# Patient Record
Sex: FEMALE | Race: WHITE | Employment: OTHER | URBAN - METROPOLITAN AREA
[De-identification: names, ages, dates, MRNs, and addresses within clinical notes are randomized per-mention and may not be internally consistent; named-entity substitution may affect disease eponyms.]

---

## 2017-02-06 ENCOUNTER — TRANSFERRED RECORDS (OUTPATIENT)
Dept: FAMILY MEDICINE | Facility: CLINIC | Age: 82
End: 2017-02-06

## 2017-02-23 ENCOUNTER — TRANSFERRED RECORDS (OUTPATIENT)
Dept: FAMILY MEDICINE | Facility: CLINIC | Age: 82
End: 2017-02-23

## 2017-03-07 ENCOUNTER — OFFICE VISIT (OUTPATIENT)
Dept: FAMILY MEDICINE | Facility: CLINIC | Age: 82
End: 2017-03-07

## 2017-03-07 VITALS
BODY MASS INDEX: 28.07 KG/M2 | WEIGHT: 139 LBS | OXYGEN SATURATION: 98 % | SYSTOLIC BLOOD PRESSURE: 104 MMHG | DIASTOLIC BLOOD PRESSURE: 56 MMHG | HEART RATE: 66 BPM | RESPIRATION RATE: 16 BRPM

## 2017-03-07 DIAGNOSIS — E11.51 DIABETES MELLITUS TYPE 2 WITH PERIPHERAL ARTERY DISEASE (H): Primary | ICD-10-CM

## 2017-03-07 DIAGNOSIS — K21.9 GASTROESOPHAGEAL REFLUX DISEASE, ESOPHAGITIS PRESENCE NOT SPECIFIED: ICD-10-CM

## 2017-03-07 DIAGNOSIS — E03.8 OTHER SPECIFIED HYPOTHYROIDISM: ICD-10-CM

## 2017-03-07 DIAGNOSIS — M48.061 LUMBAR SPINAL STENOSIS: ICD-10-CM

## 2017-03-07 DIAGNOSIS — M54.2 CERVICALGIA: ICD-10-CM

## 2017-03-07 DIAGNOSIS — I25.10 CORONARY ARTERY DISEASE INVOLVING NATIVE CORONARY ARTERY OF NATIVE HEART WITHOUT ANGINA PECTORIS: ICD-10-CM

## 2017-03-07 PROCEDURE — 99214 OFFICE O/P EST MOD 30 MIN: CPT | Performed by: FAMILY MEDICINE

## 2017-03-07 PROCEDURE — 36415 COLL VENOUS BLD VENIPUNCTURE: CPT | Performed by: FAMILY MEDICINE

## 2017-03-07 NOTE — MR AVS SNAPSHOT
After Visit Summary   3/7/2017    Serena Marie    MRN: [id number]           Patient Information     Date Of Birth          8/21/1924        Visit Information        Provider Department      3/7/2017 12:30 PM Victorino Schaefer MD [facility]        Today's Diagnoses     Diabetes mellitus type 2 with peripheral artery disease (H)    -  1    Gastroesophageal reflux disease, esophagitis presence not specified        Cervicalgia        Lumbar spinal stenosis        PAD (peripheral artery disease) (H)        Other specified hypothyroidism           Follow-ups after your visit        Your next 10 appointments already scheduled     Mar 28, 2017  3:00 PM CDT   Pacemaker Check with MITCHELL DCR2   [facility] [place] ([facility])    [address]  [facility] [id number] [phone]            Mar 28, 2017  4:15 PM CDT   Return Visit with Sis Auguste DO   [facility] ([facility])    [address]  [facility] [id number] [phone]              Who to contact     If you have questions or need follow up information about today's clinic visit or your schedule please contact [facility] directly at [phone].  Normal or non-critical lab and imaging results will be communicated to you by [employer], letter or phone within 4 business days after the clinic has received the results. If you do not hear from us within 7 days, please contact the clinic through [employer] or phone. If you have a critical or abnormal lab result, we will notify you by phone as soon as possible.  Submit refill requests through [employer] or call your pharmacy and they will forward the refill request to us. Please allow 3 business days for your refill to be completed.          Additional Information About Your Visit        [employer] Information     The [employer] lets you send messages to your  "doctor, view your test results, renew your prescriptions, schedule appointments and more. To sign up, go to [email or web address] . Click on \"Log in\" on the left side of the screen, which will take you to the Welcome page. Then click on \"Sign up Now\" on the right side of the page.     You will be asked to enter the access code listed below, as well as some personal information. Please follow the directions to create your username and password.     Your access code is: [email or web address]  Expires: 3/13/2017  1:09 PM     Your access code will  in 90 days. If you need help or a new code, please call your [place] clinic or [phone].        Care EveryWhere ID     This is your Care EveryWhere ID. This could be used by other organizations to access your [place] medical records  [id number]        Your Vitals Were     Pulse Respirations Pulse Oximetry BMI (Body Mass Index)          66 16 98% 28.07 kg/m2         Blood Pressure from Last 3 Encounters:   17 104/56   16 138/68   16 124/54    Weight from Last 3 Encounters:   17 63 kg (139 lb)   16 63.7 kg (140 lb 6.4 oz)   16 63.6 kg (140 lb 3.2 oz)              We Performed the Following     Comp. Metabolic Panel (14) ([facility])     Hemoglobin A1C ([facility])     Lipid Panel ([facility])     TSH ([facility])          Today's Medication Changes          These changes are accurate as of: 3/7/17 12:51 PM.  If you have any questions, ask your nurse or doctor.               These medicines have changed or have updated prescriptions.        Dose/Directions    fluocinolone acetonide 0.01 % Oil   This may have changed:    - how to take this  - when to take this  - reasons to take this  - additional instructions   Used for:  DM type 2, goal A1c below 7        Apply to scalp when wet massage well cover with cap leave on over night Wash in am.   Quantity:  118 mL   Refills:  12       furosemide 20 MG tablet   Commonly known as:  LASIX   This may have " changed:  See the new instructions.   Used for:  Encounter for medication refill        TAKE ONE TABLET BY MOUTH ON MONDAY WEDNESDAY AND FRIDAY   Quantity:  38 tablet   Refills:  3       lidocaine 5 % Patch   Commonly known as:  LIDODERM   This may have changed:  See the new instructions.   Used for:  Encounter for medication refill        APPLY UP TO 3 PATCHES TO PAINFUL AREA AT ONCE FOR UP TO 12 H WITHIN A 24 H PERIOD.  REMOVE AFTER 12 HOURS.   Quantity:  60 patch   Refills:  12                Primary Care Provider Office Phone # Fax #    Victorino Schaefer [phone]       [facility] [address]  [facility] [id number]        Goals        General    I want to have good control of Diabetes (pt-stated)     Notes - Note edited  1/28/2016  1:17 PM by Leatha Campos RN    I will check my blood sugars 4 x a day.  I will take Novolog with my meals.  I will take my Levemir at bedtime.              Thank you!     Thank you for choosing [facility]  for your care. Our goal is always to provide you with excellent care. Hearing back from our patients is one way we can continue to improve our services. Please take a few minutes to complete the written survey that you may receive in the mail after your visit with us. Thank you!             Your Updated Medication List - Protect others around you: Learn how to safely use, store and throw away your medicines at www.disposemymeds.org.          This list is accurate as of: 3/7/17 12:51 PM.  Always use your most recent med list.                   Brand Name Dispense Instructions for use    ACE/ARB NOT PRESCRIBED (INTENTIONAL)      1 each continuous prn ACE & ARB not prescribed due to Risk for drug interaction and Worsening renal function on ACE/ARB therapy       aspirin 81 MG tablet      Take 1 tablet by mouth every evening       atorvastatin 80 MG tablet    LIPITOR    90 tablet    Take 1 tablet (80 mg) by mouth daily       blood  glucose monitoring lancets     300 Box    Use to test blood sugar 3-4 times daily or as directed.       cholecalciferol 1000 UNIT tablet    vitamin D    100 tablet    Take 2,000 Units by mouth daily       DOCQLACE 100 MG capsule   Generic drug:  docusate sodium      Take 100 mg by mouth At Bedtime       fish oil-omega-3 fatty acids 1000 MG capsule      Take 1,200 mg by mouth daily       fluocinolone acetonide 0.01 % Oil     118 mL    Apply to scalp when wet massage well cover with cap leave on over night Wash in am.       furosemide 20 MG tablet    LASIX    38 tablet    TAKE ONE TABLET BY MOUTH ON MONDAY WEDNESDAY AND FRIDAY       GENTAK 0.3 % ophthalmic ointment   Generic drug:  gentamicin      Place into the right eye 2 times daily Right socket       GLUCERNA 1.5 JEN PO      Take 1 Can by mouth daily       LAMISIL EX      Externally apply topically daily For under breasts, in between toes       LEVEMIR FLEXTOUCH 100 UNIT/ML injection   Generic drug:  insulin detemir     15 mL    INJECT 15 UNITS UNDER THE SKIN AT BEDTIME       levothyroxine 75 MCG tablet    SYNTHROID/LEVOTHROID    90 tablet    TAKE ONE TABLET BY MOUTH EVERY DAY       lidocaine 5 % Patch    LIDODERM    60 patch    APPLY UP TO 3 PATCHES TO PAINFUL AREA AT ONCE FOR UP TO 12 H WITHIN A 24 H PERIOD.  REMOVE AFTER 12 HOURS.       TITO 128 5 % ophthalmic solution   Generic drug:  sodium chloride      Place 1 drop Into the left eye daily as needed for dry eyes       NovoLOG FLEXPEN 100 UNIT/ML injection   Generic drug:  insulin aspart     15 mL    INJECT 8-10 UNITS SUBCUTANEOUSLY UP TO 4 TIMES DAILY BEFORE MEALS AS DIRECTED       omeprazole 20 MG CR capsule    priLOSEC    30 capsule    Take 1 capsule (20 mg) by mouth At Bedtime       ONE TOUCH ULTRA test strip   Generic drug:  blood glucose monitoring     300 each    TEST BLOOD GLUCOSE FOUR TIMES A DAY       PREPARATION H 0.25-14-74.9 % rectal ointment   Generic drug:  phenylephrine-shark liver oil-mineral  oil-petrolatum      Place rectally daily       PRESERVISION AREDS 2 PO      Take 1 tablet by mouth every morning       ranitidine 150 MG tablet    ZANTAC     Take 150 mg by mouth daily (with lunch)       SYSTANE 0.4-0.3 % Soln ophthalmic solution   Generic drug:  polyethylene glycol 0.4%- propylene glycol 0.3%      Place 1 drop into both eyes as needed for dry eyes       timolol 0.5 % ophthalmic solution    TIMOPTIC    1 Bottle    Place 1 drop Into the left eye 2 times daily       TYLENOL PO      Take 1,000 mg by mouth daily       UNABLE TO FIND      Take 1 Bar by mouth daily MEDICATION NAME: FiberONe Bar       UNIFINE PENTIPS 31G X 6 MM   Generic drug:  insulin pen needle     100 each    USE ONCE DAILY       VAGISIL ANTI-ITCH MEDICATED EX      Externally apply topically every morning

## 2017-03-07 NOTE — LETTER
[facility]  [address] [place]  [address]  Phone: [phone]    March 9, 2017      Serena Marie  [address]  [facility] [address]              Dear Serena,    The results from your recent visit showed that  you are correct and  you did need to increase the insulin.   Do what we discussed and see me as planned.         Sincerely,     Victorino Rader M.D.    Results for orders placed or performed in visit on 03/07/17   Hemoglobin A1C ([facility])   Result Value Ref Range    Hemoglobin A1C 8.0 (H) 4.8 - 5.6 %    Narrative    Performed at:  01 - [facility] [address], [place], [place]  [id number]  : Kurtis Felix MD, Phone:  [phone]   Lipid Panel ([facility])   Result Value Ref Range    Cholesterol 123 100 - 199 mg/dL    Triglycerides 169 (H) 0 - 149 mg/dL    HDL Cholesterol 49 >39 mg/dL    VLDL Cholesterol Lito 34 5 - 40 mg/dL    LDL Cholesterol Calculated 40 0 - 99 mg/dL    LDL/HDL Ratio 0.8 0.0 - 3.2 ratio units    Narrative    Performed at:  01 - [facility] [address], [place], [place]  [id number]  : Kurtis Felix MD, Phone:  [phone]   Comp. Metabolic Panel (14) ([facility])   Result Value Ref Range    Glucose 280 (H) 65 - 99 mg/dL    Urea Nitrogen 16 10 - 36 mg/dL    Creatinine 0.60 0.57 - 1.00 mg/dL    eGFR If NonAfricn Am 79 >59 mL/min/1.73    eGFR If Africn Am 92 >59 mL/min/1.73    BUN/Creatinine Ratio 27 (H) 11 - 26    Sodium 136 134 - 144 mmol/L    Potassium 4.8 3.5 - 5.2 mmol/L    Chloride 96 96 - 106 mmol/L    Total CO2 20 18 - 28 mmol/L    Calcium 9.4 8.7 - 10.3 mg/dL    Protein Total 6.4 6.0 - 8.5 g/dL    Albumin 4.1 3.2 - 4.6 g/dL    Globulin, Total 2.3 1.5 - 4.5 g/dL    A/G Ratio 1.8 1.1 - 2.5    Bilirubin Total 0.3 0.0 - 1.2 mg/dL    Alkaline Phosphatase 79 39 - 117 IU/L    AST 22 0 - 40 IU/L    ALT 14 0 - 32 IU/L    Narrative    Performed at:  01 - [facility] [address], [place], [place]  [id number]  Lab  Director: Kurtis Felix MD, Phone:  [phone]   TSH ([facility])   Result Value Ref Range    TSH 0.756 0.450 - 4.500 uIU/mL    Narrative    Performed at:  01 - [facility] [address], [place], [place]  [id number]  : Kurtis Felix MD, Phone:  [phone]

## 2017-03-07 NOTE — PROGRESS NOTES
Multiple medical issues    Diabetes mellitus  Levemir 15 units daily, NovoLog with meals   at her last visit I suggested she cut back on her NovoLog insulin.  She was using 8-10 units with her meals four times a day.  Her A1c had gotten pretty low and it seemed reasonable to back off.  She tried using six units with her meals and has her readings with her today and is running most sugars during the day pre-meal in the 200s occasionally above 300.  Morning sugars are in the 140-180 range.  She is adamant that she go back to using her higher dose of NovoLog .    GERD previously associated with chest discomfort.  Currently she is on omeprazole 20 g daily no symptoms to suggest reflux.    Lumbar and cervical spine pain, she uses Lidoderm patches that she cots and finds these to be extremely helpful for her pain.    History of coronary artery disease.  She denies any chest discomfort current medications .  Aspirin 81 mg furosemide one tablet Monday and Thursday 20 mg.  Lipitor 80 mg.    Hypothyroidism on levothyroxine.      Past Medical History   Diagnosis Date     CAD (coronary artery disease) 8/2012 8/2012 Cath - 75% RCA with BRIGITTE placed, 40% ostial and distal left main stenosis     Diabetes mellitus type 2 with peripheral artery disease (H)      DM type 2, goal A1c below 7      GERD (gastroesophageal reflux disease)      Gluten intolerance      HTN (hypertension)      been off meds since 2012     Hyperlipidemia LDL goal < 100      Pacemaker 8/2009 8/2009 Near syncopal episode with HR 40s and BP 60/sys - PPM implanted     PAD (peripheral artery disease) (H) 2008     70% proximal right SFA stenosis with successful angioplasy     S/P coronary angiogram 8/2012 8/2012 Cath - 75% RCA with BRIGITTE placed, 40% ostial and distal left main stenosis     Syncope 8/2009 8/2009 Near syncopal episode with HR 40s and BP 60/sys - PPM implanted     Unspecified hypothyroidism      Past Surgical History   Procedure  Laterality Date     Colectomy       Tonsillectomy & adenoidectomy       Appendectomy       Hysterectomy, [place]       BSO     Back surgery       Cholecystectomy, laporoscopic       Cholecystectomy, Laparoscopic     Stent, coronary, [place]  8-12     8/2012 Cath - 75% RCA with BRIGITTE placed, 40% ostial and distal left main stenosis     Implant pacemaker  8/2009     Eye surgery       corneal transplant      C removal of eye       right      Angioplasty  2008     Angioplasty of proximal right superficial femoral artery     Current Outpatient Prescriptions   Medication     Multiple Vitamins-Minerals (PRESERVISION AREDS 2 PO)     omeprazole (PRILOSEC) 20 MG CR capsule     levothyroxine (SYNTHROID, LEVOTHROID) 75 MCG tablet     docusate sodium (DOCQLACE) 100 MG capsule     Terbinafine (LAMISIL EX)     Pramoxine HCl (VAGISIL ANTI-ITCH MEDICATED EX)     phenylephrine-shark liver oil-mineral oil-petrolatum (PREPARATION H) 0.25-14-74.9 % rectal ointment     polyethylene glycol 0.4%- propylene glycol 0.3% (SYSTANE) 0.4-0.3 % SOLN ophthalmic solution     NOVOLOG FLEXPEN 100 UNIT/ML soln     ranitidine (ZANTAC) 150 MG tablet     blood glucose monitoring (ONE TOUCH ULTRASOFT) lancets     lidocaine (LIDODERM) 5 % patch     ONE TOUCH ULTRA test strip     Nutritional Supplements (GLUCERNA 1.5 JEN PO)     UNABLE TO FIND     atorvastatin (LIPITOR) 80 MG tablet     furosemide (LASIX) 20 MG tablet     GENTAK 0.3 % ophthalmic ointment     fish oil-omega-3 fatty acids 1000 MG capsule     LEVEMIR FLEXTOUCH 100 UNIT/ML soln     UNIFINE PENTIPS 31G X 6 MM     cholecalciferol (VITAMIN D) 1000 UNIT tablet     timolol (TIMOPTIC) 0.5 % ophthalmic solution     Acetaminophen (TYLENOL PO)     sodium chloride (TITO 128) 5 % ophthalmic solution     fluocinolone acetonide 0.01 % OIL     aspirin 81 MG tablet     ACE/ARB NOT PRESCRIBED, INTENTIONAL,     No current facility-administered medications for this visit.      Ros  Tired in general  No CP  No edema      She continues to be very social.  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 Metabolic Panel       She will return to using the 8-10 units of NovoLog with her meals.  She is somewhat compulsive about her sugars and would prefer to be tightly controlled.    (K21.9) Gastroesophageal reflux disease, esophagitis presence not specified  Doing well currentlyOn her omeprazole    (M54.2) Cervicalgia  Significant problem that currently is managed well heat and lidocaine patches have helped her quite a bit if she gets a flareup she probably would benefit from brief course of steroids  (M48.06) Lumbar spinal stenosis   as above    (I25.10) Coronary artery disease involving native coronary artery of native heart without angina pectoris  She has no symptoms to suggest ischemia at this time certainly has significant coronary artery disease  Plan: Lipid Panel ([facility])  She will be following up with Dr. Molina shaikh         (E03.8) Other specified hypothyroidism  Plan: TSH ([facility])  Okay    rtc 2 months      Yanick Rader MD

## 2017-03-08 LAB
ALBUMIN SERPL-MCNC: 4.1 G/DL (ref 3.2–4.6)
ALBUMIN/GLOB SERPL: 1.8 {RATIO} (ref 1.1–2.5)
ALP SERPL-CCNC: 79 IU/L (ref 39–117)
ALT SERPL-CCNC: 14 IU/L (ref 0–32)
AST SERPL-CCNC: 22 IU/L (ref 0–40)
BILIRUB SERPL-MCNC: 0.3 MG/DL (ref 0–1.2)
BUN SERPL-MCNC: 16 MG/DL (ref 10–36)
BUN/CREATININE RATIO: 27 (ref 11–26)
CALCIUM SERPL-MCNC: 9.4 MG/DL (ref 8.7–10.3)
CHLORIDE SERPLBLD-SCNC: 96 MMOL/L (ref 96–106)
CHOLEST SERPL-MCNC: 123 MG/DL (ref 100–199)
CREAT SERPL-MCNC: 0.6 MG/DL (ref 0.57–1)
EGFR IF AFRICN AM: 92 ML/MIN/1.73
EGFR IF NONAFRICN AM: 79 ML/MIN/1.73
GLOBULIN, TOTAL: 2.3 G/DL (ref 1.5–4.5)
GLUCOSE SERPL-MCNC: 280 MG/DL (ref 65–99)
HBA1C MFR BLD: 8 % (ref 4.8–5.6)
HDLC SERPL-MCNC: 49 MG/DL
LDL/HDL RATIO: 0.8 RATIO UNITS (ref 0–3.2)
LDLC SERPL CALC-MCNC: 40 MG/DL (ref 0–99)
POTASSIUM SERPL-SCNC: 4.8 MMOL/L (ref 3.5–5.2)
PROT SERPL-MCNC: 6.4 G/DL (ref 6–8.5)
SODIUM SERPL-SCNC: 136 MMOL/L (ref 134–144)
TOTAL CO2: 20 MMOL/L (ref 18–28)
TRIGL SERPL-MCNC: 169 MG/DL (ref 0–149)
TSH BLD-ACNC: 0.76 UIU/ML (ref 0.45–4.5)
VLDLC SERPL CALC-MCNC: 34 MG/DL (ref 5–40)

## 2017-03-28 ENCOUNTER — ALLIED HEALTH/NURSE VISIT (OUTPATIENT)
Dept: CARDIOLOGY | Facility: CLINIC | Age: 82
End: 2017-03-28
Payer: MEDICARE

## 2017-03-28 ENCOUNTER — OFFICE VISIT (OUTPATIENT)
Dept: CARDIOLOGY | Facility: CLINIC | Age: 82
End: 2017-03-28
Payer: MEDICARE

## 2017-03-28 VITALS
HEART RATE: 72 BPM | BODY MASS INDEX: 28.43 KG/M2 | HEIGHT: 59 IN | WEIGHT: 141 LBS | SYSTOLIC BLOOD PRESSURE: 124 MMHG | DIASTOLIC BLOOD PRESSURE: 70 MMHG

## 2017-03-28 DIAGNOSIS — I73.9 PAD (PERIPHERAL ARTERY DISEASE) (H): ICD-10-CM

## 2017-03-28 DIAGNOSIS — Z95.0 CARDIAC PACEMAKER IN SITU: Primary | ICD-10-CM

## 2017-03-28 PROCEDURE — 93280 PM DEVICE PROGR EVAL DUAL: CPT | Performed by: INTERNAL MEDICINE

## 2017-03-28 PROCEDURE — 99213 OFFICE O/P EST LOW 20 MIN: CPT | Performed by: INTERNAL MEDICINE

## 2017-03-28 RX ORDER — SENNOSIDES 8.6 MG
1300 CAPSULE ORAL DAILY
COMMUNITY
End: 2020-08-28

## 2017-03-28 RX ORDER — LIDOCAINE 50 MG/G
1 PATCH TOPICAL EVERY 24 HOURS
COMMUNITY
End: 2017-08-31

## 2017-03-28 RX ORDER — ATORVASTATIN CALCIUM 80 MG/1
80 TABLET, FILM COATED ORAL DAILY
Qty: 90 TABLET | Refills: 3 | Status: SHIPPED | OUTPATIENT
Start: 2017-03-28

## 2017-03-28 RX ORDER — FUROSEMIDE 20 MG
20 TABLET ORAL WEEKLY
COMMUNITY
End: 2018-02-17

## 2017-03-28 NOTE — LETTER
3/28/2017    Victorino Schaefer MD  [facility]  [address] [place] [address]    RE: Serena SETH Marie       Dear Colleague,    I had the pleasure of seeing Serena Woodshelder in the [facility].    REFERRING PHYSICIAN:  Dr. Victorino Schaefer.      Ms. 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  She is here for a followup visit today.  She was hospitalized in September of last year with pneumonia.  During that time, she did undergo an echocardiogram which I have available for review.  Her heart function looks really good for 89.  Her EF is normal at 60%-65%.  She has mild mitral insufficiency, but no other significant valvular heart disease.  RV function looks normal.  Pacemaker is noted in the RV.  Review of her labs from the beginning of this month show optimal control of her cholesterol numbers with the exception of mild elevation in the triglycerides at 169.  Her LDL is 40, HDL 49, total is 123.  She does tolerate high-intensity statin therapy.  She also had a basic metabolic panel showing normal kidney function and normal electrolytes.  Liver function tests appeared normal that day.  Her hemoglobin A1c was elevated at 8.  She does have measurement of her blood sugar numbers and they range in the 200s-300s.      PHYSICAL EXAMINATION:   VITAL SIGNS:  On exam today, her blood pressure is 124/70, pulse is 72, weight 141.  This has been fairly stable, fluctuating only a couple of pounds over the last year.   CARDIOVASCULAR:  Tones are regular.  I do not appreciate a murmur today on exam.   LUNGS:  Clear posteriorly.   EXTREMITIES:  She has no peripheral edema.     Outpatient Encounter Prescriptions as of 3/28/2017   Medication Sig Dispense Refill     acetaminophen (TYLENOL ARTHRITIS  PAIN) 650 MG CR tablet Take 1,300 mg by mouth daily States 2 tablets qam & usually 2 tablets qpm       RaNITidine HCl (ZANTAC 150 MAXIMUM STRENGTH PO) Take 150 mg by mouth daily as needed       cholecalciferol (VITAMIN D) 1000 UNIT tablet Take 2,000 Units by mouth daily       gentamicin (GENTAK) 0.3 % ophthalmic ointment Place 1 Application into the right eye 3 times daily       lidocaine (LIDODERM) 5 % Patch Place 1 patch onto the skin every 24 hours 1/2 a patch on her R side of her neck & other 1/2 on her lower back for 12 hours a day       furosemide (LASIX) 20 MG tablet Take 20 mg by mouth once a week 1 tablet Mon & Thur       atorvastatin (LIPITOR) 80 MG tablet Take 1 tablet (80 mg) by mouth daily 90 tablet 3     Multiple Vitamins-Minerals (PRESERVISION AREDS 2 PO) Take 1 tablet by mouth every morning       omeprazole (PRILOSEC) 20 MG CR capsule Take 1 capsule (20 mg) by mouth At Bedtime 30 capsule 11     levothyroxine (SYNTHROID, LEVOTHROID) 75 MCG tablet TAKE ONE TABLET BY MOUTH EVERY DAY 90 tablet 2     docusate sodium (DOCQLACE) 100 MG capsule Take 100 mg by mouth At Bedtime       Terbinafine (LAMISIL EX) Externally apply topically daily For under breasts, in between toes       Pramoxine HCl (VAGISIL ANTI-ITCH MEDICATED EX) Externally apply topically every morning       phenylephrine-shark liver oil-mineral oil-petrolatum (PREPARATION H) 0.25-14-74.9 % rectal ointment Place rectally daily       [DISCONTINUED] NOVOLOG FLEXPEN 100 UNIT/ML soln INJECT 8-10 UNITS SUBCUTANEOUSLY UP TO 4 TIMES DAILY BEFORE MEALS AS DIRECTED 15 mL 4     blood glucose monitoring (ONE TOUCH ULTRASOFT) lancets Use to test blood sugar 3-4 times daily or as directed. 300 Box 11     ONE TOUCH ULTRA test strip TEST BLOOD GLUCOSE FOUR TIMES A  each 11     Nutritional Supplements (GLUCERNA 1.5 JEN PO) Take 1 Can by mouth daily        UNABLE TO FIND Take 1 Bar by mouth 2 times daily MEDICATION NAME: FiberONe Bar        fish  oil-omega-3 fatty acids 1000 MG capsule Take 1,200 mg by mouth daily        [DISCONTINUED] LEVEMIR FLEXTOUCH 100 UNIT/ML soln INJECT 15 UNITS UNDER THE SKIN AT BEDTIME 15 mL 9     UNIFINE PENTIPS 31G X 6 MM USE ONCE DAILY 100 each 12     timolol (TIMOPTIC) 0.5 % ophthalmic solution Place 1 drop Into the left eye 2 times daily  1 Bottle      sodium chloride (TITO 128) 5 % ophthalmic solution Place 1 drop Into the left eye daily as needed for dry eyes        ACE/ARB NOT PRESCRIBED, INTENTIONAL, 1 each continuous prn ACE & ARB not prescribed due to Risk for drug interaction and Worsening renal function on ACE/ARB therapy (Patient not taking: Reported on 4/10/2017)       fluocinolone acetonide 0.01 % OIL Apply to scalp when wet massage well cover with cap leave on over night  Wash in am. (Patient not taking: Reported on 5/9/2017) 118 mL 12     aspirin 81 MG tablet Take 1 tablet by mouth every evening        [DISCONTINUED] polyethylene glycol 0.4%- propylene glycol 0.3% (SYSTANE) 0.4-0.3 % SOLN ophthalmic solution Place 1 drop into both eyes as needed for dry eyes       [DISCONTINUED] ranitidine (ZANTAC) 150 MG tablet Take 150 mg by mouth daily (with lunch)        [DISCONTINUED] lidocaine (LIDODERM) 5 % patch APPLY UP TO 3 PATCHES TO PAINFUL AREA AT ONCE FOR UP TO 12 H WITHIN A 24 H PERIOD.  REMOVE AFTER 12 HOURS. (Patient taking differently: APPLY 1 PATCH (1/2 patch to right side of neck and 1/2 patch to mid-low back) TO PAINFUL AREA  FOR UP TO 12 H WITHIN A 24 H PERIOD.  REMOVE AFTER 12 HOURS.) 60 patch 12     [DISCONTINUED] atorvastatin (LIPITOR) 80 MG tablet Take 1 tablet (80 mg) by mouth daily 90 tablet 3     [DISCONTINUED] furosemide (LASIX) 20 MG tablet TAKE ONE TABLET BY MOUTH ON MONDAY WEDNESDAY AND FRIDAY (Patient taking differently: TAKE ONE TABLET BY MOUTH ON MONDAY AND THURSDAY) 38 tablet 3     [DISCONTINUED] GENTAK 0.3 % ophthalmic ointment Place into the right eye 2 times daily Right socket  1      [DISCONTINUED] cholecalciferol (VITAMIN D) 1000 UNIT tablet Take 2,000 Units by mouth daily  100 tablet 3     [DISCONTINUED] Acetaminophen (TYLENOL PO) Take 1,000 mg by mouth daily        No facility-administered encounter medications on file as of 3/28/2017.       SUMMARY:  Ms. 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  She is stable from a cardiac perspective and asymptomatic with normal LV function and no significant valvular disease noted on echocardiogram last fall.  Her blood tests show optimal control of her cholesterol with the exception of mild elevated triglycerides, likely in correlation with her mildly elevated hemoglobin A1c, her blood sugars as well.  Kidney function and electrolytes looked normal.  Her last interrogation of her pacemaker is from November of this past year showing appropriate pacing thresholds and adequate battery life.  She is continuing to have interrogations every 3 months.  I do not have any recommendations for changes to her medications at this time.  She seems to be doing well from a cardiac perspective.  I am happy to see her back as needed.  Please feel free to contact me with any questions you have in regards to her care.      Again, thank you for allowing me to participate in the care of your patient.      Sincerely,    Sis Auguste, DO     [facility]

## 2017-03-28 NOTE — MR AVS SNAPSHOT
After Visit Summary   3/28/2017    Serena Marie    MRN: [id number]           Patient Information     Date Of Birth          8/21/1924        Visit Information        Provider Department      3/28/2017 3:15 PM MITCHELL TAMMIEN [facility]        Today's Diagnoses     Cardiac pacemaker in situ    -  1       Follow-ups after your visit        Your next 10 appointments already scheduled     Mar 28, 2017  4:15 PM CDT   Return Visit with Sis Auguste DO   [facility] ([facility])    [address] [place] [address] [phone]            May 09, 2017 12:30 PM CDT   SHORT with Victorino Schaefer MD   [facility]    [address] [place] [address] [phone]            Jun 29, 2017 11:15 AM CDT   Phone Device Check with MITCHELL TECH2   [facility] ([facility])    [address] [place], [place] [id number]  [facility] [id number] [phone]              Who to contact     If you have questions or need follow up information about today's clinic visit or your schedule please contact [facility] directly at [phone].  Normal or non-critical lab and imaging results will be communicated to you by [email or web address], letter or phone within 4 business days after the clinic has received the results. If you do not hear from us within 7 days, please contact the clinic through MyChart or phone. If you have a critical or abnormal lab result, we will notify you by phone as soon as possible.  Submit refill requests through Gam[employer] or call your pharmacy and they will forward the refill request to us. Please allow 3 business days for your refill to be completed.          Additional Information About Your Visit        Gamemaster Information     Gam[employer]aster lets you send messages to your doctor,  "view your test results, renew your prescriptions, schedule appointments and more. To sign up, go to [email or web address] . Click on \"Log in\" on the left side of the screen, which will take you to the Welcome page. Then click on \"Sign up Now\" on the right side of the page.     You will be asked to enter the access code listed below, as well as some personal information. Please follow the directions to create your username and password.     Your access code is: [email or web address]  Expires: 2017  3:42 PM     Your access code will  in 90 days. If you need help or a new code, please call your [place] clinic or [phone].        Care EveryWhere ID     This is your Care EveryWhere ID. This could be used by other organizations to access your [place] medical records  [id number]         Blood Pressure from Last 3 Encounters:   17 104/56   16 138/68   16 124/54    Weight from Last 3 Encounters:   17 63 kg (139 lb)   16 63.7 kg (140 lb 6.4 oz)   16 63.6 kg (140 lb 3.2 oz)              We Performed the Following     PM DEVICE PROGRAMMING EVAL, DUAL LEAD PACER (53952)          Today's Medication Changes          These changes are accurate as of: 3/28/17  3:42 PM.  If you have any questions, ask your nurse or doctor.               These medicines have changed or have updated prescriptions.        Dose/Directions    fluocinolone acetonide 0.01 % Oil   This may have changed:    - how to take this  - when to take this  - reasons to take this  - additional instructions   Used for:  DM type 2, goal A1c below 7        Apply to scalp when wet massage well cover with cap leave on over night Wash in am.   Quantity:  118 mL   Refills:  12       furosemide 20 MG tablet   Commonly known as:  LASIX   This may have changed:  See the new instructions.   Used for:  Encounter for medication refill        TAKE ONE TABLET BY MOUTH ON  AND FRIDAY   Quantity:  38 tablet   Refills:  3 "       lidocaine 5 % Patch   Commonly known as:  LIDODERM   This may have changed:  See the new instructions.   Used for:  Encounter for medication refill        APPLY UP TO 3 PATCHES TO PAINFUL AREA AT ONCE FOR UP TO 12 H WITHIN A 24 H PERIOD.  REMOVE AFTER 12 HOURS.   Quantity:  60 patch   Refills:  12                Primary Care Provider Office Phone # Fax #    Victorino Schaefer [phone]       [facility] [address]  [facility] [address]        Goals        General    I want to have good control of Diabetes (pt-stated)     Notes - Note edited  1/28/2016  1:17 PM by Leatha Campos, RN    I will check my blood sugars 4 x a day.  I will take Novolog with my meals.  I will take my Levemir at bedtime.              Thank you!     Thank you for choosing [facility] [place]  for your care. Our goal is always to provide you with excellent care. Hearing back from our patients is one way we can continue to improve our services. Please take a few minutes to complete the written survey that you may receive in the mail after your visit with us. Thank you!             Your Updated Medication List - Protect others around you: Learn how to safely use, store and throw away your medicines at www.disposemymeds.org.          This list is accurate as of: 3/28/17  3:42 PM.  Always use your most recent med list.                   Brand Name Dispense Instructions for use    ACE/ARB NOT PRESCRIBED (INTENTIONAL)      1 each continuous prn ACE & ARB not prescribed due to Risk for drug interaction and Worsening renal function on ACE/ARB therapy       aspirin 81 MG tablet      Take 1 tablet by mouth every evening       atorvastatin 80 MG tablet    LIPITOR    90 tablet    Take 1 tablet (80 mg) by mouth daily       blood glucose monitoring lancets     300 Box    Use to test blood sugar 3-4 times daily or as directed.       cholecalciferol 1000 UNIT tablet    vitamin D     100 tablet    Take 2,000 Units by mouth daily       DOCQLACE 100 MG capsule   Generic drug:  docusate sodium      Take 100 mg by mouth At Bedtime       fish oil-omega-3 fatty acids 1000 MG capsule      Take 1,200 mg by mouth daily       fluocinolone acetonide 0.01 % Oil     118 mL    Apply to scalp when wet massage well cover with cap leave on over night Wash in am.       furosemide 20 MG tablet    LASIX    38 tablet    TAKE ONE TABLET BY MOUTH ON MONDAY WEDNESDAY AND FRIDAY       GENTAK 0.3 % ophthalmic ointment   Generic drug:  gentamicin      Place into the right eye 2 times daily Right socket       GLUCERNA 1.5 JEN PO      Take 1 Can by mouth daily       LAMISIL EX      Externally apply topically daily For under breasts, in between toes       LEVEMIR FLEXTOUCH 100 UNIT/ML injection   Generic drug:  insulin detemir     15 mL    INJECT 15 UNITS UNDER THE SKIN AT BEDTIME       levothyroxine 75 MCG tablet    SYNTHROID/LEVOTHROID    90 tablet    TAKE ONE TABLET BY MOUTH EVERY DAY       lidocaine 5 % Patch    LIDODERM    60 patch    APPLY UP TO 3 PATCHES TO PAINFUL AREA AT ONCE FOR UP TO 12 H WITHIN A 24 H PERIOD.  REMOVE AFTER 12 HOURS.       TITO 128 5 % ophthalmic solution   Generic drug:  sodium chloride      Place 1 drop Into the left eye daily as needed for dry eyes       NovoLOG FLEXPEN 100 UNIT/ML injection   Generic drug:  insulin aspart     15 mL    INJECT 8-10 UNITS SUBCUTANEOUSLY UP TO 4 TIMES DAILY BEFORE MEALS AS DIRECTED       omeprazole 20 MG CR capsule    priLOSEC    30 capsule    Take 1 capsule (20 mg) by mouth At Bedtime       ONE TOUCH ULTRA test strip   Generic drug:  blood glucose monitoring     300 each    TEST BLOOD GLUCOSE FOUR TIMES A DAY       PREPARATION H 0.25-14-74.9 % rectal ointment   Generic drug:  phenylephrine-shark liver oil-mineral oil-petrolatum      Place rectally daily       PRESERVISION AREDS 2 PO      Take 1 tablet by mouth every morning       ranitidine 150 MG tablet     ZANTAC     Take 150 mg by mouth daily (with lunch)       SYSTANE 0.4-0.3 % Soln ophthalmic solution   Generic drug:  polyethylene glycol 0.4%- propylene glycol 0.3%      Place 1 drop into both eyes as needed for dry eyes       timolol 0.5 % ophthalmic solution    TIMOPTIC    1 Bottle    Place 1 drop Into the left eye 2 times daily       TYLENOL PO      Take 1,000 mg by mouth daily       UNABLE TO FIND      Take 1 Bar by mouth daily MEDICATION NAME: FiberONe Bar       UNIFINE PENTIPS 31G X 6 MM   Generic drug:  insulin pen needle     100 each    USE ONCE DAILY       VAGISIL ANTI-ITCH MEDICATED EX      Externally apply topically every morning

## 2017-03-28 NOTE — PROGRESS NOTES
Don Majano DR Pacemaker Device Check/ Dr Auguste  AP: 25 % : 75 %  Mode: DDD        Underlying Rhythm: SR with CHB, rate 42 bpm  Heart Rate: Histogram is stable with adequate HR distribution  Sensing: Stable    Pacing Threshold: Stable   Impedance: WNL  Battery Status: 5 years estimated longevity  Atrial Arrhythmia: NONE  Ventricular Arrhythmia: NONE  Setting Change: NONE    Care Plan: Home teletrace in 3 months.

## 2017-03-28 NOTE — MR AVS SNAPSHOT
"              After Visit Summary   3/28/2017    Serena Marie    MRN: [id number]           Patient Information     Date Of Birth          8/21/1924        Visit Information        Provider Department      3/28/2017 4:15 PM Sis Auguste DO [facility] [place]        Today's Diagnoses     PAD (peripheral artery disease)           Follow-ups after your visit        Your next 10 appointments already scheduled     May 09, 2017 12:30 PM CDT   SHORT with Victorino Schaefer MD   [facility]    [address] [place] [address] [phone]            Jun 29, 2017 11:15 AM CDT   Phone Device Check with MITCHELL TECH2   [facility] ([facility])    [address] [place] [address] [phone]              Who to contact     If you have questions or need follow up information about today's clinic visit or your schedule please contact [facility] directly at [phone].  Normal or non-critical lab and imaging results will be communicated to you by be2hart, letter or phone within 4 business days after the clinic has received the results. If you do not hear from us within 7 days, please contact the clinic through be2hart or phone. If you have a critical or abnormal lab result, we will notify you by phone as soon as possible.  Submit refill requests through [employer] or call your pharmacy and they will forward the refill request to us. Please allow 3 business days for your refill to be completed.          Additional Information About Your Visit        MyChart Information     [employer] lets you send messages to your doctor, view your test results, renew your prescriptions, schedule appointments and more. To sign up, go to [email or web address] . Click on \"Log in\" on the left side of the screen, which will take you to the " "Welcome page. Then click on \"Sign up Now\" on the right side of the page.     You will be asked to enter the access code listed below, as well as some personal information. Please follow the directions to create your username and password.     Your access code is: [email or web address]  Expires: 2017  3:42 PM     Your access code will  in 90 days. If you need help or a new code, please call your [place] clinic or [phone].        Care EveryWhere ID     This is your Care EveryWhere ID. This could be used by other organizations to access your [place] medical records  [id number]        Your Vitals Were     Pulse Height BMI (Body Mass Index)             72 1.499 m (4' 11\") 28.48 kg/m2          Blood Pressure from Last 3 Encounters:   17 124/70   17 104/56   16 138/68    Weight from Last 3 Encounters:   17 64 kg (141 lb)   17 63 kg (139 lb)   16 63.7 kg (140 lb 6.4 oz)              Today, you had the following     No orders found for display         Today's Medication Changes          These changes are accurate as of: 3/28/17  4:36 PM.  If you have any questions, ask your nurse or doctor.               These medicines have changed or have updated prescriptions.        Dose/Directions    fluocinolone acetonide 0.01 % Oil   This may have changed:    - how to take this  - when to take this  - reasons to take this  - additional instructions   Used for:  DM type 2, goal A1c below 7        Apply to scalp when wet massage well cover with cap leave on over night Wash in am.   Quantity:  118 mL   Refills:  12            Where to get your medicines      These medications were sent to [id number] - [place], [place] - [address]  [address] [facility] [address]     Phone:  [phone]     atorvastatin 80 MG tablet                Primary Care Provider Office Phone # Fax #    Victorino Schaefer [phone]       [place] [facility] [address]  [place] [address]        Goals        General    I want to have good control of Diabetes (pt-stated)     Notes - Note edited  1/28/2016  1:17 PM by Leatha Campos, RN    I will check my blood sugars 4 x a day.  I will take Novolog with my meals.  I will take my Levemir at bedtime.              Thank you!     Thank you for choosing [facility] [place]  for your care. Our goal is always to provide you with excellent care. Hearing back from our patients is one way we can continue to improve our services. Please take a few minutes to complete the written survey that you may receive in the mail after your visit with us. Thank you!             Your Updated Medication List - Protect others around you: Learn how to safely use, store and throw away your medicines at www.disposemymeds.org.          This list is accurate as of: 3/28/17  4:36 PM.  Always use your most recent med list.                   Brand Name Dispense Instructions for use    ACE/ARB NOT PRESCRIBED (INTENTIONAL)      1 each continuous prn ACE & ARB not prescribed due to Risk for drug interaction and Worsening renal function on ACE/ARB therapy       aspirin 81 MG tablet      Take 1 tablet by mouth every evening       atorvastatin 80 MG tablet    LIPITOR    90 tablet    Take 1 tablet (80 mg) by mouth daily       blood glucose monitoring lancets     300 Box    Use to test blood sugar 3-4 times daily or as directed.       cholecalciferol 1000 UNIT tablet    vitamin D     Take 2,000 Units by mouth daily       DOCQLACE 100 MG capsule   Generic drug:  docusate sodium      Take 100 mg by mouth At Bedtime       fish oil-omega-3 fatty acids 1000 MG capsule      Take 1,200 mg by mouth daily       fluocinolone acetonide 0.01 % Oil     118 mL    Apply to scalp when wet massage well cover with cap leave on over night Wash in am.       GENTAK 0.3 % ophthalmic ointment   Generic drug:  gentamicin      Place 1 Application into the right eye 3  times daily       GLUCERNA 1.5 JEN PO      Take 1 Can by mouth daily       LAMISIL EX      Externally apply topically daily For under breasts, in between toes       LASIX 20 MG tablet   Generic drug:  furosemide      Take 20 mg by mouth once a week 1 tablet Mon & Thur       LEVEMIR FLEXTOUCH 100 UNIT/ML injection   Generic drug:  insulin detemir     15 mL    INJECT 15 UNITS UNDER THE SKIN AT BEDTIME       levothyroxine 75 MCG tablet    SYNTHROID/LEVOTHROID    90 tablet    TAKE ONE TABLET BY MOUTH EVERY DAY       lidocaine 5 % Patch    LIDODERM     Place 1 patch onto the skin every 24 hours 1/2 a patch on her R side of her neck & other 1/2 on her lower back for 12 hours a day       TITO 128 5 % ophthalmic solution   Generic drug:  sodium chloride      Place 1 drop Into the left eye daily as needed for dry eyes       NovoLOG FLEXPEN 100 UNIT/ML injection   Generic drug:  insulin aspart     15 mL    INJECT 8-10 UNITS SUBCUTANEOUSLY UP TO 4 TIMES DAILY BEFORE MEALS AS DIRECTED       omeprazole 20 MG CR capsule    priLOSEC    30 capsule    Take 1 capsule (20 mg) by mouth At Bedtime       ONE TOUCH ULTRA test strip   Generic drug:  blood glucose monitoring     300 each    TEST BLOOD GLUCOSE FOUR TIMES A DAY       PREPARATION H 0.25-14-74.9 % rectal ointment   Generic drug:  phenylephrine-shark liver oil-mineral oil-petrolatum      Place rectally daily       PRESERVISION AREDS 2 PO      Take 1 tablet by mouth every morning       timolol 0.5 % ophthalmic solution    TIMOPTIC    1 Bottle    Place 1 drop Into the left eye 2 times daily       TYLENOL ARTHRITIS PAIN 650 MG CR tablet   Generic drug:  acetaminophen      Take 1,300 mg by mouth daily States 2 tablets qam & usually 2 tablets qpm       UNABLE TO FIND      Take 1 Bar by mouth daily MEDICATION NAME: FiberONe Bar       UNIFINE PENTIPS 31G X 6 MM   Generic drug:  insulin pen needle     100 each    USE ONCE DAILY       VAGISIL ANTI-ITCH MEDICATED EX      Externally apply  topically every morning       ZANTAC 150 MAXIMUM STRENGTH PO      Take 150 mg by mouth daily as needed

## 2017-03-28 NOTE — PROGRESS NOTES
HPI and Plan:   See dictation    No orders of the defined types were placed in this encounter.      Orders Placed This Encounter   Medications     acetaminophen (TYLENOL ARTHRITIS PAIN) 650 MG CR tablet     Sig: Take 1,300 mg by mouth daily States 2 tablets qam & usually 2 tablets qpm     RaNITidine HCl (ZANTAC 150 MAXIMUM STRENGTH PO)     Sig: Take 150 mg by mouth daily as needed     cholecalciferol (VITAMIN D) 1000 UNIT tablet     Sig: Take 2,000 Units by mouth daily     gentamicin (GENTAK) 0.3 % ophthalmic ointment     Sig: Place 1 Application into the right eye 3 times daily     lidocaine (LIDODERM) 5 % Patch     Sig: Place 1 patch onto the skin every 24 hours 1/2 a patch on her R side of her neck & other 1/2 on her lower back for 12 hours a day     furosemide (LASIX) 20 MG tablet     Sig: Take 20 mg by mouth once a week 1 tablet Mon & Thur     atorvastatin (LIPITOR) 80 MG tablet     Sig: Take 1 tablet (80 mg) by mouth daily     Dispense:  90 tablet     Refill:  3       Medications Discontinued During This Encounter   Medication Reason     Acetaminophen (TYLENOL PO) Stopped by Patient     ranitidine (ZANTAC) 150 MG tablet Stopped by Patient     cholecalciferol (VITAMIN D) 1000 UNIT tablet Stopped by Patient     GENTAK 0.3 % ophthalmic ointment Stopped by Patient     polyethylene glycol 0.4%- propylene glycol 0.3% (SYSTANE) 0.4-0.3 % SOLN ophthalmic solution Stopped by Patient     lidocaine (LIDODERM) 5 % patch Stopped by Patient     furosemide (LASIX) 20 MG tablet Stopped by Patient     atorvastatin (LIPITOR) 80 MG tablet Reorder         Encounter Diagnosis   Name Primary?     PAD (peripheral artery disease)        CURRENT MEDICATIONS:  Current Outpatient Prescriptions   Medication Sig Dispense Refill     acetaminophen (TYLENOL ARTHRITIS PAIN) 650 MG CR tablet Take 1,300 mg by mouth daily States 2 tablets qam & usually 2 tablets qpm       RaNITidine HCl (ZANTAC 150 MAXIMUM STRENGTH PO) Take 150 mg by mouth daily  as needed       cholecalciferol (VITAMIN D) 1000 UNIT tablet Take 2,000 Units by mouth daily       gentamicin (GENTAK) 0.3 % ophthalmic ointment Place 1 Application into the right eye 3 times daily       lidocaine (LIDODERM) 5 % Patch Place 1 patch onto the skin every 24 hours 1/2 a patch on her R side of her neck & other 1/2 on her lower back for 12 hours a day       furosemide (LASIX) 20 MG tablet Take 20 mg by mouth once a week 1 tablet Mon & Thur       atorvastatin (LIPITOR) 80 MG tablet Take 1 tablet (80 mg) by mouth daily 90 tablet 3     Multiple Vitamins-Minerals (PRESERVISION AREDS 2 PO) Take 1 tablet by mouth every morning       omeprazole (PRILOSEC) 20 MG CR capsule Take 1 capsule (20 mg) by mouth At Bedtime 30 capsule 11     levothyroxine (SYNTHROID, LEVOTHROID) 75 MCG tablet TAKE ONE TABLET BY MOUTH EVERY DAY 90 tablet 2     docusate sodium (DOCQLACE) 100 MG capsule Take 100 mg by mouth At Bedtime       Terbinafine (LAMISIL EX) Externally apply topically daily For under breasts, in between toes       Pramoxine HCl (VAGISIL ANTI-ITCH MEDICATED EX) Externally apply topically every morning       phenylephrine-shark liver oil-mineral oil-petrolatum (PREPARATION H) 0.25-14-74.9 % rectal ointment Place rectally daily       NOVOLOG FLEXPEN 100 UNIT/ML soln INJECT 8-10 UNITS SUBCUTANEOUSLY UP TO 4 TIMES DAILY BEFORE MEALS AS DIRECTED 15 mL 4     blood glucose monitoring (ONE TOUCH ULTRASOFT) lancets Use to test blood sugar 3-4 times daily or as directed. [address]     ONE TOUCH ULTRA test strip TEST BLOOD GLUCOSE FOUR TIMES A  each 11     Nutritional Supplements (GLUCERNA 1.5 JEN PO) Take 1 Can by mouth daily        UNABLE TO FIND Take 1 Bar by mouth daily MEDICATION NAME: FiberONe Bar       fish oil-omega-3 fatty acids 1000 MG capsule Take 1,200 mg by mouth daily        LEVEMIR FLEXTOUCH 100 UNIT/ML soln INJECT 15 UNITS UNDER THE SKIN AT BEDTIME 15 mL 9     UNIFINE PENTIPS 31G X 6 MM USE ONCE DAILY 100  each 12     timolol (TIMOPTIC) 0.5 % ophthalmic solution Place 1 drop Into the left eye 2 times daily  1 Bottle      sodium chloride (TITO 128) 5 % ophthalmic solution Place 1 drop Into the left eye daily as needed for dry eyes        ACE/ARB NOT PRESCRIBED, INTENTIONAL, 1 each continuous prn ACE & ARB not prescribed due to Risk for drug interaction and Worsening renal function on ACE/ARB therapy       fluocinolone acetonide 0.01 % OIL Apply to scalp when wet massage well cover with cap leave on over night  Wash in am. (Patient taking differently: Apply topically daily as needed Apply to scalp when wet massage well cover with cap leave on over night  Wash in am.) 118 mL 12     aspirin 81 MG tablet Take 1 tablet by mouth every evening        [DISCONTINUED] atorvastatin (LIPITOR) 80 MG tablet Take 1 tablet (80 mg) by mouth daily 90 tablet 3       ALLERGIES     Allergies   Allergen Reactions     Contrast Dye Hives and Swelling     Pt. States no history of allergy to topical betadine. Tolerated surgical betadine prep 2-6-2012.     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 (H)      DM type 2, goal A1c below 7      GERD (gastroesophageal reflux disease)      Gluten intolerance      HTN (hypertension)     been off meds since 2012     Hyperlipidemia LDL goal < 100      Pacemaker 8/2009 8/2009 Near syncopal episode with HR 40s and BP 60/sys - PPM implanted     PAD (peripheral artery disease) (H) 2008    70% proximal right SFA stenosis with successful angioplasy     S/P coronary angiogram 8/2012 8/2012 Cath - 75% RCA with BRIGITTE placed, 40% ostial and distal left main stenosis     Syncope 8/2009 8/2009 Near syncopal episode with HR 40s and BP 60/sys - PPM  "implanted     Unspecified hypothyroidism        PAST SURGICAL HISTORY:  Past Surgical History:   Procedure Laterality Date     ANGIOPLASTY  2008    Angioplasty of proximal right superficial femoral artery     APPENDECTOMY       BACK SURGERY       C REMOVAL OF EYE      right      CHOLECYSTECTOMY, LAPOROSCOPIC      Cholecystectomy, Laparoscopic     COLECTOMY       EYE SURGERY      corneal transplant      HYSTERECTOMY, EMANI      BSO     IMPLANT PACEMAKER  8/2009     STENT, CORONARY, FATOU  8-12 8/2012 Cath - 75% RCA with BRIGITTE placed, 40% ostial and distal left main stenosis     TONSILLECTOMY & ADENOIDECTOMY         FAMILY HISTORY:  Family History   Problem Relation Age of Onset     CANCER Mother      CANCER Father      Unknown/Adopted Maternal Grandmother      Unknown/Adopted Maternal Grandfather      Unknown/Adopted Paternal Grandmother      Unknown/Adopted Paternal Grandfather        SOCIAL HISTORY:  Social History     Social History     Marital status:      Spouse name: N/A     Number of children: N/A     Years of education: N/A     Social History Main Topics     Smoking status: Former Smoker     Packs/day: 1.00     Years: 3.00     Types: Cigarettes     Quit date: 8/17/1947     Smokeless tobacco: Never Used     Alcohol use No     Drug use: No     Sexual activity: Not Asked     Other Topics Concern     Caffeine Concern Yes     5 cups coffee in morning     Sleep Concern No     Stress Concern No     Special Diet No     Exercise Yes     walks hallways     Seat Belt Yes     Social History Narrative       Review of Systems:  Skin:  Negative       Eyes:  Positive for glasses    ENT:  Negative      Respiratory:  Positive for cough;mucoid expectorant clear   Cardiovascular:    Positive for;chest pain \"chest pressure\" that comes and goes  Gastroenterology: Positive for excessive gas or bloating;reflux    Genitourinary:  Negative      Musculoskeletal:  Positive for neck pain;back pain    Neurologic:  Positive for " "headaches;numbness or tingling of hands Neuropathy in her hamds  Psychiatric:  Negative      Heme/Lymph/Imm:  Positive for allergies    Endocrine:  Positive for thyroid disorder;diabetes      Physical Exam:  Vitals: /70  Pulse 72  Ht 1.499 m (4' 11\")  Wt 64 kg (141 lb)  BMI 28.48 kg/m2    Constitutional:           Skin:           Head:           Eyes:           ENT:           Neck:           Chest:             Cardiac:                    Abdomen:           Vascular:                                          Extremities and Back:                 Neurological:                 CC  Victorino Schaefer MD  [facility]  [address] [facility] [place] [address]                  "

## 2017-03-29 NOTE — PROGRESS NOTES
REFERRING PHYSICIAN:  Dr. Victorino Rader.      HISTORY OF PRESENT ILLNESS:  Ms. Marei is a very pleasant 89-year-old female with a history of coronary disease, remote history of right coronary artery PCI, hypertension, hyperlipidemia and permanent pacemaker placement due to symptomatic bradycardia and syncope.  She is here for a followup visit today.  She was hospitalized in September of last year with pneumonia.  During that time, she did undergo an echocardiogram which I have available for review.  Her heart function looks really good for 89.  Her EF is normal at 60%-65%.  She has mild mitral insufficiency, but no other significant valvular heart disease.  RV function looks normal.  Pacemaker is noted in the RV.  Review of her labs from the beginning of this month show optimal control of her cholesterol numbers with the exception of mild elevation in the triglycerides at 169.  Her LDL is 40, HDL 49, total is 123.  She does tolerate high-intensity statin therapy.  She also had a basic metabolic panel showing normal kidney function and normal electrolytes.  Liver function tests appeared normal that day.  Her hemoglobin A1c was elevated at 8.  She does have measurement of her blood sugar numbers and they range in the 200s-300s.      PHYSICAL EXAMINATION:   VITAL SIGNS:  On exam today, her blood pressure is 124/70, pulse is 72, weight 141.  This has been fairly stable, fluctuating only a couple of pounds over the last year.   CARDIOVASCULAR:  Tones are regular.  I do not appreciate a murmur today on exam.   LUNGS:  Clear posteriorly.   EXTREMITIES:  She has no peripheral edema.      SUMMARY:  Ms. 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  She is stable from a cardiac perspective and asymptomatic with normal LV function and no significant  valvular disease noted on echocardiogram last .  Her blood tests show optimal control of her cholesterol with the exception of mild elevated triglycerides, likely in correlation with her mildly elevated hemoglobin A1c, her blood sugars as well.  Kidney function and electrolytes looked normal.  Her last interrogation of her pacemaker is from November of this past year showing appropriate pacing thresholds and adequate battery life.  She is continuing to have interrogations every 3 months.  I do not have any recommendations for changes to her medications at this time.  She seems to be doing well from a cardiac perspective.  I am happy to see her back as needed.  Please feel free to contact me with any questions you have in regards to her care.      cc:   Victorino Rader MD   [facility] [address] [place] [address]         JIMENEZ TOBAR DO             D: 2017 16:35   T: 2017 11:03   MT: LARS      Name:     CHRISTINA GANDHI   MRN:      [id number]        Account:      [id number]   :      1924           Service Date: 2017      Document: [id number]

## 2017-04-10 ENCOUNTER — OFFICE VISIT (OUTPATIENT)
Dept: FAMILY MEDICINE | Facility: CLINIC | Age: 82
End: 2017-04-10

## 2017-04-10 VITALS
RESPIRATION RATE: 16 BRPM | SYSTOLIC BLOOD PRESSURE: 124 MMHG | WEIGHT: 137.8 LBS | TEMPERATURE: 97.9 F | DIASTOLIC BLOOD PRESSURE: 72 MMHG | HEART RATE: 71 BPM | OXYGEN SATURATION: 93 % | BODY MASS INDEX: 27.83 KG/M2

## 2017-04-10 DIAGNOSIS — R07.0 THROAT PAIN: Primary | ICD-10-CM

## 2017-04-10 LAB — S PYO AG THROAT QL IA.RAPID: NEGATIVE

## 2017-04-10 PROCEDURE — 99213 OFFICE O/P EST LOW 20 MIN: CPT | Performed by: FAMILY MEDICINE

## 2017-04-10 PROCEDURE — 87430 STREP A AG IA: CPT | Performed by: FAMILY MEDICINE

## 2017-04-10 NOTE — PROGRESS NOTES
Sore throat, Mostly on the LEFT side  Pain in left ear   And into head  No exposure to strep,  Has history of acid reflux , But does not feel her reflux is worse, no change and using her ppi       Eye infection left  On abx drop      Past Medical History:   Diagnosis Date     CAD (coronary artery disease) 8/2012 8/2012 Cath - 75% RCA with BRIGITTE placed, 40% ostial and distal left main stenosis     Diabetes mellitus type 2 with peripheral artery disease (H)      DM type 2, goal A1c below 7      GERD (gastroesophageal reflux disease)      Gluten intolerance      HTN (hypertension)     been off meds since 2012     Hyperlipidemia LDL goal < 100      Pacemaker 8/2009 8/2009 Near syncopal episode with HR 40s and BP 60/sys - PPM implanted     PAD (peripheral artery disease) (H) 2008    70% proximal right SFA stenosis with successful angioplasy     S/P coronary angiogram 8/2012 8/2012 Cath - 75% RCA with BRIGITTE placed, 40% ostial and distal left main stenosis     Syncope 8/2009 8/2009 Near syncopal episode with HR 40s and BP 60/sys - PPM implanted     Unspecified hypothyroidism      Past Surgical History:   Procedure Laterality Date     ANGIOPLASTY  2008    Angioplasty of proximal right superficial femoral artery     APPENDECTOMY       BACK SURGERY       C REMOVAL OF EYE      right      CHOLECYSTECTOMY, LAPOROSCOPIC      Cholecystectomy, Laparoscopic     COLECTOMY       EYE SURGERY      corneal transplant      HYSTERECTOMY, EMANI      BSO     IMPLANT PACEMAKER  8/2009     STENT, CORONARY, FATOU  8-12 8/2012 Cath - 75% RCA with BRIGITTE placed, 40% ostial and distal left main stenosis     TONSILLECTOMY & ADENOIDECTOMY       Current Outpatient Prescriptions   Medication     LEVEMIR FLEXTOUCH 100 UNIT/ML injection     acetaminophen (TYLENOL ARTHRITIS PAIN) 650 MG CR tablet     RaNITidine HCl (ZANTAC 150 MAXIMUM STRENGTH PO)     cholecalciferol (VITAMIN D) 1000 UNIT tablet     gentamicin (GENTAK) 0.3 % ophthalmic ointment      lidocaine (LIDODERM) 5 % Patch     furosemide (LASIX) 20 MG tablet     atorvastatin (LIPITOR) 80 MG tablet     Multiple Vitamins-Minerals (PRESERVISION AREDS 2 PO)     omeprazole (PRILOSEC) 20 MG CR capsule     levothyroxine (SYNTHROID, LEVOTHROID) 75 MCG tablet     docusate sodium (DOCQLACE) 100 MG capsule     Terbinafine (LAMISIL EX)     Pramoxine HCl (VAGISIL ANTI-ITCH MEDICATED EX)     phenylephrine-shark liver oil-mineral oil-petrolatum (PREPARATION H) 0.25-14-74.9 % rectal ointment     NOVOLOG FLEXPEN 100 UNIT/ML soln     blood glucose monitoring (ONE TOUCH ULTRASOFT) lancets     ONE TOUCH ULTRA test strip     Nutritional Supplements (GLUCERNA 1.5 JEN PO)     UNABLE TO FIND     UNIFINE PENTIPS 31G X 6 MM     timolol (TIMOPTIC) 0.5 % ophthalmic solution     sodium chloride (TITO 128) 5 % ophthalmic solution     fluocinolone acetonide 0.01 % OIL     aspirin 81 MG tablet     fish oil-omega-3 fatty acids 1000 MG capsule     ACE/ARB NOT PRESCRIBED, INTENTIONAL,     No current facility-administered medications for this visit.        /72  Pulse 71  Temp 97.9  F (36.6  C) (Oral)  Resp 16  Wt 62.5 kg (137 lb 12.8 oz)  SpO2 93%  BMI 27.83 kg/m2  Looks herself  Right eye absent  Left eye appears nl  Ear exam nl  Mouth nl, oropharynx looks nl  Neck no masses   Mild tenderness at larynx  Lungs clear  Cor reg      ASSESSMENT / PLAN  (R07.0) Throat pain  (primary encounter diagnosis)  No clear cause ? Reflux? Observe for now if worse call    consider abx if not gone in 2-3 days?    Plan: Rapid Strep ([facility]), Beta Strep Grp A Cult         ([facility])       Yanick Rader MD

## 2017-04-10 NOTE — MR AVS SNAPSHOT
After Visit Summary   4/10/2017    Serena Marie    MRN: [id number]           Patient Information     Date Of Birth          8/21/1924        Visit Information        Provider Department      4/10/2017 4:30 PM Victorino Schaefer MD [facility]        Today's Diagnoses     Throat pain    -  1      Care Instructions    If throat pain not gone on Wednesday call Loly Schaefer's nurse  Use hot tea with honey or lemon to sooth the throat   Tylenol is okay for pain          Follow-ups after your visit        Your next 10 appointments already scheduled     May 09, 2017 12:30 PM CDT   SHORT with Victorino Schaefer MD   [facility]    [address] [place] [address] [phone]            Jun 29, 2017 11:15 AM CDT   Phone Device Check with MITCHELL TECH2   [facility] PHYSICIANS [facility] AT [place] ([facility])    [address] [place], [place] [address]  [facility] [address] [phone]              Who to contact     If you have questions or need follow up information about today's clinic visit or your schedule please contact [facility] directly at [phone].  Normal or non-critical lab and imaging results will be communicated to you by [employer], letter or phone within 4 business days after the clinic has received the results. If you do not hear from us within 7 days, please contact the clinic through [employer] or phone. If you have a critical or abnormal lab result, we will notify you by phone as soon as possible.  Submit refill requests through [employer] or call your pharmacy and they will forward the refill request to us. Please allow 3 business days for your refill to be completed.          Additional Information About Your Visit        [employer] Information     [employer] lets you send messages to your doctor, view your test results, renew your prescriptions, schedule appointments and more. To sign up, go to  [email or web address] . Click on \"Log in\" on the left side of the screen, which will take you to the Welcome page. Then click on \"Sign up Now\" on the right side of the page.     You will be asked to enter the access code listed below, as well as some personal information. Please follow the directions to create your username and password.     Your access code is: [email or web address]  Expires: 2017  3:42 PM     Your access code will  in 90 days. If you need help or a new code, please call your [place] clinic or [phone].        Care EveryWhere ID     This is your Care EveryWhere ID. This could be used by other organizations to access your [place] medical records  [id number]        Your Vitals Were     Pulse Temperature Respirations Pulse Oximetry BMI (Body Mass Index)       71 97.9  F (36.6  C) (Oral) 16 93% 27.83 kg/m2        Blood Pressure from Last 3 Encounters:   04/10/17 124/72   17 124/70   17 104/56    Weight from Last 3 Encounters:   04/10/17 62.5 kg (137 lb 12.8 oz)   17 64 kg (141 lb)   17 63 kg (139 lb)              We Performed the Following     Beta Strep Grp A Cult ([facility])     Rapid Strep ([facility])          Today's Medication Changes          These changes are accurate as of: 4/10/17  4:56 PM.  If you have any questions, ask your nurse or doctor.               These medicines have changed or have updated prescriptions.        Dose/Directions    fluocinolone acetonide 0.01 % Oil   This may have changed:    - how to take this  - when to take this  - reasons to take this  - additional instructions   Used for:  DM type 2, goal A1c below 7        Apply to scalp when wet massage well cover with cap leave on over night Wash in am.   Quantity:  118 mL   Refills:  12                Primary Care Provider Office Phone # Fax #    Victorino Schaefer [phone]       [facility] [address]  [facility] [address]        Goals        General    I " want to have good control of Diabetes (pt-stated)     Notes - Note edited  1/28/2016  1:17 PM by Leatha Campos, RN    I will check my blood sugars 4 x a day.  I will take Novolog with my meals.  I will take my Levemir at bedtime.              Thank you!     Thank you for choosing [facility]  for your care. Our goal is always to provide you with excellent care. Hearing back from our patients is one way we can continue to improve our services. Please take a few minutes to complete the written survey that you may receive in the mail after your visit with us. Thank you!             Your Updated Medication List - Protect others around you: Learn how to safely use, store and throw away your medicines at www.disposemymeds.org.          This list is accurate as of: 4/10/17  4:56 PM.  Always use your most recent med list.                   Brand Name Dispense Instructions for use    ACE/ARB NOT PRESCRIBED (INTENTIONAL)      1 each continuous prn ACE & ARB not prescribed due to Risk for drug interaction and Worsening renal function on ACE/ARB therapy       aspirin 81 MG tablet      Take 1 tablet by mouth every evening       atorvastatin 80 MG tablet    LIPITOR    90 tablet    Take 1 tablet (80 mg) by mouth daily       blood glucose monitoring lancets     300 Box    Use to test blood sugar 3-4 times daily or as directed.       cholecalciferol 1000 UNIT tablet    vitamin D     Take 2,000 Units by mouth daily       DOCQLACE 100 MG capsule   Generic drug:  docusate sodium      Take 100 mg by mouth At Bedtime       fish oil-omega-3 fatty acids 1000 MG capsule      Take 1,200 mg by mouth daily       fluocinolone acetonide 0.01 % Oil     118 mL    Apply to scalp when wet massage well cover with cap leave on over night Wash in am.       GENTAK 0.3 % ophthalmic ointment   Generic drug:  gentamicin      Place 1 Application into the right eye 3 times daily       GLUCERNA 1.5 JEN PO      Take 1 Can by mouth daily        LAMISIL EX      Externally apply topically daily For under breasts, in between toes       LASIX 20 MG tablet   Generic drug:  furosemide      Take 20 mg by mouth once a week 1 tablet Mon & Thur       LEVEMIR FLEXTOUCH 100 UNIT/ML injection   Generic drug:  insulin detemir     15 mL    INJECT 15 UNITS UNDER THE SKIN AT BEDTIME       levothyroxine 75 MCG tablet    SYNTHROID/LEVOTHROID    90 tablet    TAKE ONE TABLET BY MOUTH EVERY DAY       lidocaine 5 % Patch    LIDODERM     Place 1 patch onto the skin every 24 hours 1/2 a patch on her R side of her neck & other 1/2 on her lower back for 12 hours a day       TITO 128 5 % ophthalmic solution   Generic drug:  sodium chloride      Place 1 drop Into the left eye daily as needed for dry eyes       NovoLOG FLEXPEN 100 UNIT/ML injection   Generic drug:  insulin aspart     15 mL    INJECT 8-10 UNITS SUBCUTANEOUSLY UP TO 4 TIMES DAILY BEFORE MEALS AS DIRECTED       omeprazole 20 MG CR capsule    priLOSEC    30 capsule    Take 1 capsule (20 mg) by mouth At Bedtime       ONE TOUCH ULTRA test strip   Generic drug:  blood glucose monitoring     300 each    TEST BLOOD GLUCOSE FOUR TIMES A DAY       PREPARATION H 0.25-14-74.9 % rectal ointment   Generic drug:  phenylephrine-shark liver oil-mineral oil-petrolatum      Place rectally daily       PRESERVISION AREDS 2 PO      Take 1 tablet by mouth every morning       timolol 0.5 % ophthalmic solution    TIMOPTIC    1 Bottle    Place 1 drop Into the left eye 2 times daily       TYLENOL ARTHRITIS PAIN 650 MG CR tablet   Generic drug:  acetaminophen      Take 1,300 mg by mouth daily States 2 tablets qam & usually 2 tablets qpm       UNABLE TO FIND      Take 1 Bar by mouth 2 times daily MEDICATION NAME: FiberONe Bar       UNIFINE PENTIPS 31G X 6 MM   Generic drug:  insulin pen needle     100 each    USE ONCE DAILY       VAGISIL ANTI-ITCH MEDICATED EX      Externally apply topically every morning       ZANTAC 150 MAXIMUM STRENGTH PO       Take 150 mg by mouth daily as needed

## 2017-04-10 NOTE — PATIENT INSTRUCTIONS
If throat pain not gone on Wednesday call Loly Rader's nurse  Use hot tea with honey or lemon to sooth the throat   Tylenol is okay for pain

## 2017-04-12 LAB — BETA STREP GP A CULTURE: NEGATIVE

## 2017-04-18 ENCOUNTER — TELEPHONE (OUTPATIENT)
Dept: FAMILY MEDICINE | Facility: CLINIC | Age: 82
End: 2017-04-18

## 2017-04-18 NOTE — TELEPHONE ENCOUNTER
Fax from [facility] that patients rx for Lidocaine patch needs PA..  Submitted to [employer] on 04/17.  Received fax back stating patches were approved.  Approval dates 01/17/2017-04/17/2018.  Called [facility] pharmacy to inform them.  They will fill and call patient.

## 2017-05-09 ENCOUNTER — OFFICE VISIT (OUTPATIENT)
Dept: FAMILY MEDICINE | Facility: CLINIC | Age: 82
End: 2017-05-09

## 2017-05-09 ENCOUNTER — CARE COORDINATION (OUTPATIENT)
Dept: CASE MANAGEMENT | Facility: CLINIC | Age: 82
End: 2017-05-09

## 2017-05-09 VITALS
OXYGEN SATURATION: 98 % | SYSTOLIC BLOOD PRESSURE: 104 MMHG | RESPIRATION RATE: 16 BRPM | WEIGHT: 139.4 LBS | BODY MASS INDEX: 28.16 KG/M2 | DIASTOLIC BLOOD PRESSURE: 46 MMHG | HEART RATE: 63 BPM

## 2017-05-09 DIAGNOSIS — Z76.0 ENCOUNTER FOR MEDICATION REFILL: ICD-10-CM

## 2017-05-09 DIAGNOSIS — H54.10 BLINDNESS AND LOW VISION: ICD-10-CM

## 2017-05-09 DIAGNOSIS — E11.51 DIABETES MELLITUS TYPE 2 WITH PERIPHERAL ARTERY DISEASE (H): Primary | ICD-10-CM

## 2017-05-09 DIAGNOSIS — Z90.01 H/O ENUCLEATION OF RIGHT EYEBALL: ICD-10-CM

## 2017-05-09 PROCEDURE — 99214 OFFICE O/P EST MOD 30 MIN: CPT | Performed by: FAMILY MEDICINE

## 2017-05-09 RX ORDER — SODIUM CHLORIDE 5 %
1 OINTMENT (GRAM) OPHTHALMIC (EYE) 2 TIMES DAILY
COMMUNITY
End: 2018-04-10

## 2017-05-09 NOTE — MR AVS SNAPSHOT
"              After Visit Summary   5/9/2017    Serena Marie    MRN: [id number]           Patient Information     Date Of Birth          8/21/1924        Visit Information        Provider Department      5/9/2017 12:30 PM Victorino Schaefer MD [facility]        Today's Diagnoses     Diabetes mellitus type 2 with peripheral artery disease (H)    -  1    Blindness and low vision        H/O enucleation of right eyeball        Encounter for medication refill        Diabetes mellitus type 2 with peripheral artery disease           Follow-ups after your visit        Your next 10 appointments already scheduled     Jun 29, 2017 11:15 AM CDT   Phone Device Check with MITCHELL TECH2   [facility] [place] ([facility])    [address] [place] [id number] [phone]              Who to contact     If you have questions or need follow up information about today's clinic visit or your schedule please contact [facility] directly at [phone].  Normal or non-critical lab and imaging results will be communicated to you by [employer], letter or phone within 4 business days after the clinic has received the results. If you do not hear from us within 7 days, please contact the clinic through [employer] or phone. If you have a critical or abnormal lab result, we will notify you by phone as soon as possible.  Submit refill requests through [employer] or call your pharmacy and they will forward the refill request to us. Please allow 3 business days for your refill to be completed.          Additional Information About Your Visit        [employer] Information     [employer] lets you send messages to your doctor, view your test results, renew your prescriptions, schedule appointments and more. To sign up, go to [email or web address] . Click on \"Log in\" on the left side of the screen, which will take you to the Welcome page. Then click on \"Sign up Now\" on the right " side of the page.     You will be asked to enter the access code listed below, as well as some personal information. Please follow the directions to create your username and password.     Your access code is: [email or web address]  Expires: 2017  3:42 PM     Your access code will  in 90 days. If you need help or a new code, please call your [place] clinic or [phone].        Care EveryWhere ID     This is your Care EveryWhere ID. This could be used by other organizations to access your [place] medical records  [id number]        Your Vitals Were     Pulse Respirations Pulse Oximetry BMI (Body Mass Index)          63 16 98% 28.16 kg/m2         Blood Pressure from Last 3 Encounters:   17 104/46   04/10/17 124/72   17 124/70    Weight from Last 3 Encounters:   17 63.2 kg (139 lb 6.4 oz)   04/10/17 62.5 kg (137 lb 12.8 oz)   17 64 kg (141 lb)              Today, you had the following     No orders found for display         Today's Medication Changes          These changes are accurate as of: 17  1:10 PM.  If you have any questions, ask your nurse or doctor.               These medicines have changed or have updated prescriptions.        Dose/Directions    insulin aspart 100 UNIT/ML injection   Commonly known as:  NovoLOG FLEXPEN   This may have changed:  See the new instructions.   Used for:  Encounter for medication refill        INJECT 10 UNITS SUBCUTANEOUSLY 4 TIMES DAILY BEFORE MEALS  AND AT BEDTIME IF SUGAR HIGHER THAN 200   Quantity:  15 mL   Refills:  4       insulin detemir 100 UNIT/ML injection   Commonly known as:  LEVEMIR FLEXTOUCH   This may have changed:  See the new instructions.   Used for:  Diabetes mellitus type 2 with peripheral artery disease (H)        INJECT 18 UNITS UNDER THE SKIN AT BEDTIME   Quantity:  15 mL   Refills:  8            Where to get your medicines      These medications were sent to [facility][id number] - [place] - [address], [facility] [address]     Phone:  [phone]     insulin aspart 100 UNIT/ML injection    insulin detemir 100 UNIT/ML injection                Primary Care Provider Office Phone # Fax #    Victorino Schaefer [phone]       [facility] [address]  [facility] [address]        Goals        General    I want to have good control of Diabetes (pt-stated)     Notes - Note edited  1/28/2016  1:17 PM by Leatha Campos, RN    I will check my blood sugars 4 x a day.  I will take Novolog with my meals.  I will take my Levemir at bedtime.              Thank you!     Thank you for choosing [facility]  for your care. Our goal is always to provide you with excellent care. Hearing back from our patients is one way we can continue to improve our services. Please take a few minutes to complete the written survey that you may receive in the mail after your visit with us. Thank you!             Your Updated Medication List - Protect others around you: Learn how to safely use, store and throw away your medicines at www.disposemymeds.org.          This list is accurate as of: 5/9/17  1:10 PM.  Always use your most recent med list.                   Brand Name Dispense Instructions for use    ACE/ARB NOT PRESCRIBED (INTENTIONAL)      1 each continuous prn ACE & ARB not prescribed due to Risk for drug interaction and Worsening renal function on ACE/ARB therapy       aspirin 81 MG tablet      Take 1 tablet by mouth every evening       atorvastatin 80 MG tablet    LIPITOR    90 tablet    Take 1 tablet (80 mg) by mouth daily       blood glucose monitoring lancets     300 Box    Use to test blood sugar 3-4 times daily or as directed.       cholecalciferol 1000 UNIT tablet    vitamin D     Take 2,000 Units by mouth daily       DOCQLACE 100 MG capsule   Generic drug:  docusate sodium      Take 100 mg by mouth At Bedtime       fish oil-omega-3 fatty acids 1000 MG capsule      Take 1,200 mg by  mouth daily       fluocinolone acetonide 0.01 % Oil     118 mL    Apply to scalp when wet massage well cover with cap leave on over night Wash in am.       GENTAK 0.3 % ophthalmic ointment   Generic drug:  gentamicin      Place 1 Application into the right eye 3 times daily       GLUCERNA 1.5 JEN PO      Take 1 Can by mouth daily       insulin aspart 100 UNIT/ML injection    NovoLOG FLEXPEN    15 mL    INJECT 10 UNITS SUBCUTANEOUSLY 4 TIMES DAILY BEFORE MEALS  AND AT BEDTIME IF SUGAR HIGHER THAN 200       insulin detemir 100 UNIT/ML injection    LEVEMIR FLEXTOUCH    15 mL    INJECT 18 UNITS UNDER THE SKIN AT BEDTIME       LAMISIL EX      Externally apply topically daily For under breasts, in between toes       LASIX 20 MG tablet   Generic drug:  furosemide      Take 20 mg by mouth once a week 1 tablet Mon & Thur       levothyroxine 75 MCG tablet    SYNTHROID/LEVOTHROID    90 tablet    TAKE ONE TABLET BY MOUTH EVERY DAY       lidocaine 5 % Patch    LIDODERM     Place 1 patch onto the skin every 24 hours 1/2 a patch on her R side of her neck & other 1/2 on her lower back for 12 hours a day       * TITO 128 5 % ophthalmic solution   Generic drug:  sodium chloride      Place 1 drop Into the left eye daily as needed for dry eyes       * TITO 128 5 % ophthalmic ointment   Generic drug:  sodium chloride      Place 1 Application into the right eye 2 times daily as needed for dry eyes       omeprazole 20 MG CR capsule    priLOSEC    30 capsule    Take 1 capsule (20 mg) by mouth At Bedtime       ONE TOUCH ULTRA test strip   Generic drug:  blood glucose monitoring     300 each    TEST BLOOD GLUCOSE FOUR TIMES A DAY       PREPARATION H 0.25-14-74.9 % rectal ointment   Generic drug:  phenylephrine-shark liver oil-mineral oil-petrolatum      Place rectally daily       PRESERVISION AREDS 2 PO      Take 1 tablet by mouth every morning       TH EYE DROP ADVANCED RELIEF 0.05-0.1-1-1 % Soln   Generic drug:   Tetrahydroz-Dextran-PEG-Povid      Apply to eye as needed For dry eye relief- 5 x per day       timolol 0.5 % ophthalmic solution    TIMOPTIC    1 Bottle    Place 1 drop Into the left eye 2 times daily       TYLENOL ARTHRITIS PAIN 650 MG CR tablet   Generic drug:  acetaminophen      Take 1,300 mg by mouth daily States 2 tablets qam & usually 2 tablets qpm       UNABLE TO FIND      Take 1 Bar by mouth 2 times daily MEDICATION NAME: FiberONe Bar       UNIFINE PENTIPS 31G X 6 MM   Generic drug:  insulin pen needle     100 each    USE ONCE DAILY       VAGISIL ANTI-ITCH MEDICATED EX      Externally apply topically every morning       ZANTAC 150 MAXIMUM STRENGTH PO      Take 150 mg by mouth daily as needed       * Notice:  This list has 2 medication(s) that are the same as other medications prescribed for you. Read the directions carefully, and ask your doctor or other care provider to review them with you.

## 2017-05-09 NOTE — PROGRESS NOTES
.      Subjective:  Here to discuss the status of the following problem(s):(Unless noted the problem(s) is/are stable and if applicable the medicine(s) being used is/are causing no side effects or problems.)    CC: Recheck Medication and Diabetes      1. Diabetes mellitus type 2 with peripheral artery disease (H)  Sugars check 4 times per day  ams 120-140 other 180-240  levemir and novolog  Denies low sugars    2. Blindness and low vision  Blindness is coming  She is anxious but understands  What is coming. Wants to find NH that would care for her.               ROS:  General and CV completed and negative except as noted above    Past Medical History:   Diagnosis Date     CAD (coronary artery disease) 8/2012 8/2012 Cath - 75% RCA with BRIGITTE placed, 40% ostial and distal left main stenosis     Diabetes mellitus type 2 with peripheral artery disease (H)      DM type 2, goal A1c below 7      GERD (gastroesophageal reflux disease)      Gluten intolerance      HTN (hypertension)     been off meds since 2012     Hyperlipidemia LDL goal < 100      Pacemaker 8/2009 8/2009 Near syncopal episode with HR 40s and BP 60/sys - PPM implanted     PAD (peripheral artery disease) (H) 2008    70% proximal right SFA stenosis with successful angioplasy     S/P coronary angiogram 8/2012 8/2012 Cath - 75% RCA with BRIGITTE placed, 40% ostial and distal left main stenosis     Syncope 8/2009 8/2009 Near syncopal episode with HR 40s and BP 60/sys - PPM implanted     Unspecified hypothyroidism      Current Outpatient Prescriptions   Medication     Tetrahydroz-Dextran-PEG-Povid (TH EYE DROP ADVANCED RELIEF) 0.05-0.1-1-1 % SOLN     sodium chloride (TITO 128) 5 % ophthalmic ointment     insulin aspart (NOVOLOG FLEXPEN) 100 UNIT/ML injection     insulin detemir (LEVEMIR FLEXTOUCH) 100 UNIT/ML injection     acetaminophen (TYLENOL ARTHRITIS PAIN) 650 MG CR tablet     RaNITidine HCl (ZANTAC 150 MAXIMUM STRENGTH PO)     cholecalciferol  (VITAMIN D) 1000 UNIT tablet     gentamicin (GENTAK) 0.3 % ophthalmic ointment     lidocaine (LIDODERM) 5 % Patch     furosemide (LASIX) 20 MG tablet     atorvastatin (LIPITOR) 80 MG tablet     Multiple Vitamins-Minerals (PRESERVISION AREDS 2 PO)     omeprazole (PRILOSEC) 20 MG CR capsule     levothyroxine (SYNTHROID, LEVOTHROID) 75 MCG tablet     docusate sodium (DOCQLACE) 100 MG capsule     Terbinafine (LAMISIL EX)     Pramoxine HCl (VAGISIL ANTI-ITCH MEDICATED EX)     phenylephrine-shark liver oil-mineral oil-petrolatum (PREPARATION H) 0.25-14-74.9 % rectal ointment     blood glucose monitoring (ONE TOUCH ULTRASOFT) lancets     ONE TOUCH ULTRA test strip     Nutritional Supplements (GLUCERNA 1.5 JEN PO)     UNABLE TO FIND     fish oil-omega-3 fatty acids 1000 MG capsule     UNIFINE PENTIPS 31G X 6 MM     timolol (TIMOPTIC) 0.5 % ophthalmic solution     sodium chloride (TITO 128) 5 % ophthalmic solution     aspirin 81 MG tablet     [DISCONTINUED] LEVEMIR FLEXTOUCH 100 UNIT/ML injection     [DISCONTINUED] NOVOLOG FLEXPEN 100 UNIT/ML soln     ACE/ARB NOT PRESCRIBED, INTENTIONAL,     fluocinolone acetonide 0.01 % OIL     No current facility-administered medications for this visit.       reports that she quit smoking about 69 years ago. Her smoking use included Cigarettes. She has a 3.00 pack-year smoking history. She has never used smokeless tobacco. She reports that she does not drink alcohol or use illicit drugs.      EXAM:  BP: 104/46   Pulse: 63      Wt Readings from Last 2 Encounters:   05/09/17 63.2 kg (139 lb 6.4 oz)   04/10/17 62.5 kg (137 lb 12.8 oz)       BMI= Body mass index is 28.16 kg/(m^2).    EXAM:   APPEARANCE: = Nrl  Conj/Eyelids = right eyeball gone, left with opor vision  Ears/Nose = Nrl  Resp effort = Nrl  Breath Sounds = Nrl  Heart Rate, Rythym, & sounds (no Murm)  = Nrl  SKIN absent significant rashes or lesions = Nrl  Ext (edema) = Nrl  Recent/Remote Memory = Nrl  Mood/Affect =  Nrl    Assessment/Plan:  Serena was seen today for recheck medication and diabetes.    Diagnoses and all orders for this visit:    Diabetes mellitus type 2 with peripheral artery disease (H)  -     insulin detemir (LEVEMIR FLEXTOUCH) 100 UNIT/ML injection; INJECT 18 UNITS UNDER THE SKIN AT BEDTIME  -     CARE COORDINATION REFERRAL  Will try to get control better follow up in 4 weeks  She is quite good with managing her sugars     Blindness and low vision  -     CARE COORDINATION REFERRAL  Help with plans for NH  H/O enucleation of right eyeball    Other orders  -     Cancel: Microalbumin (RMG)          Victorino Schaefer  [facility]

## 2017-05-09 NOTE — LETTER
[facility] - CARE MANAGEMENT DEPT  [address] [facility] [address] [facility] [address]  [place] [address]  Phone: [phone]      May 15, 2017      Serena Marie  [address]  [place] [address]    Dear Serena,  I am the Clinic Care Coordinator that works with your primary care provider's clinic. I wanted to thank you for spending the time to talk with me.      I have included listings of Skilled Nursing Facilities that can provide to all of the care needs that you may have when your vision fails. There are two maps of different regions of the [place] area and the names of the facilities below the map. I also picked out a few and printed out the specific details of the facility.     I also included information about Vision Loss resources a non-profit organization that helps individuals with vision impairments stay independent.     Please feel free to keep this letter and contact information to contact me at [phone] with any questions or if you want to discuss the resources I have mailed. I will plan to check in with you in a few weeks to see if this was helpful. I look forward to hearing from you.      Sincerely,     Alma Rivera

## 2017-05-10 NOTE — PROGRESS NOTES
Clinic Care Coordination Contact  OUTREACH    Referral Information:  Referral Source: PCP  Reason for Contact: Future planning/Resource Navigation  Care Conference: No     Universal Utilization:   ED Visits in last year: 0  Hospital visits in last year: 1  Last PCP appointment: 10/06/16  Missed Appointments: 4  Concerns: No concerns  Multiple Providers or Specialists: Yes    Clinical Concerns:  Current Medical Concerns:   Patient Active Problem List   Diagnosis     Hyperlipidemia with target LDL less than 100     Diabetes mellitus type 2 with peripheral artery disease (H)     PAD (peripheral artery disease) (H)     Hypothyroidism     Lumbar spinal stenosis     GERD (gastroesophageal reflux disease)     Post-op bleeding     H/O enucleation of right eyeball     Health Care Home     Pacemaker     Syncope     HCAP (healthcare-associated pneumonia)     Cephalalgia     Bilateral low back pain without sciatica     Cervicalgia     Community acquired pneumonia     ACP (advance care planning)     Coronary artery disease involving native coronary artery of native heart without angina pectoris     Blindness and low vision         Education Provided to patient:  LTC assessment process through the [facility], educated on MA/ EW/ ACW, educated on memory care units of [facility] and LTC placement- how they differ, Vision Loss Resources, State services for the Blind  Clinical Pathway: None    Medication Management:  Patient understands and is adherent     Functional Status:  Mobility Status: Independent w/Device  Equipment Currently Used at Home: grab bar, walker, rolling  Transportation: Family provides     Psychosocial:  Current living arrangement:: I live alone  Financial/Insurance: [employer] [place]/ Medicare     Resources and Interventions:  Current Resources:  No formal supports listed  Advanced Care Plans/Directives on file:: Yes  Referrals Placed: State services for the blind/Vision Loss Resources     Goals:   Goal 1 Statement: I would like  to find a nursing home that can care for me when I am completly visually impaired.  Goal 1 Progression Percent: 10%  Goal 1 Progression Date: 05/10/17    Patient/Caregiver understanding: Spoke with patient who reports that her vision has started to slowly decrease over the last month or so. Pt is interested in researching nursing homes that specialize in assisting individuals who are visually impaired. Discussed Vision Loss resources and will mail out several options for nursing homes in her area. Pt reports she is still doing well and is able to perform daily ADL's, etc, just wanting to plan for the future.   Frequency of Care Coordination: as needed  Upcoming appointment: 06/06/17     Plan: GREGORIO MORGAN to mail out resources and meet with patient in clinic at PCP appointment on 6/6/2017.    CANDIS Proctor  Clinic Care Coordinator  [facility]/ [facility]  [phone]

## 2017-06-06 ENCOUNTER — OFFICE VISIT (OUTPATIENT)
Dept: FAMILY MEDICINE | Facility: CLINIC | Age: 82
End: 2017-06-06

## 2017-06-06 VITALS
WEIGHT: 140 LBS | TEMPERATURE: 98.2 F | HEIGHT: 57 IN | SYSTOLIC BLOOD PRESSURE: 142 MMHG | BODY MASS INDEX: 30.2 KG/M2 | HEART RATE: 70 BPM | DIASTOLIC BLOOD PRESSURE: 64 MMHG | OXYGEN SATURATION: 98 %

## 2017-06-06 DIAGNOSIS — L82.0 INFLAMED SEBORRHEIC KERATOSIS: ICD-10-CM

## 2017-06-06 DIAGNOSIS — E11.51 DIABETES MELLITUS TYPE 2 WITH PERIPHERAL ARTERY DISEASE (H): Primary | ICD-10-CM

## 2017-06-06 DIAGNOSIS — I25.10 CORONARY ARTERY DISEASE INVOLVING NATIVE CORONARY ARTERY OF NATIVE HEART WITHOUT ANGINA PECTORIS: ICD-10-CM

## 2017-06-06 DIAGNOSIS — H54.10 BLINDNESS AND LOW VISION: ICD-10-CM

## 2017-06-06 DIAGNOSIS — M54.2 CERVICALGIA: ICD-10-CM

## 2017-06-06 PROCEDURE — 36415 COLL VENOUS BLD VENIPUNCTURE: CPT | Performed by: FAMILY MEDICINE

## 2017-06-06 PROCEDURE — 17110 DESTRUCTION B9 LES UP TO 14: CPT | Performed by: FAMILY MEDICINE

## 2017-06-06 PROCEDURE — 99214 OFFICE O/P EST MOD 30 MIN: CPT | Mod: 25 | Performed by: FAMILY MEDICINE

## 2017-06-06 NOTE — MR AVS SNAPSHOT
After Visit Summary   6/6/2017    Serena Marie    MRN: [id number]           Patient Information     Date Of Birth          8/21/1924        Visit Information        Provider Department      6/6/2017 12:45 PM Victorino Schaefer MD [facility]        Today's Diagnoses     Blindness and low vision    -  1    Coronary artery disease involving native coronary artery of native heart without angina pectoris        Diabetes mellitus type 2 with peripheral artery disease (H)        Inflamed seborrheic keratosis        Other specified hypothyroidism        Cervicalgia           Follow-ups after your visit        Your next 10 appointments already scheduled     Jun 06, 2017 12:45 PM CDT   SHORT with Victorino Schaefer MD   [facility]    [address] [place] [address] [phone]            Jun 29, 2017 11:15 AM CDT   Phone Device Check with MITCHELL TECH2   [facility]YS[facility] ([facility])    [address]  [facility] [address] [phone]              Who to contact     If you have questions or need follow up information about today's clinic visit or your schedule please contact [facility] directly at [phone].  Normal or non-critical lab and imaging results will be communicated to you by [employer], letter or phone within 4 business days after the clinic has received the results. If you do not hear from us within 7 days, please contact the clinic through [employer] or phone. If you have a critical or abnormal lab result, we will notify you by phone as soon as possible.  Submit refill requests through [employer] or call your pharmacy and they will forward the refill request to us. Please allow 3 business days for your refill to be completed.          Additional Information About Your Visit        [employer] Information     [employer] lets you send messages to your doctor, view your test  "results, renew your prescriptions, schedule appointments and more. To sign up, go to [email or web address] . Click on \"Log in\" on the left side of the screen, which will take you to the Welcome page. Then click on \"Sign up Now\" on the right side of the page.     You will be asked to enter the access code listed below, as well as some personal information. Please follow the directions to create your username and password.     Your access code is: [email or web address]  Expires: 2017  3:42 PM     Your access code will  in 90 days. If you need help or a new code, please call your [facility] clinic or [phone].        Care EveryWhere ID     This is your Care EveryWhere ID. This could be used by other organizations to access your [facility] medical records  [id number]        Your Vitals Were     Pulse Temperature Height Pulse Oximetry BMI (Body Mass Index)       70 98.2  F (36.8  C) (Oral) 1.448 m (4' 9\") 98% 30.3 kg/m2        Blood Pressure from Last 3 Encounters:   17 142/64   17 104/46   04/10/17 124/72    Weight from Last 3 Encounters:   17 63.5 kg (140 lb)   17 63.2 kg (139 lb 6.4 oz)   04/10/17 62.5 kg (137 lb 12.8 oz)              We Performed the Following     DESTRUCT BENIGN LESION, UP TO 14     Hemoglobin A1C ([facility])          Today's Medication Changes          These changes are accurate as of: 17 12:40 PM.  If you have any questions, ask your nurse or doctor.               These medicines have changed or have updated prescriptions.        Dose/Directions    insulin aspart 100 UNIT/ML injection   Commonly known as:  NovoLOG FLEXPEN   This may have changed:  additional instructions   Used for:  Diabetes mellitus type 2 with peripheral artery disease (H)   Changed by:  Victorino Schaefer MD        INJECT 12 UNITS SUBCUTANEOUSLY 4 TIMES DAILY BEFORE MEALS   Quantity:  15 mL   Refills:  4       insulin detemir 100 UNIT/ML injection   Commonly known as:  LEVEMIR FLEXTOUCH   This may " have changed:  additional instructions   Used for:  Diabetes mellitus type 2 with peripheral artery disease (H)   Changed by:  Victorino Schaefer MD        INJECT 20 UNITS UNDER THE SKIN AT BEDTIME   Quantity:  15 mL   Refills:  8            Where to get your medicines      These medications were sent to [facility][id number] - [place] - [address]  [address], [facility] [address]     Phone:  [phone]     insulin aspart 100 UNIT/ML injection    insulin detemir 100 UNIT/ML injection                Primary Care Provider Office Phone # Fax #    Victorino Schaefer [phone]       [facility] [address]  [facility] [address]        Goals        General    I want to have good control of Diabetes (pt-stated)     Notes - Note edited  1/28/2016  1:17 PM by Leatha Campos RN    I will check my blood sugars 4 x a day.  I will take Novolog with my meals.  I will take my Levemir at bedtime.              Thank you!     Thank you for choosing [facility]  for your care. Our goal is always to provide you with excellent care. Hearing back from our patients is one way we can continue to improve our services. Please take a few minutes to complete the written survey that you may receive in the mail after your visit with us. Thank you!             Your Updated Medication List - Protect others around you: Learn how to safely use, store and throw away your medicines at www.disposemymeds.org.          This list is accurate as of: 6/6/17 12:40 PM.  Always use your most recent med list.                   Brand Name Dispense Instructions for use    ACE/ARB NOT PRESCRIBED (INTENTIONAL)      1 each continuous prn ACE & ARB not prescribed due to Risk for drug interaction and Worsening renal function on ACE/ARB therapy       aspirin 81 MG tablet      Take 1 tablet by mouth every evening       atorvastatin 80 MG tablet    LIPITOR    90 tablet    Take 1 tablet (80 mg) by mouth daily        blood glucose monitoring lancets     300 Box    Use to test blood sugar 3-4 times daily or as directed.       cholecalciferol 1000 UNIT tablet    vitamin D     Take 2,000 Units by mouth daily       DOCQLACE 100 MG capsule   Generic drug:  docusate sodium      Take 100 mg by mouth At Bedtime       fish oil-omega-3 fatty acids 1000 MG capsule      Take 1,200 mg by mouth daily       fluocinolone acetonide 0.01 % Oil     118 mL    Apply to scalp when wet massage well cover with cap leave on over night Wash in am.       GENTAK 0.3 % ophthalmic ointment   Generic drug:  gentamicin      Place 1 Application into the right eye 3 times daily       GLUCERNA 1.5 JEN PO      Take 1 Can by mouth daily       insulin aspart 100 UNIT/ML injection    NovoLOG FLEXPEN    15 mL    INJECT 12 UNITS SUBCUTANEOUSLY 4 TIMES DAILY BEFORE MEALS       insulin detemir 100 UNIT/ML injection    LEVEMIR FLEXTOUCH    15 mL    INJECT 20 UNITS UNDER THE SKIN AT BEDTIME       LAMISIL EX      Externally apply topically daily For under breasts, in between toes       LASIX 20 MG tablet   Generic drug:  furosemide      Take 20 mg by mouth once a week 1 tablet Mon & Thur       levothyroxine 75 MCG tablet    SYNTHROID/LEVOTHROID    90 tablet    TAKE ONE TABLET BY MOUTH EVERY DAY       lidocaine 5 % Patch    LIDODERM     Place 1 patch onto the skin every 24 hours 1/2 a patch on her R side of her neck & other 1/2 on her lower back for 12 hours a day       * TITO 128 5 % ophthalmic solution   Generic drug:  sodium chloride      Place 1 drop Into the left eye daily as needed for dry eyes       * TITO 128 5 % ophthalmic ointment   Generic drug:  sodium chloride      Place 1 Application into the right eye 2 times daily as needed for dry eyes       omeprazole 20 MG CR capsule    priLOSEC    30 capsule    Take 1 capsule (20 mg) by mouth At Bedtime       ONE TOUCH ULTRA test strip   Generic drug:  blood glucose monitoring     300 each    TEST BLOOD GLUCOSE FOUR  TIMES A DAY       PREPARATION H 0.25-14-74.9 % rectal ointment   Generic drug:  phenylephrine-shark liver oil-mineral oil-petrolatum      Place rectally daily       PRESERVISION AREDS 2 PO      Take 1 tablet by mouth every morning       TH EYE DROP ADVANCED RELIEF 0.05-0.1-1-1 % Soln   Generic drug:  Tetrahydroz-Dextran-PEG-Povid      Apply to eye as needed For dry eye relief- 5 x per day       timolol 0.5 % ophthalmic solution    TIMOPTIC    1 Bottle    Place 1 drop Into the left eye 2 times daily       TYLENOL ARTHRITIS PAIN 650 MG CR tablet   Generic drug:  acetaminophen      Take 1,300 mg by mouth daily States 2 tablets qam & usually 2 tablets qpm       UNABLE TO FIND      Take 1 Bar by mouth 2 times daily MEDICATION NAME: FiberONe Bar       UNIFINE PENTIPS 31G X 6 MM   Generic drug:  insulin pen needle     100 each    USE ONCE DAILY       VAGISIL ANTI-ITCH MEDICATED EX      Externally apply topically every morning       ZANTAC 150 MAXIMUM STRENGTH PO      Take 150 mg by mouth daily as needed       * Notice:  This list has 2 medication(s) that are the same as other medications prescribed for you. Read the directions carefully, and ask your doctor or other care provider to review them with you.

## 2017-06-06 NOTE — PROGRESS NOTES
Going blind left eye  Has no         Subjective:  Here to discuss the status of the following problem(s):(Unless noted the problem(s) is/are stable and if applicable the medicine(s) being used is/are causing no side effects or problems.)    CC: Diabetes (insulin F/U )          2. Diabetes mellitus type 2 with peripheral artery disease (H)  Managing with insulin at home  18 levimer at bed   And    Novolog 10 units with meals   Sugars reviewed checks 4 times per day  [id number]  Noon 150-300   Pm  Same   Hs 130- 180    3. 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 Coronary artery disease involving native coronary artery of native heart without angina pectoris  No CP   Walks a lot daily with walker    5. Cervicalgia  Neck uses lidocaine and it helps       ROS:  General:  NEGATIVE for fever, chills, change in weight   Resp:  NEGATIVE for significant cough or SOB   Endocrine: DENIES LOW SUGAR EPISODES    Past Medical History:   Diagnosis Date     CAD (coronary artery disease) 8/2012 8/2012 Cath - 75% RCA with BRIGITTE placed, 40% ostial and distal left main stenosis     Diabetes mellitus type 2 with peripheral artery disease (H)      DM type 2, goal A1c below 7      GERD (gastroesophageal reflux disease)      Gluten intolerance      HTN (hypertension)     been off meds since 2012     Hyperlipidemia LDL goal < 100      Pacemaker 8/2009 8/2009 Near syncopal episode with HR 40s and BP 60/sys - PPM implanted     PAD (peripheral artery disease) (H) 2008    70% proximal right SFA stenosis with successful angioplasy     S/P coronary angiogram 8/2012 8/2012 Cath - 75% RCA with BRIGITTE placed, 40% ostial and distal left main stenosis     Syncope 8/2009 8/2009 Near syncopal episode with HR 40s and BP 60/sys - PPM implanted     Unspecified hypothyroidism      Current Outpatient Prescriptions   Medication     insulin detemir (LEVEMIR FLEXTOUCH)  100 UNIT/ML injection     insulin aspart (NOVOLOG FLEXPEN) 100 UNIT/ML injection     Tetrahydroz-Dextran-PEG-Povid (TH EYE DROP ADVANCED RELIEF) 0.05-0.1-1-1 % SOLN     sodium chloride (TITO 128) 5 % ophthalmic ointment     acetaminophen (TYLENOL ARTHRITIS PAIN) 650 MG CR tablet     RaNITidine HCl (ZANTAC 150 MAXIMUM STRENGTH PO)     cholecalciferol (VITAMIN D) 1000 UNIT tablet     gentamicin (GENTAK) 0.3 % ophthalmic ointment     lidocaine (LIDODERM) 5 % Patch     furosemide (LASIX) 20 MG tablet     atorvastatin (LIPITOR) 80 MG tablet     Multiple Vitamins-Minerals (PRESERVISION AREDS 2 PO)     omeprazole (PRILOSEC) 20 MG CR capsule     levothyroxine (SYNTHROID, LEVOTHROID) 75 MCG tablet     docusate sodium (DOCQLACE) 100 MG capsule     Terbinafine (LAMISIL EX)     Pramoxine HCl (VAGISIL ANTI-ITCH MEDICATED EX)     phenylephrine-shark liver oil-mineral oil-petrolatum (PREPARATION H) 0.25-14-74.9 % rectal ointment     blood glucose monitoring (ONE TOUCH ULTRASOFT) lancets     ONE TOUCH ULTRA test strip     Nutritional Supplements (GLUCERNA 1.5 JEN PO)     UNABLE TO FIND     fish oil-omega-3 fatty acids 1000 MG capsule     UNIFINE PENTIPS 31G X 6 MM     timolol (TIMOPTIC) 0.5 % ophthalmic solution     sodium chloride (TITO 128) 5 % ophthalmic solution     ACE/ARB NOT PRESCRIBED, INTENTIONAL,     fluocinolone acetonide 0.01 % OIL     aspirin 81 MG tablet     [DISCONTINUED] insulin aspart (NOVOLOG FLEXPEN) 100 UNIT/ML injection     [DISCONTINUED] insulin detemir (LEVEMIR FLEXTOUCH) 100 UNIT/ML injection     No current facility-administered medications for this visit.       reports that she quit smoking about 69 years ago. Her smoking use included Cigarettes. She has a 3.00 pack-year smoking history. She has never used smokeless tobacco. She reports that she does not drink alcohol or use illicit drugs.      EXAM:  BP: 142/64   Pulse: 70      Wt Readings from Last 2 Encounters:   06/06/17 63.5 kg (140 lb)   05/09/17 63.2  kg (139 lb 6.4 oz)       BMI= Body mass index is 30.3 kg/(m^2).    EXAM:  Serena appears her usual self she has no eyeball on the RIGHT her vision on the LEFT is diminished significantly.  She has some tearing from the left eye.  Skin LEFT eyebrow there is a firm brown raised papule that is rough on the surface light brown smooth borders.  Lungs are clear to auscultation.  Cardiac exam is regular soft murmur is heard.  Abdomen obese nontender extremities show no edema in her legs  She is alert and oriented and charming      Procedure  Cryo therapy with liquid nitrogen 30 sec x 3 to lesion above left eye brow  Assessment/Plan:  Serena was seen today for diabetes.    Diagnoses and all orders for this visit:    Diabetes mellitus type 2 with peripheral artery disease (H)  -     Hemoglobin A1C ([facility])  INCREASE INSULIN  -     insulin detemir (LEVEMIR FLEXTOUCH) 100 UNIT/ML injection; INJECT 20 UNITS UNDER THE SKIN AT BEDTIME  -     insulin aspart (NOVOLOG FLEXPEN) 100 UNIT/ML injection; INJECT 12 UNITS SUBCUTANEOUSLY 4 TIMES DAILY BEFORE MEALS  Call if low sugars  See freeman in 1 month to follow up sugars make sure not over treating    Encouraged to look at senior APT.    Inflamed seborrheic keratosis  -     DESTRUCT BENIGN LESION, UP TO 14    Blindness and low vision  She is still managing on her own- consider looking at   [facility] senior [facility]     Coronary artery disease involving native coronary artery of native heart without angina pectoris  No CP or anginal symptoms    Cervicalgia  Manages okay with patches lidocaine, heat and rom              Victorino Schaefer  [facility]

## 2017-06-06 NOTE — LETTER
[facility]  [address] [place]  [address]  Phone: [phone]    June 8, 2017      Serena Marie  [address]  [facility] [place] [facility] [address]              Dear Serena,      The sugars are up still. I am glad we increased the insulin dose a little. See me as planned.         Sincerely,     Victorino Rader M.D.    Results for orders placed or performed in visit on 06/06/17   Hemoglobin A1C ([facility])   Result Value Ref Range    Hemoglobin A1C 8.3 (H) 4.8 - 5.6 %    Narrative    Performed at:  01 - [facility] [address], [place], [place]  [id number]  : Kurtis Felix MD, Phone:  [phone]

## 2017-06-07 LAB — HBA1C MFR BLD: 8.3 % (ref 4.8–5.6)

## 2017-06-29 ENCOUNTER — ALLIED HEALTH/NURSE VISIT (OUTPATIENT)
Dept: CARDIOLOGY | Facility: CLINIC | Age: 82
End: 2017-06-29
Payer: MEDICARE

## 2017-06-29 DIAGNOSIS — Z95.0 CARDIAC PACEMAKER IN SITU: Primary | ICD-10-CM

## 2017-06-29 PROCEDURE — 93293 PM PHONE R-STRIP DEVICE EVAL: CPT | Performed by: INTERNAL MEDICINE

## 2017-06-29 NOTE — NURSING NOTE
Office teletrace  Intrinsic Rhythm at time of check. Magnet response WNL.  F/U scheduled q 3 months.

## 2017-06-29 NOTE — MR AVS SNAPSHOT
"              After Visit Summary   6/29/2017    Serena Marie    MRN: [id number]           Patient Information     Date Of Birth          8/21/1924        Visit Information        Provider Department      6/29/2017 11:15 AM MITCHELL TECH2 [facility]        Today's Diagnoses     Cardiac pacemaker in situ    -  1       Follow-ups after your visit        Your next 10 appointments already scheduled     Jul 05, 2017 12:45 PM CDT   [facility] with [facility]ino [facility]er [facility]   [facility]    [address] [place] [address] [phone]            Oct 05, 2017 11:00 AM CDT   Phone Device Check with MITCHELL TECH2   [facility] ([facility])    [address] [address] [phone]              Who to contact     If you have questions or need follow up information about today's clinic visit or your schedule please contact [facility] directly at [phone].  Normal or non-critical lab and imaging results will be communicated to you by MyChart, letter or phone within 4 business days after the clinic has received the results. If you do not hear from us within 7 days, please contact the clinic through Ctraxhart or phone. If you have a critical or abnormal lab result, we will notify you by phone as soon as possible.  Submit refill requests through Agavideo or call your pharmacy and they will forward the refill request to us. Please allow 3 business days for your refill to be completed.          Additional Information About Your Visit        Ctraxhart Information     Agavideo lets you send messages to your doctor, view your test results, renew your prescriptions, schedule appointments and more. To sign up, go to [email or web address] . Click on \"Log in\" on the left side of the screen, which will take you to the Welcome page. Then click on " "\"Sign up Now\" on the right side of the page.     You will be asked to enter the access code listed below, as well as some personal information. Please follow the directions to create your username and password.     Your access code is: [email or web address]  Expires: 2017 11:16 AM     Your access code will  in 90 days. If you need help or a new code, please call your [place] clinic or [phone].        Care EveryWhere ID     This is your Care EveryWhere ID. This could be used by other organizations to access your [place] medical records  [id number]         Blood Pressure from Last 3 Encounters:   17 142/64   17 104/46   04/10/17 124/72    Weight from Last 3 Encounters:   17 63.5 kg (140 lb)   17 63.2 kg (139 lb 6.4 oz)   04/10/17 62.5 kg (137 lb 12.8 oz)              We Performed the Following     PM PHONE R STRIP EVAL UP TO 90 DAYS ([id number])        Primary Care Provider Office Phone # Fax #    Victorino Schaefer [phone]       [facility] [address] [place] [address]        Goals        General    I want to have good control of Diabetes (pt-stated)     Notes - Note edited  2016  1:17 PM by Leatha Campos, RN    I will check my blood sugars 4 x a day.  I will take Novolog with my meals.  I will take my Levemir at bedtime.              Equal Access to Services     [facility] : Hadnajma marr Sopriyank, waaxda luqadaha, qaybta kaalmada jennifer, zoila church. So [facility] [phone].    ATENCIÓN: Si habla español, tiene a murcia disposición servicios gratuitos de asistencia lingüística. Llame al [phone].    We comply with applicable federal civil rights laws and [place] laws. We do not discriminate on the basis of race, color, national origin, age, disability sex, sexual orientation or gender identity.            Thank you!     Thank you for choosing [facility] [place]  " for your care. Our goal is always to provide you with excellent care. Hearing back from our patients is one way we can continue to improve our services. Please take a few minutes to complete the written survey that you may receive in the mail after your visit with us. Thank you!             Your Updated Medication List - Protect others around you: Learn how to safely use, store and throw away your medicines at www.disposemymeds.org.          This list is accurate as of: 6/29/17 11:16 AM.  Always use your most recent med list.                   Brand Name Dispense Instructions for use Diagnosis    ACE/ARB NOT PRESCRIBED (INTENTIONAL)      1 each continuous prn ACE & ARB not prescribed due to Risk for drug interaction and Worsening renal function on ACE/ARB therapy    CAD (coronary artery disease)       aspirin 81 MG tablet      Take 1 tablet by mouth every evening        atorvastatin 80 MG tablet    LIPITOR    90 tablet    Take 1 tablet (80 mg) by mouth daily    PAD (peripheral artery disease) (H)       blood glucose monitoring lancets     300 Box    Use to test blood sugar 3-4 times daily or as directed.        cholecalciferol 1000 UNIT tablet    vitamin D     Take 2,000 Units by mouth daily        DOCQLACE 100 MG capsule   Generic drug:  docusate sodium      Take 100 mg by mouth At Bedtime        fish oil-omega-3 fatty acids 1000 MG capsule      Take 1,200 mg by mouth daily    PAD (peripheral artery disease) (H)       fluocinolone acetonide 0.01 % Oil     118 mL    Apply to scalp when wet massage well cover with cap leave on over night Wash in am.    DM type 2, goal A1c below 7       GENTAK 0.3 % ophthalmic ointment   Generic drug:  gentamicin      Place 1 Application into the right eye 3 times daily        GLUCERNA 1.5 JEN PO      Take 1 Can by mouth daily        insulin aspart 100 UNIT/ML injection    NovoLOG FLEXPEN    15 mL    INJECT 12 UNITS SUBCUTANEOUSLY 4 TIMES DAILY BEFORE MEALS    Diabetes mellitus type 2  with peripheral artery disease (H)       insulin detemir 100 UNIT/ML injection    LEVEMIR FLEXTOUCH    15 mL    INJECT 20 UNITS UNDER THE SKIN AT BEDTIME    Diabetes mellitus type 2 with peripheral artery disease (H)       LAMISIL EX      Externally apply topically daily For under breasts, in between toes        LASIX 20 MG tablet   Generic drug:  furosemide      Take 20 mg by mouth once a week 1 tablet Mon & Thur        levothyroxine 75 MCG tablet    SYNTHROID/LEVOTHROID    90 tablet    TAKE ONE TABLET BY MOUTH EVERY DAY    Encounter for medication refill       lidocaine 5 % Patch    LIDODERM     Place 1 patch onto the skin every 24 hours 1/2 a patch on her R side of her neck & other 1/2 on her lower back for 12 hours a day        * TITO 128 5 % ophthalmic solution   Generic drug:  sodium chloride      Place 1 drop Into the left eye daily as needed for dry eyes        * TITO 128 5 % ophthalmic ointment   Generic drug:  sodium chloride      Place 1 Application into the right eye 2 times daily as needed for dry eyes    Blindness and low vision       omeprazole 20 MG CR capsule    priLOSEC    30 capsule    Take 1 capsule (20 mg) by mouth At Bedtime    Gastroesophageal reflux disease, esophagitis presence not specified       ONE TOUCH ULTRA test strip   Generic drug:  blood glucose monitoring     300 each    TEST BLOOD GLUCOSE FOUR TIMES A DAY    Encounter for medication refill       PREPARATION H 0.25-14-74.9 % rectal ointment   Generic drug:  phenylephrine-shark liver oil-mineral oil-petrolatum      Place rectally daily        PRESERVISION AREDS 2 PO      Take 1 tablet by mouth every morning        TH EYE DROP ADVANCED RELIEF 0.05-0.1-1-1 % Soln   Generic drug:  Tetrahydroz-Dextran-PEG-Povid      Apply to eye as needed For dry eye relief- 5 x per day    Blindness and low vision       timolol 0.5 % ophthalmic solution    TIMOPTIC    1 Bottle    Place 1 drop Into the left eye 2 times daily        TYLENOL ARTHRITIS PAIN  650 MG CR tablet   Generic drug:  acetaminophen      Take 1,300 mg by mouth daily States 2 tablets qam & usually 2 tablets qpm        UNABLE TO FIND      Take 1 Bar by mouth 2 times daily MEDICATION NAME: FiberONe Bar        UNIFINE PENTIPS 31G X 6 MM   Generic drug:  insulin pen needle     100 each    USE ONCE DAILY    Encounter for medication refill       VAGISIL ANTI-ITCH MEDICATED EX      Externally apply topically every morning        ZANTAC 150 MAXIMUM STRENGTH PO      Take 150 mg by mouth daily as needed        * Notice:  This list has 2 medication(s) that are the same as other medications prescribed for you. Read the directions carefully, and ask your doctor or other care provider to review them with you.

## 2017-07-05 ENCOUNTER — OFFICE VISIT (OUTPATIENT)
Dept: FAMILY MEDICINE | Facility: CLINIC | Age: 82
End: 2017-07-05

## 2017-07-05 VITALS
RESPIRATION RATE: 24 BRPM | OXYGEN SATURATION: 98 % | DIASTOLIC BLOOD PRESSURE: 64 MMHG | HEART RATE: 70 BPM | SYSTOLIC BLOOD PRESSURE: 138 MMHG | WEIGHT: 142.6 LBS | BODY MASS INDEX: 30.86 KG/M2

## 2017-07-05 DIAGNOSIS — E11.51 DIABETES MELLITUS TYPE 2 WITH PERIPHERAL ARTERY DISEASE (H): Primary | ICD-10-CM

## 2017-07-05 DIAGNOSIS — H54.10 BLINDNESS AND LOW VISION: ICD-10-CM

## 2017-07-05 DIAGNOSIS — Z90.01 H/O ENUCLEATION OF RIGHT EYEBALL: ICD-10-CM

## 2017-07-05 PROCEDURE — 99213 OFFICE O/P EST LOW 20 MIN: CPT | Performed by: FAMILY MEDICINE

## 2017-07-05 NOTE — MR AVS SNAPSHOT
"              After Visit Summary   7/5/2017    Serena Marie    MRN: [id number]           Patient Information     Date Of Birth          8/21/1924        Visit Information        Provider Department      7/5/2017 12:45 PM Victorino Schaefer MD [facility]         Follow-ups after your visit        Your next 10 appointments already scheduled     Oct 05, 2017 11:00 AM CDT   Phone Device Check with MITCHELL TECH2   [facility] [place] ([facility])    [address] [place] [address] [phone]              Who to contact     If you have questions or need follow up information about today's clinic visit or your schedule please contact [facility] directly at [phone].  Normal or non-critical lab and imaging results will be communicated to you by Medical Datasoft Internationalhart, letter or phone within 4 business days after the clinic has received the results. If you do not hear from us within 7 days, please contact the clinic through [employer] or phone. If you have a critical or abnormal lab result, we will notify you by phone as soon as possible.  Submit refill requests through [email or web address] or call your pharmacy and they will forward the refill request to us. Please allow 3 business days for your refill to be completed.          Additional Information About Your Visit        Medical Dat[employer] Internationalhart Information     [email or web address] lets you send messages to your doctor, view your test results, renew your prescriptions, schedule appointments and more. To sign up, go to [email or web address] . Click on \"Log in\" on the left side of the screen, which will take you to the Welcome page. Then click on \"Sign up Now\" on the right side of the page.     You will be asked to enter the access code listed below, as well as some personal information. Please follow the directions to create your username and password.     Your access code is: [email or web address]  Expires: 9/27/2017 11:16 AM     Your access " code will  in 90 days. If you need help or a new code, please call your [place] clinic or [phone].        Care EveryWhere ID     This is your Care EveryWhere ID. This could be used by other organizations to access your [place] medical records  [id number]        Your Vitals Were     Pulse Respirations Pulse Oximetry BMI (Body Mass Index)          70 24 98% 30.86 kg/m2         Blood Pressure from Last 3 Encounters:   17 138/64   17 142/64   17 104/46    Weight from Last 3 Encounters:   17 64.7 kg (142 lb 9.6 oz)   17 63.5 kg (140 lb)   17 63.2 kg (139 lb 6.4 oz)              Today, you had the following     No orders found for display       Primary Care Provider Office Phone # Fax #    Victorino Schaefer [phone]       [facility] [address] [place] [address]        Goals        General    I want to have good control of Diabetes (pt-stated)     Notes - Note edited  2016  1:17 PM by Letaha Campos, RN    I will check my blood sugars 4 x a day.  I will take Novolog with my meals.  I will take my Levemir at bedtime.              Equal Access to Services     CORRINE EUCEDA : Hadii aad ku hadasho Soelbaali, waaxda luqadaha, qaybta kaalmada adeegyada, zoila velasco haydavid kerns . So [facility] [phone].    ATENCIÓN: Si habla español, tiene a murcia disposición servicios gratuitos de asistencia lingüística. Llame al [phone].    We comply with applicable federal civil rights laws and [place] laws. We do not discriminate on the basis of race, color, national origin, age, disability sex, sexual orientation or gender identity.            Thank you!     Thank you for choosing [facility]  for your care. Our goal is always to provide you with excellent care. Hearing back from our patients is one way we can continue to improve our services. Please take a few minutes to complete the written survey that you may  receive in the mail after your visit with us. Thank you!             Your Updated Medication List - Protect others around you: Learn how to safely use, store and throw away your medicines at www.disposemymeds.org.          This list is accurate as of: 7/5/17 12:50 PM.  Always use your most recent med list.                   Brand Name Dispense Instructions for use Diagnosis    ACE/ARB NOT PRESCRIBED (INTENTIONAL)      1 each continuous prn ACE & ARB not prescribed due to Risk for drug interaction and Worsening renal function on ACE/ARB therapy    CAD (coronary artery disease)       aspirin 81 MG tablet      Take 1 tablet by mouth every evening        atorvastatin 80 MG tablet    LIPITOR    90 tablet    Take 1 tablet (80 mg) by mouth daily    PAD (peripheral artery disease) (H)       blood glucose monitoring lancets     300 Box    Use to test blood sugar 3-4 times daily or as directed.        cholecalciferol 1000 UNIT tablet    vitamin D     Take 2,000 Units by mouth daily        DOCQLACE 100 MG capsule   Generic drug:  docusate sodium      Take 100 mg by mouth At Bedtime        fish oil-omega-3 fatty acids 1000 MG capsule      Take 1,200 mg by mouth daily    PAD (peripheral artery disease) (H)       fluocinolone acetonide 0.01 % Oil     118 mL    Apply to scalp when wet massage well cover with cap leave on over night Wash in am.    DM type 2, goal A1c below 7       GENTAK 0.3 % ophthalmic ointment   Generic drug:  gentamicin      Place 1 Application into the right eye 3 times daily        GLUCERNA 1.5 JEN PO      Take 1 Can by mouth daily        insulin aspart 100 UNIT/ML injection    NovoLOG FLEXPEN    15 mL    INJECT 12 UNITS SUBCUTANEOUSLY 4 TIMES DAILY BEFORE MEALS    Diabetes mellitus type 2 with peripheral artery disease (H)       insulin detemir 100 UNIT/ML injection    LEVEMIR FLEXTOUCH    15 mL    INJECT 20 UNITS UNDER THE SKIN AT BEDTIME    Diabetes mellitus type 2 with peripheral artery disease (H)        LAMISIL EX      Externally apply topically daily For under breasts, in between toes        LASIX 20 MG tablet   Generic drug:  furosemide      Take 20 mg by mouth once a week 1 tablet Mon & Thur        levothyroxine 75 MCG tablet    SYNTHROID/LEVOTHROID    90 tablet    TAKE ONE TABLET BY MOUTH EVERY DAY    Encounter for medication refill       lidocaine 5 % Patch    LIDODERM     Place 1 patch onto the skin every 24 hours 1/2 a patch on her R side of her neck & other 1/2 on her lower back for 12 hours a day        * TITO 128 5 % ophthalmic solution   Generic drug:  sodium chloride      Place 1 drop Into the left eye daily as needed for dry eyes        * TITO 128 5 % ophthalmic ointment   Generic drug:  sodium chloride      Place 1 Application into the right eye 2 times daily as needed for dry eyes    Blindness and low vision       omeprazole 20 MG CR capsule    priLOSEC    30 capsule    Take 1 capsule (20 mg) by mouth At Bedtime    Gastroesophageal reflux disease, esophagitis presence not specified       ONE TOUCH ULTRA test strip   Generic drug:  blood glucose monitoring     300 each    TEST BLOOD GLUCOSE FOUR TIMES A DAY    Encounter for medication refill       PREPARATION H 0.25-14-74.9 % rectal ointment   Generic drug:  phenylephrine-shark liver oil-mineral oil-petrolatum      Place rectally daily        PRESERVISION AREDS 2 PO      Take 1 tablet by mouth every morning        TH EYE DROP ADVANCED RELIEF 0.05-0.1-1-1 % Soln   Generic drug:  Tetrahydroz-Dextran-PEG-Povid      Apply to eye as needed For dry eye relief- 5 x per day    Blindness and low vision       timolol 0.5 % ophthalmic solution    TIMOPTIC    1 Bottle    Place 1 drop Into the left eye 2 times daily        TYLENOL ARTHRITIS PAIN 650 MG CR tablet   Generic drug:  acetaminophen      Take 1,300 mg by mouth daily States 2 tablets qam & usually 2 tablets qpm        UNABLE TO FIND      Take 1 Bar by mouth 2 times daily MEDICATION NAME: Juan Francisco Du         UNIFINE PENTIPS 31G X 6 MM   Generic drug:  insulin pen needle     100 each    USE ONCE DAILY    Encounter for medication refill       VAGISIL ANTI-ITCH MEDICATED EX      Externally apply topically every morning        ZANTAC 150 MAXIMUM STRENGTH PO      Take 150 mg by mouth daily as needed        * Notice:  This list has 2 medication(s) that are the same as other medications prescribed for you. Read the directions carefully, and ask your doctor or other care provider to review them with you.

## 2017-07-05 NOTE — PROGRESS NOTES
.[facility]    She has been very social     Lab Results   Component Value Date    A1C 8.3 06/06/2017    A1C 8.0 03/07/2017    A1C 7.0 09/08/2016    A1C 6.8 03/10/2016    A1C 6.7 09/10/2015     /64  Pulse 70  Resp 24  Wt 64.7 kg (142 lb 9.6 oz)  SpO2 98%  BMI 30.86 kg/m2                              Subjective:  Here to discuss the status of the following problem(s):(Unless noted the problem(s) is/are stable and if applicable the medicine(s) being used is/are causing no side effects or problems.)    CC: Diabetes and Recheck Medication (recent med changes - insulin)     1. Diabetes mellitus type 2 with peripheral artery disease (H)  Managing with insulin at home  18 levimer at bed   Novolog 14 units with meals   This was increased at last visit  Sugars reviewed checks 4 times per day  [id number]  Noon 130-260  Pm  Same   Hs 130- 160     3. Blindness and low vision  Has no right eye and vision worsening in left  Has family involved   Still lives independantly   Very frightened about loss of independence             ROS:  General:  NEGATIVE for fever, chills, change in weight CV:  NEGATIVE for chest pain, palpitations or peripheral edema Resp:  NEGATIVE for significant cough or SOB    Past Medical History:   Diagnosis Date     CAD (coronary artery disease) 8/2012 8/2012 Cath - 75% RCA with BRIGITTE placed, 40% ostial and distal left main stenosis     Diabetes mellitus type 2 with peripheral artery disease (H)      DM type 2, goal A1c below 7      GERD (gastroesophageal reflux disease)      Gluten intolerance      HTN (hypertension)     been off meds since 2012     Hyperlipidemia LDL goal < 100      Pacemaker 8/2009 8/2009 Near syncopal episode with HR 40s and BP 60/sys - PPM implanted     PAD (peripheral artery disease) (H) 2008    70% proximal right SFA stenosis with successful angioplasy     S/P coronary angiogram 8/2012 8/2012 Cath - 75% RCA with BRIGITTE placed, 40% ostial and distal left main stenosis      Syncope 8/2009 8/2009 Near syncopal episode with HR 40s and BP 60/sys - PPM implanted     Unspecified hypothyroidism      Current Outpatient Prescriptions   Medication     insulin detemir (LEVEMIR FLEXTOUCH) 100 UNIT/ML injection     insulin aspart (NOVOLOG FLEXPEN) 100 UNIT/ML injection     Tetrahydroz-Dextran-PEG-Povid (TH EYE DROP ADVANCED RELIEF) 0.05-0.1-1-1 % SOLN     sodium chloride (TITO 128) 5 % ophthalmic ointment     acetaminophen (TYLENOL ARTHRITIS PAIN) 650 MG CR tablet     RaNITidine HCl (ZANTAC 150 MAXIMUM STRENGTH PO)     cholecalciferol (VITAMIN D) 1000 UNIT tablet     gentamicin (GENTAK) 0.3 % ophthalmic ointment     lidocaine (LIDODERM) 5 % Patch     furosemide (LASIX) 20 MG tablet     atorvastatin (LIPITOR) 80 MG tablet     Multiple Vitamins-Minerals (PRESERVISION AREDS 2 PO)     omeprazole (PRILOSEC) 20 MG CR capsule     levothyroxine (SYNTHROID, LEVOTHROID) 75 MCG tablet     docusate sodium (DOCQLACE) 100 MG capsule     Terbinafine (LAMISIL EX)     Pramoxine HCl (VAGISIL ANTI-ITCH MEDICATED EX)     phenylephrine-shark liver oil-mineral oil-petrolatum (PREPARATION H) 0.25-14-74.9 % rectal ointment     blood glucose monitoring (ONE TOUCH ULTRASOFT) lancets     ONE TOUCH ULTRA test strip     Nutritional Supplements (GLUCERNA 1.5 JEN PO)     UNABLE TO FIND     fish oil-omega-3 fatty acids 1000 MG capsule     UNIFINE PENTIPS 31G X 6 MM     timolol (TIMOPTIC) 0.5 % ophthalmic solution     sodium chloride (TITO 128) 5 % ophthalmic solution     ACE/ARB NOT PRESCRIBED, INTENTIONAL,     fluocinolone acetonide 0.01 % OIL     aspirin 81 MG tablet     No current facility-administered medications for this visit.       reports that she quit smoking about 69 years ago. Her smoking use included Cigarettes. She has a 3.00 pack-year smoking history. She has never used smokeless tobacco. She reports that she does not drink alcohol or use illicit drugs.      EXAM:  BP: 138/64   Pulse: 70      Wt Readings from  Last 2 Encounters:   07/05/17 64.7 kg (142 lb 9.6 oz)   06/06/17 63.5 kg (140 lb)       BMI= Body mass index is 30.86 kg/(m^2).    EXAM:  Feisty lady who is bright  Right eye removed  Poor vision on left   Lungs clear  Cor reg  Ext no edema  Feet no lesions  Gait with walker steady  Assessment/Plan:  Serena was seen today for diabetes and recheck medication.    Diagnoses and all orders for this visit:    Diabetes mellitus type 2 with peripheral artery disease (H)  Had been to high will await next A1c, in 2 months or so before more changes in insulin    H/O enucleation of right eyeball    Blindness and low vision  Discussed letting family help her find a new living place now so she has some ability to have input in the place while she can still at least a little.               Victorino Schaefer  [facility]

## 2017-07-27 DIAGNOSIS — Z76.0 ENCOUNTER FOR MEDICATION REFILL: ICD-10-CM

## 2017-07-27 NOTE — TELEPHONE ENCOUNTER
unifine pentip 31G last OV 7/5/17 last labs 6/6/17  Elodia Rivas MA July 27, 2017 1:35 PM      Lab Results   Component Value Date    A1C 8.3 06/06/2017    A1C 8.0 03/07/2017    A1C 7.0 09/08/2016    A1C 6.8 03/10/2016    A1C 6.7 09/10/2015

## 2017-07-28 RX ORDER — PEN NEEDLE, DIABETIC
NEEDLE, DISPOSABLE MISCELLANEOUS
Qty: 100 EACH | Refills: 11 | Status: SHIPPED | OUTPATIENT
Start: 2017-07-28 | End: 2018-03-16

## 2017-08-04 ENCOUNTER — CARE COORDINATION (OUTPATIENT)
Dept: CARE COORDINATION | Facility: CLINIC | Age: 82
End: 2017-08-04

## 2017-08-04 NOTE — PROGRESS NOTES
Clinic Care Coordination Contact  [facility]/Voicemail    Referral Source: PCP  Clinical Data: Care Coordinator Outreach  Outreach attempted x 2.  Left message on voicemail with call back information and requested return call.  Plan: Care Coordinator mailed out care coordination [facility] letter on August 8, 2017. Care Coordinator will try to reach patient again in 3-4 weeks.    CANDIS Proctor  Clinic Care Coordinator  [phone]  [email or web address]

## 2017-08-04 NOTE — LETTER
[facility]  [address] [place] [address]  [phone]      August 8, 2017      Serena Marie  [address]  [place] [address]    Dear Serena,  I am the Clinic Care Coordinator that works with your primary care provider's clinic. I have been trying to reach you recently to follow-up on the resources that I provided about Nursing Facility Care. I'm wondering if you'd like more information or assistance on this.    If you have a moment I would love to touch base and see if there is anything else you need. Please contact me at [phone] when you are able.      Sincerely,     Alma Rivera

## 2017-08-31 DIAGNOSIS — Z76.0 ENCOUNTER FOR MEDICATION REFILL: ICD-10-CM

## 2017-08-31 RX ORDER — LEVOTHYROXINE SODIUM 75 UG/1
TABLET ORAL
Qty: 90 TABLET | Refills: 1 | Status: SHIPPED | OUTPATIENT
Start: 2017-08-31

## 2017-08-31 RX ORDER — LIDOCAINE 50 MG/G
PATCH TOPICAL
Qty: 60 PATCH | Refills: 11 | Status: SHIPPED | OUTPATIENT
Start: 2017-08-31 | End: 2018-05-14

## 2017-09-05 ENCOUNTER — OFFICE VISIT (OUTPATIENT)
Dept: FAMILY MEDICINE | Facility: CLINIC | Age: 82
End: 2017-09-05

## 2017-09-05 VITALS
HEIGHT: 57 IN | WEIGHT: 142.6 LBS | HEART RATE: 64 BPM | OXYGEN SATURATION: 99 % | DIASTOLIC BLOOD PRESSURE: 70 MMHG | SYSTOLIC BLOOD PRESSURE: 162 MMHG | BODY MASS INDEX: 30.76 KG/M2

## 2017-09-05 DIAGNOSIS — H54.10 BLINDNESS AND LOW VISION: ICD-10-CM

## 2017-09-05 DIAGNOSIS — I25.10 CORONARY ARTERY DISEASE INVOLVING NATIVE CORONARY ARTERY OF NATIVE HEART WITHOUT ANGINA PECTORIS: ICD-10-CM

## 2017-09-05 DIAGNOSIS — Z95.0 PACEMAKER: ICD-10-CM

## 2017-09-05 DIAGNOSIS — K21.9 GASTROESOPHAGEAL REFLUX DISEASE, ESOPHAGITIS PRESENCE NOT SPECIFIED: ICD-10-CM

## 2017-09-05 DIAGNOSIS — E11.51 DIABETES MELLITUS TYPE 2 WITH PERIPHERAL ARTERY DISEASE (H): Primary | ICD-10-CM

## 2017-09-05 DIAGNOSIS — M54.2 CERVICALGIA: ICD-10-CM

## 2017-09-05 DIAGNOSIS — Z90.01 H/O ENUCLEATION OF RIGHT EYEBALL: ICD-10-CM

## 2017-09-05 PROCEDURE — 36415 COLL VENOUS BLD VENIPUNCTURE: CPT | Performed by: FAMILY MEDICINE

## 2017-09-05 PROCEDURE — 99214 OFFICE O/P EST MOD 30 MIN: CPT | Performed by: FAMILY MEDICINE

## 2017-09-05 RX ORDER — OXYCODONE HYDROCHLORIDE 5 MG/1
5 CAPSULE ORAL EVERY 4 HOURS PRN
Qty: 40 CAPSULE | Refills: 0 | Status: SHIPPED | OUTPATIENT
Start: 2017-09-05 | End: 2017-11-06

## 2017-09-05 NOTE — MR AVS SNAPSHOT
"              After Visit Summary   9/5/2017    Serena Marie    MRN: [id number]           Patient Information     Date Of Birth          8/21/1924        Visit Information        Provider Department      9/5/2017 12:00 PM Victorino Schaefer MD [facility]        Today's Diagnoses     Diabetes mellitus type 2 with peripheral artery disease (H)    -  1    Gastroesophageal reflux disease, esophagitis presence not specified        Blindness and low vision        H/O enucleation of right eyeball        Coronary artery disease involving native coronary artery of native heart without angina pectoris        Pacemaker        Cervicalgia           Follow-ups after your visit        Your next 10 appointments already scheduled     Oct 05, 2017 11:00 AM CDT   Phone Device Check with MITCHELL TECH2   [facility] PHYSICIANS [facility] AT [place] ([facility])    [address] [place] [id number] [phone]              Who to contact     If you have questions or need follow up information about today's clinic visit or your schedule please contact [facility] directly at [phone].  Normal or non-critical lab and imaging results will be communicated to you by [employer], letter or phone within 4 business days after the clinic has received the results. If you do not hear from us within 7 days, please contact the clinic through [employer]IP[employer] or phone. If you have a critical or abnormal lab result, we will notify you by phone as soon as possible.  Submit refill requests through [employer] or call your pharmacy and they will forward the refill request to us. Please allow 3 business days for your refill to be completed.          Additional Information About Your Visit        Immun[employer] Information     [employer] lets you send messages to your doctor, view your test results, renew your prescriptions, schedule appointments and more. To sign up, go to [email or web address] . Click on \"Log in\" " "on the left side of the screen, which will take you to the Welcome page. Then click on \"Sign up Now\" on the right side of the page.     You will be asked to enter the access code listed below, as well as some personal information. Please follow the directions to create your username and password.     Your access code is: [email or web address]  Expires: 2017 11:16 AM     Your access code will  in 90 days. If you need help or a new code, please call your [facility] or [phone].        Care EveryWhere ID     This is your Care EveryWhere ID. This could be used by other organizations to access your [place] medical records  [id number]        Your Vitals Were     Pulse Height Pulse Oximetry BMI (Body Mass Index)          64 1.448 m (4' 9\") 99% 30.86 kg/m2         Blood Pressure from Last 3 Encounters:   17 162/70   17 138/64   17 142/64    Weight from Last 3 Encounters:   17 64.7 kg (142 lb 9.6 oz)   17 64.7 kg (142 lb 9.6 oz)   17 63.5 kg (140 lb)              We Performed the Following     Basic Metabolic Panel (8) ([facility])     Hemoglobin A1C ([facility])          Today's Medication Changes          These changes are accurate as of: 17 12:29 PM.  If you have any questions, ask your nurse or doctor.               Start taking these medicines.        Dose/Directions    oxyCODONE 5 MG capsule   Commonly known as:  OXY-IR   Used for:  Cervicalgia        Dose:  5 mg   Take 1 capsule (5 mg) by mouth every 4 hours as needed for moderate to severe pain   Quantity:  40 capsule   Refills:  0            Where to get your medicines      Some of these will need a paper prescription and others can be bought over the counter.  Ask your nurse if you have questions.     Bring a paper prescription for each of these medications     oxyCODONE 5 MG capsule                Primary Care Provider Office Phone # Fax #    Victorino Schaefer [phone] [place]  [facility] [phone]        Goals        General    I want to have good control of Diabetes (pt-stated)     Notes - Note edited  1/28/2016  1:17 PM by Leatha Campos, RN    I will check my blood sugars 4 x a day.  I will take Novolog with my meals.  I will take my Levemir at bedtime.              Equal Access to Services     ERMA EUCEDA : Hadii aad ku hadasho Soomaali, waaxda luqadaha, qaybta kaalmada adeegyada, waxay naiin hayaan feliciano bazanemkhimaria esther kerns . So [facility] [phone].    ATENCIÓN: Si habla español, tiene a murcia disposición servicios gratuitos de asistencia lingüística. Pricilaame al [phone].    We comply with applicable federal civil rights laws and [place] laws. We do not discriminate on the basis of race, color, national origin, age, disability sex, sexual orientation or gender identity.            Thank you!     Thank you for choosing [facility]  for your care. Our goal is always to provide you with excellent care. Hearing back from our patients is one way we can continue to improve our services. Please take a few minutes to complete the written survey that you may receive in the mail after your visit with us. Thank you!             Your Updated Medication List - Protect others around you: Learn how to safely use, store and throw away your medicines at www.disposemymeds.org.          This list is accurate as of: 9/5/17 12:29 PM.  Always use your most recent med list.                   Brand Name Dispense Instructions for use Diagnosis    ACE/ARB NOT PRESCRIBED (INTENTIONAL)      1 each continuous prn ACE & ARB not prescribed due to Risk for drug interaction and Worsening renal function on ACE/ARB therapy    CAD (coronary artery disease)       aspirin 81 MG tablet      Take 1 tablet by mouth every evening        atorvastatin 80 MG tablet    LIPITOR    90 tablet    Take 1 tablet (80 mg) by mouth daily    PAD (peripheral artery disease) (H)       blood glucose monitoring lancets      300 Box    Use to test blood sugar 3-4 times daily or as directed.        cholecalciferol 1000 UNIT tablet    vitamin D     Take 2,000 Units by mouth daily        DOCQLACE 100 MG capsule   Generic drug:  docusate sodium      Take 100 mg by mouth At Bedtime        fish oil-omega-3 fatty acids 1000 MG capsule      Take 1,200 mg by mouth daily    PAD (peripheral artery disease) (H)       fluocinolone acetonide 0.01 % Oil     118 mL    Apply to scalp when wet massage well cover with cap leave on over night Wash in am.    DM type 2, goal A1c below 7       GENTAK 0.3 % ophthalmic ointment   Generic drug:  gentamicin      Place 1 Application into the right eye 3 times daily        GLUCERNA 1.5 JEN PO      Take 1 Can by mouth daily        insulin aspart 100 UNIT/ML injection    NovoLOG FLEXPEN    15 mL    INJECT 12 UNITS SUBCUTANEOUSLY 4 TIMES DAILY BEFORE MEALS    Diabetes mellitus type 2 with peripheral artery disease (H)       insulin detemir 100 UNIT/ML injection    LEVEMIR FLEXTOUCH    15 mL    INJECT 20 UNITS UNDER THE SKIN AT BEDTIME    Diabetes mellitus type 2 with peripheral artery disease (H)       LAMISIL EX      Externally apply topically daily For under breasts, in between toes        LASIX 20 MG tablet   Generic drug:  furosemide      Take 20 mg by mouth once a week 1 tablet Mon & Thur        levothyroxine 75 MCG tablet    SYNTHROID/LEVOTHROID    90 tablet    TAKE ONE TABLET BY MOUTH ONE TIME DAILY    Encounter for medication refill       lidocaine 5 % Patch    LIDODERM    60 patch    APPLY UP TO 3 PATCHES TO PAINFUL AREA AT ONCE FOR UP TO 12 H WITHIN A 24 H PERIOD.  REMOVE AFTER 12 HOURS.    Encounter for medication refill       * TITO 128 5 % ophthalmic solution   Generic drug:  sodium chloride      Place 1 drop Into the left eye daily as needed for dry eyes        * TITO 128 5 % ophthalmic ointment   Generic drug:  sodium chloride      Place 1 Application into the right eye 2 times daily as needed for dry  eyes    Blindness and low vision       omeprazole 20 MG CR capsule    priLOSEC    30 capsule    Take 1 capsule (20 mg) by mouth At Bedtime    Gastroesophageal reflux disease, esophagitis presence not specified       ONE TOUCH ULTRA test strip   Generic drug:  blood glucose monitoring     300 each    TEST BLOOD GLUCOSE FOUR TIMES A DAY    Encounter for medication refill       oxyCODONE 5 MG capsule    OXY-IR    40 capsule    Take 1 capsule (5 mg) by mouth every 4 hours as needed for moderate to severe pain    Cervicalgia       PREPARATION H 0.25-14-74.9 % rectal ointment   Generic drug:  phenylephrine-shark liver oil-mineral oil-petrolatum      Place rectally daily        PRESERVISION AREDS 2 PO      Take 1 tablet by mouth every morning        TH EYE DROP ADVANCED RELIEF 0.05-0.1-1-1 % Soln   Generic drug:  Tetrahydroz-Dextran-PEG-Povid      Apply to eye as needed For dry eye relief- 5 x per day    Blindness and low vision       timolol 0.5 % ophthalmic solution    TIMOPTIC    1 Bottle    Place 1 drop Into the left eye 2 times daily        TYLENOL ARTHRITIS PAIN 650 MG CR tablet   Generic drug:  acetaminophen      Take 1,300 mg by mouth daily States 2 tablets qam & usually 2 tablets qpm        UNABLE TO FIND      Take 1 Bar by mouth 2 times daily MEDICATION NAME: FiberONe Bar        UNIFINE PENTIPS 31G X 6 MM   Generic drug:  insulin pen needle     100 each    USE ONCE DAILY    Encounter for medication refill       VAGISIL ANTI-ITCH MEDICATED EX      Externally apply topically every morning        ZANTAC 150 MAXIMUM STRENGTH PO      Take 150 mg by mouth daily as needed        * Notice:  This list has 2 medication(s) that are the same as other medications prescribed for you. Read the directions carefully, and ask your doctor or other care provider to review them with you.

## 2017-09-05 NOTE — PROGRESS NOTES
Here to follow-up on multiple issues.    Arthralgia in her cervical spine and back.  She continues to use her lidocaine patches that she feels are helpful.  She uses Tylenol.  She'll use an occasional oxycodone for which she needs refills now.  She uses approximately one every day.  She also uses heat to her neck.    GERD she notices she's had a little more reflux and she notes incidentally she's been coughing more.  The cough is not productive of colored mucus is mostly whitish and clear.  She's been using omeprazole 20 mg at night she also uses Zantac on a when necessary basis and notes that she has not been using her Zantac very often.    Coronary artery disease with history of angina she has not had any angina and a number of years.    Pacemaker in place    Diabetes she continues to desire close control of her blood sugar.  And monitors her blood sugars four times a day.  She's brought her list with her again today most of her morning readings are in the 100-140 range her highest period of time is her prelunch readings where she at times will be up to 250.  Her bedtime readings are in the 110-180 range most of them are below 130.  She uses Levemir 20 units at bedtime and currently uses 12 units of NovoLog with each meal and at bedtime.  She denies low sugar readings.    ROS   eyes   her vision continues to worsen she is essentially legally blind.  COR she denies discomfort in her chest with exertion she's not doing a lot of physical activity though    Past Medical History:   Diagnosis Date     CAD (coronary artery disease) 8/2012 8/2012 Cath - 75% RCA with BRIGITTE placed, 40% ostial and distal left main stenosis     Diabetes mellitus type 2 with peripheral artery disease (H)      DM type 2, goal A1c below 7      GERD (gastroesophageal reflux disease)      Gluten intolerance      HTN (hypertension)     been off meds since 2012     Hyperlipidemia LDL goal < 100      Pacemaker 8/2009 8/2009 Near syncopal  episode with HR 40s and BP 60/sys - PPM implanted     PAD (peripheral artery disease) (H) 2008    70% proximal right SFA stenosis with successful angioplasy     S/P coronary angiogram 8/2012 8/2012 Cath - 75% RCA with BRIGITTE placed, 40% ostial and distal left main stenosis     Syncope 8/2009 8/2009 Near syncopal episode with HR 40s and BP 60/sys - PPM implanted     Unspecified hypothyroidism      Past Surgical History:   Procedure Laterality Date     ANGIOPLASTY  2008    Angioplasty of proximal right superficial femoral artery     APPENDECTOMY       BACK SURGERY       C REMOVAL OF EYE      right      CHOLECYSTECTOMY, LAPOROSCOPIC      Cholecystectomy, Laparoscopic     COLECTOMY       EYE SURGERY      corneal transplant      HYSTERECTOMY, EMANI      BSO     IMPLANT PACEMAKER  8/2009     STENT, CORONARY, FATOU  8-12 8/2012 Cath - 75% RCA with BRIGITTE placed, 40% ostial and distal left main stenosis     TONSILLECTOMY & ADENOIDECTOMY       Current Outpatient Prescriptions   Medication     oxyCODONE (OXY-IR) 5 MG capsule     levothyroxine (SYNTHROID/LEVOTHROID) 75 MCG tablet     lidocaine (LIDODERM) 5 % Patch     UNIFINE PENTIPS 31G X 6 MM     insulin detemir (LEVEMIR FLEXTOUCH) 100 UNIT/ML injection     insulin aspart (NOVOLOG FLEXPEN) 100 UNIT/ML injection     Tetrahydroz-Dextran-PEG-Povid (TH EYE DROP ADVANCED RELIEF) 0.05-0.1-1-1 % SOLN     sodium chloride (TITO 128) 5 % ophthalmic ointment     acetaminophen (TYLENOL ARTHRITIS PAIN) 650 MG CR tablet     RaNITidine HCl (ZANTAC 150 MAXIMUM STRENGTH PO)     cholecalciferol (VITAMIN D) 1000 UNIT tablet     gentamicin (GENTAK) 0.3 % ophthalmic ointment     furosemide (LASIX) 20 MG tablet     atorvastatin (LIPITOR) 80 MG tablet     Multiple Vitamins-Minerals (PRESERVISION AREDS 2 PO)     omeprazole (PRILOSEC) 20 MG CR capsule     docusate sodium (DOCQLACE) 100 MG capsule     Terbinafine (LAMISIL EX)     phenylephrine-shark liver oil-mineral oil-petrolatum (PREPARATION H)  "0.25-14-74.9 % rectal ointment     blood glucose monitoring (ONE TOUCH ULTRASOFT) lancets     ONE TOUCH ULTRA test strip     Nutritional Supplements (GLUCERNA 1.5 JEN PO)     UNABLE TO FIND     fish oil-omega-3 fatty acids 1000 MG capsule     timolol (TIMOPTIC) 0.5 % ophthalmic solution     sodium chloride (TITO 128) 5 % ophthalmic solution     ACE/ARB NOT PRESCRIBED, INTENTIONAL,     fluocinolone acetonide 0.01 % OIL     aspirin 81 MG tablet     Pramoxine HCl (VAGISIL ANTI-ITCH MEDICATED EX)     No current facility-administered medications for this visit.        VS noted   /70  Pulse 64  Ht 1.448 m (4' 9\")  Wt 64.7 kg (142 lb 9.6 oz)  SpO2 99%  BMI 30.86 kg/m2    Feisty lady  EYES =Abnormal  EARS= NL  MOUTH =NL  NECK = NL  LUNGS=NL  COR=NL  ABD=NL  EXT= no edema    MOOD AFFECT =NL        ASSESSMENT / PLAN  (E11.51) Diabetes mellitus type 2 with peripheral artery disease (H)  (primary encounter diagnosis)    Plan: Hemoglobin A1C ([facility]), Basic Metabolic Panel        (8) ([facility])   Plans pending results we may need to increase both her NovoLog as well as Levemir.  Follow-up in three months    (K21.9) Gastroesophageal reflux disease, esophagitis presence not specified  I suspect her cough is related to reflux not well controlled she will start using her Zantac on a daily basis.    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 Given #40     Yanick kwong      "

## 2017-09-06 LAB
BUN SERPL-MCNC: 14 MG/DL (ref 10–36)
BUN/CREATININE RATIO: 24 (ref 12–28)
CALCIUM SERPL-MCNC: 9.2 MG/DL (ref 8.7–10.3)
CHLORIDE SERPLBLD-SCNC: 98 MMOL/L (ref 96–106)
CREAT SERPL-MCNC: 0.59 MG/DL (ref 0.57–1)
EGFR IF AFRICN AM: 91 ML/MIN/1.73
EGFR IF NONAFRICN AM: 79 ML/MIN/1.73
GLUCOSE SERPL-MCNC: 247 MG/DL (ref 65–99)
HBA1C MFR BLD: 7 % (ref 4.8–5.6)
POTASSIUM SERPL-SCNC: 4.7 MMOL/L (ref 3.5–5.2)
SODIUM SERPL-SCNC: 136 MMOL/L (ref 134–144)
TOTAL CO2: 26 MMOL/L (ref 18–28)

## 2017-09-14 ENCOUNTER — CARE COORDINATION (OUTPATIENT)
Dept: CARE COORDINATION | Facility: CLINIC | Age: 82
End: 2017-09-14

## 2017-09-14 NOTE — PROGRESS NOTES
Clinic Care Coordination Contact  [facility]/Voicemail    Referral Source: PCP  Clinical Data: Care Coordinator Outreach  Outreach attempted x 1.  Unable to leave a voicemail with call back information.  Plan: Care Coordinator will try to reach patient again in 3-5 business days.    Alma Rivera RUTHY  Clinic Care Coordinator  [phone]  [email or web address]

## 2017-09-15 NOTE — PROGRESS NOTES
Clinic Care Coordination Contact  OUTREACH    Referral Information:  Referral Source: PCP  Reason for Contact: Follow-up on SNF information  Care Conference: No     Universal Utilization:   ED Visits in last year: 0  Hospital visits in last year: 1  Last PCP appointment: 09/05/17  Missed Appointments: 4  Concerns: No concerns  Multiple Providers or Specialists: Yes    Clinical Concerns:  Current Medical Concerns:   Patient Active Problem List   Diagnosis     Hyperlipidemia with target LDL less than 100     Diabetes mellitus type 2 with peripheral artery disease (H)     PAD (peripheral artery disease) (H)     Lumbar spinal stenosis     GERD (gastroesophageal reflux disease)     Post-op bleeding     H/O enucleation of right eyeball     Health Care Home     Pacemaker     Syncope     HCAP (healthcare-associated pneumonia)     Cephalalgia     Bilateral low back pain without sciatica     Cervicalgia     Community acquired pneumonia     ACP (advance care planning)     Coronary artery disease involving native coronary artery of native heart without angina pectoris     Blindness and low vision      Clinical Pathway: None    Medication Management:  Pt is adherent,  Reviewed 9/5/17    Functional Status:  Mobility Status: Independent w/Device- some visual impairments so needs assistance outside of apartment  Equipment Currently Used at Home: grab bar, walker, rolling  Transportation: Pt's family assists     Psychosocial:  Current living arrangement:: I live alone  Financial/Insurance: Medicare, [place]BS [place], No concerns     Resources and Interventions:  Current Resources: No formal supports  Advanced Care Plans/Directives on file:: Yes    Goals:   Goal 1 Statement: I would like to find a nursing home that can care for me when I am completly visually impired.  Goal 1 Progression Percent: 100%  Goal 1 Progression Date: 09/15/17  -Patient has the information of several options and is considering options    Patient/Caregiver  understanding: Pt reports that her vision has not gotten any worse over the last several weeks and is hopeful that it will not decline as quickly as was anticipated. Pt has all the resources and has looked at several places. Pt denies needing any additional information at this time.     Frequency of Care Coordination: No further outreaches will be made at this time unless a new referral is made or a change in the pt's status occurs. Patient was provided with this writer's contact information and encouraged to call with any questions or concerns.    Upcoming appointment: 10/05/17     Plan: GREGORIO MORGAN will remain available to patient in clinic.     Alma Rivera RUTHY  Clinic Care Coordinator  [phone]  [email or web address]

## 2017-10-05 ENCOUNTER — ALLIED HEALTH/NURSE VISIT (OUTPATIENT)
Dept: CARDIOLOGY | Facility: CLINIC | Age: 82
End: 2017-10-05
Payer: MEDICARE

## 2017-10-05 DIAGNOSIS — Z95.0 CARDIAC PACEMAKER IN SITU: Primary | ICD-10-CM

## 2017-10-05 PROCEDURE — 93293 PM PHONE R-STRIP DEVICE EVAL: CPT | Performed by: INTERNAL MEDICINE

## 2017-10-05 NOTE — NURSING NOTE
Office teletrace  Appropriate pacemaker function  at time of check. Magnet response WNL.  F/U scheduled q 3 months.

## 2017-10-05 NOTE — MR AVS SNAPSHOT
"              After Visit Summary   10/5/2017    Serena Marie    MRN: [id number]           Patient Information     Date Of Birth          8/21/1924        Visit Information        Provider Department      10/5/2017 11:00 AM MITCHELL TECH2 [facility]        Today's Diagnoses     Cardiac pacemaker in situ    -  1       Follow-ups after your visit        Your next 10 appointments already scheduled     Dec 05, 2017 12:00 PM CST   SHORT with Victorino Schaefer MD   [facility]    [address] [place] [address] [phone]            Jan 11, 2018 11:00 AM CST   Phone Device Check with MITCHELL TECH2   [facility] ([facility])    [id number]  [facility] [address] [phone]              Who to contact     If you have questions or need follow up information about today's clinic visit or your schedule please contact [facility] directly at [phone].  Normal or non-critical lab and imaging results will be communicated to you by FirstBesthart, letter or phone within 4 business days after the clinic has received the results. If you do not hear from us within 7 days, please contact the clinic through FirstBesthart or phone. If you have a critical or abnormal lab result, we will notify you by phone as soon as possible.  Submit refill requests through MindOps or call your pharmacy and they will forward the refill request to us. Please allow 3 business days for your refill to be completed.          Additional Information About Your Visit        FirstBesthart Information     MindOps lets you send messages to your doctor, view your test results, renew your prescriptions, schedule appointments and more. To sign up, go to [email or web address] . Click on \"Log in\" on the left side of the screen, which will take you to the Welcome page. Then click on " "\"Sign up Now\" on the right side of the page.     You will be asked to enter the access code listed below, as well as some personal information. Please follow the directions to create your username and password.     Your access code is: [email or web address]  Expires: 1/3/2018 11:05 AM     Your access code will  in 90 days. If you need help or a new code, please call your [place] [facility] clinic or [phone].        Care EveryWhere ID     This is your Care EveryWhere ID. This could be used by other organizations to access your [facility] medical records  [id number]         Blood Pressure from Last 3 Encounters:   17 162/70   17 138/64   17 142/64    Weight from Last 3 Encounters:   17 64.7 kg (142 lb 9.6 oz)   17 64.7 kg (142 lb 9.6 oz)   17 63.5 kg (140 lb)              We Performed the Following     PM PHONE R STRIP EVAL UP TO 90 DAYS ([phone])        Primary Care Provider Office Phone # Fax #    Victorino Schaefer [phone] [address]  [facility] [address]        Goals        General    I want to have good control of Diabetes (pt-stated)     Notes - Note edited  2016  1:17 PM by Leatha Campos, RN    I will check my blood sugars 4 x a day.  I will take Novolog with my meals.  I will take my Levemir at bedtime.              Equal Access to Services     [facility]: Hadii osiris guyo Sopriyank, waaxda luqadaha, qaybta kaalmada adejennifer, zoila kerns . So [facility] [phone].    ATENCIÓN: Si habla español, tiene a murcia disposición servicios gratuitos de asistencia lingüística. Llame al [phone].    We comply with applicable federal civil rights laws and [place] laws. We do not discriminate on the basis of race, color, national origin, age, disability, sex, sexual orientation, or gender identity.            Thank you!     Thank you for choosing [facility]  for your care. Our goal " is always to provide you with excellent care. Hearing back from our patients is one way we can continue to improve our services. Please take a few minutes to complete the written survey that you may receive in the mail after your visit with us. Thank you!             Your Updated Medication List - Protect others around you: Learn how to safely use, store and throw away your medicines at www.disposemymeds.org.          This list is accurate as of: 10/5/17 11:05 AM.  Always use your most recent med list.                   Brand Name Dispense Instructions for use Diagnosis    ACE/ARB NOT PRESCRIBED (INTENTIONAL)      1 each continuous prn ACE & ARB not prescribed due to Risk for drug interaction and Worsening renal function on ACE/ARB therapy    CAD (coronary artery disease)       aspirin 81 MG tablet      Take 1 tablet by mouth every evening        atorvastatin 80 MG tablet    LIPITOR    90 tablet    Take 1 tablet (80 mg) by mouth daily    PAD (peripheral artery disease) (H)       blood glucose monitoring lancets     300 Box    Use to test blood sugar 3-4 times daily or as directed.        cholecalciferol 1000 UNIT tablet    vitamin D     Take 2,000 Units by mouth daily        DOCQLACE 100 MG capsule   Generic drug:  docusate sodium      Take 100 mg by mouth At Bedtime        fish oil-omega-3 fatty acids 1000 MG capsule      Take 1,200 mg by mouth daily    PAD (peripheral artery disease) (H)       fluocinolone acetonide 0.01 % Oil     118 mL    Apply to scalp when wet massage well cover with cap leave on over night Wash in am.    DM type 2, goal A1c below 7       GENTAK 0.3 % ophthalmic ointment   Generic drug:  gentamicin      Place 1 Application into the right eye 3 times daily        GLUCERNA 1.5 JEN PO      Take 1 Can by mouth daily        insulin aspart 100 UNIT/ML injection    NovoLOG FLEXPEN    15 mL    INJECT 12 UNITS SUBCUTANEOUSLY 4 TIMES DAILY BEFORE MEALS    Diabetes mellitus type 2 with peripheral artery  disease (H)       insulin detemir 100 UNIT/ML injection    LEVEMIR FLEXTOUCH    15 mL    INJECT 20 UNITS UNDER THE SKIN AT BEDTIME    Diabetes mellitus type 2 with peripheral artery disease (H)       LAMISIL EX      Externally apply topically daily For under breasts, in between toes        LASIX 20 MG tablet   Generic drug:  furosemide      Take 20 mg by mouth once a week 1 tablet Mon & Thur        levothyroxine 75 MCG tablet    SYNTHROID/LEVOTHROID    90 tablet    TAKE ONE TABLET BY MOUTH ONE TIME DAILY    Encounter for medication refill       lidocaine 5 % Patch    LIDODERM    60 patch    APPLY UP TO 3 PATCHES TO PAINFUL AREA AT ONCE FOR UP TO 12 H WITHIN A 24 H PERIOD.  REMOVE AFTER 12 HOURS.    Encounter for medication refill       * TITO 128 5 % ophthalmic solution   Generic drug:  sodium chloride      Place 1 drop Into the left eye daily as needed for dry eyes        * TITO 128 5 % ophthalmic ointment   Generic drug:  sodium chloride      Place 1 Application into the right eye 2 times daily as needed for dry eyes    Blindness and low vision       omeprazole 20 MG CR capsule    priLOSEC    30 capsule    Take 1 capsule (20 mg) by mouth At Bedtime    Gastroesophageal reflux disease, esophagitis presence not specified       ONE TOUCH ULTRA test strip   Generic drug:  blood glucose monitoring     300 each    TEST BLOOD GLUCOSE FOUR TIMES A DAY    Encounter for medication refill       oxyCODONE 5 MG capsule    OXY-IR    40 capsule    Take 1 capsule (5 mg) by mouth every 4 hours as needed for moderate to severe pain    Cervicalgia       PREPARATION H 0.25-14-74.9 % rectal ointment   Generic drug:  phenylephrine-shark liver oil-mineral oil-petrolatum      Place rectally daily        PRESERVISION AREDS 2 PO      Take 1 tablet by mouth every morning        TH EYE DROP ADVANCED RELIEF 0.05-0.1-1-1 % Soln   Generic drug:  Tetrahydroz-Dextran-PEG-Povid      Apply to eye as needed For dry eye relief- 5 x per day     Blindness and low vision       timolol 0.5 % ophthalmic solution    TIMOPTIC    1 Bottle    Place 1 drop Into the left eye 2 times daily        TYLENOL ARTHRITIS PAIN 650 MG CR tablet   Generic drug:  acetaminophen      Take 1,300 mg by mouth daily States 2 tablets qam & usually 2 tablets qpm        UNABLE TO FIND      Take 1 Bar by mouth 2 times daily MEDICATION NAME: FiberONe Bar        UNIFINE PENTIPS 31G X 6 MM   Generic drug:  insulin pen needle     100 each    USE ONCE DAILY    Encounter for medication refill       VAGISIL ANTI-ITCH MEDICATED EX      Externally apply topically every morning        ZANTAC 150 MAXIMUM STRENGTH PO      Take 150 mg by mouth daily as needed        * Notice:  This list has 2 medication(s) that are the same as other medications prescribed for you. Read the directions carefully, and ask your doctor or other care provider to review them with you.

## 2017-11-06 DIAGNOSIS — M54.2 CERVICALGIA: ICD-10-CM

## 2017-11-06 RX ORDER — OXYCODONE HYDROCHLORIDE 5 MG/1
5 CAPSULE ORAL EVERY 4 HOURS PRN
Qty: 40 CAPSULE | Refills: 0 | Status: ON HOLD | OUTPATIENT
Start: 2017-11-06 | End: 2018-02-17

## 2017-12-05 ENCOUNTER — OFFICE VISIT (OUTPATIENT)
Dept: FAMILY MEDICINE | Facility: CLINIC | Age: 82
End: 2017-12-05

## 2017-12-05 VITALS
OXYGEN SATURATION: 99 % | BODY MASS INDEX: 30.94 KG/M2 | HEART RATE: 66 BPM | WEIGHT: 143 LBS | SYSTOLIC BLOOD PRESSURE: 136 MMHG | DIASTOLIC BLOOD PRESSURE: 64 MMHG

## 2017-12-05 DIAGNOSIS — Z90.01 H/O ENUCLEATION OF RIGHT EYEBALL: ICD-10-CM

## 2017-12-05 DIAGNOSIS — M54.2 CERVICALGIA: ICD-10-CM

## 2017-12-05 DIAGNOSIS — K21.9 GASTROESOPHAGEAL REFLUX DISEASE, ESOPHAGITIS PRESENCE NOT SPECIFIED: ICD-10-CM

## 2017-12-05 DIAGNOSIS — I25.10 CORONARY ARTERY DISEASE INVOLVING NATIVE CORONARY ARTERY OF NATIVE HEART WITHOUT ANGINA PECTORIS: ICD-10-CM

## 2017-12-05 DIAGNOSIS — E11.51 DIABETES MELLITUS TYPE 2 WITH PERIPHERAL ARTERY DISEASE (H): Primary | ICD-10-CM

## 2017-12-05 LAB
A/C RATIO MG/G: >300 MG/G
ALBUMIN (URINE) MG/L: 80 MG/L
INTERPRETATION: ABNORMAL
URINE CREATININE MG/DL - QUEST: 50 MG/DL

## 2017-12-05 PROCEDURE — 82570 ASSAY OF URINE CREATININE: CPT | Performed by: FAMILY MEDICINE

## 2017-12-05 PROCEDURE — 82044 UR ALBUMIN SEMIQUANTITATIVE: CPT | Performed by: FAMILY MEDICINE

## 2017-12-05 PROCEDURE — 99214 OFFICE O/P EST MOD 30 MIN: CPT | Performed by: FAMILY MEDICINE

## 2017-12-05 NOTE — LETTER
[facility]  [address] [place]  [address]  Phone: [phone]    December 6, 2017      Serena Marie  [address]  [facility] [place] [address]              Dear Serena,    The results from your recent visit showed that your  A1c is good at 7.3.   Keep doing what you are, and return in 3 months.     Best wishes         Sincerely,     Victorino Rader M.D.    Results for orders placed or performed in visit on 12/05/17   Hemoglobin A1C ([facility])   Result Value Ref Range    Hemoglobin A1C 7.3 (H) 4.8 - 5.6 %    Narrative    Performed at:  01 - [facility] [address], [place], [place]  [id number]  : Kurtis Felix MD, Phone:  [phone]   Microalbumin ([facility])   Result Value Ref Range    Albumin mg/L 80 (A) mg/L    Urine Creatinine Mg/Dl 50 mg/dL    A/C Ratio mg/g >300 (A) mg/g    Interpretation High Abnormal (A)

## 2017-12-06 LAB — HBA1C MFR BLD: 7.3 % (ref 4.8–5.6)

## 2017-12-06 NOTE — PROGRESS NOTES
"  Subjective:  Here to discuss the status of the following problem(s):(Unless noted the problem(s) is/are stable and if applicable the medicine(s) being used is/are causing no side effects or problems.)    CC: RECHECK (f/u 2mth.)      1. Diabetes mellitus type 2 with peripheral artery disease (H)  Is using \"her own\" regimen levimir 20 at hs, novolog 12 units four times daily  Checks 4 /day   Has readings with her  ams 120-150 others look high 120-220 for the most part  Lab Results   Component Value Date    A1C 7.0 09/05/2017    A1C 8.3 06/06/2017    A1C 8.0 03/07/2017    A1C 7.0 09/08/2016    A1C 6.8 03/10/2016           2. Gastroesophageal reflux disease, esophagitis presence not specified  No reflux now   Should always be on PPI  Prior CP was difficult to distinguish from   Angina    3. Coronary artery disease involving native coronary artery of native heart without angina pectoris  ALSO HAS PAD.  NO CP NO CLAUDICATION, BUT SHE IS NOT VERY ACTIVE    4. Cervicalgia  NECK AND HEAD PAIN , USING LIDOCAINE PATCHES AND FEELS THESE HELP  ALSO USE OXYCODONE ONE A DAY ROUGHLY      5. H/O enucleation of right eyeball  SHE HAS VERY POOR VISION IN OTHER EYE AND IS LEGALLY BLIND         ROS:  General and CV completed and negative except as noted above    Past Medical History:   Diagnosis Date     CAD (coronary artery disease) 8/2012 8/2012 Cath - 75% RCA with BRIGITTE placed, 40% ostial and distal left main stenosis     Diabetes mellitus type 2 with peripheral artery disease (H)      DM type 2, goal A1c below 7      GERD (gastroesophageal reflux disease)      Gluten intolerance      HTN (hypertension)     been off meds since 2012     Hyperlipidemia LDL goal < 100      Pacemaker 8/2009 8/2009 Near syncopal episode with HR 40s and BP 60/sys - PPM implanted     PAD (peripheral artery disease) (H) 2008    70% proximal right SFA stenosis with successful angioplasy     S/P coronary angiogram 8/2012 8/2012 Cath - 75% RCA with BRIGITTE " placed, 40% ostial and distal left main stenosis     Syncope 08/2009 8/2009 Near syncopal episode with HR 40s and BP 60/sys - PPM implanted     Unspecified hypothyroidism      Current Outpatient Prescriptions   Medication     oxyCODONE (OXY-IR) 5 MG capsule     levothyroxine (SYNTHROID/LEVOTHROID) 75 MCG tablet     lidocaine (LIDODERM) 5 % Patch     UNIFINE PENTIPS 31G X 6 MM     insulin detemir (LEVEMIR FLEXTOUCH) 100 UNIT/ML injection     insulin aspart (NOVOLOG FLEXPEN) 100 UNIT/ML injection     Tetrahydroz-Dextran-PEG-Povid (TH EYE DROP ADVANCED RELIEF) 0.05-0.1-1-1 % SOLN     sodium chloride (TITO 128) 5 % ophthalmic ointment     acetaminophen (TYLENOL ARTHRITIS PAIN) 650 MG CR tablet     RaNITidine HCl (ZANTAC 150 MAXIMUM STRENGTH PO)     cholecalciferol (VITAMIN D) 1000 UNIT tablet     gentamicin (GENTAK) 0.3 % ophthalmic ointment     furosemide (LASIX) 20 MG tablet     atorvastatin (LIPITOR) 80 MG tablet     Multiple Vitamins-Minerals (PRESERVISION AREDS 2 PO)     omeprazole (PRILOSEC) 20 MG CR capsule     docusate sodium (DOCQLACE) 100 MG capsule     Terbinafine (LAMISIL EX)     Pramoxine HCl (VAGISIL ANTI-ITCH MEDICATED EX)     phenylephrine-shark liver oil-mineral oil-petrolatum (PREPARATION H) 0.25-14-74.9 % rectal ointment     blood glucose monitoring (ONE TOUCH ULTRASOFT) lancets     ONE TOUCH ULTRA test strip     Nutritional Supplements (GLUCERNA 1.5 JEN PO)     UNABLE TO FIND     fish oil-omega-3 fatty acids 1000 MG capsule     timolol (TIMOPTIC) 0.5 % ophthalmic solution     sodium chloride (TITO 128) 5 % ophthalmic solution     ACE/ARB NOT PRESCRIBED, INTENTIONAL,     fluocinolone acetonide 0.01 % OIL     aspirin 81 MG tablet     No current facility-administered medications for this visit.       reports that she quit smoking about 70 years ago. Her smoking use included Cigarettes. She has a 3.00 pack-year smoking history. She has never used smokeless tobacco. She reports that she does not drink  alcohol or use illicit drugs.      EXAM:  BP: 136/64   Pulse: 66      Wt Readings from Last 2 Encounters:   12/05/17 64.9 kg (143 lb)   09/05/17 64.7 kg (142 lb 9.6 oz)       BMI= Body mass index is 30.94 kg/(m^2).    EXAM:   APPEARANCE: = alert, no distress, smiling and feisty   Conj/Eyelids = absent right eyeball   Ears/Nose = Nrl  Oropharynx = Nrl  Neck = Nrl  Resp effort = Nrl  Breath Sounds = Nrl  Heart Rate, Rythym, & sounds (no Murm)  = Nrl  Digits and Nails =Nrl  SKIN absent significant rashes or lesions = Nrl  Ext (edema) = Nrl  Recent/Remote Memory = Nrl  Mood/Affect = Nrl    Assessment/Plan:  Serena was seen today for recheck.    Diagnoses and all orders for this visit:    Diabetes mellitus type 2 with peripheral artery disease (H)  -     Hemoglobin A1C ([facility])now  rec check often q3-4 months  -     Microalbumin ([facility])  Has her own way of doing sugars and no hypoglycemia and good a1c's cpm      Gastroesophageal reflux disease, esophagitis presence not specified  Stay on PPI     Coronary artery disease involving native coronary artery of native heart without angina pectoris  No angina    Cervicalgia  Okay now has been very problematic in past  Lidoderm  Helps    H/O enucleation of right eyeball  Legally blind    rtc 3 months            Victorino Schaefer  [facility]

## 2017-12-22 ENCOUNTER — HOSPITAL ENCOUNTER (EMERGENCY)
Facility: CLINIC | Age: 82
Discharge: HOME | End: 2017-12-22
Attending: EMERGENCY MEDICINE | Admitting: EMERGENCY MEDICINE
Payer: MEDICARE

## 2017-12-22 ENCOUNTER — NURSE TRIAGE (OUTPATIENT)
Dept: NURSING | Facility: CLINIC | Age: 82
End: 2017-12-22

## 2017-12-22 VITALS
DIASTOLIC BLOOD PRESSURE: 105 MMHG | RESPIRATION RATE: 16 BRPM | OXYGEN SATURATION: 99 % | HEART RATE: 79 BPM | TEMPERATURE: 98.3 F | SYSTOLIC BLOOD PRESSURE: 189 MMHG

## 2017-12-22 DIAGNOSIS — K08.89 PAIN, DENTAL: ICD-10-CM

## 2017-12-22 PROCEDURE — A9270 NON-COVERED ITEM OR SERVICE: HCPCS | Mod: GY

## 2017-12-22 PROCEDURE — 25000132 ZZH RX MED GY IP 250 OP 250 PS 637: Mod: GY

## 2017-12-22 PROCEDURE — 99283 EMERGENCY DEPT VISIT LOW MDM: CPT

## 2017-12-22 RX ORDER — AMOXICILLIN 500 MG/1
500 CAPSULE ORAL 3 TIMES DAILY
Qty: 30 CAPSULE | Refills: 0 | Status: SHIPPED | OUTPATIENT
Start: 2017-12-22 | End: 2018-01-01

## 2017-12-22 RX ORDER — ACETAMINOPHEN 325 MG/1
TABLET ORAL
Status: COMPLETED
Start: 2017-12-22 | End: 2017-12-22

## 2017-12-22 RX ORDER — ACETAMINOPHEN 325 MG/1
650 TABLET ORAL ONCE
Status: COMPLETED | OUTPATIENT
Start: 2017-12-22 | End: 2017-12-22

## 2017-12-22 RX ORDER — TRAMADOL HYDROCHLORIDE 50 MG/1
50 TABLET ORAL EVERY 6 HOURS PRN
Qty: 20 TABLET | Refills: 0 | Status: ON HOLD | OUTPATIENT
Start: 2017-12-22 | End: 2018-02-22

## 2017-12-22 RX ADMIN — ACETAMINOPHEN 650 MG: 325 TABLET ORAL at 17:23

## 2017-12-22 RX ADMIN — ACETAMINOPHEN 650 MG: 325 TABLET, FILM COATED ORAL at 17:23

## 2017-12-22 NOTE — ED AVS SNAPSHOT
Emergency Department    [address] [place] [address]    Phone:  [phone]    Fax:  [phone]                                       Serena Marie   MRN: [id number]    Department:   Emergency Department   Date of Visit:  12/22/2017           After Visit Summary Signature Page     I have received my discharge instructions, and my questions have been answered. I have discussed any challenges I see with this plan with the nurse or doctor.    ..........................................................................................................................................  Patient/Patient Representative Signature      ..........................................................................................................................................  Patient Representative Print Name and Relationship to Patient    ..................................................               ................................................  Date                                            Time    ..........................................................................................................................................  Reviewed by Signature/Title    ...................................................              ..............................................  Date                                                            Time

## 2017-12-22 NOTE — ED PROVIDER NOTES
History     Chief Complaint:  Dental problem    HPI   Serena Marie is a 89 year old female who presents to the emergency department today for evaluation of a dental problem and is accompanied by her neighbor. The patient states that her right lower rearmost cap came off of her tooth this afternoon. She called the nurse's line who instructed her to present here. Here, the patient reports very minor pain that began after the cap fell off and attributes the pain to the cap falling off. She denies any other infectious symptoms or swelling.    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)   DM type 2, goal A1c below 7   GERD (gastroesophageal reflux disease)   Gluten intolerance   HTN (hypertension)   Hyperlipidemia LDL goal < 100   Pacemaker   PAD (peripheral artery disease) (H)   S/P coronary angiogram   Syncope   Unspecified hypothyroidism    Past Surgical History:    Angioplasty  Appendectomy  Back surgery  Right eye removal  Laparoscopic cholecystectomy  Colectomy  Corneal transplant  EMANI BSO, combined  Implant pacemaker  Coronary stent  Tonsillectomy and adenoidectomy    Family History:    Cancer - mother, father    Social History:  The patient was accompanied to the ED by her neighbor.  Smoking Status: Former smoker  Smokeless Tobacco: Never used  Alcohol Use: No  Marital Status:   [5]    Review of Systems   HENT:  Positive for dental problem.    All other systems reviewed and are negative.    Physical Exam     Patient Vitals for the past 24 hrs:   BP Temp Temp src Pulse Resp SpO2   12/22/17 1640 (!) 189/105 98.3  F (36.8  C) Oral 79 16 99 %     Physical Exam  Nursing note and vitals reviewed.  Constitutional:  Appears well-developed and well-nourished.   HENT:   Head:      Mouth/Throat:   Oropharynx moist. No oropharyngeal exudate. Tooth #31 is missing the cap. The stump of the tooth is dark in color and has mild localized tenderness, but no surrounding gingival swelling, discoloration, or drainage. Posterior oropharynx is normal .  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 Left pupil is round and reactive to light.   Neck:    Normal range of motion. Neck supple. No facial or neck swelling or discoloration     No tracheal deviation present. No thyromegaly present.   Cardiovascular:  Normal rate, regular rhythm, no murmur   Pulmonary/Chest: Breath sounds are clear and equal without wheezes or crackles.  Abdominal:   Soft. Bowel sounds are normal. Exhibits no distension and      no mass. There is no tenderness.      There is no rebound and no guarding.   Musculoskeletal:  Exhibits no edema.   Lymphadenopathy:  No cervical adenopathy.   Neurological:   Alert and oriented to person, place, and time.   Skin:    Skin is warm and dry. No rash noted. No pallor.     Emergency Department Course     Interventions:  1723 Tylenol 650 mg PO    Emergency Department Course:  Nursing notes and vitals reviewed.  1642: I performed an exam of the patient as documented above.   1644: I spoke with Dr. Singh of the oral surgery service from [facility] regarding patient's presentation, findings, and plan of care.  1700: I rechecked the patient and applied Dycal calcium hydroxide to the tooth. I also updated her on the plan of care. She expressed understanding of this plan and consented to discharge. She will be discharged  home with instructions for care and follow up. In addition, the patient will return to the emergency department if their symptoms worsen, if new symptoms arise or if there is any concern.  All questions were answered prior to discharge.    Impression & Plan      Medical Decision Making:  Patient arrives with dental pain due to the dental cap falling off today.  I consulted with the oral surgeon Dr. Singh.  The patient arrived here because her dentist office is closed to see if we could put the cap back on but spoke with the oral surgeon and he does not put caps back on either.  This was all discussed with the patient and her neighbor who were very upset because they had called the triage line discussed her problem and the pain and was told to come to the emergency department.  We discussed with them that we can definitely treat the pain and after discussion with Dr. Singh, I applied Dycal calcium hydroxide base of the tooth to help treat the pain and she was prescribed Tramadol and I discussed the risks and benefits with her.  He was told to use Tylenol for baseline for pain. Dr. Singh also recommended amoxicillin 500 mg 3 times daily for 1 week which was prescribed to her.  She was told to call her dentist and get in as soon as possible to have the cap replaced.  She was given signs and symptoms to return for, including any facial swelling or fever.  She does not appear to have any sign of abscess and I felt she would be safely discharged.    Diagnosis:    ICD-10-CM    1. Pain, dental K08.89      Disposition:   Home    Discharge Medications:  New Prescriptions    AMOXICILLIN (AMOXIL) 500 MG CAPSULE    Take 1 capsule (500 mg) by mouth 3 times daily for 10 days    TRAMADOL (ULTRAM) 50 MG TABLET    Take 1 tablet (50 mg) by mouth every 6 hours as needed for pain No driving a car or drinking alcohol for at least 8 hours after taking this medication.     Scribe Disclosure:  [place], am serving as a  scribe at 4:42 PM on 12/22/2017 to document services personally performed by Jeri Reed MD, based on my observations and the provider's statements to me.    12/22/2017    EMERGENCY DEPARTMENT       Jeri Reed MD  12/22/17 [id number]

## 2017-12-22 NOTE — TELEPHONE ENCOUNTER
"  Reason for Disposition    [1] SEVERE pain (e.g., excruciating, unable to do any normal activities) AND [2] not improved 2 hours after pain medicine     Neighbor Angely calling\" Serena lost the cap on her tooth and now she's in a lot of pain. Where can we go?\" Advised ER.    Additional Information    Negative: Shock suspected (e.g., cold/pale/clammy skin, too weak to stand, low BP, rapid pulse)    Negative: [1] Similar pain previously AND [2] it was from \"heart attack\"    Negative: [1] Similar pain previously AND [2] it was from \"angina\" AND [3] not relieved by nitroglycerin    Negative: Sounds like a life-threatening emergency to the triager    Negative: Chest pain    Negative: Toothache followed tooth injury    Negative: Toothache or mouth pain after tooth extraction    Negative: Canker sore (i.e., aphthous ulcer)    Negative: Tongue is very swollen and tender    Negative: [1] Face is swollen AND [2] fever    Negative: Patient sounds very sick or weak to the triager    Protocols used: TOOTHACHE-ADULT-    "

## 2017-12-22 NOTE — ED AVS SNAPSHOT
Emergency Department    [address] [facility] [address]    Phone:  [phone]    Fax:  [phone]                                       Serena Marie   MRN: [id number]    Department:   Emergency Department   Date of Visit:  12/22/2017           Patient Information     Date Of Birth          8/21/1924        Your diagnoses for this visit were:     Pain, dental        You were seen by Jeri Reed MD.      Follow-up Information     Follow up with  Emergency Department.    Specialty:  EMERGENCY MEDICINE    Why:  As needed for facial swelling or fever    Contact information:    [address] [facility] [address] [phone]        Please follow up.    Why:  Follow-up with your Dentist as soon as possible to replace your cap      Discharge References/Attachments     DENTAL PAIN (ENGLISH)      Future Appointments        Provider Department Dept Phone Center    1/11/2018 11:00 AM [facility] [phone] [facility]    2/6/2018 11:30 AM Lin Cruz MD [facility] [phone] [facility]      24 Hour Appointment Hotline       To make an appointment at any [facility], call [phone] ([phone]). If you don't have a family doctor or clinic, we will help you find one. [facility] are conveniently located to serve the needs of you and your family.             Review of your medicines      START taking        Dose / Directions Last dose taken    amoxicillin 500 MG capsule   Commonly known as:  AMOXIL   Dose:  500 mg   Quantity:  30 capsule        Take 1 capsule (500 mg) by mouth 3 times daily for 10 days   Refills:  0        traMADol 50 MG tablet   Commonly known as:  ULTRAM   Dose:  50 mg   Quantity:  20 tablet        Take 1 tablet (50 mg) by mouth every 6 hours as needed for pain No driving a car or drinking alcohol for at least 8 hours after taking this medication.    Refills:  0          Our records show that you are taking the medicines listed below. If these are incorrect, please call your family doctor or clinic.        Dose / Directions Last dose taken    ACE/ARB/ARNI NOT PRESCRIBED (INTENTIONAL)   Dose:  1 each        1 each continuous prn ACE & ARB not prescribed due to Risk for drug interaction and Worsening renal function on ACE/ARB therapy   Refills:  0        aspirin 81 MG tablet   Dose:  1 tablet        Take 1 tablet by mouth every evening   Refills:  0        atorvastatin 80 MG tablet   Commonly known as:  LIPITOR   Dose:  80 mg   Quantity:  90 tablet        Take 1 tablet (80 mg) by mouth daily   Refills:  3        blood glucose monitoring lancets   Quantity:  300 Box        Use to test blood sugar 3-4 times daily or as directed.   Refills:  11        cholecalciferol 1000 UNIT tablet   Commonly known as:  vitamin D3   Dose:  2000 Units        Take 2,000 Units by mouth daily   Refills:  0        DOCQLACE 100 MG capsule   Dose:  100 mg   Generic drug:  docusate sodium        Take 100 mg by mouth At Bedtime   Refills:  0        fish oil-omega-3 fatty acids 1000 MG capsule   Dose:  1200 mg        Take 1,200 mg by mouth daily   Refills:  0        fluocinolone acetonide 0.01 % Oil   Quantity:  118 mL        Apply to scalp when wet massage well cover with cap leave on over night Wash in am.   Refills:  12        GENTAK 0.3 % ophthalmic ointment   Dose:  1 Application   Generic drug:  gentamicin        Place 1 Application into the right eye 3 times daily   Refills:  0        GLUCERNA 1.5 JEN PO   Dose:  1 Can        Take 1 Can by mouth daily   Refills:  0        insulin aspart 100 UNIT/ML injection   Commonly known as:  NovoLOG FLEXPEN   Quantity:  15 mL        INJECT 12 UNITS SUBCUTANEOUSLY 4 TIMES DAILY BEFORE MEALS   Refills:  4        insulin detemir 100 UNIT/ML injection   Commonly known as:  LEVEMIR FLEXTOUCH   Quantity:  15 mL        INJECT 20 UNITS UNDER THE SKIN  AT BEDTIME   Refills:  8        LAMISIL EX        Externally apply topically daily For under breasts, in between toes   Refills:  0        LASIX 20 MG tablet   Dose:  20 mg   Generic drug:  furosemide        Take 20 mg by mouth once a week 1 tablet Mon & Thur   Refills:  0        levothyroxine 75 MCG tablet   Commonly known as:  SYNTHROID/LEVOTHROID   Quantity:  90 tablet        TAKE ONE TABLET BY MOUTH ONE TIME DAILY   Refills:  1        lidocaine 5 % Patch   Commonly known as:  LIDODERM   Quantity:  60 patch        APPLY UP TO 3 PATCHES TO PAINFUL AREA AT ONCE FOR UP TO 12 H WITHIN A 24 H PERIOD.  REMOVE AFTER 12 HOURS.   Refills:  11        * TITO 128 5 % ophthalmic solution   Dose:  1 drop   Generic drug:  sodium chloride        Place 1 drop Into the left eye daily as needed for dry eyes   Refills:  0        * TITO 128 5 % ophthalmic ointment   Dose:  1 Application   Generic drug:  sodium chloride        Place 1 Application into the right eye 2 times daily as needed for dry eyes   Refills:  0        omeprazole 20 MG CR capsule   Commonly known as:  priLOSEC   Dose:  20 mg   Quantity:  30 capsule        Take 1 capsule (20 mg) by mouth At Bedtime   Refills:  11        ONETOUCH ULTRA test strip   Quantity:  300 each   Generic drug:  blood glucose monitoring        TEST BLOOD GLUCOSE FOUR TIMES A DAY   Refills:  11        oxyCODONE 5 MG capsule   Commonly known as:  OXY-IR   Dose:  5 mg   Quantity:  40 capsule        Take 1 capsule (5 mg) by mouth every 4 hours as needed for moderate to severe pain   Refills:  0        PREPARATION H 0.25-14-74.9 % rectal ointment   Generic drug:  phenylephrine-shark liver oil-mineral oil-petrolatum        Place rectally daily   Refills:  0        PRESERVISION AREDS 2 PO   Dose:  1 tablet        Take 1 tablet by mouth every morning   Refills:  0        TH EYE DROP ADVANCED RELIEF 0.05-0.1-1-1 % Soln   Generic drug:  Tetrahydroz-Dextran-PEG-Povid        Apply to eye as needed For dry  eye relief- 5 x per day   Refills:  0        timolol 0.5 % ophthalmic solution   Commonly known as:  TIMOPTIC   Dose:  1 drop   Quantity:  1 Bottle        Place 1 drop Into the left eye 2 times daily   Refills:  0        TYLENOL ARTHRITIS PAIN 650 MG CR tablet   Dose:  1300 mg   Generic drug:  acetaminophen        Take 1,300 mg by mouth daily States 2 tablets qam & usually 2 tablets qpm   Refills:  0        UNABLE TO FIND   Dose:  1 Bar        Take 1 Bar by mouth 2 times daily MEDICATION NAME: FiberONe Bar   Refills:  0        UNIFINE PENTIPS 31G X 6 MM   Quantity:  100 each   Generic drug:  insulin pen needle        USE ONCE DAILY   Refills:  11        VAGISIL ANTI-ITCH MEDICATED EX        Externally apply topically every morning   Refills:  0        ZANTAC 150 MAXIMUM STRENGTH PO   Dose:  150 mg        Take 150 mg by mouth daily as needed   Refills:  0        * Notice:  This list has 2 medication(s) that are the same as other medications prescribed for you. Read the directions carefully, and ask your doctor or other care provider to review them with you.            Prescriptions were sent or printed at these locations (2 Prescriptions)                   Other Prescriptions                Printed at Department/Unit printer (2 of 2)         amoxicillin (AMOXIL) 500 MG capsule               traMADol (ULTRAM) 50 MG tablet                Orders Needing Specimen Collection     None      Pending Results     No orders found from 12/20/2017 to 12/23/2017.            Pending Culture Results     No orders found from 12/20/2017 to 12/23/2017.            Pending Results Instructions     If you had any lab results that were not finalized at the time of your Discharge, you can call the ED Lab Result RN at [phone]. You will be contacted by this team for any positive Lab results or changes in treatment. The nurses are available 7 days a week from 10A to 6:30P.  You can leave a message 24 hours per day and they will return  your call.        Test Results From Your Hospital Stay               Clinical Quality Measure: Blood Pressure Screening     Your blood pressure was checked while you were in the emergency department today. The last reading we obtained was  BP: (!) 189/105 . Please read the guidelines below about what these numbers mean and what you should do about them.  If your systolic blood pressure (the top number) is less than 120 and your diastolic blood pressure (the bottom number) is less than 80, then your blood pressure is normal. There is nothing more that you need to do about it.  If your systolic blood pressure (the top number) is 120-139 or your diastolic blood pressure (the bottom number) is 80-89, your blood pressure may be higher than it should be. You should have your blood pressure rechecked within a year by a primary care provider.  If your systolic blood pressure (the top number) is 140 or greater or your diastolic blood pressure (the bottom number) is 90 or greater, you may have high blood pressure. High blood pressure is treatable, but if left untreated over time it can put you at risk for heart attack, stroke, or kidney failure. You should have your blood pressure rechecked by a primary care provider within the next 4 weeks.  If your provider in the emergency department today gave you specific instructions to follow-up with your doctor or provider even sooner than that, you should follow that instruction and not wait for up to 4 weeks for your follow-up visit.        Thank you for choosing [facility]       Thank you for choosing [facility] for your care. Our goal is always to provide you with excellent care. Hearing back from our patients is one way we can continue to improve our services. Please take a few minutes to complete the written survey that you may receive in the mail after you visit with us. Thank you!        iKaaz[employer] Information     [employer] lets you send messages to your doctor, view your test results,  "renew your prescriptions, schedule appointments and more. To sign up, go to [email or web address] . Click on \"Log in\" on the left side of the screen, which will take you to the Welcome page. Then click on \"Sign up Now\" on the right side of the page.     You will be asked to enter the access code listed below, as well as some personal information. Please follow the directions to create your username and password.     Your access code is: [email or web address]  Expires: 1/3/2018 10:05 AM     Your access code will  in 90 days. If you need help or a new code, please call your [place] clinic or [phone].        Care EveryWhere ID     This is your Care EveryWhere ID. This could be used by other organizations to access your [place] medical records  [id number]        Equal Access to Services     ERMA [facility]CEDA : Luis F Mcmullen, [place], laly rodriguez, zoila kerns . So [facility] [phone].    ATENCIÓN: Si habla español, tiene a murcia disposición servicios gratuitos de asistencia lingüística. Llame al [phone].    We comply with applicable federal civil rights laws and [place] laws. We do not discriminate on the basis of race, color, national origin, age, disability, sex, sexual orientation, or gender identity.            After Visit Summary       This is your record. Keep this with you and show to your community pharmacist(s) and doctor(s) at your next visit.                  "

## 2017-12-27 DIAGNOSIS — K21.9 GASTROESOPHAGEAL REFLUX DISEASE, ESOPHAGITIS PRESENCE NOT SPECIFIED: ICD-10-CM

## 2018-01-11 ENCOUNTER — ALLIED HEALTH/NURSE VISIT (OUTPATIENT)
Dept: CARDIOLOGY | Facility: CLINIC | Age: 83
End: 2018-01-11
Payer: MEDICARE

## 2018-01-11 DIAGNOSIS — Z95.0 CARDIAC PACEMAKER IN SITU: Primary | ICD-10-CM

## 2018-01-11 PROCEDURE — 93293 PM PHONE R-STRIP DEVICE EVAL: CPT | Performed by: INTERNAL MEDICINE

## 2018-01-11 NOTE — NURSING NOTE
Phone teletrace  Appropriate pacemaker function at time of check. Magnet response WNL.  F/U scheduled q 3 months. For annual threshold.

## 2018-01-11 NOTE — MR AVS SNAPSHOT
After Visit Summary   1/11/2018    Serena Marie    MRN: [id number]           Patient Information     Date Of Birth          8/21/1924        Visit Information        Provider Department      1/11/2018 11:00 AM STEPHEN NAVARRETE [facility]        Today's Diagnoses     Cardiac pacemaker in situ    -  1       Follow-ups after your visit        Your next 10 appointments already scheduled     Feb 06, 2018 11:30 AM CST   [facility] Visit with Lin Cruz MD   [facility]    [address] [place] [address] [phone]           Bring a current list of meds and any records pertaining to this visit. For Physicals, please bring immunization records and any forms needing to be filled out. Please arrive 10 minutes early to complete paperwork.            Apr 12, 2018  1:00 PM CDT   Pacemaker Check with STEPHEN DONALD   [facility] ([facility])    [address] [place] [address] [phone]              Who to contact     If you have questions or need follow up information about today's clinic visit or your schedule please contact [facility] directly at [phone].  Normal or non-critical lab and imaging results will be communicated to you by [employer] inTouchhart, letter or phone within 4 business days after the clinic has received the results. If you do not hear from us within 7 days, please contact the clinic through [employer] in[employer]hart or phone. If you have a critical or abnormal lab result, we will notify you by phone as soon as possible.  Submit refill requests through [employer] or call your pharmacy and they will forward the refill request to us. Please allow 3 business days for your refill to be completed.          Additional Information About Your Visit        [employer] Information     [employer] lets you send messages to your doctor, view  "your test results, renew your prescriptions, schedule appointments and more. To sign up, go to [email or web address] . Click on \"Log in\" on the left side of the screen, which will take you to the Welcome page. Then click on \"Sign up Now\" on the right side of the page.     You will be asked to enter the access code listed below, as well as some personal information. Please follow the directions to create your username and password.     Your access code is: [email or web address]  Expires: 2018 11:09 AM     Your access code will  in 90 days. If you need help or a new code, please call your [place] clinic or [phone].        Care EveryWhere ID     This is your Care EveryWhere ID. This could be used by other organizations to access your [place] medical records  [id number]         Blood Pressure from Last 3 Encounters:   17 (!) 189/105   17 136/64   17 162/70    Weight from Last 3 Encounters:   17 64.9 kg (143 lb)   17 64.7 kg (142 lb 9.6 oz)   17 64.7 kg (142 lb 9.6 oz)              We Performed the Following     PM PHONE R STRIP EVAL UP TO 90 DAYS ([phone])        Primary Care Provider Office Phone # Fax #    Lin Berenice Cruz [phone]       [facility] [address] [place] [address]        Goals        General    I want to have good control of Diabetes (pt-stated)     Notes - Note edited  2016  1:17 PM by Leatha Campos, RN    I will check my blood sugars 4 x a day.  I will take Novolog with my meals.  I will take my Levemir at bedtime.              Equal Access to Services     [facility] KINSEY : Luis F Mcmullen, [place], laly rodriguez, zoila church. [facility] [phone].    ATENCIÓN: Si habla español, tiene a murcia disposición servicios gratuitos de asistencia lingüística. Llame al [phone].    We comply with applicable federal civil rights laws and [place] laws. " We do not discriminate on the basis of race, color, national origin, age, disability, sex, sexual orientation, or gender identity.            Thank you!     Thank you for choosing [facility]  for your care. Our goal is always to provide you with excellent care. Hearing back from our patients is one way we can continue to improve our services. Please take a few minutes to complete the written survey that you may receive in the mail after your visit with us. Thank you!             Your Updated Medication List - Protect others around you: Learn how to safely use, store and throw away your medicines at www.disposemymeds.org.          This list is accurate as of: 1/11/18 11:09 AM.  Always use your most recent med list.                   Brand Name Dispense Instructions for use Diagnosis    ACE/ARB/ARNI NOT PRESCRIBED (INTENTIONAL)      1 each continuous prn ACE & ARB not prescribed due to Risk for drug interaction and Worsening renal function on ACE/ARB therapy    CAD (coronary artery disease)       aspirin 81 MG tablet      Take 1 tablet by mouth every evening        atorvastatin 80 MG tablet    LIPITOR    90 tablet    Take 1 tablet (80 mg) by mouth daily    PAD (peripheral artery disease) (H)       blood glucose monitoring lancets     300 Box    Use to test blood sugar 3-4 times daily or as directed.        cholecalciferol 1000 UNIT tablet    vitamin D3     Take 2,000 Units by mouth daily        DOCQLACE 100 MG capsule   Generic drug:  docusate sodium      Take 100 mg by mouth At Bedtime        fish oil-omega-3 fatty acids 1000 MG capsule      Take 1,200 mg by mouth daily    PAD (peripheral artery disease) (H)       fluocinolone acetonide 0.01 % Oil     118 mL    Apply to scalp when wet massage well cover with cap leave on over night Wash in am.    DM type 2, goal A1c below 7       GENTAK 0.3 % ophthalmic ointment   Generic drug:  gentamicin      Place 1 Application into the right  eye 3 times daily        GLUCERNA 1.5 JEN PO      Take 1 Can by mouth daily        insulin aspart 100 UNIT/ML injection    NovoLOG FLEXPEN    15 mL    INJECT 12 UNITS SUBCUTANEOUSLY 4 TIMES DAILY BEFORE MEALS    Diabetes mellitus type 2 with peripheral artery disease (H)       insulin detemir 100 UNIT/ML injection    LEVEMIR FLEXTOUCH    15 mL    INJECT 20 UNITS UNDER THE SKIN AT BEDTIME    Diabetes mellitus type 2 with peripheral artery disease (H)       LAMISIL EX      Externally apply topically daily For under breasts, in between toes        LASIX 20 MG tablet   Generic drug:  furosemide      Take 20 mg by mouth once a week 1 tablet Mon & Thur        levothyroxine 75 MCG tablet    SYNTHROID/LEVOTHROID    90 tablet    TAKE ONE TABLET BY MOUTH ONE TIME DAILY    Encounter for medication refill       lidocaine 5 % Patch    LIDODERM    60 patch    APPLY UP TO 3 PATCHES TO PAINFUL AREA AT ONCE FOR UP TO 12 H WITHIN A 24 H PERIOD.  REMOVE AFTER 12 HOURS.    Encounter for medication refill       * TITO 128 5 % ophthalmic solution   Generic drug:  sodium chloride      Place 1 drop Into the left eye daily as needed for dry eyes        * TITO 128 5 % ophthalmic ointment   Generic drug:  sodium chloride      Place 1 Application into the right eye 2 times daily as needed for dry eyes    Blindness and low vision       omeprazole 20 MG CR capsule    priLOSEC    30 capsule    TAKE ONE CAPSULE BY MOUTH AT BEDTIME - NO TUMS WITHIN 2 HOURS OF MED    Gastroesophageal reflux disease, esophagitis presence not specified       ONETOUCH ULTRA test strip   Generic drug:  blood glucose monitoring     300 each    TEST BLOOD GLUCOSE FOUR TIMES A DAY    Encounter for medication refill       oxyCODONE 5 MG capsule    OXY-IR    40 capsule    Take 1 capsule (5 mg) by mouth every 4 hours as needed for moderate to severe pain    Cervicalgia       PREPARATION H 0.25-14-74.9 % rectal ointment   Generic drug:  phenylephrine-shark liver oil-mineral  oil-petrolatum      Place rectally daily        PRESERVISION AREDS 2 PO      Take 1 tablet by mouth every morning        TH EYE DROP ADVANCED RELIEF 0.05-0.1-1-1 % Soln   Generic drug:  Tetrahydroz-Dextran-PEG-Povid      Apply to eye as needed For dry eye relief- 5 x per day    Blindness and low vision       timolol 0.5 % ophthalmic solution    TIMOPTIC    1 Bottle    Place 1 drop Into the left eye 2 times daily        traMADol 50 MG tablet    ULTRAM    20 tablet    Take 1 tablet (50 mg) by mouth every 6 hours as needed for pain No driving a car or drinking alcohol for at least 8 hours after taking this medication.        TYLENOL ARTHRITIS PAIN 650 MG CR tablet   Generic drug:  acetaminophen      Take 1,300 mg by mouth daily States 2 tablets qam & usually 2 tablets qpm        UNABLE TO FIND      Take 1 Bar by mouth 2 times daily MEDICATION NAME: FiberONe Bar        UNIFINE PENTIPS 31G X 6 MM   Generic drug:  insulin pen needle     100 each    USE ONCE DAILY    Encounter for medication refill       VAGISIL ANTI-ITCH MEDICATED EX      Externally apply topically every morning        ZANTAC 150 MAXIMUM STRENGTH PO      Take 150 mg by mouth daily as needed        * Notice:  This list has 2 medication(s) that are the same as other medications prescribed for you. Read the directions carefully, and ask your doctor or other care provider to review them with you.

## 2018-02-06 ENCOUNTER — OFFICE VISIT (OUTPATIENT)
Dept: FAMILY MEDICINE | Facility: CLINIC | Age: 83
End: 2018-02-06

## 2018-02-06 VITALS
BODY MASS INDEX: 30.99 KG/M2 | WEIGHT: 143.2 LBS | SYSTOLIC BLOOD PRESSURE: 125 MMHG | RESPIRATION RATE: 16 BRPM | HEART RATE: 72 BPM | DIASTOLIC BLOOD PRESSURE: 79 MMHG

## 2018-02-06 DIAGNOSIS — E11.51 DIABETES MELLITUS TYPE 2 WITH PERIPHERAL ARTERY DISEASE (H): Primary | ICD-10-CM

## 2018-02-06 DIAGNOSIS — E03.4 HYPOTHYROIDISM DUE TO ACQUIRED ATROPHY OF THYROID: ICD-10-CM

## 2018-02-06 DIAGNOSIS — E78.5 HYPERLIPIDEMIA WITH TARGET LDL LESS THAN 100: ICD-10-CM

## 2018-02-06 PROCEDURE — 99214 OFFICE O/P EST MOD 30 MIN: CPT | Performed by: FAMILY MEDICINE

## 2018-02-06 NOTE — PATIENT INSTRUCTIONS
Recommend changing breakfast Novolog insulin to 13 units, keep lunch and dinner insulin at 12 units.  Continue Levemir insulin at 20 units at bedtime.

## 2018-02-06 NOTE — PROGRESS NOTES
"Problem(s) Oriented visit        SUBJECTIVE:                                                    Serena Marie is a 89 year old female who presents to clinic today for three month diabetic check. She is due for A1c. She denies any hypoglycemias. She checks finger sticks 4 times daily, with meals and at bedtime.     She brings in her readings:   AM fastings are between  with most readings in the 100-130s. She eats blueberry pancake every morning for breakfast and has no intention of changing.   Pre-Lunch are between 120-294 with most readings 180-240.  Pre-dinner ranges 107-266 with most readings 160-220.  Bedtime ranges 100-295 with most readings 130-200.    She states that she feels pretty good, has no intention of changing how she eats or how she uses her insulin, and notes, \"if I go, I go.\" She lives alone. She prepares all her own food. She uses Levemir 20 units at hs and 12 units of Novolog with each meal. She states understanding of how to dose her insulin. She gets exercise with walking all over her building as much as she can. She has a walker. No chest pain or pressure with exertion. She takes good care of her feet with lotioning daily. She gets her nails trimmed at the podiatry office.       Problem list, Medication list, Allergies, and Medical/Social/Surgical histories reviewed in EPIC and updated as appropriate.   Additional history: as documented    ROS:  5 point ROS completed and negative except noted above, including Gen, CV, Resp, GI, MS      Histories:   Patient Active Problem List   Diagnosis     Hyperlipidemia with target LDL less than 100     Diabetes mellitus type 2 with peripheral artery disease (H)     PAD (peripheral artery disease) (H)     Lumbar spinal stenosis     Post-op bleeding     H/O enucleation of right eyeball     Health Care Home     Pacemaker     Syncope     HCAP (healthcare-associated pneumonia)     Cephalalgia     Bilateral low back pain without sciatica     Cervicalgia "     Community acquired pneumonia     ACP (advance care planning)     Coronary artery disease involving native coronary artery of native heart without angina pectoris     Blindness and low vision     Past Surgical History:   Procedure Laterality Date     ANGIOPLASTY  2008    Angioplasty of proximal right superficial femoral artery     APPENDECTOMY       BACK SURGERY       C REMOVAL OF EYE      right      CHOLECYSTECTOMY, LAPOROSCOPIC      Cholecystectomy, Laparoscopic     COLECTOMY       EYE SURGERY      corneal transplant      HYSTERECTOMY, EMANI      BSO     IMPLANT PACEMAKER  8/2009     STENT, CORONARY, FATOU  8-12    8/2012 Cath - 75% RCA with BRIGITTE placed, 40% ostial and distal left main stenosis     TONSILLECTOMY & ADENOIDECTOMY         Social History   Substance Use Topics     Smoking status: Former Smoker     Packs/day: 1.00     Years: 3.00     Types: Cigarettes     Quit date: 8/17/1947     Smokeless tobacco: Never Used     Alcohol use No     Family History   Problem Relation Age of Onset     CANCER Mother      CANCER Father      Unknown/Adopted Maternal Grandmother      Unknown/Adopted Maternal Grandfather      Unknown/Adopted Paternal Grandmother      Unknown/Adopted Paternal Grandfather            OBJECTIVE:                                                    /79  Pulse 72  Resp 16  Wt 65 kg (143 lb 3.2 oz)  BMI 30.99 kg/m2  Body mass index is 30.99 kg/(m^2).   GENERAL APPEARANCE: Alert, no acute distress  RESP: lungs clear to auscultation   CV: normal rate, regular rhythm, no murmur or gallop  MS: extremities normal, no peripheral edema  NEURO: Alert, oriented, speech and mentation normal  PSYCH: mentation appears normal, affect and mood normal   Labs Resulted Today:   Results for orders placed or performed in visit on 12/05/17   Hemoglobin A1C ([facility])   Result Value Ref Range    Hemoglobin A1C 7.3 (H) 4.8 - 5.6 %    Narrative    Performed at:  01 - [facility] [address], [place], [place]   [id number]  : Kurtis Felix MD, Phone:  [phone]   Microalbumin ([facility])   Result Value Ref Range    Albumin mg/L 80 (A) mg/L    Urine Creatinine Mg/Dl 50 mg/dL    A/C Ratio mg/g >300 (A) mg/g    Interpretation High Abnormal (A)      ASSESSMENT/PLAN:                                                        Serena was seen today for recheck.    Diagnoses and all orders for this visit:    Diabetes mellitus type 2 with peripheral artery disease (H)  -     Hemoglobin A1C ([facility])  -     Comp. Metabolic Panel (14) ([facility])    Hypothyroidism due to acquired atrophy of thyroid  -     TSH ([facility])  Continue levothyroxine.    Hyperlipidemia with target LDL less than 100  -     Comp. Metabolic Panel (14) ([facility])  Continue statin.    Patient Instructions   Recommend changing breakfast Novolog insulin to 13 units, keep lunch and dinner insulin at 12 units.  Continue Levemir insulin at 20 units at bedtime.   She seems agreeable to this.   Recheck in 3 months.     The following health maintenance items are reviewed in Epic and correct as of today:  Health Maintenance   Topic Date Due     MEDICARE ANNUAL WELLNESS VISIT  08/21/1942     INFLUENZA VACCINE (SYSTEM ASSIGNED)  09/01/2017     FOOT EXAM Q1 YEAR  11/02/2017     EYE EXAM Q1 YEAR  02/23/2018     FALL RISK ASSESSMENT  03/07/2018     LIPID MONITORING Q1 YEAR  03/07/2018     A1C Q6 MO  06/05/2018     CREATININE Q1 YEAR  09/05/2018     MICROALBUMIN Q1 YEAR  12/05/2018     TSH W/ FREE T4 REFLEX Q2 YEAR  03/07/2019     ADVANCE DIRECTIVE PLANNING Q5 YRS  11/18/2021     TETANUS IMMUNIZATION (SYSTEM ASSIGNED)  12/16/2023     PNEUMOCOCCAL  Addressed       Lin Cruz MD  [facility]  Family Practice  [facility]  [phone]    For any issues my office # is [phone]

## 2018-02-06 NOTE — MR AVS SNAPSHOT
After Visit Summary   2/6/2018    Serena Marie    MRN: [id number]           Patient Information     Date Of Birth          8/21/1924        Visit Information        Provider Department      2/6/2018 11:30 AM Lin Cruz MD [facility]        Today's Diagnoses     Diabetes mellitus type 2 with peripheral artery disease (H)    -  1    Hypothyroidism due to acquired atrophy of thyroid        Hyperlipidemia with target LDL less than 100          Care Instructions    Recommend changing breakfast Novolog insulin to 13 units, keep lunch and dinner insulin at 12 units.  Continue Levemir insulin at 20 units at bedtime.           Follow-ups after your visit        Your next 10 appointments already scheduled     Feb 06, 2018 11:30 AM CST   [facility] Lin Cruz MD   [facility]    [address] [place] [address] [phone]           Bring a current list of meds and any records pertaining to this visit. For Physicals, please bring immunization records and any forms needing to be filled out. Please arrive 10 minutes early to complete paperwork.            Apr 12, 2018  1:00 PM CDT   Pacemaker Check with STEPHEN DONALD   [facility] ([facility])    [address] [place] [id number] [phone]              Who to contact     If you have questions or need follow up information about today's clinic visit or your schedule please contact [facility] directly at [phone].  Normal or non-critical lab and imaging results will be communicated to you by MyChart, letter or phone within 4 business days after the clinic has received the results. If you do not hear from us within 7 days, please contact the clinic through MyChart or phone. If you have a critical or abnormal lab result, we will notify you by phone as soon as possible.  Submit refill  "requests through [facility] or call your pharmacy and they will forward the refill request to us. Please allow 3 business days for your refill to be completed.          Additional Information About Your Visit        [employer] Information     [facility] lets you send messages to your doctor, view your test results, renew your prescriptions, schedule appointments and more. To sign up, go to [email or web address] . Click on \"Log in\" on the left side of the screen, which will take you to the Welcome page. Then click on \"Sign up Now\" on the right side of the page.     You will be asked to enter the access code listed below, as well as some personal information. Please follow the directions to create your username and password.     Your access code is: [email or web address]  Expires: 2018 11:09 AM     Your access code will  in 90 days. If you need help or a new code, please call your [place] clinic or [phone].        Care EveryWhere ID     This is your Care EveryWhere ID. This could be used by other organizations to access your [place] medical records  [id number]        Your Vitals Were     Pulse Respirations BMI (Body Mass Index)             72 16 30.99 kg/m2          Blood Pressure from Last 3 Encounters:   18 125/79   17 (!) 189/105   17 136/64    Weight from Last 3 Encounters:   18 65 kg (143 lb 3.2 oz)   17 64.9 kg (143 lb)   17 64.7 kg (142 lb 9.6 oz)              We Performed the Following     Comp. Metabolic Panel (14) ([facility])     Hemoglobin A1C ([facility])     TSH ([facility])        Primary Care Provider Office Phone # Fax #    Lin Berenice Cruz [phone]       [facility] [address]  [facility] [id number]        Goals        General    I want to have good control of Diabetes (pt-stated)     Notes - Note edited  2016  1:17 PM by Leatha Campos, RN    I will check my blood sugars 4 x a day.  I will take Novolog with my meals.  I " will take my Levemir at bedtime.              Equal Access to Services     ERMA EUCEDA : Luis F Mcmullen, [place], zoila mayer. So [facility] [phone].    ATENCIÓN: Si habla español, tiene a murcia disposición servicios gratuitos de asistencia lingüística. Llame al [phone].    We comply with applicable federal civil rights laws and [place] laws. We do not discriminate on the basis of race, color, national origin, age, disability, sex, sexual orientation, or gender identity.            Thank you!     Thank you for choosing [facility]  for your care. Our goal is always to provide you with excellent care. Hearing back from our patients is one way we can continue to improve our services. Please take a few minutes to complete the written survey that you may receive in the mail after your visit with us. Thank you!             Your Updated Medication List - Protect others around you: Learn how to safely use, store and throw away your medicines at www.disposemymeds.org.          This list is accurate as of 2/6/18 11:24 AM.  Always use your most recent med list.                   Brand Name Dispense Instructions for use Diagnosis    ACE/ARB/ARNI NOT PRESCRIBED (INTENTIONAL)      1 each continuous prn ACE & ARB not prescribed due to Risk for drug interaction and Worsening renal function on ACE/ARB therapy    CAD (coronary artery disease)       aspirin 81 MG tablet      Take 1 tablet by mouth every evening        atorvastatin 80 MG tablet    LIPITOR    90 tablet    Take 1 tablet (80 mg) by mouth daily    PAD (peripheral artery disease) (H)       blood glucose monitoring lancets     300 Box    Use to test blood sugar 3-4 times daily or as directed.        cholecalciferol 1000 UNIT tablet    vitamin D3     Take 2,000 Units by mouth daily        DOCQLACE 100 MG capsule   Generic drug:  docusate sodium      Take 100 mg by mouth At Bedtime         fish oil-omega-3 fatty acids 1000 MG capsule      Take 1,200 mg by mouth daily    PAD (peripheral artery disease) (H)       fluocinolone acetonide 0.01 % Oil     118 mL    Apply to scalp when wet massage well cover with cap leave on over night Wash in am.    DM type 2, goal A1c below 7       GENTAK 0.3 % ophthalmic ointment   Generic drug:  gentamicin      Place 1 Application into the right eye 3 times daily        GLUCERNA 1.5 JEN PO      Take 1 Can by mouth daily        insulin aspart 100 UNIT/ML injection    NovoLOG FLEXPEN    15 mL    INJECT 12 UNITS SUBCUTANEOUSLY 4 TIMES DAILY BEFORE MEALS    Diabetes mellitus type 2 with peripheral artery disease (H)       insulin detemir 100 UNIT/ML injection    LEVEMIR FLEXTOUCH    15 mL    INJECT 20 UNITS UNDER THE SKIN AT BEDTIME    Diabetes mellitus type 2 with peripheral artery disease (H)       LAMISIL EX      Externally apply topically daily For under breasts, in between toes        LASIX 20 MG tablet   Generic drug:  furosemide      Take 20 mg by mouth once a week 1 tablet Mon & Thur        levothyroxine 75 MCG tablet    SYNTHROID/LEVOTHROID    90 tablet    TAKE ONE TABLET BY MOUTH ONE TIME DAILY    Encounter for medication refill       lidocaine 5 % Patch    LIDODERM    60 patch    APPLY UP TO 3 PATCHES TO PAINFUL AREA AT ONCE FOR UP TO 12 H WITHIN A 24 H PERIOD.  REMOVE AFTER 12 HOURS.    Encounter for medication refill       * TITO 128 5 % ophthalmic solution   Generic drug:  sodium chloride      Place 1 drop Into the left eye daily as needed for dry eyes        * TITO 128 5 % ophthalmic ointment   Generic drug:  sodium chloride      Place 1 Application into the right eye 2 times daily as needed for dry eyes    Blindness and low vision       omeprazole 20 MG CR capsule    priLOSEC    30 capsule    TAKE ONE CAPSULE BY MOUTH AT BEDTIME - NO TUMS WITHIN 2 HOURS OF MED    Gastroesophageal reflux disease, esophagitis presence not specified       ONETOUCH ULTRA  test strip   Generic drug:  blood glucose monitoring     300 each    TEST BLOOD GLUCOSE FOUR TIMES A DAY    Encounter for medication refill       oxyCODONE 5 MG capsule    OXY-IR    40 capsule    Take 1 capsule (5 mg) by mouth every 4 hours as needed for moderate to severe pain    Cervicalgia       PREPARATION H 0.25-14-74.9 % rectal ointment   Generic drug:  phenylephrine-shark liver oil-mineral oil-petrolatum      Place rectally daily        PRESERVISION AREDS 2 PO      Take 1 tablet by mouth every morning        TH EYE DROP ADVANCED RELIEF 0.05-0.1-1-1 % Soln   Generic drug:  Tetrahydroz-Dextran-PEG-Povid      Apply to eye as needed For dry eye relief- 5 x per day    Blindness and low vision       timolol 0.5 % ophthalmic solution    TIMOPTIC    1 Bottle    Place 1 drop Into the left eye 2 times daily        traMADol 50 MG tablet    ULTRAM    20 tablet    Take 1 tablet (50 mg) by mouth every 6 hours as needed for pain No driving a car or drinking alcohol for at least 8 hours after taking this medication.        TYLENOL ARTHRITIS PAIN 650 MG CR tablet   Generic drug:  acetaminophen      Take 1,300 mg by mouth daily States 2 tablets qam & usually 2 tablets qpm        UNABLE TO FIND      Take 1 Bar by mouth 2 times daily MEDICATION NAME: FiberONe Bar        UNIFINE PENTIPS 31G X 6 MM   Generic drug:  insulin pen needle     100 each    USE ONCE DAILY    Encounter for medication refill       VAGISIL ANTI-ITCH MEDICATED EX      Externally apply topically every morning        ZANTAC 150 MAXIMUM STRENGTH PO      Take 150 mg by mouth daily as needed        * Notice:  This list has 2 medication(s) that are the same as other medications prescribed for you. Read the directions carefully, and ask your doctor or other care provider to review them with you.

## 2018-02-07 LAB
ALBUMIN SERPL-MCNC: 4.2 G/DL (ref 3.2–4.6)
ALBUMIN/GLOB SERPL: 2.2 {RATIO} (ref 1.2–2.2)
ALP SERPL-CCNC: 74 IU/L (ref 39–117)
ALT SERPL-CCNC: 18 IU/L (ref 0–32)
AST SERPL-CCNC: 21 IU/L (ref 0–40)
BILIRUB SERPL-MCNC: 0.4 MG/DL (ref 0–1.2)
BUN SERPL-MCNC: 12 MG/DL (ref 10–36)
BUN/CREATININE RATIO: 27 (ref 12–28)
CALCIUM SERPL-MCNC: 9.4 MG/DL (ref 8.7–10.3)
CHLORIDE SERPLBLD-SCNC: 98 MMOL/L (ref 96–106)
CREAT SERPL-MCNC: 0.45 MG/DL (ref 0.57–1)
EGFR IF AFRICN AM: 100 ML/MIN/1.73
EGFR IF NONAFRICN AM: 87 ML/MIN/1.73
GLOBULIN, TOTAL: 1.9 G/DL (ref 1.5–4.5)
GLUCOSE SERPL-MCNC: 203 MG/DL (ref 65–99)
HBA1C MFR BLD: 7.1 % (ref 4.8–5.6)
POTASSIUM SERPL-SCNC: 4.6 MMOL/L (ref 3.5–5.2)
PROT SERPL-MCNC: 6.1 G/DL (ref 6–8.5)
SODIUM SERPL-SCNC: 137 MMOL/L (ref 134–144)
TOTAL CO2: 26 MMOL/L (ref 18–28)
TSH BLD-ACNC: 0.73 UIU/ML (ref 0.45–4.5)

## 2018-02-15 ENCOUNTER — CARE COORDINATION (OUTPATIENT)
Dept: CARE COORDINATION | Facility: CLINIC | Age: 83
End: 2018-02-15

## 2018-02-15 NOTE — PROGRESS NOTES
Clinic Care Coordination Contact  OUTREACH    Referral Information:  Referral Source: PCP  Reason for Contact: Follow-up LTC placement  Care Conference: No     Universal Utilization:   ED Visits in last year: 0  Hospital visits in last year: 1  Last PCP appointment: 02/06/18  Missed Appointments: 4  Concerns: No concerns  Multiple Providers or Specialists: Yes    Clinical Concerns:  Current Medical Concerns: Pt calling today to report that she doesn't feel safe living alone anymore and would like to move into a nursing home immediately. Discussed pt's needs. Pt is able to bathe, dress, and make food for herself. Pt would need assistance with medication management, managing doctors appointments, and bathing. Pt may be appropriate for an [facility]. Pt's daughter is coming in to town tomorrow to help pt look for placement.     Current Behavioral Concerns: Memory impairments    Education Provided to patient: Luis Alberto Baker, educated on memory care units of [facility] and SNF placement- how they differ, LTC assessment process through the [facility], educated on MA and EW  Clinical Pathway: None    Medication Management:  Pt was recently seen in clinic to review diabetic management, Pt did not want to make any adjustments to insulin or diet at that time, Pt does report compliance, Pt's friend Duane is staying with pt at this time and is able to help pt      Functional Status:  Mobility Status: Independent w/Device  Equipment Currently Used at Home: grab bar, walker, rolling  Transportation: Pt's family and friends provide     Psychosocial:  Current living arrangement:: I live alone  Financial/Insurance: Medicare, [place] [place]     Resources and Interventions:  Current Resources: No formal supports in place  Advanced Care Plans/Directives on file:: Yes  Referrals Placed: [facility]     Goals:   Goal 1 Statement: I would like to find a SNF/custodial by 3/2018.  Goal 1 Supportive Steps: I will review resources and work with family and SW CC to find a  place that I am comfortable with.  Goal 1 Progression Percent: 30%  Goal 1 Progression Date: 02/15/18  Barriers: availability, finances  Strengths: good social support  Patient/Caregiver understanding: Pt reports understanding and denies any additional questions or concerns at this times. GREGORIO CC engaged in AIDET communication during encounter.  Frequency of Care Coordination: 3 weeks      Plan: Pt and pt's family to work with [facility] to find appropriate placement. GREGORIO CC to follow-up next week with family and pt.    Alma Rivera [facility]  Clinic Care Coordinator  [phone]  [email or web address]

## 2018-02-17 ENCOUNTER — APPOINTMENT (OUTPATIENT)
Dept: GENERAL RADIOLOGY | Facility: CLINIC | Age: 83
DRG: 871 | End: 2018-02-17
Attending: EMERGENCY MEDICINE
Payer: MEDICARE

## 2018-02-17 ENCOUNTER — HOSPITAL ENCOUNTER (INPATIENT)
Facility: CLINIC | Age: 83
LOS: 5 days | Discharge: NURSING, REHAB OR LONG-TERM CARE | DRG: 871 | End: 2018-02-22
Attending: EMERGENCY MEDICINE | Admitting: INTERNAL MEDICINE
Payer: MEDICARE

## 2018-02-17 DIAGNOSIS — A41.9 SEVERE SEPSIS (H): ICD-10-CM

## 2018-02-17 DIAGNOSIS — M54.2 CERVICALGIA: Primary | ICD-10-CM

## 2018-02-17 DIAGNOSIS — J11.00 INFLUENZA AND PNEUMONIA: ICD-10-CM

## 2018-02-17 DIAGNOSIS — I10 ESSENTIAL HYPERTENSION: ICD-10-CM

## 2018-02-17 DIAGNOSIS — J10.1 INFLUENZA B: ICD-10-CM

## 2018-02-17 DIAGNOSIS — J18.9 PNEUMONIA OF RIGHT MIDDLE LOBE DUE TO INFECTIOUS ORGANISM: ICD-10-CM

## 2018-02-17 DIAGNOSIS — R65.20 SEVERE SEPSIS (H): ICD-10-CM

## 2018-02-17 LAB
ANION GAP SERPL CALCULATED.3IONS-SCNC: 10 MMOL/L (ref 3–14)
BASOPHILS # BLD AUTO: 0 10E9/L (ref 0–0.2)
BASOPHILS NFR BLD AUTO: 0.2 %
BUN SERPL-MCNC: 18 MG/DL (ref 7–30)
CALCIUM SERPL-MCNC: 8.5 MG/DL (ref 8.5–10.1)
CHLORIDE SERPL-SCNC: 97 MMOL/L (ref 94–109)
CO2 SERPL-SCNC: 22 MMOL/L (ref 20–32)
CREAT SERPL-MCNC: 0.55 MG/DL (ref 0.52–1.04)
DIFFERENTIAL METHOD BLD: ABNORMAL
EOSINOPHIL # BLD AUTO: 0 10E9/L (ref 0–0.7)
EOSINOPHIL NFR BLD AUTO: 0 %
ERYTHROCYTE [DISTWIDTH] IN BLOOD BY AUTOMATED COUNT: 14.1 % (ref 10–15)
FLUAV+FLUBV AG SPEC QL: NEGATIVE
FLUAV+FLUBV AG SPEC QL: POSITIVE
GFR SERPL CREATININE-BSD FRML MDRD: >90 ML/MIN/1.7M2
GLUCOSE BLDC GLUCOMTR-MCNC: 169 MG/DL (ref 70–99)
GLUCOSE BLDC GLUCOMTR-MCNC: 196 MG/DL (ref 70–99)
GLUCOSE BLDC GLUCOMTR-MCNC: 216 MG/DL (ref 70–99)
GLUCOSE SERPL-MCNC: 186 MG/DL (ref 70–99)
HCT VFR BLD AUTO: 41.3 % (ref 35–47)
HGB BLD-MCNC: 14.2 G/DL (ref 11.7–15.7)
IMM GRANULOCYTES # BLD: 0 10E9/L (ref 0–0.4)
IMM GRANULOCYTES NFR BLD: 0.2 %
LACTATE BLD-SCNC: 1.2 MMOL/L (ref 0.7–2)
LACTATE SERPL-SCNC: 2.1 MMOL/L (ref 0.4–2)
LACTATE SERPL-SCNC: NORMAL MMOL/L (ref 0.4–2)
LYMPHOCYTES # BLD AUTO: 0.7 10E9/L (ref 0.8–5.3)
LYMPHOCYTES NFR BLD AUTO: 16.1 %
MCH RBC QN AUTO: 31 PG (ref 26.5–33)
MCHC RBC AUTO-ENTMCNC: 34.4 G/DL (ref 31.5–36.5)
MCV RBC AUTO: 90 FL (ref 78–100)
MONOCYTES # BLD AUTO: 0.6 10E9/L (ref 0–1.3)
MONOCYTES NFR BLD AUTO: 14.1 %
NEUTROPHILS # BLD AUTO: 2.8 10E9/L (ref 1.6–8.3)
NEUTROPHILS NFR BLD AUTO: 69.4 %
NRBC # BLD AUTO: 0 10*3/UL
NRBC BLD AUTO-RTO: 0 /100
PLATELET # BLD AUTO: 100 10E9/L (ref 150–450)
POTASSIUM SERPL-SCNC: 4.4 MMOL/L (ref 3.4–5.3)
RBC # BLD AUTO: 4.58 10E12/L (ref 3.8–5.2)
SODIUM SERPL-SCNC: 129 MMOL/L (ref 133–144)
SPECIMEN SOURCE: ABNORMAL
WBC # BLD AUTO: 4 10E9/L (ref 4–11)

## 2018-02-17 PROCEDURE — 99221 1ST HOSP IP/OBS SF/LOW 40: CPT | Mod: AI | Performed by: INTERNAL MEDICINE

## 2018-02-17 PROCEDURE — 25000131 ZZH RX MED GY IP 250 OP 636 PS 637: Mod: GY | Performed by: INTERNAL MEDICINE

## 2018-02-17 PROCEDURE — 87040 BLOOD CULTURE FOR BACTERIA: CPT | Performed by: EMERGENCY MEDICINE

## 2018-02-17 PROCEDURE — 36415 COLL VENOUS BLD VENIPUNCTURE: CPT | Performed by: EMERGENCY MEDICINE

## 2018-02-17 PROCEDURE — 87804 INFLUENZA ASSAY W/OPTIC: CPT | Performed by: EMERGENCY MEDICINE

## 2018-02-17 PROCEDURE — 71046 X-RAY EXAM CHEST 2 VIEWS: CPT

## 2018-02-17 PROCEDURE — 96361 HYDRATE IV INFUSION ADD-ON: CPT

## 2018-02-17 PROCEDURE — 96374 THER/PROPH/DIAG INJ IV PUSH: CPT

## 2018-02-17 PROCEDURE — 87086 URINE CULTURE/COLONY COUNT: CPT | Performed by: EMERGENCY MEDICINE

## 2018-02-17 PROCEDURE — 00000146 ZZHCL STATISTIC GLUCOSE BY METER IP

## 2018-02-17 PROCEDURE — 85025 COMPLETE CBC W/AUTO DIFF WBC: CPT | Performed by: EMERGENCY MEDICINE

## 2018-02-17 PROCEDURE — 83605 ASSAY OF LACTIC ACID: CPT | Performed by: EMERGENCY MEDICINE

## 2018-02-17 PROCEDURE — 80048 BASIC METABOLIC PNL TOTAL CA: CPT | Performed by: EMERGENCY MEDICINE

## 2018-02-17 PROCEDURE — A9270 NON-COVERED ITEM OR SERVICE: HCPCS | Mod: GY | Performed by: EMERGENCY MEDICINE

## 2018-02-17 PROCEDURE — 12000007 ZZH R&B INTERMEDIATE

## 2018-02-17 PROCEDURE — 25000125 ZZHC RX 250: Performed by: INTERNAL MEDICINE

## 2018-02-17 PROCEDURE — 99207 ZZC DOWN CODE DUE TO INITIAL EXAM: CPT | Performed by: INTERNAL MEDICINE

## 2018-02-17 PROCEDURE — 25000128 H RX IP 250 OP 636: Performed by: INTERNAL MEDICINE

## 2018-02-17 PROCEDURE — 25000132 ZZH RX MED GY IP 250 OP 250 PS 637: Mod: GY | Performed by: EMERGENCY MEDICINE

## 2018-02-17 PROCEDURE — 27210995 ZZH RX 272: Performed by: EMERGENCY MEDICINE

## 2018-02-17 PROCEDURE — A9270 NON-COVERED ITEM OR SERVICE: HCPCS | Mod: GY | Performed by: INTERNAL MEDICINE

## 2018-02-17 PROCEDURE — 25000132 ZZH RX MED GY IP 250 OP 250 PS 637: Mod: GY | Performed by: INTERNAL MEDICINE

## 2018-02-17 PROCEDURE — 99285 EMERGENCY DEPT VISIT HI MDM: CPT | Mod: 25

## 2018-02-17 PROCEDURE — 25000128 H RX IP 250 OP 636: Performed by: EMERGENCY MEDICINE

## 2018-02-17 RX ORDER — ONDANSETRON 4 MG/1
4 TABLET, ORALLY DISINTEGRATING ORAL EVERY 6 HOURS PRN
Status: DISCONTINUED | OUTPATIENT
Start: 2018-02-17 | End: 2018-02-22 | Stop reason: HOSPADM

## 2018-02-17 RX ORDER — SODIUM CHLORIDE 9 MG/ML
INJECTION, SOLUTION INTRAVENOUS CONTINUOUS
Status: DISCONTINUED | OUTPATIENT
Start: 2018-02-17 | End: 2018-02-19

## 2018-02-17 RX ORDER — POTASSIUM CL/LIDO/0.9 % NACL 10MEQ/0.1L
10 INTRAVENOUS SOLUTION, PIGGYBACK (ML) INTRAVENOUS
Status: DISCONTINUED | OUTPATIENT
Start: 2018-02-17 | End: 2018-02-22 | Stop reason: HOSPADM

## 2018-02-17 RX ORDER — ALBUTEROL SULFATE 0.83 MG/ML
2.5 SOLUTION RESPIRATORY (INHALATION)
Status: DISCONTINUED | OUTPATIENT
Start: 2018-02-17 | End: 2018-02-22 | Stop reason: HOSPADM

## 2018-02-17 RX ORDER — LIDOCAINE 4 G/G
3 PATCH TOPICAL
Status: DISCONTINUED | OUTPATIENT
Start: 2018-02-17 | End: 2018-02-22 | Stop reason: HOSPADM

## 2018-02-17 RX ORDER — TRAMADOL HYDROCHLORIDE 50 MG/1
50 TABLET ORAL EVERY 6 HOURS PRN
Status: DISCONTINUED | OUTPATIENT
Start: 2018-02-17 | End: 2018-02-22 | Stop reason: HOSPADM

## 2018-02-17 RX ORDER — SODIUM CHLORIDE 5 %
1 OINTMENT (GRAM) OPHTHALMIC (EYE) 2 TIMES DAILY PRN
Status: DISCONTINUED | OUTPATIENT
Start: 2018-02-17 | End: 2018-02-22 | Stop reason: HOSPADM

## 2018-02-17 RX ORDER — ACETAMINOPHEN 325 MG/1
650 TABLET ORAL EVERY 4 HOURS PRN
Status: DISCONTINUED | OUTPATIENT
Start: 2018-02-17 | End: 2018-02-22 | Stop reason: HOSPADM

## 2018-02-17 RX ORDER — POLYETHYLENE GLYCOL 3350 17 G/17G
17 POWDER, FOR SOLUTION ORAL DAILY PRN
Status: DISCONTINUED | OUTPATIENT
Start: 2018-02-17 | End: 2018-02-22 | Stop reason: HOSPADM

## 2018-02-17 RX ORDER — TIMOLOL MALEATE 5 MG/ML
1 SOLUTION/ DROPS OPHTHALMIC 2 TIMES DAILY
Status: DISCONTINUED | OUTPATIENT
Start: 2018-02-17 | End: 2018-02-22 | Stop reason: HOSPADM

## 2018-02-17 RX ORDER — ONDANSETRON 2 MG/ML
4 INJECTION INTRAMUSCULAR; INTRAVENOUS EVERY 6 HOURS PRN
Status: DISCONTINUED | OUTPATIENT
Start: 2018-02-17 | End: 2018-02-22 | Stop reason: HOSPADM

## 2018-02-17 RX ORDER — ATORVASTATIN CALCIUM 80 MG/1
80 TABLET, FILM COATED ORAL EVERY EVENING
Status: DISCONTINUED | OUTPATIENT
Start: 2018-02-17 | End: 2018-02-22 | Stop reason: HOSPADM

## 2018-02-17 RX ORDER — GUAIFENESIN/DEXTROMETHORPHAN 100-10MG/5
5 SYRUP ORAL EVERY 6 HOURS
Status: DISCONTINUED | OUTPATIENT
Start: 2018-02-17 | End: 2018-02-22 | Stop reason: HOSPADM

## 2018-02-17 RX ORDER — ASPIRIN 81 MG/1
81 TABLET, CHEWABLE ORAL EVERY EVENING
Status: DISCONTINUED | OUTPATIENT
Start: 2018-02-17 | End: 2018-02-22 | Stop reason: HOSPADM

## 2018-02-17 RX ORDER — AZITHROMYCIN 250 MG/1
250 TABLET, FILM COATED ORAL DAILY
Status: COMPLETED | OUTPATIENT
Start: 2018-02-18 | End: 2018-02-21

## 2018-02-17 RX ORDER — DEXTROSE MONOHYDRATE 25 G/50ML
25-50 INJECTION, SOLUTION INTRAVENOUS
Status: DISCONTINUED | OUTPATIENT
Start: 2018-02-17 | End: 2018-02-22 | Stop reason: HOSPADM

## 2018-02-17 RX ORDER — AZITHROMYCIN 250 MG/1
500 TABLET, FILM COATED ORAL ONCE
Status: COMPLETED | OUTPATIENT
Start: 2018-02-17 | End: 2018-02-17

## 2018-02-17 RX ORDER — PROCHLORPERAZINE MALEATE 5 MG
5 TABLET ORAL EVERY 6 HOURS PRN
Status: DISCONTINUED | OUTPATIENT
Start: 2018-02-17 | End: 2018-02-22 | Stop reason: HOSPADM

## 2018-02-17 RX ORDER — OSELTAMIVIR PHOSPHATE 30 MG/1
30 CAPSULE ORAL 2 TIMES DAILY
Status: DISCONTINUED | OUTPATIENT
Start: 2018-02-17 | End: 2018-02-22 | Stop reason: HOSPADM

## 2018-02-17 RX ORDER — POTASSIUM CHLORIDE 7.45 MG/ML
10 INJECTION INTRAVENOUS
Status: DISCONTINUED | OUTPATIENT
Start: 2018-02-17 | End: 2018-02-22 | Stop reason: HOSPADM

## 2018-02-17 RX ORDER — NICOTINE POLACRILEX 4 MG
15-30 LOZENGE BUCCAL
Status: DISCONTINUED | OUTPATIENT
Start: 2018-02-17 | End: 2018-02-22 | Stop reason: HOSPADM

## 2018-02-17 RX ORDER — NALOXONE HYDROCHLORIDE 0.4 MG/ML
.1-.4 INJECTION, SOLUTION INTRAMUSCULAR; INTRAVENOUS; SUBCUTANEOUS
Status: DISCONTINUED | OUTPATIENT
Start: 2018-02-17 | End: 2018-02-22 | Stop reason: HOSPADM

## 2018-02-17 RX ORDER — PROCHLORPERAZINE 25 MG
12.5 SUPPOSITORY, RECTAL RECTAL EVERY 12 HOURS PRN
Status: DISCONTINUED | OUTPATIENT
Start: 2018-02-17 | End: 2018-02-22 | Stop reason: HOSPADM

## 2018-02-17 RX ORDER — POTASSIUM CHLORIDE 1.5 G/1.58G
20-40 POWDER, FOR SOLUTION ORAL
Status: DISCONTINUED | OUTPATIENT
Start: 2018-02-17 | End: 2018-02-22 | Stop reason: HOSPADM

## 2018-02-17 RX ORDER — LIDOCAINE 40 MG/G
CREAM TOPICAL
Status: DISCONTINUED | OUTPATIENT
Start: 2018-02-17 | End: 2018-02-22 | Stop reason: HOSPADM

## 2018-02-17 RX ORDER — INFANT FORM.IRON LAC-F/DHA/ARA 3.1 G/1
1 POWDER (GRAM) ORAL DAILY
Status: DISCONTINUED | OUTPATIENT
Start: 2018-02-17 | End: 2018-02-17

## 2018-02-17 RX ORDER — OSELTAMIVIR PHOSPHATE 30 MG/1
30 CAPSULE ORAL ONCE
Status: COMPLETED | OUTPATIENT
Start: 2018-02-17 | End: 2018-02-17

## 2018-02-17 RX ORDER — POTASSIUM CHLORIDE 29.8 MG/ML
20 INJECTION INTRAVENOUS
Status: DISCONTINUED | OUTPATIENT
Start: 2018-02-17 | End: 2018-02-22 | Stop reason: HOSPADM

## 2018-02-17 RX ORDER — DOCUSATE SODIUM 100 MG/1
100 CAPSULE, LIQUID FILLED ORAL AT BEDTIME
Status: DISCONTINUED | OUTPATIENT
Start: 2018-02-17 | End: 2018-02-22 | Stop reason: HOSPADM

## 2018-02-17 RX ORDER — POTASSIUM CHLORIDE 1500 MG/1
20-40 TABLET, EXTENDED RELEASE ORAL
Status: DISCONTINUED | OUTPATIENT
Start: 2018-02-17 | End: 2018-02-22 | Stop reason: HOSPADM

## 2018-02-17 RX ORDER — BISACODYL 10 MG
10 SUPPOSITORY, RECTAL RECTAL DAILY PRN
Status: DISCONTINUED | OUTPATIENT
Start: 2018-02-17 | End: 2018-02-22 | Stop reason: HOSPADM

## 2018-02-17 RX ADMIN — GUAIFENESIN AND DEXTROMETHORPHAN 5 ML: 100; 10 SYRUP ORAL at 16:00

## 2018-02-17 RX ADMIN — SODIUM CHLORIDE 1000 ML: 9 INJECTION, SOLUTION INTRAVENOUS at 11:00

## 2018-02-17 RX ADMIN — CEFTRIAXONE 1 G: 10 INJECTION, POWDER, FOR SOLUTION INTRAVENOUS at 10:56

## 2018-02-17 RX ADMIN — OSELTAMIVIR PHOSPHATE 30 MG: 30 CAPSULE ORAL at 20:54

## 2018-02-17 RX ADMIN — DOCUSATE SODIUM 100 MG: 100 CAPSULE, LIQUID FILLED ORAL at 20:54

## 2018-02-17 RX ADMIN — LIDOCAINE 1 PATCH: 560 PATCH PERCUTANEOUS; TOPICAL; TRANSDERMAL at 20:49

## 2018-02-17 RX ADMIN — GUAIFENESIN AND DEXTROMETHORPHAN 5 ML: 100; 10 SYRUP ORAL at 20:53

## 2018-02-17 RX ADMIN — TIMOLOL MALEATE 1 DROP: 5 SOLUTION/ DROPS OPHTHALMIC at 20:54

## 2018-02-17 RX ADMIN — LEVOTHYROXINE SODIUM 75 MCG: 50 TABLET ORAL at 15:58

## 2018-02-17 RX ADMIN — INSULIN DETEMIR 20 UNITS: 100 INJECTION, SOLUTION SUBCUTANEOUS at 21:46

## 2018-02-17 RX ADMIN — AZITHROMYCIN 500 MG: 250 TABLET, FILM COATED ORAL at 10:56

## 2018-02-17 RX ADMIN — SODIUM CHLORIDE, PRESERVATIVE FREE 100 ML/HR: 5 INJECTION INTRAVENOUS at 15:56

## 2018-02-17 RX ADMIN — SODIUM CHLORIDE 1 G: 50 OINTMENT OPHTHALMIC at 21:46

## 2018-02-17 RX ADMIN — SODIUM CHLORIDE 1000 ML: 9 INJECTION, SOLUTION INTRAVENOUS at 09:48

## 2018-02-17 RX ADMIN — ATORVASTATIN CALCIUM 80 MG: 80 TABLET, FILM COATED ORAL at 20:53

## 2018-02-17 RX ADMIN — ASPIRIN 81 MG 81 MG: 81 TABLET ORAL at 20:54

## 2018-02-17 RX ADMIN — OSELTAMIVIR PHOSPHATE 30 MG: 30 CAPSULE ORAL at 11:39

## 2018-02-17 ASSESSMENT — ACTIVITIES OF DAILY LIVING (ADL)
TRANSFERRING: 1-->ASSISTIVE EQUIPMENT
RETIRED_EATING: 0-->INDEPENDENT
RETIRED_COMMUNICATION: 0-->UNDERSTANDS/COMMUNICATES WITHOUT DIFFICULTY
BATHING: 0-->INDEPENDENT
AMBULATION: 1-->ASSISTIVE EQUIPMENT
COGNITION: 1 - ATTENTION OR MEMORY DEFICITS
DRESS: 0-->INDEPENDENT
TOILETING: 0-->INDEPENDENT
FALL_HISTORY_WITHIN_LAST_SIX_MONTHS: NO

## 2018-02-17 ASSESSMENT — ENCOUNTER SYMPTOMS
DIFFICULTY URINATING: 0
CHILLS: 1
WEAKNESS: 0
ABDOMINAL PAIN: 0
DIAPHORESIS: 0
FEVER: 1
COUGH: 1

## 2018-02-17 NOTE — ED PROVIDER NOTES
History     Chief Complaint:  Fever (and c/o cough and cold s/s for one week)      HPI   Serena Marie is a 89 year old female who presents with progressive fever and mildly productive cough for about the last week.  Symptoms have intensified in the last several days.  The patient feels as if she is becoming progressively more weak.  She had difficulty getting around this morning secondary to general weakness.  She woke up this morning and had a fever of 102, she did take Tylenol.  She does have some body aches.  She does not complain of any urinary symptoms.    Allergies:  Contrast Dye  Lisinopril  Metoprolol      Medications:    Omeprazole  Tramadol   Oxycodone  Levothyroxine  Lidocaine   Insulin detemir  Acetaminophen  Ranitidine HCl  Gentamicin  Furosemide   Atorvastatin  Terbinafine   Timolol  Sodium chloride  Fluocinolone Acetonide  Aspirin 81 MG tablet       Past Medical History:    CAD (coronary artery disease)   Diabetes mellitus type 2 with peripheral artery disease (H)   DM type 2, goal A1c below 7   GERD (gastroesophageal reflux disease)   Gluten intolerance   HTN (hypertension)   Hyperlipidemia LDL goal < 100   Pacemaker 8/2009  PAD (peripheral artery disease) (H) 2008  S/P coronary angiogram 8/2012  Syncope 08/2009  Unspecified hypothyroidism     Past Surgical History:    Angioplasty  2008  Appendectomy    Back Surgery    C Removal of eye    Cholecystectomy, Laparoscopic  Colectomy  Hysterectomy, EMANI    Implant Pacemaker    Stent, Coronary, FATOU      Family History:    Cancer    Social History:  Smoking status: Former Smoker  Alcohol use: No       Review of Systems   Constitutional: Positive for chills and fever. Negative for diaphoresis.   Respiratory: Positive for cough.    Gastrointestinal: Negative for abdominal pain.   Genitourinary: Negative for difficulty urinating.   Skin: Negative for rash.   Neurological: Negative for weakness.   All other systems reviewed and are negative.      Physical  Exam   First Vitals:  BP: (!) 167/102  Pulse: 89  Temp: 102.7  F (39.3  C)  Resp: 20  Height: 152.4 cm (5')  Weight: 64.4 kg (142 lb)  SpO2: 98 %      Physical Exam  General: Resting uncomfortably on the gurney, harsh paroxysmal cough    Moaning at times secondary to generalized body aches  Head:  The scalp, face, and head appear normal  Eyes:  The pupils are equal, round, and reactive to light    There is no nystagmus    Extraocular muscles are intact    Conjunctivae and sclerae are normal  ENT:    The nose is normal    Pinnae are normal    The oropharynx is normal    Uvula is in the midline  Neck:  Normal range of motion    There is no rigidity noted    There is no midline cervical spine pain/tenderness    Trachea is in the midline    No mass is detected  CV:  Regular rate and underlying rhythm     Normal S1/S2, no S3/S4    No pathological murmur detected  Resp:  Lungs are clear    There is no tachypnea    Non-labored    No rales    No wheezing   GI:  Abdomen is soft, there is no rigidity    No distension    No tympani    No rebound tenderness     Non-surgical without peritoneal features  MS:  Normal muscular tone    Symmetric motor strength    No major joint effusions    No asymmetric leg swelling, no calf tenderness  Skin:  No rash or acute skin lesions noted    A pacemaker is noted to the left upper chest.  No complications    He has prior well-healed abdominal surgical scars  Neuro: Speech is normal and fluent    She can follow commands, she moves all extremities.  Her mentation is normal.  Psych:  Awake. Alert.      Normal affect.  Appropriate interactions.  Lymph: No anterior cervical lymphadenopathy noted              Emergency Department Course     Imaging:  Radiographic findings were communicated with the patient who voiced understanding of the findings.  XR Chest 2 views:   Since September 13, 2016, dual-lead pacemaker is again  noted overlying the left hemithorax. Heart size is normal. No  pleural  effusion, pneumothorax. There is a small focus of opacity in the  medial right lower lobe, which may be an early focus of pneumonia.  Recommend continued radiographic surveillance until resolution. As per radiology.       Laboratory:  CBC: WBC: 4.0, HGB: 14.2, PLT: 100    BMP: Glucose 186, , o/w WNL (Creatinine: 0.55)    Lactic Acid: 2.1    Influenza A/B antigen: A - Negative; B - Positive    Blood culture: (In process)    Lactic Acid whole blood: 1.2      Interventions:  0948 NS 1L IV Bolus       Emergency Department Course:  Nursing notes and vitals reviewed.     0934 I performed an exam of the patient as documented above.     IV inserted. Medicine administered as documented above. Blood drawn. This was sent to the lab for further testing, results above.    The patient was sent for a chest XR while in the emergency department, findings above.     1033 I rechecked the patient and discussed the results of her workup thus far.     1058 I consulted with Dr. Cardona, Hospitalist, regarding the patient's history and presentation here in the emergency department.    Findings and plan explained to the Patient who consents to admission. Discussed the patient with Dr. Cardona, who will admit the patient to a medical bed for further monitoring, evaluation, and treatment.        Impression & Plan      CMS Diagnoses:   The patient has signs of Severe Sepsis as evidenced by:    1. 2 SIRS criteria, AND  2. Suspected infection, AND   3. Organ dysfunction: Lactic Acid >2    Time severe sepsis diagnosis confirmed = 0955 as this was the time when Lactate resulted, and the level was >2      3 Hour Severe Sepsis Bundle Completion:  1. Initial Lactic Acid Result:   Recent Labs   Lab Test  02/17/18   1135  02/17/18   1046  02/17/18   0938   LACT  1.2  Canceled, Test credited, specimen discarded  2.1*   A repeat lactate was performed at 1146 and this had normalized after IV fluids to 1.2      2. Blood Cultures before  Antibiotics: Yes  3. Broad Spectrum Antibiotics Administered: Yes, ceftriaxone, Tamiflu, and Azithromycin.      Anti-infectives (Future)    Start     Dose/Rate Route Frequency Ordered Stop    02/17/18 1115  oseltamivir (TAMIFLU) capsule 30 mg      30 mg Oral ONCE 02/17/18 1114          4. 2000 ml of IV fluids. (Greater than 30 mL's per kilogram)    the patient was transferred out of the ED prior to the 6 hour keith.    Repeat lactate 1.2  Repeat perfusion assessment reveals improved perfusion.    Medical Decision Making:  This patient presents with progressive weakness and cough.  The patient is noted to have acute influenza B and she also has a right middle lobe community-acquired pneumonia.  She does not have an advanced network of support at home in the assisted living and she will require admission to the hospital at this time.  The patient has mild hyponatremia.  Her mildly elevated lactate is likely secondary to acute influenza B and dehydration.  She does meet criteria for severe sepsis given the viral and bacterial infection in the lung and a mildly elevated lactate.  The patient is given intravenous fluids.  She will be admitted to the hospital at this time.  Three-hour bundle was completed.  The six-hour bundle has also been completed with a repeat lactate and perfusion assessment as noted above.        Diagnosis:    ICD-10-CM   1. Pneumonia of right middle lobe due to infectious organism (H) J18.1   2. Influenza B J10.1   3. Severe sepsis (H) A41.9    R65.20           Disposition:  Admitted to a medical bed under the care of Mitch Cardona MD    Discharge Medications:    Andrew IVY, am serving as a scribe on 2/17/2018 at 9:34 AM to personally document services performed by Frank Lui MD based on my observations and the provider's statements to me.         Andrew Sanchez  2/17/2018    EMERGENCY DEPARTMENT       Frank Lui MD  02/17/18 0386

## 2018-02-17 NOTE — IP AVS SNAPSHOT
[address] [facility]    [address] [place] [address]    Phone:  [phone]                                       After Visit Summary   2/17/2018    Serena Marie    MRN: [id number]           After Visit Summary Signature Page     I have received my discharge instructions, and my questions have been answered. I have discussed any challenges I see with this plan with the nurse or doctor.    ..........................................................................................................................................  Patient/Patient Representative Signature      ..........................................................................................................................................  Patient Representative Print Name and Relationship to Patient    ..................................................               ................................................  Date                                            Time    ..........................................................................................................................................  Reviewed by Signature/Title    ...................................................              ..............................................  Date                                                            Time

## 2018-02-17 NOTE — IP AVS SNAPSHOT
` [place] [address] [facility]: [phone]            Medication Administration Report for Serena Marie as of 02/22/18 0267   Legend:    Given Hold Not Given Due Canceled Entry Other Actions    Time Time (Time) Time  Time-Action       Inactive    Active    Linked        Medications 02/16/18 02/17/18 02/18/18 02/19/18 02/20/18 02/21/18 02/22/18    acetaminophen (TYLENOL) tablet 650 mg  Dose: 650 mg  Freq: EVERY 4 HOURS PRN Route: PO  PRN Reason: mild pain  Start: 02/17/18 1339   Admin Instructions: Alternate ibuprofen (if ordered) with acetaminophen.  Maximum acetaminophen dose from all sources = 75 mg/kg/day not to exceed 4 grams/day.       1832 (650 mg)-Given        0813 (650 mg)-Given       1657 (650 mg)-Given        0336 (650 mg)-Given       1945 (650 mg)-Given        0255 (650 mg)-Given       1400 (650 mg)-Given        1020 (650 mg)-Given           albuterol neb solution 2.5 mg  Dose: 2.5 mg  Freq: EVERY 6 HOURS Route: NEBULIZATION  Start: 02/20/18 1330        1948 (2.5 mg)-Given        (0006)-Not Given       0245 (2.5 mg)-Given       0723 (2.5 mg)-Given       (1300)-Not Given       2004 (2.5 mg)-Given        (0153)-Not Given       0800 (2.5 mg)-Given       1257 (2.5 mg)-Given       [ ] 1900           albuterol neb solution 2.5 mg  Dose: 2.5 mg  Freq: EVERY 2 HOURS PRN Route: NEBULIZATION  PRN Reason: other  PRN Comment: dyspnea  Start: 02/17/18 1342              alum & mag hydroxide-simethicone (MYLANTA ES/MAALOX  ES) suspension 20 mL  Dose: 20 mL  Freq: EVERY 4 HOURS PRN Route: PO  PRN Reasons: indigestion,heartburn  Start: 02/20/18 1345   Admin Instructions: Shake well.               aspirin chewable tablet 81 mg  Dose: 81 mg  Freq: EVERY EVENING Route: PO  Start: 02/17/18 2000 2054 (81 mg)-Given        2045 (81 mg)-Given        1850 (81 mg)-Given               1945 (81 mg)-Given        2025 (81 mg)-Given        [ ] 2000           atorvastatin (LIPITOR) tablet 80 mg  Dose: 80  mg  Freq: EVERY EVENING Route: PO  Start: 02/17/18 2000 2053 (80 mg)-Given        2046 (80 mg)-Given        1850 (80 mg)-Given               1945 (80 mg)-Given        2025 (80 mg)-Given        [ ] 2000           benzocaine-menthol (CHLORASEPTIC) 6-10 MG lozenge 1 lozenge  Dose: 1 lozenge  Freq: EVERY 1 HOUR PRN Route: BU  PRN Reason: sore throat  PRN Comment: dry/sore throat without fever  Start: 02/19/18 1829 2130 (1 lozenge)-Given              bisacodyl (DULCOLAX) Suppository 10 mg  Dose: 10 mg  Freq: DAILY PRN Route: RE  PRN Reason: constipation  Start: 02/17/18 1339   Admin Instructions: Hold for loose stools.  This is the third step of a three step constipation treatment protocol.               docusate sodium (COLACE) capsule 100 mg  Dose: 100 mg  Freq: AT BEDTIME Route: PO  Start: 02/17/18 2200 2054 (100 mg)-Given               2045 (100 mg)-Given               (2138)-Not Given [C]        (2138)-Not Given        (2116)-Not Given        [ ] 2200           glucose 40 % gel 15-30 g  Dose: 15-30 g  Freq: EVERY 15 MIN PRN Route: PO  PRN Reason: low blood sugar  Start: 02/17/18 1338   Admin Instructions: Give 15 g for BG 51 to 69 mg/dL IF patient is conscious and able to swallow. Give 30 g for BG less than or equal to 50 mg/dL IF patient is conscious and able to swallow. Do NOT give glucose gel via enteral tube.  IF patient has enteral tube: give apple juice 120 mL (4 oz or 15 g of CHO) via enteral tube for BG 51 to 69 mg/dL.  Give apple juice 240 mL (8 oz or 30 g of CHO) via enteral tube for BG less than or equal to 50 mg/dL.    ~Oral gel is preferable for conscious and able to swallow patient.   ~IF gel unavailable or patient refuses may provide apple juice 120 mL (4 oz or 15 g of CHO). Document juice on I and O flowsheet.              Or  dextrose 50 % injection 25-50 mL  Dose: 25-50 mL  Freq: EVERY 15 MIN PRN Route: IV  PRN Reason: low blood sugar  Start: 02/17/18 1338   Admin Instructions: Use if  have IV access, BG less than 70 mg/dL and meet dose criteria below:  Dose if conscious and alert (or disorientated) and NPO = 25 mL  Dose if unconscious / not alert = 50 mL  Vesicant. For ordered doses up to 25 mg, give IV Push undiluted. Give each 5g over 1 minute.              Or  glucagon injection 1 mg  Dose: 1 mg  Freq: EVERY 15 MIN PRN Route: SC  PRN Reason: low blood sugar  PRN Comment: May repeat x 1 only  Start: 02/17/18 1338   Admin Instructions: May give SQ or IM. ONLY use glucagon IF patient has NO IV access AND is UNABLE to swallow AND blood glucose is LESS than or EQUAL to 50 mg/dL.  Give IV Push over 1 minute. Reconstitute with 1mL sterile water.               guaiFENesin-dextromethorphan (ROBITUSSIN DM) 100-10 MG/5ML syrup 5 mL  Dose: 5 mL  Freq: EVERY 6 HOURS Route: PO  Start: 02/17/18 1345     1600 (5 mL)-Given       2053 (5 mL)-Given               0413 (5 mL)-Given       0939 (5 mL)-Given       (1600)-Not Given       1834 (5 mL)-Given       2046 (5 mL)-Given               0402 (5 mL)-Given       0956 (5 mL)-Given       1657 (5 mL)-Given       2131 (5 mL)-Given        (0343)-Not Given       (0951)-Not Given       1653 (5 mL)-Given       (2234)-Not Given        (0400)-Not Given       0916 (5 mL)-Given       1603 (5 mL)-Given        (0200)-Not Given       (0458)-Not Given       0948 (5 mL)-Given       [ ] 1600       [ ] 2200           hydrALAZINE (APRESOLINE) injection 10 mg  Dose: 10 mg  Freq: EVERY 4 HOURS PRN Route: IV  PRN Reason: high blood pressure  PRN Comment: give for SBP > 160  Start: 02/19/18 1126   Admin Instructions: For ordered doses up to 40 mg, give IV Push undiluted over 1 minute.        1158 (10 mg)-Given       1850 (10 mg)-Given        1700 (10 mg)-Given        0301 (10 mg)-Given        0245 (10 mg)-Given           insulin aspart (NovoLOG) inj (RAPID ACTING)  Dose: 1-5 Units  Freq: AT BEDTIME Route: SC  Start: 02/17/18 2200   Admin Instructions: MEDIUM INSULIN RESISTANCE DOSING     Do Not give Bedtime Correction Insulin if BG less than  200.   For  - 249 give 1 units.   For  - 299 give 2 units.   For  - 349 give 3 units.   For  -399 give 4 units.   For BG greater than or equal to 400 give 5 units.  Notify provider if glucose greater than or equal to 350 mg/dL after administration of correction dose.  If given at mealtime, must be administered 5 min before meal or immediately after.      (2144)-Not Given [C]        (2109)-Not Given        (2138)-Not Given        (2144)-Not Given         (0201)-Not Given       [ ] 2200           insulin aspart (NovoLOG) inj (RAPID ACTING)  Dose: 1-7 Units  Freq: 3 TIMES DAILY BEFORE MEALS Route: SC  Start: 02/17/18 1345   Admin Instructions: Correction Scale - MEDIUM INSULIN RESISTANCE DOSING     Do Not give Correction Insulin if Pre-Meal BG less than 140.   For Pre-Meal  - 189 give 1 unit.   For Pre-Meal  - 239 give 2 units.   For Pre-Meal  - 289 give 3 units.   For Pre-Meal  - 339 give 4 units.   For Pre-Meal - 399 give 5 units.   For Pre-Meal -449 give 6 units  For Pre-Meal BG greater than or equal to 450 give 7 units.   To be given with prandial insulin, and based on pre-meal blood glucose.    Notify provider if glucose greater than or equal to 350 mg/dL after administration of correction dose.  If given at mealtime, must be administered 5 min before meal or immediately after.      (1400)-Not Given [C]               (0901)-Not Given       (1345)-Not Given       (1815)-Not Given        (0817)-Not Given       (1347)-Not Given       (1827)-Not Given        (0850)-Not Given       1249 (1 Units)-Given       (1944)-Not Given        (0919)-Not Given [C]       1224 (2 Units)-Given       (1831)-Not Given [C]        (0845)-Not Given       1403 (3 Units)-Given       [ ] 1700           insulin aspart (NovoLOG) inj (RAPID ACTING)  Dose: 12 Units  Freq: 4 TIMES DAILY WITH MEALS & NIGHTLY Route: SC  Start:  02/17/18 1345   Admin Instructions: If given at mealtime, must be administered 5 min before meal or immediately after.      (1400)-Not Given [C]              (2305)-Not Given        (0931)-Not Given       1546 (4 Units)-Given       (1828)-Not Given [C]       (2110)-Not Given        0815 (12 Units)-Given       (1347)-Not Given [C]       (1901)-Not Given [C]       (2138)-Not Given        (0850)-Not Given [C]       1250 (5 Units)-Given       (1944)-Not Given       2236 (5 Units)-Given [C]        (0919)-Not Given [C]       1224 (12 Units)-Given       (1841)-Not Given [C]       (2116)-Not Given [C]        (0845)-Not Given [C]       (1413)-Not Given [C]       [ ] 1800       [ ] 2200           insulin detemir (LEVEMIR) injection 20 Units  Dose: 20 Units  Freq: AT BEDTIME Route: SC  Start: 02/17/18 2200     2146 (20 Units)-Given        2047 (20 Units)-Given               (2139)-Not Given [C]        2234 (20 Units)-Given        2027 (20 Units)-Given [C]               [ ] 2200           levothyroxine (SYNTHROID/LEVOTHROID) tablet 75 mcg  Dose: 75 mcg  Freq: DAILY Route: PO  Start: 02/17/18 1345   Admin Instructions: Separate oral administration of iron- or calcium-containing products and levothyroxine by at least 4 hours.      1558 (75 mcg)-Given        0933 (75 mcg)-Given        0813 (75 mcg)-Given        0844 (75 mcg)-Given        0915 (75 mcg)-Given        0949 (75 mcg)-Given           Lidocaine (LIDOCARE) 4 % Patch 3 patch  Dose: 3 patch  Freq: EVERY 24 HOURS 2000 Route: TD  Start: 02/17/18 2000   Admin Instructions: Apply patch(s) to arm. To prevent lidocaine toxicity, patient should be patch free for 12 hrs daily. Patches may be cut to smaller size prior to removing release liner.  NEVER APPLY HEAT OVER PATCH which increases absorption and risk of local anesthetic toxicity. Do not apply over area where liposomal bupivacaine was injected for 96 hours post injection.      2049 (1 patch)-Given [C]                (3838)-Not  "Given        0847 (1 patch)-Given        0917 (1 patch)-Given [C]        0951 (1 patch)-Given [C]           lidocaine (LMX4) cream  Freq: EVERY 1 HOUR PRN Route: Top  PRN Reason: pain  PRN Comment: with VAD insertion or accessing implanted port.  Start: 02/17/18 1339   Admin Instructions: Do NOT give if patient has a history of allergy to any local anesthetic or any \"luciana\" product.   Apply 30 minutes prior to VAD insertion or port access.  MAX Dose:  2.5 g (  of 5 g tube)               lidocaine 1 % 1 mL  Dose: 1 mL  Freq: EVERY 1 HOUR PRN Route: OTHER  PRN Comment: mild pain with VAD insertion or accessing implanted port  Start: 02/17/18 1339   Admin Instructions: Do NOT give if patient has a history of allergy to any local anesthetic or any \"luciana\" product. MAX dose 1 mL subcutaneous OR intradermal in divided doses.               lidocaine patch in PLACE  Freq: EVERY 8 HOURS Route: TD  Start: 02/17/18 2200   Admin Instructions: Chart every shift, confirming that patch is still in place on patient (no barcode scan needed). See patch order for dose information.  NEVER APPLY HEAT OVER PATCH which will increase absorption and may lead to risk of local anesthetic toxicity. Do not apply over area where liposomal bupivacaine injected for 96 hours.      2145 ( )-Patch in Place        0146 ( )-Patch in Place       0940 ( )-Patch Removed       1700 ( )-Patch Removed        0021 ( )-Patch Removed       (0818)-Not Given       (1652)-Not Given        (0125)-Not Given [C]       0851 ( )-Patch in Place       1704 ( )-Patch in Place               0920 ( )-Patch in Place       1603 ( )-Patch in Place        (0201)-Not Given [C]       1010 ( )-Patch in Place       [ ] 1700           lidocaine patch REMOVAL  Freq: EVERY 24 HOURS 0800 Route: TD  Start: 02/18/18 0800   Admin Instructions: Remove lidocaine Patch.       0900 ( )-Patch Removed        1902 ( )-Negative [C]        2234 ( )-Patch Removed        2025 ( )-Patch Removed     "    [ ] 2000           losartan (COZAAR) tablet 50 mg  Dose: 50 mg  Freq: DAILY Route: PO  Start: 02/21/18 1215   Admin Instructions: Hold for SBP <100mmHg.          1359 (50 mg)-Given        0949 (50 mg)-Given           melatonin tablet 1 mg  Dose: 1 mg  Freq: AT BEDTIME PRN Route: PO  PRN Reason: sleep  Start: 02/17/18 1339   Admin Instructions: Do not give unless at least 6 hours of uninterrupted sleep is expected.               miconazole (MICATIN; MICRO GUARD) 2 % powder  Freq: EVERY 1 HOUR PRN Route: Top  PRN Reason: other  PRN Comment: topical candidiasis  Start: 02/19/18 1903   Admin Instructions: Apply to affected area.          2140 ( )-Given [C]              naloxone (NARCAN) injection 0.1-0.4 mg  Dose: 0.1-0.4 mg  Freq: EVERY 2 MIN PRN Route: IV  PRN Reason: opioid reversal  Start: 02/17/18 1339   Admin Instructions: For respiratory rate LESS than or EQUAL to 8.  Partial reversal dose:  0.1 mg titrated q 2 minutes for Analgesia Side Effects Monitoring Sedation Level of 3 (frequently drowsy, arousable, drifts to sleep during conversation).Full reversal dose:  0.4 mg bolus for Analgesia Side Effects Monitoring Sedation Level of 4 (somnolent, minimal or no response to stimulation).  For ordered doses up to 2mg give IVP. Give each 0.4mg over 15 seconds in emergency situations. For non-emergent situations further dilute in 9mL of NS to facilitate titration of response.               omeprazole (priLOSEC) CR capsule 20 mg  Dose: 20 mg  Freq: EVERY MORNING Route: PO  Start: 02/18/18 0900      0933 (20 mg)-Given        0813 (20 mg)-Given        0845 (20 mg)-Given        0916 (20 mg)-Given        0948 (20 mg)-Given           ondansetron (ZOFRAN-ODT) ODT tab 4 mg  Dose: 4 mg  Freq: EVERY 6 HOURS PRN Route: PO  PRN Reasons: nausea,vomiting  Start: 02/17/18 1339   Admin Instructions: This is Step 1 of nausea and vomiting management.  If nausea not resolved in 15 minutes, go to Step 2 prochlorperazine (COMPAZINE). Do  not push through foil backing. Peel back foil and gently remove. Place on tongue immediately. Administration with liquid unnecessary  With dry hands, peel back foil backing and gently remove tablet; do not push oral disintegrating tablet through foil backing; administer immediately on tongue and oral disintegrating tablet dissolves in seconds; then swallow with saliva; liquid not required.              Or  ondansetron (ZOFRAN) injection 4 mg  Dose: 4 mg  Freq: EVERY 6 HOURS PRN Route: IV  PRN Reasons: nausea,vomiting  Start: 02/17/18 1339   Admin Instructions: This is Step 1 of nausea and vomiting management.  If nausea not resolved in 15 minutes, go to Step 2 prochlorperazine (COMPAZINE).  Irritant. For ordered doses up to 4 mg, give IV Push undiluted over 2-5 minutes.               oseltamivir (TAMIFLU) capsule 30 mg  Dose: 30 mg  Freq: 2 TIMES DAILY Route: PO  Indications Comment: Influenza B  Start: 02/17/18 2100   End: 02/24/18 2059 2054 (30 mg)-Given        0933 (30 mg)-Given       2046 (30 mg)-Given        0813 (30 mg)-Given       2130 (30 mg)-Given        0845 (30 mg)-Given       2232 (30 mg)-Given        0916 (30 mg)-Given       2025 (30 mg)-Given        1020 (30 mg)-Given       [ ] 2100           phenylephrine-shark liver oil-mineral oil-petrolatum (PREPARATION H) 0.25-14-74.9 % rectal ointment  Freq: DAILY Route: RE  Start: 02/17/18 1345     (1415)-Not Given        1402 ( )-Given        (0819)-Not Given        (0907)-Not Given        0921 ( )-Given        1412 ( )-Given [C]           polyethylene glycol (MIRALAX/GLYCOLAX) Packet 17 g  Dose: 17 g  Freq: DAILY PRN Route: PO  PRN Reason: constipation  Start: 02/17/18 1339   Admin Instructions: Give in 8oz of  water, juice, or soda. Hold for loose stools.  This is the second step of a three step constipation treatment protocol.  1 Packet = 17 grams. Mixed prescribed dose in 8 ounces of water. Follow with 8 oz. of water.               potassium chloride  (KLOR-CON) Packet 20-40 mEq  Dose: 20-40 mEq  Freq: EVERY 2 HOURS PRN Route: ORAL OR FEED  PRN Reason: potassium supplementation  Start: 02/17/18 1339   Admin Instructions: Use if unable to tolerate tablets.  If Serum K+ 3.0-3.3, dose = 60 mEq po total dose (40 mEq x1 followed in 2 hours by 20 mEq x1). Recheck K+ level 4 hours after dose and the next AM.  If Serum K+ 2.5-2.9, dose = 80 mEq po total dose (40 mEq Q2H x2). Recheck K+ level 4 hours after dose and the next AM.  If Serum K+ less than 2.5, See IV order.  Dissolve packet contents in 4-8 ounces of cold water or juice.           1028 (40 mEq)-Given       1254 (40 mEq)-Given           potassium chloride 10 mEq in 100 mL intermittent infusion with 10 mg lidocaine  Dose: 10 mEq  Freq: EVERY 1 HOUR PRN Route: IV  PRN Reason: potassium supplementation  Start: 02/17/18 1339   Admin Instructions: Infuse via PERIPHERAL LINE. Use potassium with lidocaine for pain with peripheral administration.  If Serum K+ 3.0-3.3, dose = 10 mEq/hr x4 doses (40 mEq IV total dose). Recheck K+ level 2 hours after dose and the next AM.  If Serum K+ less than 3.0, dose = 10 mEq/hr x6 doses (60 mEq IV total dose). Recheck K+ level 2 hours after dose and the next AM.               potassium chloride 10 mEq in 100 mL sterile water intermittent infusion (premix)  Dose: 10 mEq  Freq: EVERY 1 HOUR PRN Route: IV  PRN Reason: potassium supplementation  Start: 02/17/18 1339   Admin Instructions: Infuse via PERIPHERAL LINE or CENTRAL LINE. Use for central line replacement if patient weight less than 65 kg, if patient is on TPN with high potassium content or if unit does not stock 20 mEq bags.   If Serum K+ 3.0-3.3, dose = 10 mEq/hr x4 doses (40 mEq IV total dose). Recheck K+ level 2 hours after dose and the next AM.   If Serum K+ less than 3.0, dose = 10 mEq/hr x6 doses (60 mEq IV total dose). Recheck K+ level 2 hours after dose and the next AM.               potassium chloride 20 mEq in 50 mL  intermittent infusion  Dose: 20 mEq  Freq: EVERY 1 HOUR PRN Route: IV  PRN Reason: potassium supplementation  Start: 02/17/18 1339   Admin Instructions: Infuse via CENTRAL LINE Only. May need EKG if less than 65 kg or on TPN - Max rate is 0.3 mEq/kg/hr for patients not on EKG monitoring.   If Serum K+ 3.0-3.3, dose = 20 mEq/hr x2 doses (40 mEq IV total dose). Recheck K+ level 2 hours after dose and the next AM.  If Serum K+ less than 3.0, dose = 20 mEq/hr x3 doses (60 mEq IV total dose). Recheck K+ level 2 hours after dose and the next AM.               potassium chloride SA (K-DUR/KLOR-CON M) CR tablet 20-40 mEq  Dose: 20-40 mEq  Freq: EVERY 2 HOURS PRN Route: PO  PRN Reason: potassium supplementation  Start: 02/17/18 1339   Admin Instructions: Use if able to take PO.   If Serum K+ 3.0-3.3, dose = 60 mEq po total dose (40 mEq x1 followed in 2 hours by 20 mEq x1). Recheck K+ level 4 hours after dose and the next AM.  If Serum K+ 2.5-2.9, dose = 80 mEq po total dose (40 mEq Q2H x2). Recheck K+ level 4 hours after dose and the next AM.  If Serum K+ less than 2.5, See IV order.  DO NOT CRUSH               pramoxine (PRAX) 1 % lotion  Freq: EVERY MORNING Route: RE  Start: 02/20/18 0900        (0905)-Not Given        (0921)-Not Given [C]        (0900)-Not Given           predniSONE (DELTASONE) tablet 40 mg  Dose: 40 mg  Freq: DAILY Route: PO  Start: 02/20/18 1400   End: 02/25/18 0859        1508 (40 mg)-Given        0916 (40 mg)-Given        0948 (40 mg)-Given           prochlorperazine (COMPAZINE) injection 5 mg  Dose: 5 mg  Freq: EVERY 6 HOURS PRN Route: IV  PRN Reasons: nausea,vomiting  Start: 02/17/18 1340   Admin Instructions: This is Step 2 of nausea and vomiting management. Give if nausea not resolved 15 minutes after giving ondansetron (ZOFRAN). If nausea not resolved in 15 minutes, go to Step 3 metoclopramide (REGLAN), if ordered.  For ordered doses up to 10 mg, give IV Push undiluted. Each 5mg over 1 minute.               Or  prochlorperazine (COMPAZINE) tablet 5 mg  Dose: 5 mg  Freq: EVERY 6 HOURS PRN Route: PO  PRN Reason: vomiting  Start: 02/17/18 1340   Admin Instructions: This is Step 2 of nausea and vomiting management. Give if nausea not resolved 15 minutes after giving ondansetron (ZOFRAN). If nausea not resolved in 15 minutes, go to Step 3 metoclopramide (REGLAN), if ordered.              Or  prochlorperazine (COMPAZINE) Suppository 12.5 mg  Dose: 12.5 mg  Freq: EVERY 12 HOURS PRN Route: RE  PRN Reasons: nausea,vomiting  Start: 02/17/18 1340   Admin Instructions: This is Step 2 of nausea and vomiting management. Give if nausea not resolved 15 minutes after giving ondansetron (ZOFRAN). If nausea not resolved in 15 minutes, go to Step 3 metoclopramide (REGLAN), if ordered.               sodium chloride (TITO 128) 5 % ophthalmic ointment 1 g  Dose: 1 g  Freq: 2 TIMES DAILY PRN Route: RIGHT EYE  PRN Reason: dry eyes  Start: 02/17/18 1335     2146 (1 g)-Given        0937 (1 g)-Given       2054 (1 g)-Given        1649 (1 g)-Given        0848 (1 g)-Given       1945 (1 g)-Given        0916 (1 g)-Given       2035 (1 g)-Given            sodium chloride (PF) 0.9% PF flush 3 mL  Dose: 3 mL  Freq: EVERY 8 HOURS Route: IK  Start: 02/17/18 1400   Admin Instructions: And Q1H PRN, to lock peripheral IV dormant line.      1557 (3 mL)-Given       (2145)-Not Given        (0056)-Not Given       (0932)-Not Given       (1815)-Not Given        (0020)-Not Given       (0820)-Not Given       (1653)-Not Given        0127 (3 mL)-Given       0905 (3 mL)-Given       1657 (3 mL)-Given        0301 (3 mL)-Given       0919 (3 mL)-Given       1603 (3 mL)-Given        0230 (3 mL)-Given       1022 (3 mL)-Given       [ ] 1700           sodium chloride (PF) 0.9% PF flush 3 mL  Dose: 3 mL  Freq: EVERY 1 HOUR PRN Route: IK  PRN Reason: line flush  PRN Comment: for peripheral IV flush post IV meds  Start: 02/17/18 0249       2687 (3 mL)-Given               timolol (TIMOPTIC) 0.5 % ophthalmic solution 1 drop  Dose: 1 drop  Freq: 2 TIMES DAILY Route: LEFT EYE  Start: 02/17/18 2100 2054 (1 drop)-Given        0931 (1 drop)-Given       2046 (1 drop)-Given        0820 (1 drop)-Given       2131 (1 drop)-Given        0849 (1 drop)-Given       1945 (1 drop)-Given               0916 (1 drop)-Given       2025 (1 drop)-Given        0953 (1 drop)-Given       [ ] 2100           traMADol (ULTRAM) tablet 50 mg  Dose: 50 mg  Freq: EVERY 6 HOURS PRN Route: PO  PRN Reason: moderate pain  Start: 02/17/18 1336      0933 (50 mg)-Given              Completed Medications  Medications 02/16/18 02/17/18 02/18/18 02/19/18 02/20/18 02/21/18 02/22/18         Dose: 250 mg  Freq: ONCE Route: PO  Indications of Use: COMMUNITY ACQUIRED PNEUMONIA  Start: 02/22/18 1145   End: 02/22/18 1411          1411 (250 mg)-Given             Dose: 250 mg  Freq: DAILY Route: PO  Indications of Use: COMMUNITY ACQUIRED PNEUMONIA  Start: 02/18/18 0900   End: 02/21/18 0916      0933 (250 mg)-Given        0813 (250 mg)-Given        0845 (250 mg)-Given        0916 (250 mg)-Given           Discontinued Medications  Medications 02/16/18 02/17/18 02/18/18 02/19/18 02/20/18 02/21/18 02/22/18         Dose: 2.5 mg  Freq: EVERY 6 HOURS Route: NEBULIZATION  Start: 02/20/18 1400   End: 02/20/18 1317        1317-Med Discontinued           Dose: 20 mL  Freq: ONCE Route: PO  Start: 02/20/18 1400   End: 02/20/18 1410   Admin Instructions: Shake well.         1407-Hold       1410-Med Discontinued           Dose: 1 g  Freq: EVERY 24 HOURS Route: IV  Indications of Use: COMMUNITY ACQUIRED PNEUMONIA  Start: 02/18/18 1100   End: 02/22/18 1137   Admin Instructions: Give IVP over 3 minutes       1248 (1 g)-Given        1051 (1 g)-Given        1300 (1 g)-Given        1206 (1 g)-Given        1137-Med Discontinued  (1213)-Not Given             Freq: EVERY MORNING Route: EX  Start: 02/19/18 0900   End: 02/20/18 0130   Admin Instructions:  Use 1 wipe externally, to vaginal area,  every morning.        (1003)-Not Given        0130-Med Discontinued           Rate: 100 mL/hr   Freq: CONTINUOUS Route: IV  Start: 02/17/18 1345   End: 02/19/18 1729     1556 (100 mL/hr)-New Bag        0413 ( )-New Bag       1358 ( )-New Bag       2100 ( )-Rate/Dose Verify        0023 ( )-New Bag       1729-Med Discontinued       Medications 02/16/18 02/17/18 02/18/18 02/19/18 02/20/18 02/21/18 02/22/18

## 2018-02-17 NOTE — IP AVS SNAPSHOT
` ` Patient Information     Patient Name Sex     Serena Gandhi ([id number]) Female 1924       Room Bed    9107 2297-06      Patient Demographics     Address Phone    [address]  [place] [address] [phone] (Home)  none (Work)  none (Mobile)      Patient Ethnicity & Race     Ethnic Group Patient Race    American White      Emergency Contact(s)     Name Relation Home Work Mobile    SANDHYA GANDHI Son [phone] none [phone]    Priti Crooks Daughter [phone] none [phone]    JOEL KENNEDY Relative [phone] none [phone]      Documents on File        Status Date Received Description       Documents for the Patient    Consent for Services - [facility]MG Received () 11 RMG-CONS    Privacy Notice - RMG Received () 11 RMG-PRIV    Insurance Card Received () 12 RMG-Medicare    Patient ID Received 11 -    Insurance Card Received () 11 [place]    Privacy Notice - [place] Received 11     Consent for Services - Hospital/Clinic Received () 11     Insurance Card Received () 12 RMG-[place]- Basic Senior [place]    Privacy Notice - RMG Received 12 RMG-PRIVACY    Consent for Services - RMG Received () 12 RMG-CONSENT    External Medication Information Consent Accepted () 12 RMG-EXTERNAL    Consent for Services - Hospital/Clinic Received () 12     Business/Insurance/Care Coordination/Health Form - Patient.1 Received 12     Consent for Services - Hospital/Clinic   12 ABN RMG    HIM HOLLI Authorization  12     Physical Therapy Certification Received 12     Business/Insurance/Care Coordination/Health Form - Patient.1 Received 12 BCBS    Insurance Card Received () 13 RMG-BCBS    Insurance Card Received 17 RMG-MEDICARE    Consent for EHR Access  13 Copied from existing Consent for services - C/HOD  collected on 2012    Insurance Card  13 -    Consent for Services - Hospital/Clinic   05/10/13 ABN RMG    Consent for Services - RMG Received () 13 RMG-CONSENT    External Medication Information Consent Accepted 14 RMG-EXTERNAL    [facility] Specified Other       Consent for Services - Hospital/Clinic       Consent for Services - Hospital/Clinic Received () 10/11/13     Consent to Communicate Received 10/11/13     Insurance Card Received () 03/10/16 RMG-BCBS    Patient ID Received 14 -    Consent for Services - RMG Received () 14 RMG-CONSENT    Consent for Services - Hospital/Clinic Received () 01/05/15     Consent for Services - Hospital/Clinic       Patient ID Received 03/24/15 -    Consent for Services - RMG Received () 09/10/15 RMG-CONSENT    Consent for Services/Privacy Notice - Hospital/Clinic Received () 16     Insurance Card Received 16 -    Insurance Card Received 04/10/17 RMG-BCBS    [facility] HOLLI Authorization - File Only   16 MEDICAL RELEASE TO [facility]    Consent to Communicate   AUTH TO TALK TO FAMILY    Consent for Services - RMG Received () 16 RMG-CONSENT    Advance Directives and Living Will Received 16 POLST 16    Advance Directives and Living Will Received 16 Health Care Directive 16    Advance Directives and Living Will Not Received  Validation of AD 16    HIM HOLLI Authorization - File Only   17 MEDICAL RELEASE TO [facility]    Insurance Card Received 17 -BCBS 2017    Consent for Services/Privacy Notice - Hospital/Clinic Received 17     Consent for Services - RMG Received 17 RMG-CONSENT    Care Everywhere Prospective Auth Received 17     External Medication Information Consent  (Deleted)      Insurance Card Received (Deleted) 12 not available    Insurance Card Received (Deleted) 16        Documents for the  Encounter    CMS IM for Patient Signature Received 02/18/18     EMS/Ambulance Record  02/18/18 [facility]      Admission Information     Attending Provider Admitting Provider Admission Type Admission Date/Time    Mitch Cardona MD Taiwo, Adeyinka Adetokunbo, MD Emergency 02/17/18  0923    Discharge Date Hospital Service Auth/Cert Status Service Area     Internal Medicine Incomplete [facility] SERVICES    Unit Room/Bed Admission Status       [facility] ORTHO SPEC UNIT 5507/5507-01 Admission (Confirmed)       Admission     Complaint    Influenza and pneumonia      Hospital Account     Name Acct ID Class Status Primary Coverage    Serena Marie [id number] Inpatient Open MEDICARE - MEDICARE            Guarantor Account (for Hospital Account #[id number])     Name Relation to Pt Service Area Active? Acct Type    Serena Marie Self FCS Yes Personal/Family    Address Phone          [address]  [place] [address] [phone](H)  none(O)              Coverage Information (for Hospital Account #[id number])     1. MEDICARE/MEDICARE     F/O Payor/Plan Precert #    MEDICARE/MEDICARE     Subscriber Subscriber #    Serena Marie [id number]    Address Phone    [employer]  [address]  [place] [address] [phone]          2. [place]     F/O Payor/Plan Precert #    [place]     Subscriber Subscriber #    Serena Marie [id number]    Address Phone    [address] [address]  [place] [address] [phone]

## 2018-02-17 NOTE — IP AVS SNAPSHOT
[place] [address] ORTHO SPECIALTY UNIT: [phone]                                              INTERAGENCY TRANSFER FORM - LAB / IMAGING / EKG / EMG RESULTS   2018                    Hospital Admission Date: 2018  CHRISTINA GANDHI   : 1924  Sex: Female        Attending Provider: Mitch Cardona MD     Allergies:  Contrast Dye, Lisinopril, Metoprolol    Infection:  None   Service:  INTERNAL MED    Ht:  1.524 m (5')   Wt:  64.4 kg (141 lb 14.4 oz)   Admission Wt:  64.4 kg (142 lb)    BMI:  27.71 kg/m 2   BSA:  1.65 m 2            Patient PCP Information     Provider PCP Type    Lin Cruz MD General         Lab Results - 3 Days      Glucose by meter [[id number]] (Abnormal)  Resulted: 18 1402, Result status: Final result    Ordering provider: Mitch Cardona MD  18 1230 Resulting lab: POINT OF CARE TEST, GLUCOSE    Specimen Information    Type Source Collected On     18 1230          Components       Value Reference Range Flag Lab   Glucose 186 70 - 99 mg/dL H 170            Glucose by meter [[id number]] (Abnormal)  Resulted: 18 0833, Result status: Final result    Ordering provider: Mitch Cardona MD  18 0756 Resulting lab: POINT OF CARE TEST, GLUCOSE    Specimen Information    Type Source Collected On     18 0756          Components       Value Reference Range Flag Lab   Glucose 134 70 - 99 mg/dL H 170            Glucose by meter [[id number]] (Abnormal)  Resulted: 18 0710, Result status: Final result    Ordering provider: Mitch Cardona MD  18 0703 Resulting lab: POINT OF CARE TEST, GLUCOSE    Specimen Information    Type Source Collected On     18 0703          Components       Value Reference Range Flag Lab   Glucose 128 70 - 99 mg/dL H 170            Blood culture [[id number]]  Resulted: 18 0611, Result status: Preliminary result    Ordering provider: Frank Lui,  MD  02/17/18 0945 Resulting lab: INFECTIOUS DISEASE DIAGNOSTIC LABORATORY    Specimen Information    Type Source Collected On   Blood  02/17/18 1046   Comment:  Right Arm          Components       Value Reference Range Flag Lab   Specimen Description Blood Right Arm      Special Requests Aerobic and anaerobic bottles received   FrStHsLb   Culture Micro No growth after 3 days   225            Blood culture [[id number]]  Resulted: 02/20/18 0611, Result status: Preliminary result    Ordering provider: Frank Lui MD  02/17/18 0943 Resulting lab: INFECTIOUS DISEASE DIAGNOSTIC LABORATORY    Specimen Information    Type Source Collected On   Blood Arm, Left 02/17/18 0938   Comment:  Left Arm          Components       Value Reference Range Flag Lab   Specimen Description Blood Left Arm      Special Requests Aerobic and anaerobic bottles received   FrStHsLb   Culture Micro No growth after 3 days   225            Clostridium difficile toxin B PCR [[id number]]  Resulted: 02/20/18 0003, Result status: Final result    Ordering provider: Mitch Carodna MD  02/19/18 1855 Resulting lab: [facility]    Specimen Information    Type Source Collected On   Feces  02/19/18 1845          Components       Value Reference Range Flag Lab   Specimen Description Feces   FrStHsLb   C Diff Toxin B PCR Negative NEG^Negative  51   Comment:         Negative: Clostridium difficile target DNA sequences NOT detected, presumed   negative for Clostridium difficile toxin B or the number of bacteria present   may be below the limit of detection for the test.  FDA approved assay performed using [employer] GeneXpert real-time PCR.  A negative result does not exclude actual disease due to Clostridium difficile   and may be due to improper collection, handling and storage of the specimen   or the number of organisms in the specimen is below the detection limit of the   assay.              Glucose by meter  [[id number]] (Abnormal)  Resulted: 02/19/18 2150, Result status: Final result    Ordering provider: Mitch Cardona MD  02/19/18 2129 Resulting lab: POINT OF CARE TEST, GLUCOSE    Specimen Information    Type Source Collected On     02/19/18 2129          Components       Value Reference Range Flag Lab   Glucose 102 70 - 99 mg/dL H 170            Glucose by meter [[id number]] (Abnormal)  Resulted: 02/19/18 1816, Result status: Final result    Ordering provider: Mitch Cardona MD  02/19/18 1811 Resulting lab: POINT OF CARE TEST, GLUCOSE    Specimen Information    Type Source Collected On     02/19/18 1811          Components       Value Reference Range Flag Lab   Glucose 117 70 - 99 mg/dL H 170            Glucose by meter [[id number]]  Resulted: 02/19/18 1716, Result status: Final result    Ordering provider: Mitch Cardona MD  02/19/18 0230 Resulting lab: POINT OF CARE TEST, GLUCOSE    Specimen Information    Type Source Collected On     02/19/18 0230          Components       Value Reference Range Flag Lab   Glucose 83 70 - 99 mg/dL  170            Glucose by meter [[id number]]  Resulted: 02/19/18 1345, Result status: Final result    Ordering provider: Mitch Cardona MD  02/19/18 1341 Resulting lab: POINT OF CARE TEST, GLUCOSE    Specimen Information    Type Source Collected On     02/19/18 1341          Components       Value Reference Range Flag Lab   Glucose 84 70 - 99 mg/dL  170            Glucose by meter [[id number]] (Abnormal)  Resulted: 02/19/18 0901, Result status: Final result    Ordering provider: Mitch Cardona MD  02/19/18 0848 Resulting lab: POINT OF CARE TEST, GLUCOSE    Specimen Information    Type Source Collected On     02/19/18 0848          Components       Value Reference Range Flag Lab   Glucose 101 70 - 99 mg/dL H 170            Urine Culture [[id number]]  Resulted: 02/18/18 2130, Result status: Final result    Ordering provider: Rock  Frank WADDELL MD  02/17/18 0943 Resulting lab: INFECTIOUS DISEASE DIAGNOSTIC LABORATORY    Specimen Information    Type Source Collected On   Midstream Urine Urine clean catch 02/17/18 2045          Components       Value Reference Range Flag Lab   Specimen Description Midstream Urine      Special Requests Specimen received in preservative   75   Culture Micro No growth   225            Glucose by meter [[id number]] (Abnormal)  Resulted: 02/18/18 2106, Result status: Final result    Ordering provider: Mitch Cardona MD  02/18/18 2054 Resulting lab: POINT OF CARE TEST, GLUCOSE    Specimen Information    Type Source Collected On     02/18/18 2054          Components       Value Reference Range Flag Lab   Glucose 121 70 - 99 mg/dL H 170            Glucose by meter [[id number]] (Abnormal)  Resulted: 02/18/18 1800, Result status: Final result    Ordering provider: Mitch Cardona MD  02/18/18 1714 Resulting lab: POINT OF CARE TEST, GLUCOSE    Specimen Information    Type Source Collected On     02/18/18 1714          Components       Value Reference Range Flag Lab   Glucose 113 70 - 99 mg/dL H 170            Sputum Culture Aerobic Bacterial [[id number]] (Abnormal)  Resulted: 02/18/18 1419, Result status: Final result    Ordering provider: Mitch Cardona MD  02/17/18 1840 Resulting lab: INFECTIOUS DISEASE DIAGNOSTIC LABORATORY    Specimen Information    Type Source Collected On   Sputum  02/18/18 0145          Components       Value Reference Range Flag Lab   Specimen Description Sputum      Culture Micro --  A 225   Result:         >10 Squamous epithelial cells/low power field indicates oral contamination. Please   recollect.     Culture Micro --   225   Result:         Notification of test cancellation was given to  Maylin Leyva RN at 1419 2/18/18 [id number]              Gram stain [[id number]] (Abnormal)  Resulted: 02/18/18 1411, Result status: Final result    Ordering provider: Brendan  Mitch Sood MD  02/17/18 1840 Resulting lab: INFECTIOUS DISEASE DIAGNOSTIC LABORATORY    Specimen Information    Type Source Collected On   Sputum  02/18/18 0145          Components       Value Reference Range Flag Lab   Specimen Description Sputum      Special Requests Screen   75   Gram Stain --  A 225   Result:         >10 Squamous epithelial cells/low power field indicates oral contamination. Please   recollect.     Gram Stain >25 PMNs/low power field   225   Result:     Gram Stain --   225   Result:         Many  Mixed gram negative and positive tammy              Glucose by meter [[id number]] (Abnormal)  Resulted: 02/18/18 1346, Result status: Final result    Ordering provider: Mitch Cardona MD  02/18/18 1339 Resulting lab: POINT OF CARE TEST, GLUCOSE    Specimen Information    Type Source Collected On     02/18/18 1339          Components       Value Reference Range Flag Lab   Glucose 108 70 - 99 mg/dL H 170            Basic metabolic panel [[id number]] (Abnormal)  Resulted: 02/18/18 0749, Result status: Final result    Ordering provider: Mitch Cardona MD  02/18/18 0000 Resulting lab: [facility]    Specimen Information    Type Source Collected On   Blood  02/18/18 0707          Components       Value Reference Range Flag Lab   Sodium 135 133 - 144 mmol/L  FrStHsLb   Potassium 3.5 3.4 - 5.3 mmol/L  FrStHsLb   Chloride 104 94 - 109 mmol/L  FrStHsLb   Carbon Dioxide 25 20 - 32 mmol/L  FrStHsLb   Anion Gap 6 3 - 14 mmol/L  FrStHsLb   Glucose 101 70 - 99 mg/dL H FrStHsLb   Urea Nitrogen 9 7 - 30 mg/dL  FrStHsLb   Creatinine 0.51 0.52 - 1.04 mg/dL L FrStHsLb   GFR Estimate >90 >60 mL/min/1.7m2  FrStHsLb   Comment:  Non  GFR Calc   GFR Estimate If Black >90 >60 mL/min/1.7m2  FrStHsLb   Comment:  African American GFR Calc   Calcium 7.9 8.5 - 10.1 mg/dL L FrStHsLb            CBC with platelets [[id number]] (Abnormal)  Resulted: 02/18/18 0733, Result  status: Final result    Ordering provider: Mitch Cardona MD  02/18/18 0000 Resulting lab: [facility]    Specimen Information    Type Source Collected On   Blood  02/18/18 0707          Components       Value Reference Range Flag Lab   WBC 3.0 4.0 - 11.0 10e9/L L FrStHsLb   RBC Count 3.99 3.8 - 5.2 10e12/L  FrStHsLb   Hemoglobin 12.4 11.7 - 15.7 g/dL  FrStHsLb   Hematocrit 36.4 35.0 - 47.0 %  FrStHsLb   MCV 91 78 - 100 fl  FrStHsLb   MCH 31.1 26.5 - 33.0 pg  FrStHsLb   MCHC 34.1 31.5 - 36.5 g/dL  FrStHsLb   RDW 14.4 10.0 - 15.0 %  FrStHsLb   Platelet Count 85 150 - 450 10e9/L L FrStHsLb            Glucose by meter [[id number]] (Abnormal)  Resulted: 02/18/18 0250, Result status: Final result    Ordering provider: Mitch Cardona MD  02/18/18 0232 Resulting lab: POINT OF CARE TEST, GLUCOSE    Specimen Information    Type Source Collected On     02/18/18 0232          Components       Value Reference Range Flag Lab   Glucose 124 70 - 99 mg/dL H 170            Glucose by meter [[id number]] (Abnormal)  Resulted: 02/17/18 2221, Result status: Final result    Ordering provider: Mitch Cardona MD  02/17/18 2132 Resulting lab: POINT OF CARE TEST, GLUCOSE    Specimen Information    Type Source Collected On     02/17/18 2132          Components       Value Reference Range Flag Lab   Glucose 169 70 - 99 mg/dL H 170            Glucose by meter [[id number]] (Abnormal)  Resulted: 02/17/18 1746, Result status: Final result    Ordering provider: Mitch Cardona MD  02/17/18 1736 Resulting lab: POINT OF CARE TEST, GLUCOSE    Specimen Information    Type Source Collected On     02/17/18 1736          Components       Value Reference Range Flag Lab   Glucose 216 70 - 99 mg/dL H 170            Glucose by meter [[id number]] (Abnormal)  Resulted: 02/17/18 1346, Result status: Final result    Ordering provider: Mitch Cardona MD  02/17/18 1337 Resulting lab: POINT  OF CARE TEST, GLUCOSE    Specimen Information    Type Source Collected On     02/17/18 1337          Components       Value Reference Range Flag Lab   Glucose 196 70 - 99 mg/dL H 170            Lactic acid [[id number]]  Resulted: 02/17/18 1146, Result status: Final result    Ordering provider: Frank Lui MD  02/17/18 1112 Resulting lab: [facility]    Specimen Information    Type Source Collected On   Blood  02/17/18 1135          Components       Value Reference Range Flag Lab   Lactic Acid 1.2 0.7 - 2.0 mmol/L  FrStHsLb            Lactic acid whole blood [[id number]]  Resulted: 02/17/18 1112, Result status: In process    Ordering provider: Frank Lui MD  02/17/18 1111 Resulting lab: [facility]    Specimen Information    Type Source Collected On     02/17/18 1111            Lactic acid [[id number]]  Resulted: 02/17/18 1111, Result status: Final result    Ordering provider: Frank Lui MD  02/17/18 1033 Resulting lab: [facility]    Specimen Information    Type Source Collected On   Blood  02/17/18 1046          Components       Value Reference Range Flag Lab   Lactic Acid Canceled, Test credited, specimen discarded 0.4 - 2.0 mmol/L  FrStHsLb   Comment:         Unsatisfactory specimen - clotted  MSG TO TRACKBOARD 11:10 a LAB TO REDRAW              Basic metabolic panel [[id number]] (Abnormal)  Resulted: 02/17/18 1007, Result status: Final result    Ordering provider: Frank Lui MD  02/17/18 0943 Resulting lab: [facility]    Specimen Information    Type Source Collected On   Blood  02/17/18 0938          Components       Value Reference Range Flag Lab   Sodium 129 133 - 144 mmol/L L FrStHsLb   Potassium 4.4 3.4 - 5.3 mmol/L  FrStHsLb   Comment:  Specimen slightly hemolyzed, potassium may be falsely elevated   Chloride 97 94 - 109 mmol/L  FrStHsLb   Carbon Dioxide 22 20 - 32 mmol/L  FrStHsLb   Anion Gap 10 3 - 14 mmol/L  FrStHsLb   Glucose 186 70 - 99  mg/dL H FrStHsLb   Urea Nitrogen 18 7 - 30 mg/dL  FrStHsLb   Creatinine 0.55 0.52 - 1.04 mg/dL  FrStHsLb   GFR Estimate >90 >60 mL/min/1.7m2  FrStHsLb   Comment:  Non  GFR Calc   GFR Estimate If Black >90 >60 mL/min/1.7m2  FrStHsLb   Comment:  African American GFR Calc   Calcium 8.5 8.5 - 10.1 mg/dL  FrStHsLb            Lactic acid [[id number]] (Abnormal)  Resulted: 02/17/18 0955, Result status: Final result    Ordering provider: Frank Lui MD  02/17/18 0943 Resulting lab: [facility]    Specimen Information    Type Source Collected On   Blood  02/17/18 0938          Components       Value Reference Range Flag Lab   Lactic Acid 2.1 0.4 - 2.0 mmol/L H FrStHsLb            Influenza A/B antigen [[id number]] (Abnormal)  Resulted: 02/17/18 0952, Result status: Final result    Ordering provider: Frank Lui MD  02/17/18 0929 Resulting lab: [facility]    Specimen Information    Type Source Collected On   Nasal  02/17/18 0927          Components       Value Reference Range Flag Lab   Influenza A/B Agn Specimen Nasal   FrStHsLb   Influenza A Negative NEG^Negative  FrStHsLb   Influenza B Positive NEG^Negative A FrStHsLb   Comment:         Test results must be correlated with clinical data. If necessary, results   should be confirmed by a molecular assay or viral culture.              CBC with platelets differential [[id number]] (Abnormal)  Resulted: 02/17/18 0951, Result status: Final result    Ordering provider: Frank Lui MD  02/17/18 0943 Resulting lab: [facility]    Specimen Information    Type Source Collected On   Blood  02/17/18 0938          Components       Value Reference Range Flag Lab   WBC 4.0 4.0 - 11.0 10e9/L  FrStHsLb   RBC Count 4.58 3.8 - 5.2 10e12/L  FrStHsLb   Hemoglobin 14.2 11.7 - 15.7 g/dL  FrStHsLb   Hematocrit 41.3 35.0 - 47.0 %  FrStHsLb   MCV 90 78 - 100 fl  FrStHsLb   MCH 31.0 26.5 - 33.0 pg  FrStHsLb   MCHC 34.4 31.5 -  "36.5 g/dL  FrStHsLb   RDW 14.1 10.0 - 15.0 %  FrStHsLb   Platelet Count 100 150 - 450 10e9/L L FrStHsLb   Diff Method Automated Method   FrStHsLb   % Neutrophils 69.4 %  FrStHsLb   % Lymphocytes 16.1 %  FrStHsLb   % Monocytes 14.1 %  FrStHsLb   % Eosinophils 0.0 %  FrStHsLb   % Basophils 0.2 %  FrStHsLb   % Immature Granulocytes 0.2 %  FrStHsLb   Nucleated RBCs 0 0 /100  FrStHsLb   Absolute Neutrophil 2.8 1.6 - 8.3 10e9/L  FrStHsLb   Absolute Lymphocytes 0.7 0.8 - 5.3 10e9/L L FrStHsLb   Absolute Monocytes 0.6 0.0 - 1.3 10e9/L  FrStHsLb   Absolute Eosinophils 0.0 0.0 - 0.7 10e9/L  FrStHsLb   Absolute Basophils 0.0 0.0 - 0.2 10e9/L  FrStHsLb   Abs Immature Granulocytes 0.0 0 - 0.4 10e9/L  FrStHsLb   Absolute Nucleated RBC 0.0   FrStHsLb            Testing Performed By     Lab - Abbreviation Name Director Address Valid Date Range    14 - FrStHsLb [facility] Unknown [address] [place] [address] 05/08/15 1057 - Present    45 - KTE235 [facility] Unknown Unknown 01/28/02 0000 - Present    51 - Unknown [facility] Unknown [address] [facility] [address] 12/31/14 1010 - Present    75 - Unknown [facility] Unknown [address] [facility] [address] 01/15/15 1019 - Present    170 - Unknown POINT OF CARE TEST, GLUCOSE Unknown Unknown 10/31/11 1114 - Present    225 - Unknown INFECTI[facility] DISEASE DIAGNOSTIC LABORATORY Unknown [address] [facility] [address] 12/19/14 0954 - Present            Unresulted Labs (24h ago through future)    Start       Ordered    Unscheduled  Potassium  (Potassium Replacement - \"Standard\" - For K levels less than 3.4 mmol/L - UU,UR,UA,RH,SH,PH,WY )  CONDITIONAL (SPECIFY),   Routine     Comments:  Obtain Potassium Level for these conditions:  *IF no potassium result within 24 hours before initiation of order set, draw potassium level with next lab collect.    *2 HOURS AFTER last IV potassium replacement dose " and 4 hours after an oral replacement dose.  *Next morning after potassium dose.     Repeat Potassium Replacement if necessary.    02/17/18 1341         Imaging Results - 3 Days      XR Chest 2 Views [[id number]]  Resulted: 02/17/18 1044, Result status: Final result    Ordering provider: Frank Lui MD  02/17/18 0943 Resulted by: Lauren Bean MD    Performed: 02/17/18 0945 - 02/17/18 0959 Resulting lab: RADIOLOGY RESULTS    Narrative:       CHEST TWO VIEWS  2/17/2018 9:59 AM     HISTORY: 89-year-old with fever and cough.       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 Heart size is normal. No pleural  effusion, pneumothorax. There is a small focus of opacity in the  medial right lower lobe, which may be an early focus of pneumonia.  Recommend continued radiographic surveillance until resolution.    LAUREN BEAN MD      Testing Performed By     Lab - Abbreviation Name Director Address Valid Date Range    104 - Rad Rslts RADIOLOGY RESULTS Unknown Unknown 02/16/05 1553 - Present            Encounter-Level Documents:     There are no encounter-level documents.      Order-Level Documents:     There are no order-level documents.

## 2018-02-17 NOTE — ED NOTES
[facility]  ED Nurse Handoff Report    ED Chief complaint: Fever (and c/o cough and cold s/s for one week)      ED Diagnosis:   Final diagnoses:   None       Code Status: DNR / DNI    Allergies:   Allergies   Allergen Reactions     Contrast Dye Hives and Swelling     Pt. States no history of allergy to topical betadine. Tolerated surgical betadine prep 2-6-2012.     Lisinopril Cough     dizzy     Metoprolol      Depressed         Activity level - Baseline/Home:  Stand with Assist    Activity Level - Current:   Stand with Assist     Needed?: No    Isolation: Yes  Infection: Not Applicable  Influenza    Bariatric?: No    Vital Signs:   Vitals:    02/17/18 0925   BP: (!) 167/102   Pulse: 89   Resp: 20   Temp: 102.7  F (39.3  C)   SpO2: 98%   Weight: 64.4 kg (142 lb)   Height: 1.524 m (5')       Cardiac Rhythm: ,        Pain level: 0-10 Pain Scale: 10    Is this patient confused?: No    Patient Report: Serena comes to the ER today for not feeling well for the last week. Fever at home today of 102 for which she did take tylenol. She has a productive cough and her inlfuenza is positive. She's receiving one liter of NS. Await  colleton. VSS. She c/o of body aches. CXR shows a pneumonia and she will be getting po zithromax and rocephin.    Family Comments: no family here    OBS brochure/video discussed/provided to patient: No    ED Medications:   Medications   0.9% sodium chloride BOLUS (1,000 mLs Intravenous New Bag 2/17/18 6715)       Drips infusing?:  No      ED NURSE PHONE NUMBER: [phone]

## 2018-02-17 NOTE — IP AVS SNAPSHOT
MRN:[id number]                      After Visit Summary   2/17/2018    Serena Marie    MRN: [id number]           Thank you!     Thank you for choosing [facility] for your care. Our goal is always to provide you with excellent care. Hearing back from our patients is one way we can continue to improve our services. Please take a few minutes to complete the written survey that you may receive in the mail after you visit with us. Thank you!        Patient Information     Date Of Birth          8/21/1924        Designated Caregiver       Most Recent Value    Caregiver    Will someone help with your care after discharge? yes    Name of designated caregiver Phan Marie    Phone number of caregiver [phone]    Caregiver address [place]      About your hospital stay     You were admitted on:  February 17, 2018 You last received care in the:  [address] [facility]    You were discharged on:  February 22, 2018        Reason for your hospital stay       Influenza B and Community Acquired Pneumonia                  Who to Call     For medical emergencies, please call 911.  For non-urgent questions about your medical care, please call your primary care provider or clinic, [phone]          Attending Provider     Provider Specialty    Frank Lui MD Emergency Medicine    [facility], Mitch Sood MD Internal Medicine       Primary Care Provider Office Phone # Fax #    Lin Berenice Cruz [phone]      After Care Instructions     Activity - Up with assistive device           Advance Diet as Tolerated       Follow this diet upon discharge:      Consistent Carbohydrate Diet 6334-6955 Calories: Moderate Consistent CHO (4-6 CHO units/meal)            General info for SNF       Length of Stay Estimate: Short Term Care: Estimated # of Days <30  Condition at Discharge: Improving  Level of care:skilled   Rehabilitation Potential: Good  Admission H&P remains valid  and up-to-date: Yes  Recent Chemotherapy: N/A  Use Nursing Home Standing Orders: Yes            Intake and output       Every shift            Mantoux instructions       Give two-step Mantoux (PPD) Per Facility Policy Yes.                  Follow-up Appointments     Follow Up and recommended labs and tests       Follow up with primary care provider in 2-3 weeks.  No follow up labs or test are needed.                  Your next 10 appointments already scheduled     Apr 12, 2018  1:00 PM CDT   Pacemaker Check with STEPHEN DONALD   [facility] ([facility])    [address] [place] [address] [address]  [facility] [address]   [phone]            May 09, 2018 10:00 AM CDT   Office Visit with Cass Zeng RPH   [facility] ([facility])    [address] [place] [address] [phone]           Bring a current list of meds and any records pertaining to this visit. For Physicals, please bring immunization records and any forms needing to be filled out. Please arrive 10 minutes early to complete paperwork.            May 09, 2018 11:15 AM CDT   [facility] with Lin Cruz MD   [facility]    [address] [place] [address] [phone]           Bring a current list of meds and any records pertaining to this visit. For Physicals, please bring immunization records and any forms needing to be filled out. Please arrive 10 minutes early to complete paperwork.              Additional Services     Occupational Therapy Adult Consult       Evaluate and treat as clinically indicated.    Reason:   Influenza B and Community acquired pneumonia.            Physical Therapy Adult Consult       Evaluate and treat as clinically indicated.    Reason:  Influenza B and Community acquired pneumonia.                  Pending Results     Date and Time Order Name Status Description    2/17/2018 0945  "Blood culture Preliminary     2018 0943 Blood culture Preliminary             Statement of Approval     Ordered          18 1209  I have reviewed and agree with all the recommendations and orders detailed in this document.  EFFECTIVE NOW     Approved and electronically signed by:  Mitch Cardona MD             Admission Information     Date & Time Provider Department Dept. Phone    2018 Mitch Cardona MD [place] [address] [facility] [phone]      Your Vitals Were     Blood Pressure Pulse Temperature Respirations Height Weight    141/70 (BP Location: Right arm) 77 98.2  F (36.8  C) (Oral) 16 1.524 m (5') 64.4 kg (141 lb 14.4 oz)    Pulse Oximetry BMI (Body Mass Index)                100% 27.71 kg/m2          SeeMedia Information     SeeMedia lets you send messages to your doctor, view your test results, renew your prescriptions, schedule appointments and more. To sign up, go to [email or web address] . Click on \"Log in\" on the left side of the screen, which will take you to the Welcome page. Then click on \"Sign up Now\" on the right side of the page.     You will be asked to enter the access code listed below, as well as some personal information. Please follow the directions to create your username and password.     Your access code is: [email or web address]  Expires: 2018 11:09 AM     Your access code will  in 90 days. If you need help or a new code, please call your [facility] clinic or [phone].        Care EveryWhere ID     This is your Care EveryWhere ID. This could be used by other organizations to access your [facility] medical records  [id number]        Equal Access to Services     CORRINE EUCEDA : Luis F Mcmullen, [place], laly rodriguez, [employer]. So [facility] [phone].    ATENCIÓN: Si habla español, tiene a murcia disposición servicios gratuitos de asistencia lingüística. Llame al " [phone].    We comply with applicable federal civil rights laws and [place] laws. We do not discriminate on the basis of race, color, national origin, age, disability, sex, sexual orientation, or gender identity.               Review of your medicines      START taking        Dose / Directions    albuterol (2.5 MG/3ML) 0.083% neb solution   Used for:  Influenza B        Dose:  2.5 mg   Take 1 vial (2.5 mg) by nebulization every 2 hours as needed for other (dyspnea)   Quantity:  360 mL   Refills:  0       azithromycin 250 MG tablet   Commonly known as:  ZITHROMAX   Used for:  Pneumonia of right middle lobe due to infectious organism (H)        Dose:  250 mg   Start taking on:  2/23/2018   Take 1 tablet (250 mg) by mouth daily for 2 days   Quantity:  2 tablet   Refills:  0       losartan 50 MG tablet   Commonly known as:  COZAAR   Used for:  Essential hypertension        Dose:  100 mg   Start taking on:  2/23/2018   Take 2 tablets (100 mg) by mouth daily   Quantity:  30 tablet   Refills:  0       oseltamivir 30 MG capsule   Commonly known as:  TAMIFLU   Indication:  Influenza B   Used for:  Influenza B        Dose:  30 mg   Take 1 capsule (30 mg) by mouth 2 times daily for 2 days   Quantity:  5 capsule   Refills:  0       predniSONE 20 MG tablet   Commonly known as:  DELTASONE   Used for:  Influenza B        Dose:  40 mg   Start taking on:  2/23/2018   Take 2 tablets (40 mg) by mouth daily for 2 days   Refills:  0         CONTINUE these medicines which may have CHANGED, or have new prescriptions. If we are uncertain of the size of tablets/capsules you have at home, strength may be listed as something that might have changed.        Dose / Directions    fluocinolone acetonide 0.01 % Oil   This may have changed:    - when to take this  - reasons to take this  - additional instructions   Used for:  DM type 2, goal A1c below 7        Apply to scalp when wet massage well cover with cap leave on over night Wash in  am.   Quantity:  118 mL   Refills:  12         CONTINUE these medicines which have NOT CHANGED        Dose / Directions    ACE/ARB/ARNI NOT PRESCRIBED (INTENTIONAL)   Used for:  CAD (coronary artery disease)        Dose:  1 each   1 each continuous prn ACE & ARB not prescribed due to Risk for drug interaction and Worsening renal function on ACE/ARB therapy   Refills:  0       aspirin 81 MG tablet        Dose:  1 tablet   Take 1 tablet by mouth every evening   Refills:  0       atorvastatin 80 MG tablet   Commonly known as:  LIPITOR   Used for:  PAD (peripheral artery disease) (H)        Dose:  80 mg   Take 1 tablet (80 mg) by mouth daily   Quantity:  90 tablet   Refills:  3       blood glucose monitoring lancets        Use to test blood sugar 3-4 times daily or as directed.   Quantity:  300 Box   Refills:  11       cholecalciferol 1000 UNIT tablet   Commonly known as:  vitamin D3        Dose:  2000 Units   Take 2,000 Units by mouth daily   Refills:  0       DOCQLACE 100 MG capsule   Generic drug:  docusate sodium        Dose:  100 mg   Take 100 mg by mouth At Bedtime   Refills:  0       fish oil-omega-3 fatty acids 1000 MG capsule   Used for:  PAD (peripheral artery disease) (H)        Dose:  1200 mg   Take 1,200 mg by mouth daily   Refills:  0       GLUCERNA 1.5 JEN PO        Dose:  1 Can   Take 1 Can by mouth daily   Refills:  0       insulin detemir 100 UNIT/ML injection   Commonly known as:  LEVEMIR FLEXTOUCH   Used for:  Diabetes mellitus type 2 with peripheral artery disease (H)        INJECT 20 UNITS UNDER THE SKIN AT BEDTIME   Quantity:  15 mL   Refills:  8       LAMISIL EX        Externally apply topically daily For under breasts, in between toes   Refills:  0       levothyroxine 75 MCG tablet   Commonly known as:  SYNTHROID/LEVOTHROID   Used for:  Encounter for medication refill        TAKE ONE TABLET BY MOUTH ONE TIME DAILY   Quantity:  90 tablet   Refills:  1       lidocaine 5 % Patch   Commonly known  as:  LIDODERM   Used for:  Encounter for medication refill        APPLY UP TO 3 PATCHES TO PAINFUL AREA AT ONCE FOR UP TO 12 H WITHIN A 24 H PERIOD.  REMOVE AFTER 12 HOURS.   Quantity:  60 patch   Refills:  11       * TITO 128 5 % ophthalmic solution   Generic drug:  sodium chloride        Dose:  1 drop   Place 1 drop Into the left eye daily as needed for dry eyes   Refills:  0       * TITO 128 5 % ophthalmic ointment   Used for:  Blindness and low vision   Generic drug:  sodium chloride        Dose:  1 Application   Place 1 Application into the right eye 2 times daily as needed for dry eyes   Refills:  0       NovoLOG FLEXPEN 100 UNIT/ML injection   Generic drug:  insulin aspart        Dose:  12 Units   Inject 12 Units Subcutaneous 4 times daily (with meals and nightly)   Refills:  0       omeprazole 20 MG CR capsule   Commonly known as:  priLOSEC   Used for:  Gastroesophageal reflux disease, esophagitis presence not specified        TAKE ONE CAPSULE BY MOUTH AT BEDTIME - NO TUMS WITHIN 2 HOURS OF MED   Quantity:  30 capsule   Refills:  10       ONETOUCH ULTRA test strip   Used for:  Encounter for medication refill   Generic drug:  blood glucose monitoring        TEST BLOOD GLUCOSE FOUR TIMES A DAY   Quantity:  300 each   Refills:  11       PREPARATION H 0.25-14-74.9 % rectal ointment   Generic drug:  phenylephrine-shark liver oil-mineral oil-petrolatum        Place rectally daily   Refills:  0       PRESERVISION AREDS 2 PO        Dose:  1 tablet   Take 1 tablet by mouth every morning   Refills:  0       TH EYE DROP ADVANCED RELIEF 0.05-0.1-1-1 % Soln   Used for:  Blindness and low vision   Generic drug:  Tetrahydroz-Dextran-PEG-Povid        Apply to eye as needed For dry eye relief- 5 x per day   Refills:  0       timolol 0.5 % ophthalmic solution   Commonly known as:  TIMOPTIC        Dose:  1 drop   Place 1 drop Into the left eye 2 times daily   Quantity:  1 Bottle   Refills:  0       traMADol 50 MG tablet    Commonly known as:  ULTRAM   Used for:  Cervicalgia        Dose:  50 mg   Take 1 tablet (50 mg) by mouth every 6 hours as needed for pain No driving a car or drinking alcohol for at least 8 hours after taking this medication.   Quantity:  20 tablet   Refills:  0       TYLENOL ARTHRITIS PAIN 650 MG CR tablet   Generic drug:  acetaminophen        Dose:  1300 mg   Take 1,300 mg by mouth daily States 2 tablets qam & usually 2 tablets qpm   Refills:  0       UNABLE TO FIND        Dose:  1 Bar   Take 1 Bar by mouth 2 times daily MEDICATION NAME: FiberONe Bar   Refills:  0       UNIFINE PENTIPS 31G X 6 MM   Used for:  Encounter for medication refill   Generic drug:  insulin pen needle        USE ONCE DAILY   Quantity:  100 each   Refills:  11       VAGISIL ANTI-ITCH MEDICATED EX        Externally apply topically every morning   Refills:  0       ZANTAC 150 MAXIMUM STRENGTH PO        Dose:  150 mg   Take 150 mg by mouth daily as needed   Refills:  0       * Notice:  This list has 2 medication(s) that are the same as other medications prescribed for you. Read the directions carefully, and ask your doctor or other care provider to review them with you.         Where to get your medicines      Some of these will need a paper prescription and others can be bought over the counter. Ask your nurse if you have questions.     Bring a paper prescription for each of these medications     traMADol 50 MG tablet       You don't need a prescription for these medications     albuterol (2.5 MG/3ML) 0.083% neb solution    azithromycin 250 MG tablet    losartan 50 MG tablet    oseltamivir 30 MG capsule    predniSONE 20 MG tablet                Protect others around you: Learn how to safely use, store and throw away your medicines at www.disposemymeds.org.        ANTIBIOTIC INSTRUCTION     You've Been Prescribed an Antibiotic - Now What?  Your healthcare team thinks that you or your loved one might have an infection. Some infections can be  treated with antibiotics, which are powerful, life-saving drugs. Like all medications, antibiotics have side effects and should only be used when necessary. There are some important things you should know about your antibiotic treatment.      Your healthcare team may run tests before you start taking an antibiotic.    Your team may take samples (e.g., from your blood, urine or other areas) to run tests to look for bacteria. These test can be important to determine if you need an antibiotic at all and, if you do, which antibiotic will work best.      Within a few days, your healthcare team might change or even stop your antibiotic.    Your team may start you on an antibiotic while they are working to find out what is making you sick.    Your team might change your antibiotic because test results show that a different antibiotic would be better to treat your infection.    In some cases, once your team has more information, they learn that you do not need an antibiotic at all. They may find out that you don't have an infection, or that the antibiotic you're taking won't work against your infection. For example, an infection caused by a virus can't be treated with antibiotics. Staying on an antibiotic when you don't need it is more likely to be harmful than helpful.      You may experience side effects from your antibiotic.    Like all medications, antibiotics have side effects. Some of these can be serious.    Let you healthcare team know if you have any known allergies when you are admitted to the hospital.    One significant side effect of nearly all antibiotics is the risk of severe and sometimes deadly diarrhea caused by Clostridium difficile (C. Difficile). This occurs when a person takes antibiotics because some good germs are destroyed. Antibiotic use allows C. diificile to take over, putting patients at high risk for this serious infection.    As a patient or caregiver, it is important to understand your or  your loved one's antibiotic treatment. It is especially important for caregivers to speak up when patients can't speak for themselves. Here are some important questions to ask your healthcare team.    What infection is this antibiotic treating and how do you know I have that infection?    What side effects might occur from this antibiotic?    How long will I need to take this antibiotic?    Is it safe to take this antibiotic with other medications or supplements (e.g., vitamins) that I am taking?     Are there any special directions I need to know about taking this antibiotic? For example, should I take it with food?    How will I be monitored to know whether my infection is responding to the antibiotic?    What tests may help to make sure the right antibiotic is prescribed for me?      Information provided by:  www.cdc.gov/getsmart  U.S. Department of Health and Human Services  Centers for disease Control and Prevention  National Center for Emerging and Zoonotic Infectious Diseases  Division of Healthcare Quality Promotion        Information about OPIOIDS     PRESCRIPTION OPIOIDS: WHAT YOU NEED TO KNOW    Prescription opioids can be used to help relieve moderate to severe pain and are often prescribed following a surgery or injury, or for certain health conditions. These medications can be an important part of treatment but also come with serious risks. It is important to work with your health care provider to make sure you are getting the safest, most effective care.    WHAT ARE THE RISKS AND SIDE EFFECTS OF OPIOID USE?  Prescription opioids carry serious risks of addiction and overdose, especially with prolonged use. An opioid overdose, often marked by slowed breathing can cause sudden death. The use of prescription opioids can have a number of side effects as well, even when taken as directed:      Tolerance - meaning you might need to take more of a medication for the same pain relief    Physical dependence -  meaning you have symptoms of withdrawal when a medication is stopped    Increased sensitivity to pain    Constipation    Nausea, vomiting, and dry mouth    Sleepiness and dizziness    Confusion    Depression    Low levels of testosterone that can result in lower sex drive, energy, and strength    Itching and sweating    RISKS ARE GREATER WITH:    History of drug misuse, substance use disorder, or overdose    Mental health conditions (such as depression or anxiety)    Sleep apnea    Older age (65 years or older)    Pregnancy    Avoid alcohol while taking prescription opioids.   Also, unless specifically advised by your health care provider, medications to avoid include:    Benzodiazepines (such as Xanax or Valium)    Muscle relaxants (such as Soma or Flexeril)    Hypnotics (such as Ambien or Lunesta)    Other prescription opioids    KNOW YOUR OPTIONS:  Talk to your health care provider about ways to manage your pain that do not involve prescription opioids. Some of these options may actually work better and have fewer risks and side effects:    Pain relievers such as acetaminophen, ibuprofen, and naproxen    Some medications that are also used for depression or seizures    Physical therapy and exercise    Cognitive behavioral therapy, a psychological, goal-directed approach, in which patients learn how to modify physical, behavioral, and emotional triggers of pain and stress    IF YOU ARE PRESCRIBED OPIOIDS FOR PAIN:    Never take opioids in greater amounts or more often than prescribed    Follow up with your primary health care provider and work together to create a plan on how to manage your pain.    Talk about ways to help manage your pain that do not involve prescription opioids    Talk about all concerns and side effects    Help prevent misuse and abuse    Never sell or share prescription opioids    Never use another person's prescription opioids    Store prescription opioids in a secure place and out of reach  of others (this may include visitors, children, friends, and family)    Visit www.cdc.gov/drugoverdose to learn about risks of opioid abuse and overdose    If you believe you may be struggling with addiction, tell your health care provider and ask for guidance or call [facility]'s National Helpline at 4-018-604-HELP    LEARN MORE / www.cdc.gov/drugoverdose/prescribing/guideline.html    Safely dispose of unused prescription opioids: Find your local drug take-back programs and more information about the importance of safe disposal at www.doseofreality.[place].gov             Medication List: This is a list of all your medications and when to take them. Check marks below indicate your daily home schedule. Keep this list as a reference.      Medications           Morning Afternoon Evening Bedtime As Needed    ACE/ARB/ARNI NOT PRESCRIBED (INTENTIONAL)   1 each continuous prn ACE & ARB not prescribed due to Risk for drug interaction and Worsening renal function on ACE/ARB therapy                                albuterol (2.5 MG/3ML) 0.083% neb solution   Take 1 vial (2.5 mg) by nebulization every 2 hours as needed for other (dyspnea)   Last time this was given:  2.5 mg on 2/22/2018 12:57 PM                                aspirin 81 MG tablet   Take 1 tablet by mouth every evening                                atorvastatin 80 MG tablet   Commonly known as:  LIPITOR   Take 1 tablet (80 mg) by mouth daily   Last time this was given:  80 mg on 2/21/2018  8:25 PM                                azithromycin 250 MG tablet   Commonly known as:  ZITHROMAX   Take 1 tablet (250 mg) by mouth daily for 2 days   Start taking on:  2/23/2018   Last time this was given:  250 mg on 2/22/2018  2:11 PM                                blood glucose monitoring lancets   Use to test blood sugar 3-4 times daily or as directed.                                cholecalciferol 1000 UNIT tablet   Commonly known as:  vitamin D3   Take 2,000 Units by mouth  daily                                DOCQLACE 100 MG capsule   Take 100 mg by mouth At Bedtime   Last time this was given:  100 mg on 2/18/2018  8:45 PM   Generic drug:  docusate sodium                                fish oil-omega-3 fatty acids 1000 MG capsule   Take 1,200 mg by mouth daily                                fluocinolone acetonide 0.01 % Oil   Apply to scalp when wet massage well cover with cap leave on over night Wash in am.                                GLUCERNA 1.5 JEN PO   Take 1 Can by mouth daily                                insulin detemir 100 UNIT/ML injection   Commonly known as:  LEVEMIR FLEXTOUCH   INJECT 20 UNITS UNDER THE SKIN AT BEDTIME   Last time this was given:  20 Units on 2/21/2018  8:27 PM                                LAMISIL EX   Externally apply topically daily For under breasts, in between toes                                levothyroxine 75 MCG tablet   Commonly known as:  SYNTHROID/LEVOTHROID   TAKE ONE TABLET BY MOUTH ONE TIME DAILY   Last time this was given:  75 mcg on 2/22/2018  9:49 AM                                lidocaine 5 % Patch   Commonly known as:  LIDODERM   APPLY UP TO 3 PATCHES TO PAINFUL AREA AT ONCE FOR UP TO 12 H WITHIN A 24 H PERIOD.  REMOVE AFTER 12 HOURS.                                losartan 50 MG tablet   Commonly known as:  COZAAR   Take 2 tablets (100 mg) by mouth daily   Start taking on:  2/23/2018   Last time this was given:  50 mg on 2/22/2018  9:49 AM                                * TITO 128 5 % ophthalmic solution   Place 1 drop Into the left eye daily as needed for dry eyes   Generic drug:  sodium chloride                                * TITO 128 5 % ophthalmic ointment   Place 1 Application into the right eye 2 times daily as needed for dry eyes   Last time this was given:  1 g on 2/21/2018  8:35 PM   Generic drug:  sodium chloride                                NovoLOG FLEXPEN 100 UNIT/ML injection   Inject 12 Units Subcutaneous 4  times daily (with meals and nightly)   Last time this was given:  3 Units on 2/22/2018  2:03 PM   Generic drug:  insulin aspart                                omeprazole 20 MG CR capsule   Commonly known as:  priLOSEC   TAKE ONE CAPSULE BY MOUTH AT BEDTIME - NO TUMS WITHIN 2 HOURS OF MED   Last time this was given:  20 mg on 2/22/2018  9:48 AM                                ONETOUCH ULTRA test strip   TEST BLOOD GLUCOSE FOUR TIMES A DAY   Generic drug:  blood glucose monitoring                                oseltamivir 30 MG capsule   Commonly known as:  TAMIFLU   Take 1 capsule (30 mg) by mouth 2 times daily for 2 days   Last time this was given:  30 mg on 2/22/2018 10:20 AM                                predniSONE 20 MG tablet   Commonly known as:  DELTASONE   Take 2 tablets (40 mg) by mouth daily for 2 days   Start taking on:  2/23/2018   Last time this was given:  40 mg on 2/22/2018  9:48 AM                                PREPARATION H 0.25-14-74.9 % rectal ointment   Place rectally daily   Last time this was given:  2/22/2018  2:12 PM   Generic drug:  phenylephrine-shark liver oil-mineral oil-petrolatum                                PRESERVISION AREDS 2 PO   Take 1 tablet by mouth every morning                                TH EYE DROP ADVANCED RELIEF 0.05-0.1-1-1 % Soln   Apply to eye as needed For dry eye relief- 5 x per day   Generic drug:  Tetrahydroz-Dextran-PEG-Povid                                timolol 0.5 % ophthalmic solution   Commonly known as:  TIMOPTIC   Place 1 drop Into the left eye 2 times daily   Last time this was given:  1 drop on 2/22/2018  9:53 AM                                traMADol 50 MG tablet   Commonly known as:  ULTRAM   Take 1 tablet (50 mg) by mouth every 6 hours as needed for pain No driving a car or drinking alcohol for at least 8 hours after taking this medication.   Last time this was given:  50 mg on 2/18/2018  9:33 AM                                TYLENOL ARTHRITIS  PAIN 650 MG CR tablet   Take 1,300 mg by mouth daily States 2 tablets qam & usually 2 tablets qpm   Generic drug:  acetaminophen                                UNABLE TO FIND   Take 1 Bar by mouth 2 times daily MEDICATION NAME: FiberONe Bar   Last time this was given:  2/22/2018 10:10 AM                                UNIFINE PENTIPS 31G X 6 MM   USE ONCE DAILY   Generic drug:  insulin pen needle                                VAGISIL ANTI-ITCH MEDICATED EX   Externally apply topically every morning                                ZANTAC 150 MAXIMUM STRENGTH PO   Take 150 mg by mouth daily as needed                                * Notice:  This list has 2 medication(s) that are the same as other medications prescribed for you. Read the directions carefully, and ask your doctor or other care provider to review them with you.              More Information        The Flu (Influenza)     The virus that causes the flu spreads through the air in droplets when someone who has the flu coughs, sneezes, laughs, or talks.   The flu (influenza) is an infection that affects your respiratory tract. This tract is made up of your mouth, nose, and lungs, and the passages between them. Unlike a cold, the flu can make you very ill. And it can lead to pneumonia, a serious lung infection. The flu can have serious complications and even cause death.  Who is at risk for the flu?  Anyone can get the flu. But you are more likely to become infected if you:    Have a weakened immune system    Work in a healthcare setting where you may be exposed to flu germs    Live or work with someone who has the flu    Haven t had an annual flu shot  How does the flu spread?  The flu is caused by a virus. The virus spreads through the air in droplets when someone who has the flu coughs, sneezes, laughs, or talks. You can become infected when you inhale these viruses directly. You can also become infected when you touch a surface on which the droplets have  landed and then transfer the germs to your eyes, nose, or mouth. Touching used tissues, or sharing utensils, drinking glasses, or a toothbrush from an infected person can expose you to flu viruses, too.  What are the symptoms of the flu?  Flu symptoms tend to come on quickly and may last a few days to a few weeks. They include:    Fever usually higher than 100.4 F  (38 C) and chills    Sore throat and headache    Dry cough    Runny nose    Tiredness and weakness    Muscle aches  Who is at risk for flu complications?  For some people, the flu can be very serious. The risk for complications is greater for:    Children younger than age 5    Adults ages 65 and older    People with a chronic illness such as diabetes or heart, kidney, or lung disease    People who live in a nursing home or long-term care facility   How is the flu treated?  The flu usually gets better after 7 days or so. In some cases, your healthcare provider may prescribe an antiviral medicine. This may help you get well a little sooner. For the medicine to help, you need to take it as soon as possible (ideally within 48 hours) after your symptoms start. If you develop pneumonia or other serious illness, you may need to stay in the hospital.  Easing flu symptoms    Drink lots of fluids such as water, juice, and warm soup. A good rule is to drink enough so that you urinate your normal amount.    Get plenty of rest.    Ask your healthcare provider what to take for fever and pain.    Call your provider if your fever is 100.4 F (38 C) or higher, or you become dizzy, lightheaded, or short of breath.  Taking steps to protect others    Wash your hands often, especially after coughing or sneezing. Or clean your hands with an alcohol-based hand  containing at least 60% alcohol.    Cough or sneeze into a tissue. Then throw the tissue away and wash your hands. If you don t have a tissue, cough and sneeze into your elbow.    Stay home until at least 24 hours  after you no longer have a fever or chills. Be sure the fever isn t being hidden by fever-reducing medicine.    Don t share food, utensils, drinking glasses, or a toothbrush with others.    Ask your healthcare provider if others in your household should get antiviral medicine to help them avoid infection.  How can the flu be prevented?    One of the best ways to avoid the flu is to get a flu vaccine each year. The virus that causes the flu changes from year to year. For that reason, healthcare providers recommend getting the flu vaccine each year, as soon as it's available in your area. The vaccine is given as a shot. Your healthcare provider can tell you which vaccine is right for you. A nasal spray is also available but is not recommended for the 8901-2341 flu season. The CDC says this is because the nasal spray did not seem to protect against the flu over the last several flu seasons. In the past, it was meant for people ages 2 to 49.    Wash your hands often. Frequent handwashing is a proven way to help prevent infection.    Carry an alcohol-based hand gel containing at least 60% alcohol. Use it when you can't use soap and water. Then wash your hands as soon as you can.    Avoid touching your eyes, nose, and mouth.    At home and work, clean phones, computer keyboards, and toys often with disinfectant wipes.    If possible, avoid close contact with others who have the flu or symptoms of the flu.  Handwashing tips  Handwashing is one of the best ways to prevent many common infections. If you are caring for or visiting someone with the flu, wash your hands each time you enter and leave the room. Follow these steps:    Use warm water and plenty of soap. Rub your hands together well.    Clean the whole hand, including under your nails, between your fingers, and up the wrists.    Wash for at least 15 seconds.    Rinse, letting the water run down your fingers, not up your wrists.    Dry your hands well. Use a paper  towel to turn off the faucet and open the door.  Using alcohol-based hand   Alcohol-based hand  are also a good choice. Use them when you can't use soap and water. Follow these steps:    Squeeze about a tablespoon of gel into the palm of one hand.    Rub your hands together briskly, cleaning the backs of your hands, the palms, between your fingers, and up the wrists.    Rub until the gel is gone and your hands are completely dry.  Preventing the flu in healthcare settings  The flu is a special concern for people in hospitals and long-term care facilities. To help prevent the spread of flu, many hospitals and nursing homes take these steps:    Healthcare providers wash their hands or use an alcohol-based hand  before and after treating each patient.    People with the flu have private rooms and bathrooms or share a room with someone with the same infection.    People who are at high risk for the flu but don't have it are encouraged to get the flu and pneumonia vaccines.    All healthcare workers are encouraged or required to get flu shots.   Date Last Reviewed: 12/1/2016 2000-2017 [employer]. [address] [place] [address]. All rights reserved. This information is not intended as a substitute for professional medical care. Always follow your healthcare professional's instructions.                Pneumonia (Adult)  Pneumonia is an infection deep within the lungs. It is in the small air sacs (alveoli). Pneumonia may be caused by a virus or bacteria. Pneumonia caused by bacteria is usually treated with an antibiotic. Severe cases may need to be treated in the hospital. Milder cases can be treated at home. Symptoms usually start to get better during the first 2 days of treatment.    Home care  Follow these guidelines when caring for yourself at home:    Rest at home for the first 2 to 3 days, or until you feel stronger. Don t let yourself get overly tired when you go  back to your activities.    Stay away from cigarette smoke - yours or other people s.    You may use acetaminophen or ibuprofen to control fever or pain, unless another medicine was prescribed. If you have chronic liver or kidney disease, talk with your healthcare provider before using these medicines. Also talk with your provider if you ve had a stomach ulcer or gastrointestinal bleeding. Don t give aspirin to anyone younger than 18 years of age who is ill with a fever. It may cause severe liver damage.    Your appetite may be poor, so a light diet is fine.    Drink 6 to 8 glasses of fluids every day to make sure you are getting enough fluids. Beverages can include water, sport drinks, sodas without caffeine, juices, tea, or soup. Fluids will help loosen secretions in the lung. This will make it easier for you to cough up the phlegm (sputum). If you also have heart or kidney disease, check with your healthcare provider before you drink extra fluids.    Take antibiotic medicine prescribed until it is all gone, even if you are feeling better after a few days.  Follow-up care  Follow up with your healthcare provider in the next 2 to 3 days, or as advised. This is to be sure the medicine is helping you get better.  If you are 65 or older, you should get a pneumococcal vaccine and a yearly flu (influenza) shot. You should also get these vaccines if you have chronic lung disease like asthma, emphysema, or COPD. Recently, a second type of pneumonia vaccine has become available for everyone over 65 years old. This is in addition to the previous vaccine. Ask your provider about this.  When to seek medical advice  Call your healthcare provider right away if any of these occur:    You don t get better within the first 48 hours of treatment    Shortness of breath gets worse    Rapid breathing (more than 25 breaths per minute)    Coughing up blood    Chest pain gets worse with breathing    Fever of 100.4 F (38 C) or higher that  doesn t get better with fever medicine    Weakness, dizziness, or fainting that gets worse    Thirst or dry mouth that gets worse    Sinus pain, headache, or a stiff neck    Chest pain not caused by coughing  Date Last Reviewed: 1/1/2017 2000-2017 [employer]. [address] [place] [address]. All rights reserved. This information is not intended as a substitute for professional medical care. Always follow your healthcare professional's instructions.

## 2018-02-17 NOTE — PHARMACY-ADMISSION MEDICATION HISTORY
Admission medication history interview status for the 2/17/2018  admission is complete. See EPIC admission navigator for prior to admission medications     Medication history source reliability:Good    Actions taken by pharmacist (provider contacted, etc):None     Additional medication history information not noted on PTA med list :None    Medication reconciliation/reorder completed by provider prior to medication history? No    Time spent in this activity: 25 min    Prior to Admission medications    Medication Sig Last Dose Taking? Auth Provider   insulin aspart (NOVOLOG FLEXPEN) 100 UNIT/ML injection Inject 12 Units Subcutaneous 4 times daily (with meals and nightly)  Yes Unknown, Entered By History   omeprazole (PRILOSEC) 20 MG CR capsule TAKE ONE CAPSULE BY MOUTH AT BEDTIME - NO TUMS WITHIN 2 HOURS OF MED 2/16/2018 at Unknown time Yes Lin Cruz MD   traMADol (ULTRAM) 50 MG tablet Take 1 tablet (50 mg) by mouth every 6 hours as needed for pain No driving a car or drinking alcohol for at least 8 hours after taking this medication.  Yes Jeri Reed MD   levothyroxine (SYNTHROID/LEVOTHROID) 75 MCG tablet TAKE ONE TABLET BY MOUTH ONE TIME DAILY  2/16/2018 at Unknown time Yes Victorino Schaefer MD   lidocaine (LIDODERM) 5 % Patch APPLY UP TO 3 PATCHES TO PAINFUL AREA AT ONCE FOR UP TO 12 H WITHIN A 24 H PERIOD.  REMOVE AFTER 12 HOURS. 2/17/2018 at am Yes Victorino Schaefer MD   insulin detemir (LEVEMIR FLEXTOUCH) 100 UNIT/ML injection INJECT 20 UNITS UNDER THE SKIN AT BEDTIME 2/16/2018 at Unknown time Yes Victorino Schaefer MD   Tetrahydroz-Dextran-PEG-Povid (TH EYE DROP ADVANCED RELIEF) 0.05-0.1-1-1 % SOLN Apply to eye as needed For dry eye relief- 5 x per day  Yes Reported, Patient   sodium chloride (TITO 128) 5 % ophthalmic ointment Place 1 Application into the right eye 2 times daily as needed for dry eyes  Yes Reported, Patient   acetaminophen (TYLENOL ARTHRITIS PAIN) 650 MG CR tablet Take 1,300 mg by  mouth daily States 2 tablets qam & usually 2 tablets qpm 2/16/2018 at Unknown time Yes Reported, Patient   RaNITidine HCl (ZANTAC 150 MAXIMUM STRENGTH PO) Take 150 mg by mouth daily as needed  Yes Reported, Patient   cholecalciferol (VITAMIN D) 1000 UNIT tablet Take 2,000 Units by mouth daily 2/16/2018 at Unknown time Yes Reported, Patient   atorvastatin (LIPITOR) 80 MG tablet Take 1 tablet (80 mg) by mouth daily 2/16/2018 at Unknown time Yes Sis Auguste, DO   Multiple Vitamins-Minerals (PRESERVISION AREDS 2 PO) Take 1 tablet by mouth every morning 2/16/2018 at Unknown time Yes Reported, Patient   docusate sodium (DOCQLACE) 100 MG capsule Take 100 mg by mouth At Bedtime 2/16/2018 at Unknown time Yes Reported, Patient   Terbinafine (LAMISIL EX) Externally apply topically daily For under breasts, in between toes 2/16/2018 at Unknown time Yes Reported, Patient   Pramoxine HCl (VAGISIL ANTI-ITCH MEDICATED EX) Externally apply topically every morning 2/16/2018 at Unknown time Yes Reported, Patient   phenylephrine-shark liver oil-mineral oil-petrolatum (PREPARATION H) 0.25-14-74.9 % rectal ointment Place rectally daily 2/16/2018 at Unknown time Yes Reported, Patient   fish oil-omega-3 fatty acids 1000 MG capsule Take 1,200 mg by mouth daily  2/16/2018 at Unknown time Yes Reported, Patient   timolol (TIMOPTIC) 0.5 % ophthalmic solution Place 1 drop Into the left eye 2 times daily  2/17/2018 at x1 Yes Victorino Schaefer MD   sodium chloride (TITO 128) 5 % ophthalmic solution Place 1 drop Into the left eye daily as needed for dry eyes   Yes Reported, Patient   fluocinolone acetonide 0.01 % OIL Apply to scalp when wet massage well cover with cap leave on over night  Wash in am.  Patient taking differently: as needed Apply to scalp when wet massage well cover with cap leave on over night  Wash in am.  Yes Victorino Schaefer MD   aspirin 81 MG tablet Take 1 tablet by mouth every evening  2/16/2018 at Unknown time Yes  Reported, Patient   UNIFINE PENTIPS 31G X 6 MM USE ONCE DAILY   Lin Cruz MD   blood glucose monitoring (ONE TOUCH ULTRASOFT) lancets Use to test blood sugar 3-4 times daily or as directed.   Victorino Schaefer MD   ONE TOUCH ULTRA test strip TEST BLOOD GLUCOSE FOUR TIMES A DAY   Victorino Schaefer MD   Nutritional Supplements (GLUCERNA 1.5 JEN PO) Take 1 Can by mouth daily    Reported, Patient   UNABLE TO FIND Take 1 Bar by mouth 2 times daily MEDICATION NAME: FiberONe Bar    Reported, Patient   ACE/ARB NOT PRESCRIBED, INTENTIONAL, 1 each continuous prn ACE & ARB not prescribed due to Risk for drug interaction and Worsening renal function on ACE/ARB therapy   Victorino Schaefer MD

## 2018-02-17 NOTE — PROGRESS NOTES
RECEIVING UNIT ED HANDOFF REVIEW    ED Nurse Handoff Report was reviewed by: Maylin Leyva on February 17, 2018 at 11:35 AM

## 2018-02-17 NOTE — IP AVS SNAPSHOT
` [place] [address] [facility]: [phone]                                              INTERAGENCY TRANSFER FORM - NURSING   2018                    Hospital Admission Date: 2018  CHRISTINA GANDHI   : 1924  Sex: Female        Attending Provider: Mitch Cardona MD     Allergies:  Contrast Dye, Lisinopril, Metoprolol    Infection:  None   Service:  INTERNAL MED    Ht:  1.524 m (5')   Wt:  64.4 kg (141 lb 14.4 oz)   Admission Wt:  64.4 kg (142 lb)    BMI:  27.71 kg/m 2   BSA:  1.65 m 2            Patient PCP Information     Provider PCP Type    Lin Cruz MD General      Current Code Status     Date Active Code Status Order ID Comments User Context       2018  1:41 PM Full Code [id number]  Mitch Cardona MD Inpatient       Code Status History     Date Active Date Inactive Code Status Order ID Comments User Context    2016 11:18 AM 2018  1:41 PM DNR/DNI [id number]  Biju Montaño MD Outpatient    2016  1:49 AM 2016 11:18 AM DNR/DNI [id number]  Keith Bradley MD Inpatient    4/3/2015 12:28 PM 2016  1:49 AM DNR/DNI [id number]  Bird Huang MD Outpatient    2015  2:01 AM 4/3/2015 12:28 PM DNR/DNI [id number]  Luis Felipe Cruz MD Inpatient    3/26/2015  8:15 AM 2015  2:01 AM DNR/DNI [id number]  Christine Wahl PA-C Outpatient    3/24/2015  7:56 PM 3/26/2015  8:15 AM DNR/DNI [id number]  Christine Wahl PA-C Inpatient    2012  1:44 PM 3/24/2015  7:56 PM DNR/DNI [id number]  Lin Brunner MD Outpatient    2012 11:56 PM 2012  1:44 PM DNR/DNI [id number]  Bernice Najera RN Inpatient    2012  2:05 PM 2012 11:56 PM Full Code [id number]  Helen Ghotra MD Outpatient    2012 10:32 PM 2012  2:05 PM DNI [id number]  Laurent Chavez MD Inpatient    10/21/2012 12:59 PM 10/22/2012  4:40 PM DNR/DNI [id number]  Mitch Cardona,  MD Inpatient    11/30/2011  1:05 PM 10/21/2012 12:59 PM DNR [id number]  Victorino Schaefer Outpatient      Advance Directives        Scanned docmt in ACP Activity?           Yes, scanned ACP docmt        Hospital Problems as of 2/22/2018              Priority Class Noted POA    Influenza due to influenza virus, type B Medium  2/17/2018 Yes    Influenza and pneumonia Medium  2/17/2018 Yes      Non-Hospital Problems as of 2/22/2018              Priority Class Noted    Syncope Medium  8/1/2009    Hyperlipidemia with target LDL less than 100 Medium  Unknown    Diabetes mellitus type 2 with peripheral artery disease (H) Medium  Unknown    PAD (peripheral artery disease) (H) Medium  Unknown    Lumbar spinal stenosis Medium  12/29/2011    Post-op bleeding Medium  11/22/2012    H/O enucleation of right eyeball Medium  12/7/2012    Health Care Home Medium  11/21/2013    Pacemaker Medium  Unknown    HCAP (healthcare-associated pneumonia) Medium  4/1/2015    Cephalalgia Medium  5/14/2015    Bilateral low back pain without sciatica Medium  5/14/2015    Cervicalgia Medium  6/11/2015    Community acquired pneumonia of right lower lobe of lung (H) Medium  9/14/2016    ACP (advance care planning) Medium  11/18/2016    Coronary artery disease involving native coronary artery of native heart without angina pectoris Medium  3/7/2017    Blindness and low vision Medium  5/9/2017      Immunizations     Name Date      Pneumococcal 23 valent 06/11/09          END      ASSESSMENT     Discharge Profile Flowsheet     EXPECTED DISCHARGE     Other Resources  -- 02/15/18 1528    Expected Discharge Date  02/22/18 (TCU) 02/21/18 1038   PAS Number  -- 02/15/18 1528    DISCHARGE NEEDS ASSESSMENT     [facility] Line Referral Placed  -- 02/15/18 1528    Concerns To Be Addressed  basic needs concerns;discharge planning concerns 02/20/18 0909   F/U Appointment Brochure Provided  -- (N/A) 02/15/18 1437    Equipment Currently Used at Home  walker, rolling  "02/20/18 0909   Referrals Placed  Other *** ([facility]) 02/15/18 1528    Transportation Available  family or friend will provide 02/18/18 0953   SKIN      Equipment Used at Home  walker, rolling 01/28/16 1243   Inspection of bony prominences  Full 02/21/18 1646    GASTROINTESTINAL (ADULT,PEDIATRIC,OB)     Inspection under devices  Full 02/20/18 2143    GI WDL  WDL 02/21/18 2115   Skin WDL  ex 02/22/18 0203    Last Bowel Movement  02/21/18 02/21/18 2115   Skin Integrity  bruise(s) 02/22/18 0203    GI Signs/Symptoms  diarrhea 02/19/18 1844   Skin Color/Characteristics  pale 02/21/18 1646    Passing flatus  yes 02/21/18 1236   Skin Moisture  flaky;dry 02/22/18 0503    COMMUNICATION ASSESSMENT     SAFETY      Patient's communication style  spoken language (English or Bilingual) 02/17/18 0924   Safety WDL  WDL 02/22/18 0203    FINAL RESOURCES     All Alarms  alarm(s) activated and audible 02/22/18 0203    Resources List  -- 02/15/18 1520                      Assessment WDL (Within Defined Limits) Definitions           Safety WDL     Effective: 09/28/15    Row Information: <b>WDL Definition:</b> Bed in low position, wheels locked; call light in reach; upper side rails up x 2; ID band on<br> <font color=\"gray\"><i>Item=AS safety wdl>>List=AS safety wdl>>Version=F14</i></font>      Skin WDL     Effective: 09/28/15    Row Information: <b>WDL Definition:</b> Warm; dry; intact; elastic; without discoloration; pressure points without redness<br> <font color=\"gray\"><i>Item=AS skin wdl>>List=AS skin wdl>>Version=F14</i></font>      Vitals     Vital Signs Flowsheet     VITAL SIGNS     Pain Intervention(s)  Medication (See eMAR) 02/21/18 0508    Temp  98.2  F (36.8  C) 02/22/18 1219   Response to Interventions  Decrease in pain 02/21/18 0533    Temp src  Oral 02/22/18 1219   ANALGESIA SIDE EFFECTS MONITORING      Resp  16 02/22/18 1219   Side Effects Monitoring: Respiratory Quality  R 02/21/18 1231    Pulse  77 02/22/18 1219   " Side Effects Monitoring: Respiratory Depth  N 02/21/18 1231    Pulse/Heart Rate Source  Monitor 02/22/18 1219   Side Effects Monitoring: Sedation Level  1 02/21/18 1231    BP  141/70 02/22/18 1219   HEIGHT AND WEIGHT      BP Location  Right arm 02/22/18 1219   Height  1.524 m (5') 02/17/18 0926    OXYGEN THERAPY     Weight  64.4 kg (141 lb 14.4 oz) 02/20/18 0549    SpO2  100 % 02/22/18 1258   Weight Method  Bed scale 02/20/18 0549    O2 Device  None (Room air) 02/22/18 1258   BSA (Calculated - sq m)  1.65 02/17/18 0926    PACEMAKER     BMI (Calculated)  27.79 02/17/18 0926    Pacemaker  Permanent 02/22/18 0203   POSITIONING      Site Assessment  WDL 02/21/18 1236   Body Position  independently positioning 02/21/18 2115    PAIN/COMFORT     Positioning/Transfer Devices  pillows 02/21/18 1646    Patient Currently in Pain  denies 02/22/18 0500   Head of Bed (HOB)  HOB at 20-30 degrees 02/21/18 2115    Preferred Pain Scale  number (Numeric Rating Pain Scale) 02/21/18 0243   DAILY CARE      0-10 Pain Scale  5 02/21/18 0243   Activity Management  activity adjusted per tolerance 02/21/18 2115    Pain Location  Head 02/21/18 0243   Activity Assistance Provided  assistance, 1 person 02/21/18 2115    Pain Orientation  Anterior 02/19/18 1132   Assistive Device Utilized  gait belt;mechanical lift 02/21/18 2115    Pain Descriptors  Aching 02/21/18 0243                 Patient Lines/Drains/Airways Status    Active LINES/DRAINS/AIRWAYS     Name: Placement date: Placement time: Site: Days: Last dressing change:    Peripheral IV 02/20/18 Right;Anterior Upper forearm 02/20/18   1458   Upper forearm   1             Patient Lines/Drains/Airways Status    Active PICC/CVC     None            Intake/Output Detail Report     Date Intake     Output   Net    Shift P.O. I.V. IV Piggyback Total Urine Other Total       Noc 02/20/18 2300 - 02/21/18 0659 -- -- -- -- 200 -- 200 -200    Day 02/21/18 0700 - 02/21/18 1459 -- -- -- -- -- -- -- 0    Ann-Marie  02/21/18 1500 - 02/21/18 2259 250 -- -- 250 200 -- 200 50    Noc 02/21/18 2300 - 02/22/18 0659 150 -- -- 150 -- -- -- 150    Day 02/22/18 0700 - 02/22/18 1459 -- -- -- -- -- -- -- 0      Last Void/BM       Most Recent Value    Urine Occurrence 1 at 02/22/2018 0600    Stool Occurrence 1 at 02/22/2018 0600      Case Management/Discharge Planning     Case Management/Discharge Planning Flowsheet     REFERRAL INFORMATION     Equipment Currently Used at Home  walker, rolling 02/20/18 0909    Arrived From  home or self-care 09/14/16 1458   Resources List  -- 02/15/18 1520    LIVING ENVIRONMENT     Other Resources  -- 02/15/18 1528    Lives With  alone 02/18/18 0953   PAS Number  -- 02/15/18 1528    Living Arrangements  condominium 02/18/18 0953   [facility] Line Referral Placed  -- 02/15/18 1528    COPING/STRESS     F/U Appointment Brochure Provided  -- (N/A) 02/15/18 1437    Major Change/Loss/Stressor  none 02/19/18 1006   Referrals Placed  Other *** ([facility]) 02/15/18 1528    EXPECTED DISCHARGE     ABUSE RISK SCREEN      Expected Discharge Date  02/22/18 (TCU) 02/21/18 1038   QUESTION TO PATIENT:  Has a member of your family or a partner(now or in the past) intimidated, hurt, manipulated, or controlled you in any way?  no 02/17/18 0927    ASSESSMENT/CONCERNS TO BE ADDRESSED     QUESTION TO PATIENT: Do you feel safe going back to the place where you are living?  yes 02/17/18 0927    Concerns To Be Addressed  basic needs concerns;discharge planning concerns 02/20/18 0909   OBSERVATION: Is there reason to believe there has been maltreatment of a vulnerable adult (ie. Physical/Sexual/Emotional abuse, self neglect, lack of adequate food, shelter, medical care, or financial exploitation)?  no 02/17/18 0927    DISCHARGE PLANNING     OTHER      Transportation Available  family or friend will provide 02/18/18 0953   Are you depressed or being treated for depression?  No 02/19/18 1011    Equipment Used at Home  walker,  rolling 01/28/16 1243   HOMICIDE RISK      FINAL RESOURCES     Feels Like Hurting Others  no 02/17/18 0954

## 2018-02-17 NOTE — IP AVS SNAPSHOT
[place] [address] [facility]: [phone]                                              INTERAGENCY TRANSFER FORM - PHYSICIAN ORDERS   2018                    Hospital Admission Date: 2018  CHRISTINA GANDHI   : 1924  Sex: Female        Attending Provider: Mitch Cardona MD     Allergies:  Contrast Dye, Lisinopril, Metoprolol    Infection:  None   Service:  INTERNAL MED    Ht:  1.524 m (5')   Wt:  64.4 kg (141 lb 14.4 oz)   Admission Wt:  64.4 kg (142 lb)    BMI:  27.71 kg/m 2   BSA:  1.65 m 2            Patient PCP Information     Provider PCP Type    Lin Cruz MD General      ED Clinical Impression     Diagnosis Description Comment Added By Time Added    Pneumonia of right middle lobe due to infectious organism (H) [J18.1] Pneumonia of right middle lobe due to infectious organism (H) [J18.1]  Frank Lui MD 2018 10:30 AM    Influenza B [J10.1] Influenza B [J10.1]  Frank Lui MD 2018 10:31 AM    Severe sepsis (H) [A41.9, R65.20] Severe sepsis (H) [A41.9, R65.20]  Frank Lui MD 2018 11:24 AM      Hospital Problems as of 2018              Priority Class Noted POA    Influenza due to influenza virus, type B Medium  2018 Yes    Influenza and pneumonia Medium  2018 Yes      Non-Hospital Problems as of 2018              Priority Class Noted    Syncope Medium  2009    Hyperlipidemia with target LDL less than 100 Medium  Unknown    Diabetes mellitus type 2 with peripheral artery disease (H) Medium  Unknown    PAD (peripheral artery disease) (H) Medium  Unknown    Lumbar spinal stenosis Medium  2011    Post-op bleeding Medium  2012    H/O enucleation of right eyeball Medium  2012    Health Care Home Medium  2013    Pacemaker Medium  Unknown    HCAP (healthcare-associated pneumonia) Medium  2015    Cephalalgia Medium  2015    Bilateral low back pain without sciatica Medium  2015     Cervicalgia Medium  6/11/2015    Community acquired pneumonia of right lower lobe of lung (H) Medium  9/14/2016    ACP (advance care planning) Medium  11/18/2016    Coronary artery disease involving native coronary artery of native heart without angina pectoris Medium  3/7/2017    Blindness and low vision Medium  5/9/2017      Code Status History     Date Active Date Inactive Code Status Order ID Comments User Context    9/17/2016 11:18 AM 2/17/2018  1:41 PM DNR/DNI [id number]  Biju Montaño MD Outpatient    9/14/2016  1:49 AM 9/17/2016 11:18 AM DNR/DNI [id number]  Keith Bradley MD Inpatient    4/3/2015 12:28 PM 9/14/2016  1:49 AM DNR/DNI [id number]  Bird Huang MD Outpatient    4/1/2015  2:01 AM 4/3/2015 12:28 PM DNR/DNI [id number]  Luis Felipe Cruz MD Inpatient    3/26/2015  8:15 AM 4/1/2015  2:01 AM DNR/DNI [id number]  Christine Wahl PA-C Outpatient    3/24/2015  7:56 PM 3/26/2015  8:15 AM DNR/DNI [id number]  Christine Wahl PA-C Inpatient    11/25/2012  1:44 PM 3/24/2015  7:56 PM DNR/DNI [id number]  Lin Brunner MD Outpatient    11/22/2012 11:56 PM 11/25/2012  1:44 PM DNR/DNI [id number]  Bernice Najera RN Inpatient    11/18/2012  2:05 PM 11/22/2012 11:56 PM Full Code [id number]  Helen Ghotra MD Outpatient    11/16/2012 10:32 PM 11/18/2012  2:05 PM DNI [id number]  Laurent Chavez MD Inpatient    10/21/2012 12:59 PM 10/22/2012  4:40 PM DNR/DNI [id number]  Mitch Cardona MD Inpatient    11/30/2011  1:05 PM 10/21/2012 12:59 PM DNR [id number]  Victorino Schaefer Outpatient         Medication Review      START taking        Dose / Directions Comments    albuterol (2.5 MG/3ML) 0.083% neb solution   Used for:  Influenza B        Dose:  2.5 mg   Take 1 vial (2.5 mg) by nebulization every 2 hours as needed for other (dyspnea)   Quantity:  360 mL   Refills:  0        azithromycin 250 MG tablet   Commonly known as:  ZITHROMAX   Used for:  Pneumonia of  right middle lobe due to infectious organism (H)        Dose:  250 mg   Start taking on:  2/23/2018   Take 1 tablet (250 mg) by mouth daily for 2 days   Quantity:  2 tablet   Refills:  0        losartan 50 MG tablet   Commonly known as:  COZAAR   Used for:  Essential hypertension        Dose:  100 mg   Start taking on:  2/23/2018   Take 2 tablets (100 mg) by mouth daily   Quantity:  30 tablet   Refills:  0        oseltamivir 30 MG capsule   Commonly known as:  TAMIFLU   Indication:  Influenza B   Used for:  Influenza B        Dose:  30 mg   Take 1 capsule (30 mg) by mouth 2 times daily for 2 days   Quantity:  5 capsule   Refills:  0        predniSONE 20 MG tablet   Commonly known as:  DELTASONE   Used for:  Influenza B        Dose:  40 mg   Start taking on:  2/23/2018   Take 2 tablets (40 mg) by mouth daily for 2 days   Refills:  0          CONTINUE these medications which may have CHANGED, or have new prescriptions. If we are uncertain of the size of tablets/capsules you have at home, strength may be listed as something that might have changed.        Dose / Directions Comments    fluocinolone acetonide 0.01 % Oil   This may have changed:    - when to take this  - reasons to take this  - additional instructions   Used for:  DM type 2, goal A1c below 7        Apply to scalp when wet massage well cover with cap leave on over night Wash in am.   Quantity:  118 mL   Refills:  12          CONTINUE these medications which have NOT CHANGED        Dose / Directions Comments    ACE/ARB/ARNI NOT PRESCRIBED (INTENTIONAL)   Used for:  CAD (coronary artery disease)        Dose:  1 each   1 each continuous prn ACE & ARB not prescribed due to Risk for drug interaction and Worsening renal function on ACE/ARB therapy   Refills:  0        aspirin 81 MG tablet        Dose:  1 tablet   Take 1 tablet by mouth every evening   Refills:  0        atorvastatin 80 MG tablet   Commonly known as:  LIPITOR   Used for:  PAD (peripheral artery  disease) (H)        Dose:  80 mg   Take 1 tablet (80 mg) by mouth daily   Quantity:  90 tablet   Refills:  3        blood glucose monitoring lancets        Use to test blood sugar 3-4 times daily or as directed.   Quantity:  300 Box   Refills:  11    Entered rx from fax       cholecalciferol 1000 UNIT tablet   Commonly known as:  vitamin D3        Dose:  2000 Units   Take 2,000 Units by mouth daily   Refills:  0        DOCQLACE 100 MG capsule   Generic drug:  docusate sodium        Dose:  100 mg   Take 100 mg by mouth At Bedtime   Refills:  0        fish oil-omega-3 fatty acids 1000 MG capsule   Used for:  PAD (peripheral artery disease) (H)        Dose:  1200 mg   Take 1,200 mg by mouth daily   Refills:  0        GLUCERNA 1.5 JEN PO        Dose:  1 Can   Take 1 Can by mouth daily   Refills:  0        insulin detemir 100 UNIT/ML injection   Commonly known as:  LEVEMIR FLEXTOUCH   Used for:  Diabetes mellitus type 2 with peripheral artery disease (H)        INJECT 20 UNITS UNDER THE SKIN AT BEDTIME   Quantity:  15 mL   Refills:  8    New dose       LAMISIL EX        Externally apply topically daily For under breasts, in between toes   Refills:  0        levothyroxine 75 MCG tablet   Commonly known as:  SYNTHROID/LEVOTHROID   Used for:  Encounter for medication refill        TAKE ONE TABLET BY MOUTH ONE TIME DAILY   Quantity:  90 tablet   Refills:  1        lidocaine 5 % Patch   Commonly known as:  LIDODERM   Used for:  Encounter for medication refill        APPLY UP TO 3 PATCHES TO PAINFUL AREA AT ONCE FOR UP TO 12 H WITHIN A 24 H PERIOD.  REMOVE AFTER 12 HOURS.   Quantity:  60 patch   Refills:  11        * TITO 128 5 % ophthalmic solution   Generic drug:  sodium chloride        Dose:  1 drop   Place 1 drop Into the left eye daily as needed for dry eyes   Refills:  0        * TITO 128 5 % ophthalmic ointment   Used for:  Blindness and low vision   Generic drug:  sodium chloride        Dose:  1 Application   Place 1  Application into the right eye 2 times daily as needed for dry eyes   Refills:  0        NovoLOG FLEXPEN 100 UNIT/ML injection   Generic drug:  insulin aspart        Dose:  12 Units   Inject 12 Units Subcutaneous 4 times daily (with meals and nightly)   Refills:  0        omeprazole 20 MG CR capsule   Commonly known as:  priLOSEC   Used for:  Gastroesophageal reflux disease, esophagitis presence not specified        TAKE ONE CAPSULE BY MOUTH AT BEDTIME - NO TUMS WITHIN 2 HOURS OF MED   Quantity:  30 capsule   Refills:  10        ONETOUCH ULTRA test strip   Used for:  Encounter for medication refill   Generic drug:  blood glucose monitoring        TEST BLOOD GLUCOSE FOUR TIMES A DAY   Quantity:  300 each   Refills:  11        PREPARATION H 0.25-14-74.9 % rectal ointment   Generic drug:  phenylephrine-shark liver oil-mineral oil-petrolatum        Place rectally daily   Refills:  0        PRESERVISION AREDS 2 PO        Dose:  1 tablet   Take 1 tablet by mouth every morning   Refills:  0        TH EYE DROP ADVANCED RELIEF 0.05-0.1-1-1 % Soln   Used for:  Blindness and low vision   Generic drug:  Tetrahydroz-Dextran-PEG-Povid        Apply to eye as needed For dry eye relief- 5 x per day   Refills:  0        timolol 0.5 % ophthalmic solution   Commonly known as:  TIMOPTIC        Dose:  1 drop   Place 1 drop Into the left eye 2 times daily   Quantity:  1 Bottle   Refills:  0        traMADol 50 MG tablet   Commonly known as:  ULTRAM   Used for:  Cervicalgia        Dose:  50 mg   Take 1 tablet (50 mg) by mouth every 6 hours as needed for pain No driving a car or drinking alcohol for at least 8 hours after taking this medication.   Quantity:  20 tablet   Refills:  0        TYLENOL ARTHRITIS PAIN 650 MG CR tablet   Generic drug:  acetaminophen        Dose:  1300 mg   Take 1,300 mg by mouth daily States 2 tablets qam & usually 2 tablets qpm   Refills:  0        UNABLE TO FIND        Dose:  1 Bar   Take 1 Bar by mouth 2 times  daily MEDICATION NAME: FiberONe Bar   Refills:  0        UNIFINE PENTIPS 31G X 6 MM   Used for:  Encounter for medication refill   Generic drug:  insulin pen needle        USE ONCE DAILY   Quantity:  100 each   Refills:  11        VAGISIL ANTI-ITCH MEDICATED EX        Externally apply topically every morning   Refills:  0        ZANTAC 150 MAXIMUM STRENGTH PO        Dose:  150 mg   Take 150 mg by mouth daily as needed   Refills:  0        * Notice:  This list has 2 medication(s) that are the same as other medications prescribed for you. Read the directions carefully, and ask your doctor or other care provider to review them with you.            Summary of Visit     Reason for your hospital stay       Influenza B and Community Acquired Pneumonia             After Care     Activity - Up with assistive device           Advance Diet as Tolerated       Follow this diet upon discharge:      Consistent Carbohydrate Diet 0115-3586 Calories: Moderate Consistent CHO (4-6 CHO units/meal)       General info for SNF       Length of Stay Estimate: Short Term Care: Estimated # of Days <30  Condition at Discharge: Improving  Level of care:skilled   Rehabilitation Potential: Good  Admission H&P remains valid and up-to-date: Yes  Recent Chemotherapy: N/A  Use Nursing Home Standing Orders: Yes       Intake and output       Every shift       Mantoux instructions       Give two-step Mantoux (PPD) Per Facility Policy Yes.             Referrals     Occupational Therapy Adult Consult       Evaluate and treat as clinically indicated.    Reason:   Influenza B and Community acquired pneumonia.       Physical Therapy Adult Consult       Evaluate and treat as clinically indicated.    Reason:  Influenza B and Community acquired pneumonia.             Your next 10 appointments already scheduled     Apr 12, 2018  1:00 PM CDT   Pacemaker Check with STEPHEN DONALD   [facility]   [place] ([facility])    [address]  [facility] [address]  [place] [address]   [phone]            May 09, 2018 10:00 AM CDT   Office Visit with Cass Zeng LUIS MANUEL   [facility] ([facility])    [address] [place] [address] [phone]           Bring a current list of meds and any records pertaining to this visit. For Physicals, please bring immunization records and any forms needing to be filled out. Please arrive 10 minutes early to complete paperwork.            May 09, 2018 11:15 AM CDT   [facility] Lin Cruz [facility]   [facility]    [address] [place] [address] [phone]           Bring a current list of meds and any records pertaining to this visit. For Physicals, please bring immunization records and any forms needing to be filled out. Please arrive 10 minutes early to complete paperwork.              Follow-Up Appointment Instructions     Future Labs/Procedures    Follow Up and recommended labs and tests     Comments:    Follow up with primary care provider in 2-3 weeks.  No follow up labs or test are needed.      Follow-Up Appointment Instructions     Follow Up and recommended labs and tests       Follow up with primary care provider in 2-3 weeks.  No follow up labs or test are needed.             Statement of Approval     Ordered          02/22/18 1209  I have reviewed and agree with all the recommendations and orders detailed in this document.  EFFECTIVE NOW     Approved and electronically signed by:  Mitch Cardona MD

## 2018-02-17 NOTE — IP AVS SNAPSHOT
` `     [place] [address] [facility]: [phone]                 INTERAGENCY TRANSFER FORM - NOTES (H&P, Discharge Summary, Consults, Procedures, Therapies)   2018                    Hospital Admission Date: 2018  SERENA GANDHI   : 1924  Sex: Female        Patient PCP Information     Provider PCP Type    Lin Cruz MD General         History & Physicals      H&P by Mitch Cardona MD at 2018  6:27 PM     Author:  Mitch Cardona MD Service:  Hospitalist Author Type:  Physician    Filed:  2018  6:25 PM Date of Service:  2018  6:27 PM Creation Time:  2018  6:27 PM    Status:  Signed :  Mitch Cardona MD (Physician)         History and Physical     Serena Gandhi MRN# [id number]   YOB: 1924 Age: 89 year old      Date of Admission:  2018    Primary care provider: Lin Cruz          Assessment and Plan:   89 year old female, with PmHx of coronary artery disease, type 2 diabetes mellitus,   GERD, hypertension, hyperlipidemia, hypothyroidism who presented to the emergency   room at [facility] on 18 with[AT1.1] complaints[AT1.2] of fever as high as 102 F, productive cough of white sputum with yellow specks, body aches, chills and reduced appetite.   Workup done on 18 revealed, BMP significa[AT1.1]nt[AT1.2] for Na+ 129. CBC revealed, Plts 100.   INR was 2.1. Influenza antigen screen was positive for influenza B. Chest x-rays on 18 revealed, dual-lead pacemaker noted overlying the left hemithorax. Heart size is normal.   No pleural effusion, pneumothorax. There is a small focus of opacity in the medial right lower lobe, which may be an early focus of pneumonia.[AT1.1]      1.[AT1.3] Influenza[AT1.1] B: will treat with Tamiflu 30 mg twice daily for 1 week. For O2 via NC as needed    2. Right lower lobe community acquired pneumonia[AT1.3], with  sepsis[AT1.4]: continue IV Rocephin and oral azithromycin.  Albuterol nebulizer as needed.  For scheduled Robitussin-DM.  For  sputum and blood cultures.    3.  Type 2 diabetes mellitus: Will continue scheduled insulin aspart with meals.  Continue insulin detemir[AT1.3] qhs[AT1.2].  For medium dose insulin aspart sliding scale as needed. HbA1C was 7.1% on 2/6/18.    4.  Hyperlipidemia: Continue Lipitor.    5.  Hypothyroidism: Continue levothyroxine.    6.  GERD: Continue omeprazole.    7.  Glaucoma: Continue timolol eyedrops.    CODE STATUS: The patient is full code.[AT1.3]                        Chief Complaint:   Fever, productive cough, body aches, chills[AT1.1] and[AT1.2] reduced appetite x 1 week.    History is obtained from the patient[AT1.1].[AT1.2]         History of Present Illness:   89 year old female, with PmHx of coronary artery disease, type 2 diabetes mellitus, GERD, hypertension, hyperlipidemia, hypothyroidism[AT1.1],[AT1.2] who presented to the emergency room at [facility] on 2/17/18[AT1.1],[AT1.2] with symptoms of fever as high as 102 F, productive cough   of white sputum with yellow specks, body aches, chills and reduced appetite. The patient lives independently at home. She went to her primary care physician early last week[AT1.1] for the same symptoms[AT1.2], but was not given any medication[AT1.1]s[AT1.2].  Vitals in the ER revealed[AT1.1],[AT1.2] temp 102.7F,   B.P. 167/102, HR 89, Resp 20 and O2 sats 90% on RA.  Workup done on 2/17/18 revealed,   BMP significa[AT1.1]nt[AT1.2] for Na+ 129. CBC revealed, Plts 100. INR was 2.1. Influenza antigen screen was positive for influenza B.[AT1.1]v[AT1.2]Chest x-rays on 2/17/18 revealed,  dual-lead pacemaker noted overlying the left hemithorax.[AT1.1]v[AT1.2]Heart size is normal. No pleural effusion, pneumothorax. There is a small focus of opacity[AT1.1]v[AT1.2]in the medial right lower lobe, which may be an early focus of pneumonia.    In the ER, th[AT1.1]e p[AT1.2]atient was given IV N/Saline 1L bolus, IV Rocephin 1g, Azithromycin 500mg x 1[AT1.1]   a[AT1.2]nd[AT1.1] T[AT1.2]amiflu 30mg x 1.            Past Medical History:     Past Medical History:   Diagnosis Date     CAD (coronary artery disease) 8/2012 8/2012 Cath - 75% RCA with BRIGITTE placed, 40% ostial and distal left main stenosis     Diabetes mellitus type 2 with peripheral artery disease (H)      DM type 2, goal A1c below 7      GERD (gastroesophageal reflux disease)      Gluten intolerance      HTN (hypertension)     been off meds since 2012     Hyperlipidemia LDL goal < 100      Pacemaker 8/2009 8/2009 Near syncopal episode with HR 40s and BP 60/sys - PPM implanted     PAD (peripheral artery disease) (H) 2008    70% proximal right SFA stenosis with successful angioplasy     S/P coronary angiogram 8/2012 8/2012 Cath - 75% RCA with BRIGITTE placed, 40% ostial and distal left main stenosis     Syncope 08/2009 8/2009 Near syncopal episode with HR 40s and BP 60/sys - PPM implanted     Unspecified hypothyroidism              Past Surgical History:     Past Surgical History:   Procedure Laterality Date     ANGIOPLASTY  2008    Angioplasty of proximal right superficial femoral artery     APPENDECTOMY       BACK SURGERY       C REMOVAL OF EYE      right      CHOLECYSTECTOMY, LAPOROSCOPIC      Cholecystectomy, Laparoscopic     COLECTOMY       EYE SURGERY      corneal transplant      HYSTERECTOMY, EMANI      BSO     IMPLANT PACEMAKER  8/2009     STENT, CORONARY, FATOU  8-12 8/2012 Cath - 75% RCA with BRIGITTE placed, 40% ostial and distal left main stenosis     TONSILLECTOMY & ADENOIDECTOMY                   Social History:     Social History   Substance Use Topics     Smoking status: Former Smoker     Packs/day: 1.00     Years: 3.00     Types: Cigarettes     Quit date: 8/17/1947     Smokeless tobacco: Never Used     Alcohol use No             Family History:     Family History   Problem Relation  Age of Onset     CANCER Mother      CANCER Father      Unknown/Adopted Maternal Grandmother      Unknown/Adopted Maternal Grandfather      Unknown/Adopted Paternal Grandmother      Unknown/Adopted Paternal Grandfather              Immunizations:     Immunization History   Administered Date(s) Administered     Pneumococcal 23 valent 06/11/2009            Allergies:     Allergies   Allergen Reactions     Contrast Dye Hives and Swelling     Pt. States no history of allergy to topical betadine. Tolerated surgical betadine prep 2-6-2012.     Lisinopril Cough     dizzy     Metoprolol      Depressed               Medications:     Prescriptions Prior to Admission   Medication Sig Dispense Refill Last Dose     insulin aspart (NOVOLOG FLEXPEN) 100 UNIT/ML injection Inject 12 Units Subcutaneous 4 times daily (with meals and nightly)        omeprazole (PRILOSEC) 20 MG CR capsule TAKE ONE CAPSULE BY MOUTH AT BEDTIME - NO TUMS WITHIN 2 HOURS OF MED 30 capsule 10 2/16/2018 at Unknown time     traMADol (ULTRAM) 50 MG tablet Take 1 tablet (50 mg) by mouth every 6 hours as needed for pain No driving a car or drinking alcohol for at least 8 hours after taking this medication. 20 tablet 0 Taking     levothyroxine (SYNTHROID/LEVOTHROID) 75 MCG tablet TAKE ONE TABLET BY MOUTH ONE TIME DAILY  90 tablet 1 2/16/2018 at Unknown time     lidocaine (LIDODERM) 5 % Patch APPLY UP TO 3 PATCHES TO PAINFUL AREA AT ONCE FOR UP TO 12 H WITHIN A 24 H PERIOD.  REMOVE AFTER 12 HOURS. 60 patch 11 2/17/2018 at am     insulin detemir (LEVEMIR FLEXTOUCH) 100 UNIT/ML injection INJECT 20 UNITS UNDER THE SKIN AT BEDTIME 15 mL 8 2/16/2018 at Unknown time     Tetrahydroz-Dextran-PEG-Povid (TH EYE DROP ADVANCED RELIEF) 0.05-0.1-1-1 % SOLN Apply to eye as needed For dry eye relief- 5 x per day   Taking     sodium chloride (TITO 128) 5 % ophthalmic ointment Place 1 Application into the right eye 2 times daily as needed for dry eyes   Taking     acetaminophen  (TYLENOL ARTHRITIS PAIN) 650 MG CR tablet Take 1,300 mg by mouth daily States 2 tablets qam & usually 2 tablets qpm   2/16/2018 at Unknown time     RaNITidine HCl (ZANTAC 150 MAXIMUM STRENGTH PO) Take 150 mg by mouth daily as needed   Taking     cholecalciferol (VITAMIN D) 1000 UNIT tablet Take 2,000 Units by mouth daily   2/16/2018 at Unknown time     atorvastatin (LIPITOR) 80 MG tablet Take 1 tablet (80 mg) by mouth daily 90 tablet 3 2/16/2018 at Unknown time     Multiple Vitamins-Minerals (PRESERVISION AREDS 2 PO) Take 1 tablet by mouth every morning   2/16/2018 at Unknown time     docusate sodium (DOCQLACE) 100 MG capsule Take 100 mg by mouth At Bedtime   2/16/2018 at Unknown time     Terbinafine (LAMISIL EX) Externally apply topically daily For under breasts, in between toes   2/16/2018 at Unknown time     Pramoxine HCl (VAGISIL ANTI-ITCH MEDICATED EX) Externally apply topically every morning   2/16/2018 at Unknown time     phenylephrine-shark liver oil-mineral oil-petrolatum (PREPARATION H) 0.25-14-74.9 % rectal ointment Place rectally daily   2/16/2018 at Unknown time     fish oil-omega-3 fatty acids 1000 MG capsule Take 1,200 mg by mouth daily    2/16/2018 at Unknown time     timolol (TIMOPTIC) 0.5 % ophthalmic solution Place 1 drop Into the left eye 2 times daily  1 Bottle  2/17/2018 at x1     sodium chloride (TITO 128) 5 % ophthalmic solution Place 1 drop Into the left eye daily as needed for dry eyes    Taking     fluocinolone acetonide 0.01 % OIL Apply to scalp when wet massage well cover with cap leave on over night  Wash in am. (Patient taking differently: as needed Apply to scalp when wet massage well cover with cap leave on over night  Wash in am.) 118 mL 12 Taking     aspirin 81 MG tablet Take 1 tablet by mouth every evening    2/16/2018 at Unknown time     UNIFINE PENTIPS 31G X 6 MM USE ONCE DAILY 100 each 11 Taking     blood glucose monitoring (ONE TOUCH ULTRASOFT) lancets Use to test blood sugar  3-4 times daily or as directed. [address] Taking     ONE TOUCH ULTRA test strip TEST BLOOD GLUCOSE FOUR TIMES A  each 11 Taking     Nutritional Supplements (GLUCERNA 1.5 JEN PO) Take 1 Can by mouth daily    Taking     UNABLE TO FIND Take 1 Bar by mouth 2 times daily MEDICATION NAME: FiberONe Bar    Taking     ACE/ARB NOT PRESCRIBED, INTENTIONAL, 1 each continuous prn ACE & ARB not prescribed due to Risk for drug interaction and Worsening renal function on ACE/ARB therapy   Taking             Review of Systems:   The 10 point Review of Systems is negative other than noted in the HPI              Physical Exam:     Vitals were reviewed[AT1.1]  Patient Vitals for the past 72 hrs:   BP Temp Temp src Pulse Resp SpO2 Height Weight   02/17/18 1546 144/66 99.5  F (37.5  C) Oral 82 16 96 % - -   02/17/18 1218 155/66 98.3  F (36.8  C) Oral 69 18 99 % - -   02/17/18 1100 173/85 - - 76 14 97 % - -   02/17/18 1045 163/69 - - - - 99 % - -   02/17/18 1030 168/75 - - 87 18 98 % - -   02/17/18 1022 168/58 - - 88 18 100 % - -   02/17/18 0925 (!) 167/102 102.7  F (39.3  C) - 89 20 98 % 1.524 m (5') 64.4 kg (142 lb)[AT1.5]     Constitutional:   Elderly white female,[AT1.1] awake,[AT1.2] cooperative, no apparent distress, O2 Sats 96% on RA     Lungs:   Clear to auscultation bilaterally, no crackles or wheezing     Cardiovascular:   S1 and S2, regular rate and rhythm, no S3 or S4, and no murmur noted     Abdomen:   Soft, non-distended, non-tender, no masses palpated, no hepatosplenomegaly, BS present     Musculoskeletal:   No pedal edema          Data:[AT1.1]     Results for orders placed or performed during the hospital encounter of 02/17/18   XR Chest 2 Views    Narrative    CHEST TWO VIEWS  2/17/2018 9:59 AM     HISTORY: 89-year-old with fever and cough.       Impression    IMPRESSION: Since September 13, 2016, dual-lead pacemaker is again  noted overlying the left hemithorax. Heart size is normal. No pleural  effusion,  pneumothorax. There is a small focus of opacity in the  medial right lower lobe, which may be an early focus of pneumonia.  Recommend continued radiographic surveillance until resolution.    LAUREN KIRAN MD   CBC with platelets differential   Result Value Ref Range    WBC 4.0 4.0 - 11.0 10e9/L    RBC Count 4.58 3.8 - 5.2 10e12/L    Hemoglobin 14.2 11.7 - 15.7 g/dL    Hematocrit 41.3 35.0 - 47.0 %    MCV 90 78 - 100 fl    MCH 31.0 26.5 - 33.0 pg    MCHC 34.4 31.5 - 36.5 g/dL    RDW 14.1 10.0 - 15.0 %    Platelet Count 100 (L) 150 - 450 10e9/L    Diff Method Automated Method     % Neutrophils 69.4 %    % Lymphocytes 16.1 %    % Monocytes 14.1 %    % Eosinophils 0.0 %    % Basophils 0.2 %    % Immature Granulocytes 0.2 %    Nucleated RBCs 0 0 /100    Absolute Neutrophil 2.8 1.6 - 8.3 10e9/L    Absolute Lymphocytes 0.7 (L) 0.8 - 5.3 10e9/L    Absolute Monocytes 0.6 0.0 - 1.3 10e9/L    Absolute Eosinophils 0.0 0.0 - 0.7 10e9/L    Absolute Basophils 0.0 0.0 - 0.2 10e9/L    Abs Immature Granulocytes 0.0 0 - 0.4 10e9/L    Absolute Nucleated RBC 0.0    Basic metabolic panel   Result Value Ref Range    Sodium 129 (L) 133 - 144 mmol/L    Potassium 4.4 3.4 - 5.3 mmol/L    Chloride 97 94 - 109 mmol/L    Carbon Dioxide 22 20 - 32 mmol/L    Anion Gap 10 3 - 14 mmol/L    Glucose 186 (H) 70 - 99 mg/dL    Urea Nitrogen 18 7 - 30 mg/dL    Creatinine 0.55 0.52 - 1.04 mg/dL    GFR Estimate >90 >60 mL/min/1.7m2    GFR Estimate If Black >90 >60 mL/min/1.7m2    Calcium 8.5 8.5 - 10.1 mg/dL   Lactic acid   Result Value Ref Range    Lactic Acid 2.1 (H) 0.4 - 2.0 mmol/L   Lactic acid   Result Value Ref Range    Lactic Acid Canceled, Test credited, specimen discarded 0.4 - 2.0 mmol/L   Lactic acid   Result Value Ref Range    Lactic Acid 1.2 0.7 - 2.0 mmol/L   Glucose by meter   Result Value Ref Range    Glucose 196 (H) 70 - 99 mg/dL   Glucose by meter   Result Value Ref Range    Glucose 216 (H) 70 - 99 mg/dL   Influenza A/B antigen   Result  Value Ref Range    Influenza A/B Agn Specimen Nasal     Influenza A Negative NEG^Negative    Influenza B Positive (A) NEG^Negative   Blood culture   Result Value Ref Range    Specimen Description Blood Left Arm     Special Requests Aerobic and anaerobic bottles received     Culture Micro No growth after 4 hours    Blood culture   Result Value Ref Range    Specimen Description Blood Right Arm     Special Requests Aerobic and anaerobic bottles received     Culture Micro No growth after 3 hours[AT1.5]         Revision History        User Key Date/Time User Provider Type Action    > AT1.2 2/18/2018  6:25 PM Mitch Cardona MD Physician Sign     AT1.4 2/17/2018  6:46 PM Mitch Cardona MD Physician      AT1.3 2/17/2018  6:37 PM Mitch Cardona MD Physician      AT1.5 2/17/2018  6:28 PM Mitch Cardona MD Physician      AT1.1 2/17/2018  6:27 PM Mitch Cardona MD Physician                   Discharge Summaries     No notes of this type exist for this encounter.      Consult Notes     No notes of this type exist for this encounter.         Progress Notes - Physician (Notes from 02/17/18 through 02/20/18)      Progress Notes by Mely Ahn RN at 2/20/2018 12:18 PM     Author:  Mely Ahn RN Service:  Care Coordinator Author Type:      Filed:  2/20/2018 12:24 PM Date of Service:  2/20/2018 12:18 PM Creation Time:  2/20/2018 12:18 PM    Status:  Signed :  Mely Ahn RN ()         Care Transition Initial Assessment - RN        Met with: Patient and Family.  DATA   Active Problems:    Influenza due to influenza virus, type B    Influenza and pneumonia       Cognitive Status: awake, alert and oriented.        Contact information and PCP information verified: Yes  Lives With: alone  Living Arrangements: condominium                 Insurance concerns: No Insurance issues identified  ASSESSMENT  Patient currently receives  the following services:  none        Identified issues/concerns regarding health management: needs TCU at discharge, and at first patient wanted home with family but they are unable to support her as [place], and son dealing with his immediate family with illness and unable to assist.  PLAN  Financial costs for the patient include N/A .  Patient given options and choices for discharge yes, they have approved a referral sent to [facility], and have a listing of other facilities to tour.  They understand that discharge may happen before other facilities are toured, and this may be as soon as today/tomorrow.  Awaiting hospitalist clearance for discharge.  Patient/family is agreeable to the plan?  Yes, she is now agreeable to TCU  Patient anticipates discharging to TCU .        Patient anticipates needs for home equipment: Yes  Discharge Planner   Discharge Plans in progress: yes  Barriers to discharge plan: none  Plan/Disposition: Home   Appointments: await hospitalist  Care  (CTS) will continue to follow as needed.[JB1.1]       Revision History        User Key Date/Time User Provider Type Action    > JB1.1 2/20/2018 12:24 PM Mely Ahn RN Case Manager Sign            Progress Notes by Alma Rivera BSW at 2/20/2018  9:10 AM     Author:  Alma Rivera BSW Service:  (none) Author Type:      Filed:  2/20/2018  9:34 AM Encounter Date:  2/20/2018 Status:  Signed    :  Alma Rivera BSW ()           Clinic Care Coordination Contact    Situation: Pt's daughter Priti called the clinic and requested GREGORIO MORGAN call back to discuss pt's situation and disposition planning. Patient chart reviewed by care coordinator.    Background: Pt had contacted GREGORIO MORGAN on 2/15/2018 and reported that she could no longer care for herself in her home and needed help. Pt was wanting to go to a SNF facility. GREGORIO MORGAN had spoken with the family and began planning. Care  [facility] has been in contact with the family to explore options for placement.      Assessment: Pt has been hospitalized with Influenza B. Pt is experiencing weakness and therapy is recommending TCU placement, however pt would like to go home.[AC1.1] Discussed concerns with pt's daughter who is planning on going back to [place] on Sunday. Pt's support is limited following her departure. Pt's daughter is in agreement and has been thinking that TCU placement would be best.[AC1.2]    Plan:[AC1.1] Pt's daughter will discuss discharge planning with Care Coordinator in the hospital.[AC1.2]  CC will remain available as needed.[AC1.3]    Alma Rivera RUTHY  Clinic Care Coordinator  [phone]  [email or web address][AC1.1]                  Revision History        User Key Date/Time User Provider Type Action    > AC1.3 2/20/2018  9:34 AM Alma Rivera BSW  Sign     AC1.2 2/20/2018  9:31 AM Alma Rivera BSW       AC1.1 2/20/2018  9:10 AM Alma Rivera BSW              Progress Notes by Mitch Cardona MD at 2/19/2018  5:13 PM     Author:  Mitch Cardona MD Service:  Hospitalist Author Type:  Physician    Filed:  2/19/2018  5:30 PM Date of Service:  2/19/2018  5:13 PM Creation Time:  2/19/2018  5:13 PM    Status:  Addendum :  Mitch Cardona MD (Physician)         [facility]    Hospitalist Progress Note  Mitch Cardona MD    Assessment & Plan     89 year old female, with PmHx of coronary artery disease, type 2 diabetes mellitus,   GERD, hypertension, hyperlipidemia, hypothyroidism who presented to the emergency   room at [facility] on 2/17/18 with complaints of fever as high as 102 F, productive cough of white sputum with yellow specks, body aches, chills and reduced appetite.  Workup done on 2/17/18 revealed, BMP significant for Na+ 129. CBC revealed,   Plts 100. INR was 2.1. Influenza  antigen screen was positive for influenza B. 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 Heart size is normal. No pleural effusion, pneumothorax. There is a small focus of opacity in the medial right lower lobe, which may be an early focus of pneumonia.       1. Influenza B: continue Tamiflu 30 mg twice daily for 1 week (to complete 2/23/18).   For O2 via NC as needed     2. Right lower lobe community acquired pneumonia, with sepsis: continue IV Rocephin and oral azithromycin. Albuterol nebulizer as needed. For scheduled Robitussin-DM.   Sputum cultures on 2/18/18 was oral contamination. Blood and urine cultures on 2/17/18 is negative.     3.  Type 2 diabetes mellitus: BG on 84 this am. Continue scheduled insulin aspart with meals. Continue insulin detemir qhs. For medium dose insulin aspart sliding scale as needed. HbA1C was 7.1% on 2/6/18.     4.  Hyperlipidemia: Continue Lipitor.     5.  Hypothyroidism: Continue levothyroxine.     6.  GERD: Continue omeprazole.     7.  Glaucoma: Continue timolol eyedrops.[AT1.1]    8. Elevated blood pressure, without diagnosis of hypertension: pt's SBP today is ranging 160s-190s. She was not on B.P. Medications at home. Continue IV hydralazine prn. Discontinued IV N/Saline today.[AT1.2]      DVT Prophylaxis: Pneumatic Compression Devices  Code Status: Full Code    Disposition: Expected discharge in 1-2 days.      Interval History   The pt had no new complaints today. She has daily improvement of her cough and appetite.     -Data reviewed today: I reviewed all new labs and imaging results over the last 24 hours. I personally reviewed no images or EKG's today.    Physical Exam   Temp: 98.2  F (36.8  C) Temp src: Oral BP: 173/68 Pulse: 70   Resp: 16 SpO2: 99 % O2 Device: None (Room air)    Vitals:    02/17/18 0925   Weight: 64.4 kg (142 lb)     Vital Signs with Ranges  Temp:  [97.5  F (36.4  C)-98.2  F (36.8  C)] 98.2  F (36.8   C)  Pulse:  [58-86] 70  Resp:  [16-19] 16  BP: (138-199)/(66-99) 173/68  SpO2:  [98 %-100 %] 99 %  I/O last 3 completed shifts:  In: 2740 [P.O.:540; I.V.:2200]  Out: 2500 [Urine:2500]    Constitutional: Elderly white female, awake, cooperative, no apparent distress, O2 Sats 99% on RA  Respiratory: BS vesicular bilaterally, no crackles or wheezing  Cardiovascular: S1 and S2, reg, no murmur noted  GI: Soft, non-distended, non-tender, no masses, BS present+  Skin/Integumen: No rashes  Extremities: No pedal edema    Medications     sodium chloride 100 mL/hr at 02/19/18 0023       Pramoxine HCl   Apply externally QAM     aspirin  81 mg Oral QPM     atorvastatin  80 mg Oral QPM     docusate sodium  100 mg Oral At Bedtime     insulin aspart  12 Units Subcutaneous 4x Daily w/meals     insulin detemir  20 Units Subcutaneous At Bedtime     levothyroxine  75 mcg Oral Daily     omeprazole  20 mg Oral QAM     phenylephrine-shark liver oil-mineral oil-petrolatum   Rectal Daily     timolol  1 drop Left Eye BID     Lidocaine  3 patch Transdermal Q24h     insulin aspart  1-7 Units Subcutaneous TID AC     insulin aspart  1-5 Units Subcutaneous At Bedtime     sodium chloride (PF)  3 mL Intracatheter Q8H     cefTRIAXone  1 g Intravenous Q24H     azithromycin  250 mg Oral Daily     guaiFENesin-dextromethorphan  5 mL Oral Q6H     lidocaine   Transdermal Q24H     lidocaine   Transdermal Q8H     oseltamivir  30 mg Oral BID       Data     Recent Labs  Lab 02/18/18  0707 02/17/18  0938   WBC 3.0* 4.0   HGB 12.4 14.2   MCV 91 90   PLT 85* 100*    129*   POTASSIUM 3.5 4.4   CHLORIDE 104 97   CO2 25 22   BUN 9 18   CR 0.51* 0.55   ANIONGAP 6 10   JEN 7.9* 8.5   * 186*       No results found for this or any previous visit (from the past 24 hour(s)).[AT1.1]       Revision History        User Key Date/Time User Provider Type Action    > AT1.2 2/19/2018  5:30 PM Mitch Cardona MD Physician Addend     AT1.1 2/19/2018  5:19  PM Mitch Cardona MD Physician Sign            Progress Notes by Mely Ahn RN at 2/19/2018  3:05 PM     Author:  Mely Ahn RN Service:  Care Coordinator Author Type:      Filed:  2/19/2018  3:06 PM Date of Service:  2/19/2018  3:05 PM Creation Time:  2/19/2018  3:05 PM    Status:  Signed :  Mely Ahn RN ()         Left message with daughter Priti to discuss discharge planning, await medical stability.  Mely Ahn RN[JB1.1]     Revision History        User Key Date/Time User Provider Type Action    > JB1.1 2/19/2018  3:06 PM Mely Ahn RN Case Manager Sign            Progress Notes by Jessica Whitehead RN at 2/19/2018 10:28 AM     Author:  Jessica Whitehead RN Service:  (none) Author Type:  Registered Nurse    Filed:  2/19/2018 10:28 AM Date of Service:  2/19/2018 10:28 AM Creation Time:  2/19/2018 10:28 AM    Status:  Signed :  Jessica Whitehead, RN (Registered Nurse)         Dr. Cardona paged about pt's elevated BPs in the AM.[JH1.1]     Revision History        User Key Date/Time User Provider Type Action    > JH1.1 2/19/2018 10:28 AM Jessica Whitehead, RN Registered Nurse Sign            Progress Notes by Mitch Cardona MD at 2/18/2018  6:26 PM     Author:  Mitch Cardona MD Service:  Hospitalist Author Type:  Physician    Filed:  2/18/2018  6:37 PM Date of Service:  2/18/2018  6:26 PM Creation Time:  2/18/2018  6:26 PM    Status:  Signed :  Mitch Cardona MD (Physician)         [facility]    Hospitalist Progress Note  Mitch Cardona MD    Assessment & Plan     89 year old female, with PmHx of coronary artery disease, type 2 diabetes mellitus,   GERD, hypertension, hyperlipidemia, hypothyroidism who presented to the emergency   room at [facility] on 2/17/18 with complaints of fever as high as 102 F, productive cough of white sputum with yellow  specks, body aches, chills and reduced appetite.  Workup done on 2/17/18 revealed, BMP significant for Na+ 129. CBC revealed,   Plts 100. INR was 2.1. Influenza antigen screen was positive for influenza B. 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 Heart size is normal. No pleural effusion, pneumothorax. There is a small focus of opacity in the medial right lower lobe, which may be an early focus of pneumonia.       1. Influenza B: continue Tamiflu 30 mg twice daily for 1 week. For O2 via NC as needed     2. Right lower lobe community acquired pneumonia, with sepsis: continue IV Rocephin and oral azithromycin. Albuterol nebulizer as needed. For scheduled Robitussin-DM.   Sputum cultures on 2/18/18 was oral contamination. Blood cultures on 2/17/18 is negative  so far.     3.  Type 2 diabetes mellitus: BG on 101 this am. Continue scheduled insulin aspart with meals. Continue insulin detemir qhs. For medium dose insulin aspart sliding scale as needed. HbA1C was 7.1% on 2/6/18.     4.  Hyperlipidemia: Continue Lipitor.     5.  Hypothyroidism: Continue levothyroxine.     6.  GERD: Continue omeprazole.     7.  Glaucoma: Continue timolol eyedrops.      DVT Prophylaxis: Pneumatic Compression Devices  Code Status: Full Code    Disposition: Expected discharge in 1-2 days.      Interval History   The pt reported some improvement of her cough since admission. Taking in deep breath makes her cough. She has cough productive of whitish/yellow sputum. She stated that her appetite is improving.     -Data reviewed today: I reviewed all new labs and imaging results over the last 24 hours. I personally reviewed no images or EKG's today.    Physical Exam   Temp: 98.1  F (36.7  C) Temp src: Oral BP: 143/68 Pulse: 71   Resp: 16 SpO2: 98 % O2 Device: None (Room air)    Vitals:    02/17/18 0925   Weight: 64.4 kg (142 lb)     Vital Signs with Ranges  Temp:  [97.5  F (36.4  C)-98.2  F (36.8  C)]  98.1  F (36.7  C)  Pulse:  [60-71] 71  Resp:  [16-19] 16  BP: (143-170)/(60-73) 143/68  SpO2:  [96 %-100 %] 98 %  I/O last 3 completed shifts:  In: 1756.67 [P.O.:350; I.V.:1406.67]  Out: 1000 [Urine:1000]    Constitutional: Elderly white female, awake, cooperative, no apparent distress, O2 Sats 98% on RA  Respiratory: BS vesicular bilaterally, no crackles or wheezing  Cardiovascular: S1 and S2, reg, no murmur noted  GI: Soft, non-distended, non-tender, no masses, BS present+  Skin/Integumen: No rashes  Extremities: No pedal edema    Medications     sodium chloride 100 mL/hr at 02/18/18 1358       aspirin  81 mg Oral QPM     atorvastatin  80 mg Oral QPM     docusate sodium  100 mg Oral At Bedtime     insulin aspart  12 Units Subcutaneous 4x Daily w/meals     insulin detemir  20 Units Subcutaneous At Bedtime     levothyroxine  75 mcg Oral Daily     omeprazole  20 mg Oral QAM     phenylephrine-shark liver oil-mineral oil-petrolatum   Rectal Daily     Pramoxine HCl  1 Application Apply externally QAM     timolol  1 drop Left Eye BID     Lidocaine  3 patch Transdermal Q24h     insulin aspart  1-7 Units Subcutaneous TID AC     insulin aspart  1-5 Units Subcutaneous At Bedtime     sodium chloride (PF)  3 mL Intracatheter Q8H     cefTRIAXone  1 g Intravenous Q24H     azithromycin  250 mg Oral Daily     guaiFENesin-dextromethorphan  5 mL Oral Q6H     lidocaine   Transdermal Q24H     lidocaine   Transdermal Q8H     oseltamivir  30 mg Oral BID       Data     Recent Labs  Lab 02/18/18  0707 02/17/18  0938   WBC 3.0* 4.0   HGB 12.4 14.2   MCV 91 90   PLT 85* 100*    129*   POTASSIUM 3.5 4.4   CHLORIDE 104 97   CO2 25 22   BUN 9 18   CR 0.51* 0.55   ANIONGAP 6 10   JEN 7.9* 8.5   * 186*       No results found for this or any previous visit (from the past 24 hour(s)).[AT1.1]       Revision History        User Key Date/Time User Provider Type Action    > AT1.1 2/18/2018  6:37 PM BrendanMitch sandoval MD Physician  Sign            Progress Notes by Maylin Leyva RN at 2/18/2018  2:22 PM     Author:  Maylin Leyva RN Service:  (none) Author Type:  Registered Nurse    Filed:  2/18/2018  2:23 PM Date of Service:  2/18/2018  2:22 PM Creation Time:  2/18/2018  2:22 PM    Status:  Signed :  Maylin Leyva RN (Registered Nurse)         Sputum culture cancelled re: oral decontamination.[LM1.1]     Revision History        User Key Date/Time User Provider Type Action    > LM1.1 2/18/2018  2:23 PM Maylin Leyva RN Registered Nurse Sign            Progress Notes by Jessica Chu, PT at 2/18/2018 12:24 PM     Author:  Jessica Chu, PT Service:  (none) Author Type:  Physical Therapist    Filed:  2/18/2018 12:25 PM Date of Service:  2/18/2018 12:24 PM Creation Time:  2/18/2018 12:24 PM    Status:  Signed :  Jessica Chu, PT (Physical Therapist)            02/18/18 0949   Quick Adds   Type of Visit Initial PT Evaluation   Living Environment   Lives With alone   Living Arrangements condominium   Number of Stairs to Enter Home 0  (Elevator Access. )   Number of Stairs Within Home 0   Transportation Available family or friend will provide   Living Environment Comment Pt reports she has 3 children, although none of them are local.    Self-Care   Usual Activity Tolerance good   Current Activity Tolerance fair   Regular Exercise no   Equipment Currently Used at Home walker, rolling   Functional Level Prior   Ambulation 1-->assistive equipment   Transferring 1-->assistive equipment   Toileting 0-->independent   Bathing (Sponge baths)   Fall history within last six months no   Prior Functional Level Comment Pt reports completing functional mobility independently with use of 4WW.    General Information   Onset of Illness/Injury or Date of Surgery - Date 02/17/18   Referring Physician Mitch Cardona MD   Patient/Family Goals Statement None stated.    Pertinent History of  "Current Problem (include personal factors and/or comorbidities that impact the POC) 89 y/o female admitted with PNA, influenza B and severe sepsis.    Precautions/Limitations fall precautions  (Droplet precautions)   General Observations Pt in supine upon arrival of therapist.    General Info Comments Up with assist.    Cognitive Status Examination   Orientation person;place  (\"January 2-018\")   Level of Consciousness alert   Follows Commands and Answers Questions 100% of the time   Personal Safety and Judgment intact   Pain Assessment   Patient Currently in Pain No   Posture    Posture Comments Noted forward head and shoulder posture upon standign at FWW.    Range of Motion (ROM)   ROM Comment B LEs WFL.    Strength   Strength Comments Not formally assessed, B LEs grossly 3/5 with functional mobility.    Bed Mobility   Bed Mobility Comments Supine-sit with HOB elevated, SBA.    Transfer Skills   Transfer Comments Sit <> stand with FWW and CGA.    Gait   Gait Comments Pt amb 10' with FWW and CGA.    Balance   Balance Comments Noted good sitting balance and fair standing balance at FWW.    Sensory Examination   Sensory Perception Comments Pt reports neuropathy in B hands.    General Therapy Interventions   Planned Therapy Interventions bed mobility training;gait training;ROM;strengthening;transfer training   Clinical Impression   Criteria for Skilled Therapeutic Intervention yes, treatment indicated   PT Diagnosis Difficulty with gait.    Influenced by the following impairments SOB, Decreased activity tolerance, Generalized weakness   Functional limitations due to impairments Limited functional mobility requiring AD and assist.    Clinical Presentation Evolving/Changing   Clinical Presentation Rationale Based on PMH, current presentation, and social support.    Clinical Decision Making (Complexity) Low complexity   Therapy Frequency` daily   Predicted Duration of Therapy Intervention (days/wks) 3 days   Anticipated " "Discharge Disposition Transitional Care Facility   Risk & Benefits of therapy have been explained Yes   Patient, Family & other staff in agreement with plan of care Yes   [facility] AM-PAC  \"6 Clicks\" V.2 Basic Mobility Inpatient Short Form   1. Turning from your back to your side while in a flat bed without using bedrails? 4 - None   2. Moving from lying on your back to sitting on the side of a flat bed without using bedrails? 3 - A Little   3. Moving to and from a bed to a chair (including a wheelchair)? 3 - A Little   4. Standing up from a chair using your arms (e.g., wheelchair, or bedside chair)? 3 - A Little   5. To walk in hospital room? 3 - A Little   6. Climbing 3-5 steps with a railing? 2 - A Lot   Basic Mobility Raw Score (Score out of 24.Lower scores equate to lower levels of function) 18   Total Evaluation Time   Total Evaluation Time (Minutes) 8[JH1.1]        Revision History        User Key Date/Time User Provider Type Action    > JH1.1 2/18/2018 12:25 PM Jessica Chu, PT Physical Therapist Sign            ED Notes signed by Sarkis Non-Provider at 2/18/2018  3:50 AM      Author:  Sarkis Non-Provider Service:  (none) Author Type:  (none)    Filed:  2/18/2018  3:50 AM Date of Service:  2/18/2018  1:35 AM Creation Time:  2/18/2018  3:50 AM    Status:  Signed :  Sarkis Non-Provider     Scan on 2/18/2018  3:50 AM by Sarkis Non-Provider : [facility]          Revision History        User Key Date/Time User Provider Type Action    > [N/A] 2/18/2018  3:50 AM Scan Non-Provider (none) Sign            ED Provider Notes by Frank Lui MD at 2/17/2018  9:22 AM     Author:  Frank Lui MD Service:  Emergency Medicine Author Type:  Physician    Filed:  2/17/2018  5:20 PM Date of Service:  2/17/2018  9:22 AM Creation Time:  2/17/2018  9:34 AM    Status:  Signed :  Frank Lui MD (Physician)           History     Chief Complaint:  Fever (and c/o cough and " cold s/s for one week)      SALONI   Serena Marie is a 89 year old female who presents with[RB1.1] progressive fever and mildly productive cough for about the last week.  Symptoms have intensified in the last several days.  The patient feels as if she is becoming progressively more weak.  She had difficulty getting around this morning secondary to general weakness.  She woke up this morning and had a fever of 102, she did take Tylenol.  She does have some body aches.  She does not complain of any urinary symptoms.[MR1.1]    Allergies:[RB1.1]  Contrast Dye  Lisinopril  Metoprolol[RB1.2]      Medications:[RB1.1]    Omeprazole  Tramadol   Oxycodone  L[RB1.3]evothyroxine[RB1.4]  L[RB1.3]idocaine[RB1.4]   I[RB1.3]nsulin detemir[RB1.4]  A[RB1.3]cetaminophen[RB1.4]  Ranitidine[RB1.3] HCl[RB1.4]  G[RB1.3]entamicin[RB1.4]  F[RB1.3]urosemide[RB1.4]   A[RB1.3]torvastatin  Terbinafine[RB1.4]   T[RB1.3]imolol[RB1.4]  S[RB1.3]odium chloride[RB1.4]  F[RB1.3]luocinolone[RB1.4] A[RB1.3]cetonide[RB1.4]  A[RB1.3]spirin 81 MG tablet[RB1.4]       Past Medical History:[RB1.1]    CAD (coronary artery disease)   Diabetes mellitus type 2 with peripheral artery disease (H)   DM type 2, goal A1c below 7   GERD (gastroesophageal reflux disease)   Gluten intolerance   HTN (hypertension)   Hyperlipidemia LDL goal < 100   Pacemaker 8/2009  PAD (peripheral artery disease) (H) 2008  S/P coronary angiogram 8/2012  Syncope 08/2009  Unspecified hypothyroidism[RB1.4]     Past Surgical History:[RB1.1]    Angioplasty[RB1.3]  2008[RB1.4]  Appendectomy    Back Surgery[RB1.3]    C[RB1.4] Removal of eye    Cholecystectomy[RB1.3],[RB1.4] Laparoscopic  Colectomy  Hysterectomy[RB1.3], EMANI[RB1.4]    Implant Pacemaker    Stent[RB1.3],[RB1.4] Coronary[RB1.3], FATOU[RB1.4]      Family History:[RB1.1]    Cancer[RB1.4]    Social History:[RB1.1]  Smoking status: Former Smoker  Alcohol use: No[RB1.4]       Review of Systems   Constitutional: Positive for[RB1.1]  chills[RB1.4] and[RB1.1] fever[RB1.4]. Negative for[RB1.1] diaphoresis[RB1.4].   Respiratory: Positive for[RB1.1] cough[RB1.4].    Gastrointestinal: Negative for[RB1.1] abdominal pain[RB1.4].   Genitourinary: Negative for[RB1.1] difficulty urinating[RB1.4].   Skin: Negative for[RB1.1] rash[RB1.4].   Neurological: Negative for[RB1.1] weakness[RB1.4].[RB1.1]   All other systems reviewed and are negative[RB1.4].      Physical Exam   First Vitals:[RB1.1]  BP: (!) 167/102  Pulse: 89  Temp: 102.7  F (39.3  C)  Resp: 20  Height: 152.4 cm (5')  Weight: 64.4 kg (142 lb)  SpO2: 98 %[RB1.5]      Physical Exam[RB1.1]  General: Resting uncomfortably on the gurney, harsh paroxysmal cough    Moaning at times secondary to generalized body aches  Head:  The scalp, face, and head appear normal  Eyes:  The pupils are equal, round, and reactive to light    There is no nystagmus    Extraocular muscles are intact    Conjunctivae and sclerae are normal  ENT:    The nose is normal    Pinnae are normal    The oropharynx is normal    Uvula is in the midline  Neck:  Normal range of motion    There is no rigidity noted    There is no midline cervical spine pain/tenderness    Trachea is in the midline    No mass is detected  CV:  Regular rate and underlying rhythm     Normal S1/S2, no S3/S4    No pathological murmur detected  Resp:  Lungs are clear    There is no tachypnea    Non-labored    No rales    No wheezing   GI:  Abdomen is soft, there is no rigidity    No distension    No tympani    No rebound tenderness     Non-surgical without peritoneal features  MS:  Normal muscular tone    Symmetric motor strength    No major joint effusions    No asymmetric leg swelling, no calf tenderness  Skin:  No rash or acute skin lesions noted    A pacemaker is noted to the left upper chest.  No complications    He has prior well-healed abdominal surgical scars  Neuro: Speech is normal and fluent    She can follow commands, she moves all extremities.  Her  mentation is normal.  Psych:  Awake. Alert.      Normal affect.  Appropriate interactions.  Lymph: No anterior cervical lymphadenopathy noted[MR1.1]              Emergency Department Course     Imaging:[RB1.1]  Radiographic findings were communicated with the patient who voiced understanding of the findings.  XR Chest 2 views:   Since September 13, 2016, dual-lead pacemaker is again  noted overlying the left hemithorax. Heart size is normal. No pleural  effusion, pneumothorax. There is a small focus of opacity in the  medial right lower lobe, which may be an early focus of pneumonia.  Recommend continued radiographic surveillance until resolution. As per radiology.[RB1.4]       Laboratory:[RB1.1]  CBC: WBC: 4.0, HGB: 14.2, PLT: 100    BMP: Glucose 186, , o/w WNL (Creatinine: 0.55)    Lactic Acid: 2.1    Influenza A/B antigen: A - Negative; B - Positive    Blood culture:[RB1.4] (In process)    Lactic Acid whole blood:[RB1.3] 1.2[RB1.6]      Interventions:[RB1.1]  0948 NS 1L IV Bolus[RB1.4]       Emergency Department Course:  Nursing notes and vitals reviewed.[RB1.1]     0934[RB1.4] I performed an exam of the patient as documented above.     IV inserted. Medicine administered as documented above. Blood drawn. This was sent to the lab for further testing, results above.    The patient was sent for a[RB1.1] chest XR[RB1.5] while in the emergency department, findings above.[RB1.1]     1033[RB1.7] I rechecked the patient and discussed the results of her workup thus far.[RB1.1]     1058 I consulted with Dr. Cardona, Hospitalist, regarding the patient's history and presentation here in the emergency department.[RB1.8]    Findings and plan explained to the[RB1.7] Patient[RB1.8] who consents to admission. Discussed the patient with [RB1.7] Brendan[RB1.5], who will admit the patient to a[RB1.7] medical[RB1.5] bed for further monitoring, evaluation, and treatment.[RB1.7]        Impression & Plan      CMS Diagnoses:[RB1.1]    The patient has signs of Severe Sepsis as evidenced by:    1. 2 SIRS criteria, AND  2. Suspected infection, AND   3. Organ dysfunction: Lactic Acid >2    Time severe sepsis diagnosis confirmed = 0955 as this was the time when Lactate resulted, and the level was >2      3 Hour Severe Sepsis Bundle Completion:  1. Initial Lactic Acid Result:[RB1.9]   Recent Labs   Lab Test  02/17/18   1135  02/17/18   1046  02/17/18   0938   LACT  1.2  Canceled, Test credited, specimen discarded  2.1*[MR1.2]   A repeat lactate was performed at 1146 and this had normalized after IV fluids to 1.2[MR1.3]      2. Blood Cultures before Antibiotics: Yes  3. Broad Spectrum Antibiotics Administered: Yes, ceftriaxone, Tamiflu, and Azithromycin.[RB1.9]      Anti-infectives (Future)    Start     Dose/Rate Route Frequency Ordered Stop    02/17/18 1115  oseltamivir (TAMIFLU) capsule 30 mg      30 mg Oral ONCE 02/17/18 1114[RB1.5]          4.[RB1.9] 2000 ml of IV fluids.[RB1.10] (Greater than 30 mL's per kilogram)[MR1.4]    the patient was transferred out of the ED prior to the 6 hour keith.    Repeat lactate[RB1.10] 1.2[RB1.6]  Repeat perfusion assessment reveals improved perfusion.[RB1.10]    Medical Decision Making:[RB1.1]  This patient presents with progressive weakness and cough.  The patient is noted to have acute influenza B and she also has a right middle lobe community-acquired pneumonia.  She does not have an advanced network of support at home in the assisted living and she will require admission to the hospital at this time.  The patient has mild hyponatremia.  Her mildly elevated lactate is likely secondary to acute influenza B and dehydration.  She does meet criteria for severe sepsis given the viral and bacterial infection in the lung and a mildly elevated lactate.  The patient is given intravenous fluids.  She will be admitted to the hospital at this time.[MR1.5]  Three-hour bundle was completed.  The six-hour bundle has also been  completed with a repeat lactate and perfusion assessment as noted above.[MR1.4]        Diagnosis:[RB1.1]    ICD-10-CM   1. Pneumonia of right middle lobe due to infectious organism (H) J18.1   2. Influenza B J10.1   3. Severe sepsis (H) A41.9    R65.20[RB1.5]           Disposition:[RB1.1]  Admitted to[RB1.9] a medical bed under the care of Mitch Cardona MD[MR1.4]    Discharge Medications:    Andrew IVY, grazyna serving as a scribe on 2/17/2018 at 9:34 AM to personally document services performed by Frank Lui MD based on my observations and the provider's statements to me.         Andrew Sanchez  2/17/2018    EMERGENCY DEPARTMENT[RB1.1]       Frank Lui MD  02/17/18 1720  [MR1.2]     Revision History        User Key Date/Time User Provider Type Action    > MR1.2 2/17/2018  5:20 PM Frank Lui MD Physician Sign     MR1.3 2/17/2018  5:19 PM Frank Lui MD Physician      [N/A] 2/17/2018 12:35 PM Andrew Sanchez Scribe Share     RB1.6 2/17/2018 11:49 AM Andrew Sanchez Scribe Share     RB1.3 2/17/2018 11:36 AM Radhames Sanchezerick Scribe Share     RB1.5 2/17/2018 11:27 AM Andrew Sanchez Scribe Share     [N/A] 2/17/2018 11:24 AM Radhames Sanchezerick Scribe Share     [N/A] 2/17/2018 11:24 AM Radhames Sanchezerick Scribe Share     MR1.4 2/17/2018 11:23 AM Frank Lui MD Physician Share     [N/A] 2/17/2018 11:05 AM Luis Sanchezk Scribe Share     RB1.10 2/17/2018 11:03 AM Radhames Sanchezerick Scribe Share     RB1.9 2/17/2018 11:00 AM Andrew Sanchezibkingsley      [N/A] 2/17/2018 10:59 AM Luis Sanchezk Scribe Share     MR1.5 2/17/2018 10:57 AM Frank Lui MD Physician Share     RB1.8 2/17/2018 10:57 AM Andrew Sanchez      RB1.7 2/17/2018 10:46 AM Andrew Sanchez Share     RB1.2 2/17/2018 10:27 AM Andrew Sanchez Share     RB1.4 2/17/2018 10:17 AM Andrew Sanchez      MR1.1 2/17/2018  9:47 AM Frank Lui MD Physician Share     RB1.1 2/17/2018  9:34 AM Andrew Sanchez  Share            Progress Notes by Maylin Leyva RN at 2/17/2018 11:35 AM     Author:  Maylin Leyva RN Service:  (none) Author Type:  Registered Nurse    Filed:  2/17/2018 11:35 AM Date of Service:  2/17/2018 11:35 AM Creation Time:  2/17/2018 11:35 AM    Status:  Signed :  Maylin Leyva RN (Registered Nurse)         RECEIVING UNIT ED HANDOFF REVIEW    ED Nurse Handoff Report was reviewed by: Maylin Leyva on February 17, 2018 at 11:35 AM[LM1.1]            Revision History        User Key Date/Time User Provider Type Action    > LM1.1 2/17/2018 11:35 AM Maylin Leyva RN Registered Nurse Sign            ED Notes by Brandon SOLITARIO RN at 2/17/2018  9:59 AM     Author:  Brandon SOLITARIO RN Service:  (none) Author Type:  Registered Nurse    Filed:  2/17/2018 10:34 AM Date of Service:  2/17/2018  9:59 AM Creation Time:  2/17/2018  9:59 AM    Status:  Addendum :  Brandon SOLITARIO, RN (Registered Nurse)         [facility]  ED Nurse Handoff Report    ED Chief complaint: Fever (and c/o cough and cold s/s for one week)      ED Diagnosis:   Final diagnoses:   None       Code Status: DNR / DNI    Allergies:   Allergies   Allergen Reactions     Contrast Dye Hives and Swelling     Pt. States no history of allergy to topical betadine. Tolerated surgical betadine prep 2-6-2012.     Lisinopril Cough     dizzy     Metoprolol      Depressed         Activity level - Baseline/Home:  Stand with Assist    Activity Level - Current:   Stand with Assist     Needed?: No    Isolation: Yes  Infection: Not Applicable  Influenza    Bariatric?: No    Vital Signs:   Vitals:    02/17/18 0925   BP: (!) 167/102   Pulse: 89   Resp: 20   Temp: 102.7  F (39.3  C)   SpO2: 98%   Weight: 64.4 kg (142 lb)   Height: 1.524 m (5')       Cardiac Rhythm: ,        Pain level: 0-10 Pain Scale: 10    Is this patient confused?: No    Patient Report:  Serena comes to the ER today for not feeling well for the last week. Fever at home today of 102 for which she did take tylenol. She has a productive cough and her inlfuenza is positive. She's receiving one liter of NS. Await [facility]. VSS. She c/o of body aches.[KL1.1] CXR shows a pneumonia and she will be getting po zithromax and rocephin.[KL1.2]    Family Comments: no family here    OBS brochure/video discussed/provided to patient: No    ED Medications:   Medications   0.9% sodium chloride BOLUS (1,000 mLs Intravenous New Bag 2/17/18 0948)       Drips infusing?:  No      ED NURSE PHONE NUMBER: [id number][KL1.1]            Revision History        User Key Date/Time User Provider Type Action    > KL1.2 2/17/2018 10:34 AM Brandon SOLITARIO RN Registered Nurse Addend     KL1.1 2/17/2018 10:01 AM Brandon SOLITARIO RN Registered Nurse Sign            ED Notes by Meagan Whitney RN at 2/17/2018  9:23 AM     Author:  Meagan Whitney RN Service:  (none) Author Type:  Registered Nurse    Filed:  2/17/2018  9:23 AM Date of Service:  2/17/2018  9:23 AM Creation Time:  2/17/2018  9:23 AM    Status:  Signed :  Meagan Whitney RN (Registered Nurse)         Bed: ED10  Expected date:   Expected time:   Means of arrival:   Comments:  446  89 F flu  0921     Revision History        User Key Date/Time User Provider Type Action    > MV1.1 2/17/2018  9:23 AM Meagan Whitney, RN Registered Nurse Sign                  Procedure Notes     No notes of this type exist for this encounter.         Progress Notes - Therapies (Notes from 02/17/18 through 02/20/18)      Progress Notes by Jessica Chu PT at 2/18/2018 12:24 PM     Author:  Jessica Chu PT Service:  (none) Author Type:  Physical Therapist    Filed:  2/18/2018 12:25 PM Date of Service:  2/18/2018 12:24 PM Creation Time:  2/18/2018 12:24 PM    Status:  Signed :  Jessica Chu PT (Physical Therapist)            02/18/18  "0949   Quick Adds   Type of Visit Initial PT Evaluation   Living Environment   Lives With alone   Living Arrangements condominium   Number of Stairs to Enter Home 0  (Elevator Access. )   Number of Stairs Within Home 0   Transportation Available family or friend will provide   Living Environment Comment Pt reports she has 3 children, although none of them are local.    Self-Care   Usual Activity Tolerance good   Current Activity Tolerance fair   Regular Exercise no   Equipment Currently Used at Home walker, rolling   Functional Level Prior   Ambulation 1-->assistive equipment   Transferring 1-->assistive equipment   Toileting 0-->independent   Bathing (Sponge baths)   Fall history within last six months no   Prior Functional Level Comment Pt reports completing functional mobility independently with use of 4WW.    General Information   Onset of Illness/Injury or Date of Surgery - Date 02/17/18   Referring Physician Mitch Cardona MD   Patient/Family Goals Statement None stated.    Pertinent History of Current Problem (include personal factors and/or comorbidities that impact the POC) 89 y/o female admitted with PNA, influenza B and severe sepsis.    Precautions/Limitations fall precautions  (Droplet precautions)   General Observations Pt in supine upon arrival of therapist.    General Info Comments Up with assist.    Cognitive Status Examination   Orientation person;place  (\"January 2-018\")   Level of Consciousness alert   Follows Commands and Answers Questions 100% of the time   Personal Safety and Judgment intact   Pain Assessment   Patient Currently in Pain No   Posture    Posture Comments Noted forward head and shoulder posture upon standign at FWW.    Range of Motion (ROM)   ROM Comment B LEs WFL.    Strength   Strength Comments Not formally assessed, B LEs grossly 3/5 with functional mobility.    Bed Mobility   Bed Mobility Comments Supine-sit with HOB elevated, SBA.    Transfer Skills   Transfer " "Comments Sit <> stand with FWW and CGA.    Gait   Gait Comments Pt amb 10' with FWW and CGA.    Balance   Balance Comments Noted good sitting balance and fair standing balance at FWW.    Sensory Examination   Sensory Perception Comments Pt reports neuropathy in B hands.    General Therapy Interventions   Planned Therapy Interventions bed mobility training;gait training;ROM;strengthening;transfer training   Clinical Impression   Criteria for Skilled Therapeutic Intervention yes, treatment indicated   PT Diagnosis Difficulty with gait.    Influenced by the following impairments SOB, Decreased activity tolerance, Generalized weakness   Functional limitations due to impairments Limited functional mobility requiring AD and assist.    Clinical Presentation Evolving/Changing   Clinical Presentation Rationale Based on PMH, current presentation, and social support.    Clinical Decision Making (Complexity) Low complexity   Therapy Frequency` daily   Predicted Duration of Therapy Intervention (days/wks) 3 days   Anticipated Discharge Disposition Transitional Care Facility   Risk & Benefits of therapy have been explained Yes   Patient, Family & other staff in agreement with plan of care Yes   [facility] AM-PAC  \"6 Clicks\" V.2 Basic Mobility Inpatient Short Form   1. Turning from your back to your side while in a flat bed without using bedrails? 4 - None   2. Moving from lying on your back to sitting on the side of a flat bed without using bedrails? 3 - A Little   3. Moving to and from a bed to a chair (including a wheelchair)? 3 - A Little   4. Standing up from a chair using your arms (e.g., wheelchair, or bedside chair)? 3 - A Little   5. To walk in hospital room? 3 - A Little   6. Climbing 3-5 steps with a railing? 2 - A Lot   Basic Mobility Raw Score (Score out of 24.Lower scores equate to lower levels of function) 18   Total Evaluation Time   Total Evaluation Time (Minutes) 8[JH1.1]        Revision History        " User Key Date/Time User Provider Type Action    > JH1.1 2/18/2018 12:25 PM Jessica Chu, PT Physical Therapist Sign

## 2018-02-18 ENCOUNTER — APPOINTMENT (OUTPATIENT)
Dept: PHYSICAL THERAPY | Facility: CLINIC | Age: 83
DRG: 871 | End: 2018-02-18
Attending: INTERNAL MEDICINE
Payer: MEDICARE

## 2018-02-18 LAB
ANION GAP SERPL CALCULATED.3IONS-SCNC: 6 MMOL/L (ref 3–14)
BACTERIA SPEC CULT: ABNORMAL
BACTERIA SPEC CULT: ABNORMAL
BACTERIA SPEC CULT: NO GROWTH
BUN SERPL-MCNC: 9 MG/DL (ref 7–30)
CALCIUM SERPL-MCNC: 7.9 MG/DL (ref 8.5–10.1)
CHLORIDE SERPL-SCNC: 104 MMOL/L (ref 94–109)
CO2 SERPL-SCNC: 25 MMOL/L (ref 20–32)
CREAT SERPL-MCNC: 0.51 MG/DL (ref 0.52–1.04)
ERYTHROCYTE [DISTWIDTH] IN BLOOD BY AUTOMATED COUNT: 14.4 % (ref 10–15)
GFR SERPL CREATININE-BSD FRML MDRD: >90 ML/MIN/1.7M2
GLUCOSE BLDC GLUCOMTR-MCNC: 108 MG/DL (ref 70–99)
GLUCOSE BLDC GLUCOMTR-MCNC: 113 MG/DL (ref 70–99)
GLUCOSE BLDC GLUCOMTR-MCNC: 121 MG/DL (ref 70–99)
GLUCOSE BLDC GLUCOMTR-MCNC: 124 MG/DL (ref 70–99)
GLUCOSE SERPL-MCNC: 101 MG/DL (ref 70–99)
GRAM STN SPEC: ABNORMAL
HCT VFR BLD AUTO: 36.4 % (ref 35–47)
HGB BLD-MCNC: 12.4 G/DL (ref 11.7–15.7)
Lab: ABNORMAL
Lab: NORMAL
MCH RBC QN AUTO: 31.1 PG (ref 26.5–33)
MCHC RBC AUTO-ENTMCNC: 34.1 G/DL (ref 31.5–36.5)
MCV RBC AUTO: 91 FL (ref 78–100)
PLATELET # BLD AUTO: 85 10E9/L (ref 150–450)
POTASSIUM SERPL-SCNC: 3.5 MMOL/L (ref 3.4–5.3)
RBC # BLD AUTO: 3.99 10E12/L (ref 3.8–5.2)
SODIUM SERPL-SCNC: 135 MMOL/L (ref 133–144)
SPECIMEN SOURCE: ABNORMAL
SPECIMEN SOURCE: ABNORMAL
SPECIMEN SOURCE: NORMAL
WBC # BLD AUTO: 3 10E9/L (ref 4–11)

## 2018-02-18 PROCEDURE — 25000128 H RX IP 250 OP 636: Performed by: INTERNAL MEDICINE

## 2018-02-18 PROCEDURE — 99232 SBSQ HOSP IP/OBS MODERATE 35: CPT | Performed by: INTERNAL MEDICINE

## 2018-02-18 PROCEDURE — 40000193 ZZH STATISTIC PT WARD VISIT: Performed by: PHYSICAL THERAPIST

## 2018-02-18 PROCEDURE — 80048 BASIC METABOLIC PNL TOTAL CA: CPT | Performed by: INTERNAL MEDICINE

## 2018-02-18 PROCEDURE — 00000146 ZZHCL STATISTIC GLUCOSE BY METER IP

## 2018-02-18 PROCEDURE — 85027 COMPLETE CBC AUTOMATED: CPT | Performed by: INTERNAL MEDICINE

## 2018-02-18 PROCEDURE — 87205 SMEAR GRAM STAIN: CPT | Performed by: INTERNAL MEDICINE

## 2018-02-18 PROCEDURE — A9270 NON-COVERED ITEM OR SERVICE: HCPCS | Mod: GY | Performed by: INTERNAL MEDICINE

## 2018-02-18 PROCEDURE — 27210995 ZZH RX 272: Performed by: INTERNAL MEDICINE

## 2018-02-18 PROCEDURE — 25000131 ZZH RX MED GY IP 250 OP 636 PS 637: Mod: GY | Performed by: INTERNAL MEDICINE

## 2018-02-18 PROCEDURE — 97161 PT EVAL LOW COMPLEX 20 MIN: CPT | Mod: GP | Performed by: PHYSICAL THERAPIST

## 2018-02-18 PROCEDURE — 36415 COLL VENOUS BLD VENIPUNCTURE: CPT | Performed by: INTERNAL MEDICINE

## 2018-02-18 PROCEDURE — 97530 THERAPEUTIC ACTIVITIES: CPT | Mod: GP | Performed by: PHYSICAL THERAPIST

## 2018-02-18 PROCEDURE — 25000132 ZZH RX MED GY IP 250 OP 250 PS 637: Mod: GY | Performed by: INTERNAL MEDICINE

## 2018-02-18 PROCEDURE — 12000007 ZZH R&B INTERMEDIATE

## 2018-02-18 RX ADMIN — OSELTAMIVIR PHOSPHATE 30 MG: 30 CAPSULE ORAL at 20:46

## 2018-02-18 RX ADMIN — LEVOTHYROXINE SODIUM 75 MCG: 50 TABLET ORAL at 09:33

## 2018-02-18 RX ADMIN — GUAIFENESIN AND DEXTROMETHORPHAN 5 ML: 100; 10 SYRUP ORAL at 09:39

## 2018-02-18 RX ADMIN — SODIUM CHLORIDE 1 G: 50 OINTMENT OPHTHALMIC at 20:54

## 2018-02-18 RX ADMIN — ASPIRIN 81 MG 81 MG: 81 TABLET ORAL at 20:45

## 2018-02-18 RX ADMIN — AZITHROMYCIN 250 MG: 250 TABLET, FILM COATED ORAL at 09:33

## 2018-02-18 RX ADMIN — TIMOLOL MALEATE 1 DROP: 5 SOLUTION/ DROPS OPHTHALMIC at 09:31

## 2018-02-18 RX ADMIN — ATORVASTATIN CALCIUM 80 MG: 80 TABLET, FILM COATED ORAL at 20:46

## 2018-02-18 RX ADMIN — ACETAMINOPHEN 650 MG: 325 TABLET, FILM COATED ORAL at 18:32

## 2018-02-18 RX ADMIN — SODIUM CHLORIDE, PRESERVATIVE FREE: 5 INJECTION INTRAVENOUS at 13:58

## 2018-02-18 RX ADMIN — SODIUM CHLORIDE, PRESERVATIVE FREE: 5 INJECTION INTRAVENOUS at 04:13

## 2018-02-18 RX ADMIN — GUAIFENESIN AND DEXTROMETHORPHAN 5 ML: 100; 10 SYRUP ORAL at 04:13

## 2018-02-18 RX ADMIN — CEFTRIAXONE 1 G: 10 INJECTION, POWDER, FOR SOLUTION INTRAVENOUS at 12:48

## 2018-02-18 RX ADMIN — MINERAL OIL, PETROLATUM, PHENYLEPHRINE HCL: 2.5; 140; 749 OINTMENT TOPICAL at 14:02

## 2018-02-18 RX ADMIN — OMEPRAZOLE 20 MG: 20 CAPSULE, DELAYED RELEASE ORAL at 09:33

## 2018-02-18 RX ADMIN — DOCUSATE SODIUM 100 MG: 100 CAPSULE, LIQUID FILLED ORAL at 20:45

## 2018-02-18 RX ADMIN — OSELTAMIVIR PHOSPHATE 30 MG: 30 CAPSULE ORAL at 09:33

## 2018-02-18 RX ADMIN — INSULIN DETEMIR 20 UNITS: 100 INJECTION, SOLUTION SUBCUTANEOUS at 20:47

## 2018-02-18 RX ADMIN — TRAMADOL HYDROCHLORIDE 50 MG: 50 TABLET, COATED ORAL at 09:33

## 2018-02-18 RX ADMIN — TIMOLOL MALEATE 1 DROP: 5 SOLUTION/ DROPS OPHTHALMIC at 20:46

## 2018-02-18 RX ADMIN — SODIUM CHLORIDE 1 G: 50 OINTMENT OPHTHALMIC at 09:37

## 2018-02-18 RX ADMIN — GUAIFENESIN AND DEXTROMETHORPHAN 5 ML: 100; 10 SYRUP ORAL at 18:34

## 2018-02-18 RX ADMIN — INSULIN ASPART 4 UNITS: 100 INJECTION, SOLUTION INTRAVENOUS; SUBCUTANEOUS at 15:46

## 2018-02-18 RX ADMIN — GUAIFENESIN AND DEXTROMETHORPHAN 5 ML: 100; 10 SYRUP ORAL at 20:46

## 2018-02-18 NOTE — PLAN OF CARE
Problem: Patient Care Overview  Goal: Plan of Care/Patient Progress Review  Outcome: No Change  A&Ox2-3. VSS on RA. Denies pain. Frequent nonproductive cough, LS course. Up with 1 and walker to [facility]. UA collected. Continue to monitor.

## 2018-02-18 NOTE — PLAN OF CARE
Problem: Patient Care Overview  Goal: Plan of Care/Patient Progress Review  PT: Orders received, evaluation completed, and treatment initiated. Patient is a 89 y/o female admitted with PNA, influenza B and severe sepsis. Patient lives alone in a condominium with elevator access. Patient reports independence at baseline with use of 4WW.     Discharge Planner PT   Patient plan for discharge: Pt reports she would prefer to return home. Pt is aware current recommendation is TCU.  Current status: O2 sats on RA throughout session remained > 90%. Patient performed supine-sit, SBA. Sit <> stand and ambulation of 40 feet with FWW and CGA for safety, noted decreased gait speed. Patient agreeable to sit up in chair at end of session.   Barriers to return to prior living situation: Lives alone, Decreased activity tolerance, Fall risk  Recommendations for discharge: TCU  Rationale for recommendations: Pt will benefit from continued skilled PT intervention at TCU prior to returning home alone in order to increase independence and safety with functional mobility.        Entered by: Jessica Chu 02/18/2018 12:28 PM

## 2018-02-18 NOTE — H&P
History and Physical     Serena Marei MRN# [id number]   YOB: 1924 Age: 89 year old      Date of Admission:  2/17/2018    Primary care provider: Lin Cruz          Assessment and Plan:   89 year old female, with PmHx of coronary artery disease, type 2 diabetes mellitus,   GERD, hypertension, hyperlipidemia, hypothyroidism who presented to the emergency   room at [facility] on 2/17/18 with complaints of fever as high as 102 F, productive cough of white sputum with yellow specks, body aches, chills and reduced appetite.   Workup done on 2/17/18 revealed, BMP significant for Na+ 129. CBC revealed, Plts 100.   INR was 2.1. Influenza antigen screen was positive for influenza B. 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 Heart size is normal.   No pleural effusion, pneumothorax. There is a small focus of opacity in the medial right lower lobe, which may be an early focus of pneumonia.      1. Influenza B: will treat with Tamiflu 30 mg twice daily for 1 week. For O2 via NC as needed    2. Right lower lobe community acquired pneumonia, with sepsis: continue IV Rocephin and oral azithromycin.  Albuterol nebulizer as needed.  For scheduled Robitussin-DM.  For  sputum and blood cultures.    3.  Type 2 diabetes mellitus: Will continue scheduled insulin aspart with meals.  Continue insulin detemir qhs.  For medium dose insulin aspart sliding scale as needed. HbA1C was 7.1% on 2/6/18.    4.  Hyperlipidemia: Continue Lipitor.    5.  Hypothyroidism: Continue levothyroxine.    6.  GERD: Continue omeprazole.    7.  Glaucoma: Continue timolol eyedrops.    CODE STATUS: The patient is full code.                        Chief Complaint:   Fever, productive cough, body aches, chills and reduced appetite x 1 week.    History is obtained from the patient.         History of Present Illness:   89 year old female, with PmHx of coronary artery  disease, type 2 diabetes mellitus, GERD, hypertension, hyperlipidemia, hypothyroidism, who presented to the emergency room at [facility] on 2/17/18, with symptoms of fever as high as 102 F, productive cough   of white sputum with yellow specks, body aches, chills and reduced appetite. The patient lives independently at home. She went to her primary care physician early last week for the same symptoms, but was not given any medications.  Vitals in the ER revealed, temp 102.7F,   B.P. 167/102, HR 89, Resp 20 and O2 sats 90% on RA.  Workup done on 2/17/18 revealed,   BMP significant for Na+ 129. CBC revealed, Plts 100. INR was 2.1. Influenza antigen screen was positive for influenza B.vChest x-rays on 2/17/18 revealed,  dual-lead pacemaker noted overlying the left hemithorax.vHeart size is normal. No pleural effusion, pneumothorax. There is a small focus of opacityvin the medial right lower lobe, which may be an early focus of pneumonia.   In the ER, the patient was given IV N/Saline 1L bolus, IV Rocephin 1g, Azithromycin 500mg x 1   and Tamiflu 30mg x 1.            Past Medical History:     Past Medical History:   Diagnosis Date     CAD (coronary artery disease) 8/2012 8/2012 Cath - 75% RCA with BRIGITTE placed, 40% ostial and distal left main stenosis     Diabetes mellitus type 2 with peripheral artery disease (H)      DM type 2, goal A1c below 7      GERD (gastroesophageal reflux disease)      Gluten intolerance      HTN (hypertension)     been off meds since 2012     Hyperlipidemia LDL goal < 100      Pacemaker 8/2009 8/2009 Near syncopal episode with HR 40s and BP 60/sys - PPM implanted     PAD (peripheral artery disease) (H) 2008    70% proximal right SFA stenosis with successful angioplasy     S/P coronary angiogram 8/2012 8/2012 Cath - 75% RCA with BRIGITTE placed, 40% ostial and distal left main stenosis     Syncope 08/2009 8/2009 Near syncopal episode with HR 40s and BP 60/sys - PPM  implanted     Unspecified hypothyroidism              Past Surgical History:     Past Surgical History:   Procedure Laterality Date     ANGIOPLASTY  2008    Angioplasty of proximal right superficial femoral artery     APPENDECTOMY       BACK SURGERY       C REMOVAL OF EYE      right      CHOLECYSTECTOMY, LAPOROSCOPIC      Cholecystectomy, Laparoscopic     COLECTOMY       EYE SURGERY      corneal transplant      HYSTERECTOMY, EMANI      BSO     IMPLANT PACEMAKER  8/2009     STENT, CORONARY, FATOU  8-12 8/2012 Cath - 75% RCA with BRIGITTE placed, 40% ostial and distal left main stenosis     TONSILLECTOMY & ADENOIDECTOMY                   Social History:     Social History   Substance Use Topics     Smoking status: Former Smoker     Packs/day: 1.00     Years: 3.00     Types: Cigarettes     Quit date: 8/17/1947     Smokeless tobacco: Never Used     Alcohol use No             Family History:     Family History   Problem Relation Age of Onset     CANCER Mother      CANCER Father      Unknown/Adopted Maternal Grandmother      Unknown/Adopted Maternal Grandfather      Unknown/Adopted Paternal Grandmother      Unknown/Adopted Paternal Grandfather              Immunizations:     Immunization History   Administered Date(s) Administered     Pneumococcal 23 valent 06/11/2009            Allergies:     Allergies   Allergen Reactions     Contrast Dye Hives and Swelling     Pt. States no history of allergy to topical betadine. Tolerated surgical betadine prep 2-6-2012.     Lisinopril Cough     dizzy     Metoprolol      Depressed               Medications:     Prescriptions Prior to Admission   Medication Sig Dispense Refill Last Dose     insulin aspart (NOVOLOG FLEXPEN) 100 UNIT/ML injection Inject 12 Units Subcutaneous 4 times daily (with meals and nightly)        omeprazole (PRILOSEC) 20 MG CR capsule TAKE ONE CAPSULE BY MOUTH AT BEDTIME - NO TUMS WITHIN 2 HOURS OF MED 30 capsule 10 2/16/2018 at Unknown time     traMADol (ULTRAM) 50 MG  tablet Take 1 tablet (50 mg) by mouth every 6 hours as needed for pain No driving a car or drinking alcohol for at least 8 hours after taking this medication. 20 tablet 0 Taking     levothyroxine (SYNTHROID/LEVOTHROID) 75 MCG tablet TAKE ONE TABLET BY MOUTH ONE TIME DAILY  90 tablet 1 2/16/2018 at Unknown time     lidocaine (LIDODERM) 5 % Patch APPLY UP TO 3 PATCHES TO PAINFUL AREA AT ONCE FOR UP TO 12 H WITHIN A 24 H PERIOD.  REMOVE AFTER 12 HOURS. 60 patch 11 2/17/2018 at am     insulin detemir (LEVEMIR FLEXTOUCH) 100 UNIT/ML injection INJECT 20 UNITS UNDER THE SKIN AT BEDTIME 15 mL 8 2/16/2018 at Unknown time     Tetrahydroz-Dextran-PEG-Povid (TH EYE DROP ADVANCED RELIEF) 0.05-0.1-1-1 % SOLN Apply to eye as needed For dry eye relief- 5 x per day   Taking     sodium chloride (TITO 128) 5 % ophthalmic ointment Place 1 Application into the right eye 2 times daily as needed for dry eyes   Taking     acetaminophen (TYLENOL ARTHRITIS PAIN) 650 MG CR tablet Take 1,300 mg by mouth daily States 2 tablets qam & usually 2 tablets qpm   2/16/2018 at Unknown time     RaNITidine HCl (ZANTAC 150 MAXIMUM STRENGTH PO) Take 150 mg by mouth daily as needed   Taking     cholecalciferol (VITAMIN D) 1000 UNIT tablet Take 2,000 Units by mouth daily   2/16/2018 at Unknown time     atorvastatin (LIPITOR) 80 MG tablet Take 1 tablet (80 mg) by mouth daily 90 tablet 3 2/16/2018 at Unknown time     Multiple Vitamins-Minerals (PRESERVISION AREDS 2 PO) Take 1 tablet by mouth every morning   2/16/2018 at Unknown time     docusate sodium (DOCQLACE) 100 MG capsule Take 100 mg by mouth At Bedtime   2/16/2018 at Unknown time     Terbinafine (LAMISIL EX) Externally apply topically daily For under breasts, in between toes   2/16/2018 at Unknown time     Pramoxine HCl (VAGISIL ANTI-ITCH MEDICATED EX) Externally apply topically every morning   2/16/2018 at Unknown time     phenylephrine-shark liver oil-mineral oil-petrolatum (PREPARATION H)  0.25-14-74.9 % rectal ointment Place rectally daily   2/16/2018 at Unknown time     fish oil-omega-3 fatty acids 1000 MG capsule Take 1,200 mg by mouth daily    2/16/2018 at Unknown time     timolol (TIMOPTIC) 0.5 % ophthalmic solution Place 1 drop Into the left eye 2 times daily  1 Bottle  2/17/2018 at x1     sodium chloride (TITO 128) 5 % ophthalmic solution Place 1 drop Into the left eye daily as needed for dry eyes    Taking     fluocinolone acetonide 0.01 % OIL Apply to scalp when wet massage well cover with cap leave on over night  Wash in am. (Patient taking differently: as needed Apply to scalp when wet massage well cover with cap leave on over night  Wash in am.) 118 mL 12 Taking     aspirin 81 MG tablet Take 1 tablet by mouth every evening    2/16/2018 at Unknown time     UNIFINE PENTIPS 31G X 6 MM USE ONCE DAILY 100 each 11 Taking     blood glucose monitoring (ONE TOUCH ULTRASOFT) lancets Use to test blood sugar 3-4 times daily or as directed. [address] Taking     ONE TOUCH ULTRA test strip TEST BLOOD GLUCOSE FOUR TIMES A  each 11 Taking     Nutritional Supplements (GLUCERNA 1.5 JEN PO) Take 1 Can by mouth daily    Taking     UNABLE TO FIND Take 1 Bar by mouth 2 times daily MEDICATION NAME: FiberONe Bar    Taking     ACE/ARB NOT PRESCRIBED, INTENTIONAL, 1 each continuous prn ACE & ARB not prescribed due to Risk for drug interaction and Worsening renal function on ACE/ARB therapy   Taking             Review of Systems:   The 10 point Review of Systems is negative other than noted in the HPI              Physical Exam:     Vitals were reviewed  Patient Vitals for the past 72 hrs:   BP Temp Temp src Pulse Resp SpO2 Height Weight   02/17/18 1546 144/66 99.5  F (37.5  C) Oral 82 16 96 % - -   02/17/18 1218 155/66 98.3  F (36.8  C) Oral 69 18 99 % - -   02/17/18 1100 173/85 - - 76 14 97 % - -   02/17/18 1045 163/69 - - - - 99 % - -   02/17/18 1030 168/75 - - 87 18 98 % - -   02/17/18 1022 168/58 - - 88  18 100 % - -   02/17/18 0925 (!) 167/102 102.7  F (39.3  C) - 89 20 98 % 1.524 m (5') 64.4 kg (142 lb)     Constitutional:   Elderly white female, awake, cooperative, no apparent distress, O2 Sats 96% on RA     Lungs:   Clear to auscultation bilaterally, no crackles or wheezing     Cardiovascular:   S1 and S2, regular rate and rhythm, no S3 or S4, and no murmur noted     Abdomen:   Soft, non-distended, non-tender, no masses palpated, no hepatosplenomegaly, BS present     Musculoskeletal:   No pedal edema          Data:     Results for orders placed or performed during the hospital encounter of 02/17/18   XR Chest 2 Views    Narrative    CHEST TWO VIEWS  2/17/2018 9:59 AM     HISTORY: 89-year-old with fever and cough.       Impression    IMPRESSION: Since September 13, 2016, dual-lead pacemaker is again  noted overlying the left hemithorax. Heart size is normal. No pleural  effusion, pneumothorax. There is a small focus of opacity in the  medial right lower lobe, which may be an early focus of pneumonia.  Recommend continued radiographic surveillance until resolution.    LAUREN KIRAN MD   CBC with platelets differential   Result Value Ref Range    WBC 4.0 4.0 - 11.0 10e9/L    RBC Count 4.58 3.8 - 5.2 10e12/L    Hemoglobin 14.2 11.7 - 15.7 g/dL    Hematocrit 41.3 35.0 - 47.0 %    MCV 90 78 - 100 fl    MCH 31.0 26.5 - 33.0 pg    MCHC 34.4 31.5 - 36.5 g/dL    RDW 14.1 10.0 - 15.0 %    Platelet Count 100 (L) 150 - 450 10e9/L    Diff Method Automated Method     % Neutrophils 69.4 %    % Lymphocytes 16.1 %    % Monocytes 14.1 %    % Eosinophils 0.0 %    % Basophils 0.2 %    % Immature Granulocytes 0.2 %    Nucleated RBCs 0 0 /100    Absolute Neutrophil 2.8 1.6 - 8.3 10e9/L    Absolute Lymphocytes 0.7 (L) 0.8 - 5.3 10e9/L    Absolute Monocytes 0.6 0.0 - 1.3 10e9/L    Absolute Eosinophils 0.0 0.0 - 0.7 10e9/L    Absolute Basophils 0.0 0.0 - 0.2 10e9/L    Abs Immature Granulocytes 0.0 0 - 0.4 10e9/L    Absolute Nucleated  RBC 0.0    Basic metabolic panel   Result Value Ref Range    Sodium 129 (L) 133 - 144 mmol/L    Potassium 4.4 3.4 - 5.3 mmol/L    Chloride 97 94 - 109 mmol/L    Carbon Dioxide 22 20 - 32 mmol/L    Anion Gap 10 3 - 14 mmol/L    Glucose 186 (H) 70 - 99 mg/dL    Urea Nitrogen 18 7 - 30 mg/dL    Creatinine 0.55 0.52 - 1.04 mg/dL    GFR Estimate >90 >60 mL/min/1.7m2    GFR Estimate If Black >90 >60 mL/min/1.7m2    Calcium 8.5 8.5 - 10.1 mg/dL   Lactic acid   Result Value Ref Range    Lactic Acid 2.1 (H) 0.4 - 2.0 mmol/L   Lactic acid   Result Value Ref Range    Lactic Acid Canceled, Test credited, specimen discarded 0.4 - 2.0 mmol/L   Lactic acid   Result Value Ref Range    Lactic Acid 1.2 0.7 - 2.0 mmol/L   Glucose by meter   Result Value Ref Range    Glucose 196 (H) 70 - 99 mg/dL   Glucose by meter   Result Value Ref Range    Glucose 216 (H) 70 - 99 mg/dL   Influenza A/B antigen   Result Value Ref Range    Influenza A/B Agn Specimen Nasal     Influenza A Negative NEG^Negative    Influenza B Positive (A) NEG^Negative   Blood culture   Result Value Ref Range    Specimen Description Blood Left Arm     Special Requests Aerobic and anaerobic bottles received     Culture Micro No growth after 4 hours    Blood culture   Result Value Ref Range    Specimen Description Blood Right Arm     Special Requests Aerobic and anaerobic bottles received     Culture Micro No growth after 3 hours

## 2018-02-18 NOTE — PROGRESS NOTES
A&Ox2, VSS on 3-4L O2. LS course with frequent nonproductive cough. UA sent down. Up with 1 walker and GB to [facility]. Continue to monitor.

## 2018-02-18 NOTE — PROGRESS NOTES
"   02/18/18 0949   Quick Adds   Type of Visit Initial PT Evaluation   Living Environment   Lives With alone   Living Arrangements condominium   Number of Stairs to Enter Home 0  (Elevator Access. )   Number of Stairs Within Home 0   Transportation Available family or friend will provide   Living Environment Comment Pt reports she has 3 children, although none of them are local.    Self-Care   Usual Activity Tolerance good   Current Activity Tolerance fair   Regular Exercise no   Equipment Currently Used at Home walker, rolling   Functional Level Prior   Ambulation 1-->assistive equipment   Transferring 1-->assistive equipment   Toileting 0-->independent   Bathing (Sponge baths)   Fall history within last six months no   Prior Functional Level Comment Pt reports completing functional mobility independently with use of 4WW.    General Information   Onset of Illness/Injury or Date of Surgery - Date 02/17/18   Referring Physician Mitch Cardona MD   Patient/Family Goals Statement None stated.    Pertinent History of Current Problem (include personal factors and/or comorbidities that impact the POC) 89 y/o female admitted with PNA, influenza B and severe sepsis.    Precautions/Limitations fall precautions  (Droplet precautions)   General Observations Pt in supine upon arrival of therapist.    General Info Comments Up with assist.    Cognitive Status Examination   Orientation person;place  (\"January 2-018\")   Level of Consciousness alert   Follows Commands and Answers Questions 100% of the time   Personal Safety and Judgment intact   Pain Assessment   Patient Currently in Pain No   Posture    Posture Comments Noted forward head and shoulder posture upon standign at FWW.    Range of Motion (ROM)   ROM Comment B LEs WFL.    Strength   Strength Comments Not formally assessed, B LEs grossly 3/5 with functional mobility.    Bed Mobility   Bed Mobility Comments Supine-sit with HOB elevated, SBA.    Transfer Skills " "  Transfer Comments Sit <> stand with FWW and CGA.    Gait   Gait Comments Pt amb 10' with FWW and CGA.    Balance   Balance Comments Noted good sitting balance and fair standing balance at FWW.    Sensory Examination   Sensory Perception Comments Pt reports neuropathy in B hands.    General Therapy Interventions   Planned Therapy Interventions bed mobility training;gait training;ROM;strengthening;transfer training   Clinical Impression   Criteria for Skilled Therapeutic Intervention yes, treatment indicated   PT Diagnosis Difficulty with gait.    Influenced by the following impairments SOB, Decreased activity tolerance, Generalized weakness   Functional limitations due to impairments Limited functional mobility requiring AD and assist.    Clinical Presentation Evolving/Changing   Clinical Presentation Rationale Based on PMH, current presentation, and social support.    Clinical Decision Making (Complexity) Low complexity   Therapy Frequency` daily   Predicted Duration of Therapy Intervention (days/wks) 3 days   Anticipated Discharge Disposition Transitional Care Facility   Risk & Benefits of therapy have been explained Yes   Patient, Family & other staff in agreement with plan of care Yes   [facility] AM-PAC  \"6 Clicks\" V.2 Basic Mobility Inpatient Short Form   1. Turning from your back to your side while in a flat bed without using bedrails? 4 - None   2. Moving from lying on your back to sitting on the side of a flat bed without using bedrails? 3 - A Little   3. Moving to and from a bed to a chair (including a wheelchair)? 3 - A Little   4. Standing up from a chair using your arms (e.g., wheelchair, or bedside chair)? 3 - A Little   5. To walk in hospital room? 3 - A Little   6. Climbing 3-5 steps with a railing? 2 - A Lot   Basic Mobility Raw Score (Score out of 24.Lower scores equate to lower levels of function) 18   Total Evaluation Time   Total Evaluation Time (Minutes) 8     "

## 2018-02-18 NOTE — PLAN OF CARE
Problem: Patient Care Overview  Goal: Plan of Care/Patient Progress Review  Outcome: Declining  Pt is AAOx3, not to time, forgetful. Encouraged to have fluids, assisted to commode x1, VSS, CMS intact, weak strength. Non-productive cough, frequent, tx with robitussin. Will continue to monitor.

## 2018-02-18 NOTE — PLAN OF CARE
Problem: Patient Care Overview  Goal: Plan of Care/Patient Progress Review  VSS on RA, up to [facility] with assist 1 and walker, voiding, loose stool x 1, no appetite.

## 2018-02-19 LAB
GLUCOSE BLDC GLUCOMTR-MCNC: 101 MG/DL (ref 70–99)
GLUCOSE BLDC GLUCOMTR-MCNC: 102 MG/DL (ref 70–99)
GLUCOSE BLDC GLUCOMTR-MCNC: 117 MG/DL (ref 70–99)
GLUCOSE BLDC GLUCOMTR-MCNC: 83 MG/DL (ref 70–99)
GLUCOSE BLDC GLUCOMTR-MCNC: 84 MG/DL (ref 70–99)

## 2018-02-19 PROCEDURE — 00000146 ZZHCL STATISTIC GLUCOSE BY METER IP

## 2018-02-19 PROCEDURE — 99232 SBSQ HOSP IP/OBS MODERATE 35: CPT | Performed by: INTERNAL MEDICINE

## 2018-02-19 PROCEDURE — A9270 NON-COVERED ITEM OR SERVICE: HCPCS | Mod: GY | Performed by: INTERNAL MEDICINE

## 2018-02-19 PROCEDURE — 27210995 ZZH RX 272: Performed by: INTERNAL MEDICINE

## 2018-02-19 PROCEDURE — 25000132 ZZH RX MED GY IP 250 OP 250 PS 637: Mod: GY | Performed by: INTERNAL MEDICINE

## 2018-02-19 PROCEDURE — 99207 ZZC CDG-MDM COMPONENT: MEETS MODERATE - UP CODED: CPT | Performed by: INTERNAL MEDICINE

## 2018-02-19 PROCEDURE — 12000007 ZZH R&B INTERMEDIATE

## 2018-02-19 PROCEDURE — 87493 C DIFF AMPLIFIED PROBE: CPT | Performed by: INTERNAL MEDICINE

## 2018-02-19 PROCEDURE — 25000128 H RX IP 250 OP 636: Performed by: INTERNAL MEDICINE

## 2018-02-19 RX ORDER — HYDRALAZINE HYDROCHLORIDE 20 MG/ML
10 INJECTION INTRAMUSCULAR; INTRAVENOUS EVERY 4 HOURS PRN
Status: DISCONTINUED | OUTPATIENT
Start: 2018-02-19 | End: 2018-02-22 | Stop reason: HOSPADM

## 2018-02-19 RX ADMIN — ATORVASTATIN CALCIUM 80 MG: 80 TABLET, FILM COATED ORAL at 18:50

## 2018-02-19 RX ADMIN — HYDRALAZINE HYDROCHLORIDE 10 MG: 20 INJECTION INTRAMUSCULAR; INTRAVENOUS at 11:58

## 2018-02-19 RX ADMIN — GUAIFENESIN AND DEXTROMETHORPHAN 5 ML: 100; 10 SYRUP ORAL at 21:31

## 2018-02-19 RX ADMIN — INSULIN ASPART 12 UNITS: 100 INJECTION, SOLUTION INTRAVENOUS; SUBCUTANEOUS at 08:15

## 2018-02-19 RX ADMIN — OMEPRAZOLE 20 MG: 20 CAPSULE, DELAYED RELEASE ORAL at 08:13

## 2018-02-19 RX ADMIN — HYDRALAZINE HYDROCHLORIDE 10 MG: 20 INJECTION INTRAMUSCULAR; INTRAVENOUS at 18:50

## 2018-02-19 RX ADMIN — OSELTAMIVIR PHOSPHATE 30 MG: 30 CAPSULE ORAL at 08:13

## 2018-02-19 RX ADMIN — AZITHROMYCIN 250 MG: 250 TABLET, FILM COATED ORAL at 08:13

## 2018-02-19 RX ADMIN — ASPIRIN 81 MG 81 MG: 81 TABLET ORAL at 18:50

## 2018-02-19 RX ADMIN — TIMOLOL MALEATE 1 DROP: 5 SOLUTION/ DROPS OPHTHALMIC at 08:20

## 2018-02-19 RX ADMIN — LEVOTHYROXINE SODIUM 75 MCG: 50 TABLET ORAL at 08:13

## 2018-02-19 RX ADMIN — SODIUM CHLORIDE 1 G: 50 OINTMENT OPHTHALMIC at 16:49

## 2018-02-19 RX ADMIN — TIMOLOL MALEATE 1 DROP: 5 SOLUTION/ DROPS OPHTHALMIC at 21:31

## 2018-02-19 RX ADMIN — CEFTRIAXONE 1 G: 10 INJECTION, POWDER, FOR SOLUTION INTRAVENOUS at 10:51

## 2018-02-19 RX ADMIN — GUAIFENESIN AND DEXTROMETHORPHAN 5 ML: 100; 10 SYRUP ORAL at 16:57

## 2018-02-19 RX ADMIN — SODIUM CHLORIDE, PRESERVATIVE FREE: 5 INJECTION INTRAVENOUS at 00:23

## 2018-02-19 RX ADMIN — Medication 1 LOZENGE: at 21:30

## 2018-02-19 RX ADMIN — GUAIFENESIN AND DEXTROMETHORPHAN 5 ML: 100; 10 SYRUP ORAL at 09:56

## 2018-02-19 RX ADMIN — GUAIFENESIN AND DEXTROMETHORPHAN 5 ML: 100; 10 SYRUP ORAL at 04:02

## 2018-02-19 RX ADMIN — MICONAZOLE NITRATE: 2 POWDER TOPICAL at 21:40

## 2018-02-19 RX ADMIN — ACETAMINOPHEN 650 MG: 325 TABLET, FILM COATED ORAL at 08:13

## 2018-02-19 RX ADMIN — ACETAMINOPHEN 650 MG: 325 TABLET, FILM COATED ORAL at 16:57

## 2018-02-19 RX ADMIN — OSELTAMIVIR PHOSPHATE 30 MG: 30 CAPSULE ORAL at 21:30

## 2018-02-19 ASSESSMENT — ACTIVITIES OF DAILY LIVING (ADL)
RETIRED_EATING: 0-->INDEPENDENT
COGNITION: 1 - ATTENTION OR MEMORY DEFICITS
FALL_HISTORY_WITHIN_LAST_SIX_MONTHS: NO
AMBULATION: 1-->ASSISTIVE EQUIPMENT
SWALLOWING: 0-->SWALLOWS FOODS/LIQUIDS WITHOUT DIFFICULTY
DRESS: 0-->INDEPENDENT
RETIRED_COMMUNICATION: 0-->UNDERSTANDS/COMMUNICATES WITHOUT DIFFICULTY
TOILETING: 0-->INDEPENDENT
WHICH_OF_THE_ABOVE_FUNCTIONAL_RISKS_HAD_A_RECENT_ONSET_OR_CHANGE?: AMBULATION;TRANSFERRING;TOILETING
TRANSFERRING: 1-->ASSISTIVE EQUIPMENT
BATHING: 1-->ASSISTIVE EQUIPMENT

## 2018-02-19 NOTE — PLAN OF CARE
Problem: Pneumonia (Adult)  Goal: Signs and Symptoms of Listed Potential Problems Will be Absent, Minimized or Managed (Pneumonia)  Signs and symptoms of listed potential problems will be absent, minimized or managed by discharge/transition of care (reference Pneumonia (Adult) CPG).  Outcome: No Change  [id number]: VSS. AAOx3; forgetful at times. R eye absent and limited vision in L eye w/ glaucoma. Up w/ 1-assist to [facility]. Positive for PNA and Influenza B. Droplet precautions maintained. Continue w/ IV rocephin, IVF, and tamiflu. NSS at 100 mL/hr. Continue to monitor.

## 2018-02-19 NOTE — PROGRESS NOTES
[facility]    Hospitalist Progress Note  Mitch Cardona MD    Assessment & Plan     89 year old female, with PmHx of coronary artery disease, type 2 diabetes mellitus,   GERD, hypertension, hyperlipidemia, hypothyroidism who presented to the emergency   room at [facility] on 2/17/18 with complaints of fever as high as 102 F, productive cough of white sputum with yellow specks, body aches, chills and reduced appetite.  Workup done on 2/17/18 revealed, BMP significant for Na+ 129. CBC revealed,   Plts 100. INR was 2.1. Influenza antigen screen was positive for influenza B. 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 Heart size is normal. No pleural effusion, pneumothorax. There is a small focus of opacity in the medial right lower lobe, which may be an early focus of pneumonia.       1. Influenza B: continue Tamiflu 30 mg twice daily for 1 week (to complete 2/23/18).   For O2 via NC as needed     2. Right lower lobe community acquired pneumonia, with sepsis: continue IV Rocephin and oral azithromycin. Albuterol nebulizer as needed. For scheduled Robitussin-DM.   Sputum cultures on 2/18/18 was oral contamination. Blood and urine cultures on 2/17/18 is negative.     3.  Type 2 diabetes mellitus: BG on 84 this am. Continue scheduled insulin aspart with meals. Continue insulin detemir qhs. For medium dose insulin aspart sliding scale as needed. HbA1C was 7.1% on 2/6/18.     4.  Hyperlipidemia: Continue Lipitor.     5.  Hypothyroidism: Continue levothyroxine.     6.  GERD: Continue omeprazole.     7.  Glaucoma: Continue timolol eyedrops.    8. Elevated blood pressure, without diagnosis of hypertension: pt's SBP today is ranging 160s-190s. She was not on B.P. Medications at home. Continue IV hydralazine prn. Discontinued IV N/Saline today.      DVT Prophylaxis: Pneumatic Compression Devices  Code Status: Full Code    Disposition:  Expected discharge in 1-2 days.      Interval History   The pt had no new complaints today. She has daily improvement of her cough and appetite.     -Data reviewed today: I reviewed all new labs and imaging results over the last 24 hours. I personally reviewed no images or EKG's today.    Physical Exam   Temp: 98.2  F (36.8  C) Temp src: Oral BP: 173/68 Pulse: 70   Resp: 16 SpO2: 99 % O2 Device: None (Room air)    Vitals:    02/17/18 0925   Weight: 64.4 kg (142 lb)     Vital Signs with Ranges  Temp:  [97.5  F (36.4  C)-98.2  F (36.8  C)] 98.2  F (36.8  C)  Pulse:  [58-86] 70  Resp:  [16-19] 16  BP: (138-199)/(66-99) 173/68  SpO2:  [98 %-100 %] 99 %  I/O last 3 completed shifts:  In: 2740 [P.O.:540; I.V.:2200]  Out: 2500 [Urine:2500]    Constitutional: Elderly white female, awake, cooperative, no apparent distress, O2 Sats 99% on RA  Respiratory: BS vesicular bilaterally, no crackles or wheezing  Cardiovascular: S1 and S2, reg, no murmur noted  GI: Soft, non-distended, non-tender, no masses, BS present+  Skin/Integumen: No rashes  Extremities: No pedal edema    Medications     sodium chloride 100 mL/hr at 02/19/18 0023       Pramoxine HCl   Apply externally QAM     aspirin  81 mg Oral QPM     atorvastatin  80 mg Oral QPM     docusate sodium  100 mg Oral At Bedtime     insulin aspart  12 Units Subcutaneous 4x Daily w/meals     insulin detemir  20 Units Subcutaneous At Bedtime     levothyroxine  75 mcg Oral Daily     omeprazole  20 mg Oral QAM     phenylephrine-shark liver oil-mineral oil-petrolatum   Rectal Daily     timolol  1 drop Left Eye BID     Lidocaine  3 patch Transdermal Q24h     insulin aspart  1-7 Units Subcutaneous TID AC     insulin aspart  1-5 Units Subcutaneous At Bedtime     sodium chloride (PF)  3 mL Intracatheter Q8H     cefTRIAXone  1 g Intravenous Q24H     azithromycin  250 mg Oral Daily     guaiFENesin-dextromethorphan  5 mL Oral Q6H     lidocaine   Transdermal Q24H     lidocaine   Transdermal Q8H      oseltamivir  30 mg Oral BID       Data     Recent Labs  Lab 02/18/18  0707 02/17/18  0938   WBC 3.0* 4.0   HGB 12.4 14.2   MCV 91 90   PLT 85* 100*    129*   POTASSIUM 3.5 4.4   CHLORIDE 104 97   CO2 25 22   BUN 9 18   CR 0.51* 0.55   ANIONGAP 6 10   JEN 7.9* 8.5   * 186*       No results found for this or any previous visit (from the past 24 hour(s)).

## 2018-02-19 NOTE — PLAN OF CARE
Problem: Patient Care Overview  Goal: Plan of Care/Patient Progress Review  Outcome: Improving  Ultram given prior to PT, patient up with minimal assist 1 and walker, voiding on BSC, appetite fair, Tylenol given for headache.

## 2018-02-19 NOTE — PROGRESS NOTES
[facility]    Hospitalist Progress Note  Mitch Cardona MD    Assessment & Plan     89 year old female, with PmHx of coronary artery disease, type 2 diabetes mellitus,   GERD, hypertension, hyperlipidemia, hypothyroidism who presented to the emergency   room at [facility] on 2/17/18 with complaints of fever as high as 102 F, productive cough of white sputum with yellow specks, body aches, chills and reduced appetite.  Workup done on 2/17/18 revealed, BMP significant for Na+ 129. CBC revealed,   Plts 100. INR was 2.1. Influenza antigen screen was positive for influenza B. 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 Heart size is normal. No pleural effusion, pneumothorax. There is a small focus of opacity in the medial right lower lobe, which may be an early focus of pneumonia.       1. Influenza B: continue Tamiflu 30 mg twice daily for 1 week. For O2 via NC as needed     2. Right lower lobe community acquired pneumonia, with sepsis: continue IV Rocephin and oral azithromycin. Albuterol nebulizer as needed. For scheduled Robitussin-DM.   Sputum cultures on 2/18/18 was oral contamination. Blood cultures on 2/17/18 is negative  so far.     3.  Type 2 diabetes mellitus: BG on 101 this am. Continue scheduled insulin aspart with meals. Continue insulin detemir qhs. For medium dose insulin aspart sliding scale as needed. HbA1C was 7.1% on 2/6/18.     4.  Hyperlipidemia: Continue Lipitor.     5.  Hypothyroidism: Continue levothyroxine.     6.  GERD: Continue omeprazole.     7.  Glaucoma: Continue timolol eyedrops.      DVT Prophylaxis: Pneumatic Compression Devices  Code Status: Full Code    Disposition: Expected discharge in 1-2 days.      Interval History   The pt reported some improvement of her cough since admission. Taking in deep breath makes her cough. She has cough productive of whitish/yellow sputum. She stated that her  appetite is improving.     -Data reviewed today: I reviewed all new labs and imaging results over the last 24 hours. I personally reviewed no images or EKG's today.    Physical Exam   Temp: 98.1  F (36.7  C) Temp src: Oral BP: 143/68 Pulse: 71   Resp: 16 SpO2: 98 % O2 Device: None (Room air)    Vitals:    02/17/18 0925   Weight: 64.4 kg (142 lb)     Vital Signs with Ranges  Temp:  [97.5  F (36.4  C)-98.2  F (36.8  C)] 98.1  F (36.7  C)  Pulse:  [60-71] 71  Resp:  [16-19] 16  BP: (143-170)/(60-73) 143/68  SpO2:  [96 %-100 %] 98 %  I/O last 3 completed shifts:  In: 1756.67 [P.O.:350; I.V.:1406.67]  Out: 1000 [Urine:1000]    Constitutional: Elderly white female, awake, cooperative, no apparent distress, O2 Sats 98% on RA  Respiratory: BS vesicular bilaterally, no crackles or wheezing  Cardiovascular: S1 and S2, reg, no murmur noted  GI: Soft, non-distended, non-tender, no masses, BS present+  Skin/Integumen: No rashes  Extremities: No pedal edema    Medications     sodium chloride 100 mL/hr at 02/18/18 1358       aspirin  81 mg Oral QPM     atorvastatin  80 mg Oral QPM     docusate sodium  100 mg Oral At Bedtime     insulin aspart  12 Units Subcutaneous 4x Daily w/meals     insulin detemir  20 Units Subcutaneous At Bedtime     levothyroxine  75 mcg Oral Daily     omeprazole  20 mg Oral QAM     phenylephrine-shark liver oil-mineral oil-petrolatum   Rectal Daily     Pramoxine HCl  1 Application Apply externally QAM     timolol  1 drop Left Eye BID     Lidocaine  3 patch Transdermal Q24h     insulin aspart  1-7 Units Subcutaneous TID AC     insulin aspart  1-5 Units Subcutaneous At Bedtime     sodium chloride (PF)  3 mL Intracatheter Q8H     cefTRIAXone  1 g Intravenous Q24H     azithromycin  250 mg Oral Daily     guaiFENesin-dextromethorphan  5 mL Oral Q6H     lidocaine   Transdermal Q24H     lidocaine   Transdermal Q8H     oseltamivir  30 mg Oral BID       Data     Recent Labs  Lab 02/18/18  0707 02/17/18  0938   WBC  3.0* 4.0   HGB 12.4 14.2   MCV 91 90   PLT 85* 100*    129*   POTASSIUM 3.5 4.4   CHLORIDE 104 97   CO2 25 22   BUN 9 18   CR 0.51* 0.55   ANIONGAP 6 10   JEN 7.9* 8.5   * 186*       No results found for this or any previous visit (from the past 24 hour(s)).

## 2018-02-19 NOTE — PLAN OF CARE
Problem: Pneumonia (Adult)  Goal: Signs and Symptoms of Listed Potential Problems Will be Absent, Minimized or Managed (Pneumonia)  Signs and symptoms of listed potential problems will be absent, minimized or managed by discharge/transition of care (reference Pneumonia (Adult) CPG).   Outcome: No Change  Pt A/Ox3, baseline limited vision. VSS. Up 1 assist w/ walker. Reports pain 4/10 using tylenol with relief. Mod CHO diet tolerated,  and 84. Frequent nonproductive cough. Lung sound coarse crackles. Voiding adequately. Prune juice given with breakfast for constipation, pt declined further intervention.

## 2018-02-19 NOTE — PROGRESS NOTES
Left message with daughter Priti to discuss discharge planning, await medical stability.  Mely Ahn RN

## 2018-02-19 NOTE — PLAN OF CARE
Problem: Patient Care Overview  Goal: Plan of Care/Patient Progress Review  Outcome: Improving  Pt is AAOx3, not to time, voiding adequately, assistx1 to bsc. Lung sounds improved with mild congestion. Frequent non productive cough. BP is slightly elevated, asymptomatic, CMS intact, will continue to monitor.

## 2018-02-19 NOTE — PLAN OF CARE
Problem: Patient Care Overview  Goal: Plan of Care/Patient Progress Review  PT: Pt with increased BP this AM, per discussion with pt's RN no further medical management at this time per medical team, appropriate to attempt therapy at this time. Pt attempted, /80 (previously 199/87 and 193/76), pt also endorses headache to frontal lobe rates 4/10 at this time. Discussed gentle activity or supine exercises. Pt declines any participation at this time, stating tired after being up in chair and eating morning meal, family also present. RN aware, will attempt back as able.

## 2018-02-20 ENCOUNTER — CARE COORDINATION (OUTPATIENT)
Dept: CARE COORDINATION | Facility: CLINIC | Age: 83
End: 2018-02-20

## 2018-02-20 LAB
C DIFF TOX B STL QL: NEGATIVE
GLUCOSE BLDC GLUCOMTR-MCNC: 128 MG/DL (ref 70–99)
GLUCOSE BLDC GLUCOMTR-MCNC: 134 MG/DL (ref 70–99)
GLUCOSE BLDC GLUCOMTR-MCNC: 169 MG/DL (ref 70–99)
GLUCOSE BLDC GLUCOMTR-MCNC: 186 MG/DL (ref 70–99)
GLUCOSE BLDC GLUCOMTR-MCNC: 195 MG/DL (ref 70–99)
SPECIMEN SOURCE: NORMAL

## 2018-02-20 PROCEDURE — 27210995 ZZH RX 272: Performed by: INTERNAL MEDICINE

## 2018-02-20 PROCEDURE — 12000007 ZZH R&B INTERMEDIATE

## 2018-02-20 PROCEDURE — 25000128 H RX IP 250 OP 636: Performed by: INTERNAL MEDICINE

## 2018-02-20 PROCEDURE — 25000132 ZZH RX MED GY IP 250 OP 250 PS 637: Mod: GY | Performed by: INTERNAL MEDICINE

## 2018-02-20 PROCEDURE — 25000131 ZZH RX MED GY IP 250 OP 636 PS 637: Mod: GY | Performed by: INTERNAL MEDICINE

## 2018-02-20 PROCEDURE — 00000146 ZZHCL STATISTIC GLUCOSE BY METER IP

## 2018-02-20 PROCEDURE — 40000141 ZZH STATISTIC PERIPHERAL IV START W/O US GUIDANCE

## 2018-02-20 PROCEDURE — 25000125 ZZHC RX 250: Performed by: INTERNAL MEDICINE

## 2018-02-20 PROCEDURE — A9270 NON-COVERED ITEM OR SERVICE: HCPCS | Mod: GY | Performed by: INTERNAL MEDICINE

## 2018-02-20 PROCEDURE — 99232 SBSQ HOSP IP/OBS MODERATE 35: CPT | Performed by: INTERNAL MEDICINE

## 2018-02-20 PROCEDURE — 40000275 ZZH STATISTIC RCP TIME EA 10 MIN

## 2018-02-20 PROCEDURE — 94640 AIRWAY INHALATION TREATMENT: CPT | Mod: 76

## 2018-02-20 RX ORDER — ALBUTEROL SULFATE 0.83 MG/ML
2.5 SOLUTION RESPIRATORY (INHALATION)
Status: DISCONTINUED | OUTPATIENT
Start: 2018-02-20 | End: 2018-02-20

## 2018-02-20 RX ORDER — ALBUTEROL SULFATE 0.83 MG/ML
2.5 SOLUTION RESPIRATORY (INHALATION)
Status: DISCONTINUED | OUTPATIENT
Start: 2018-02-20 | End: 2018-02-22 | Stop reason: HOSPADM

## 2018-02-20 RX ORDER — PREDNISONE 20 MG/1
40 TABLET ORAL DAILY
Status: DISCONTINUED | OUTPATIENT
Start: 2018-02-20 | End: 2018-02-22 | Stop reason: HOSPADM

## 2018-02-20 RX ORDER — PRAMOXINE HYDROCHLORIDE 10 MG/ML
LOTION TOPICAL EVERY MORNING
Status: DISCONTINUED | OUTPATIENT
Start: 2018-02-20 | End: 2018-02-22 | Stop reason: HOSPADM

## 2018-02-20 RX ORDER — ALUMINA, MAGNESIA, AND SIMETHICONE 2400; 2400; 240 MG/30ML; MG/30ML; MG/30ML
20 SUSPENSION ORAL EVERY 4 HOURS PRN
Status: DISCONTINUED | OUTPATIENT
Start: 2018-02-20 | End: 2018-02-22 | Stop reason: HOSPADM

## 2018-02-20 RX ORDER — ALUMINA, MAGNESIA, AND SIMETHICONE 2400; 2400; 240 MG/30ML; MG/30ML; MG/30ML
20 SUSPENSION ORAL ONCE
Status: DISCONTINUED | OUTPATIENT
Start: 2018-02-20 | End: 2018-02-20

## 2018-02-20 RX ADMIN — SODIUM CHLORIDE 1 G: 50 OINTMENT OPHTHALMIC at 08:48

## 2018-02-20 RX ADMIN — LIDOCAINE 1 PATCH: 560 PATCH PERCUTANEOUS; TOPICAL; TRANSDERMAL at 08:47

## 2018-02-20 RX ADMIN — TIMOLOL MALEATE 1 DROP: 5 SOLUTION/ DROPS OPHTHALMIC at 19:45

## 2018-02-20 RX ADMIN — GUAIFENESIN AND DEXTROMETHORPHAN 5 ML: 100; 10 SYRUP ORAL at 16:53

## 2018-02-20 RX ADMIN — ACETAMINOPHEN 650 MG: 325 TABLET, FILM COATED ORAL at 03:36

## 2018-02-20 RX ADMIN — ASPIRIN 81 MG 81 MG: 81 TABLET ORAL at 19:45

## 2018-02-20 RX ADMIN — ACETAMINOPHEN 650 MG: 325 TABLET, FILM COATED ORAL at 19:45

## 2018-02-20 RX ADMIN — INSULIN DETEMIR 20 UNITS: 100 INJECTION, SOLUTION SUBCUTANEOUS at 22:34

## 2018-02-20 RX ADMIN — OSELTAMIVIR PHOSPHATE 30 MG: 30 CAPSULE ORAL at 08:45

## 2018-02-20 RX ADMIN — OSELTAMIVIR PHOSPHATE 30 MG: 30 CAPSULE ORAL at 22:32

## 2018-02-20 RX ADMIN — ATORVASTATIN CALCIUM 80 MG: 80 TABLET, FILM COATED ORAL at 19:45

## 2018-02-20 RX ADMIN — ALBUTEROL SULFATE 2.5 MG: 2.5 SOLUTION RESPIRATORY (INHALATION) at 19:48

## 2018-02-20 RX ADMIN — TIMOLOL MALEATE 1 DROP: 5 SOLUTION/ DROPS OPHTHALMIC at 08:49

## 2018-02-20 RX ADMIN — PREDNISONE 40 MG: 20 TABLET ORAL at 15:08

## 2018-02-20 RX ADMIN — CEFTRIAXONE 1 G: 10 INJECTION, POWDER, FOR SOLUTION INTRAVENOUS at 13:00

## 2018-02-20 RX ADMIN — INSULIN ASPART 5 UNITS: 100 INJECTION, SOLUTION INTRAVENOUS; SUBCUTANEOUS at 22:36

## 2018-02-20 RX ADMIN — LEVOTHYROXINE SODIUM 75 MCG: 50 TABLET ORAL at 08:44

## 2018-02-20 RX ADMIN — SODIUM CHLORIDE 1 G: 50 OINTMENT OPHTHALMIC at 19:45

## 2018-02-20 RX ADMIN — INSULIN ASPART 1 UNITS: 100 INJECTION, SOLUTION INTRAVENOUS; SUBCUTANEOUS at 12:49

## 2018-02-20 RX ADMIN — INSULIN ASPART 5 UNITS: 100 INJECTION, SOLUTION INTRAVENOUS; SUBCUTANEOUS at 12:50

## 2018-02-20 RX ADMIN — HYDRALAZINE HYDROCHLORIDE 10 MG: 20 INJECTION INTRAMUSCULAR; INTRAVENOUS at 17:00

## 2018-02-20 RX ADMIN — AZITHROMYCIN 250 MG: 250 TABLET, FILM COATED ORAL at 08:45

## 2018-02-20 RX ADMIN — OMEPRAZOLE 20 MG: 20 CAPSULE, DELAYED RELEASE ORAL at 08:45

## 2018-02-20 NOTE — PLAN OF CARE
Problem: Patient Care Overview  Goal: Plan of Care/Patient Progress Review  Outcome: No Change  A/O x 3, vss, a-febrile, denies pain, up with assist of one and walker to bathroom, loose stool mixed with urine x 1 this afternoon, ISO for influenza B, patient has an occasional non productive cough, refused cough medicine, lidoderm patch to lower back and right neck for chronic pain, appetite poor, refused breakfast and lunch.

## 2018-02-20 NOTE — PLAN OF CARE
Problem: Pneumonia (Adult)  Goal: Signs and Symptoms of Listed Potential Problems Will be Absent, Minimized or Managed (Pneumonia)  Signs and symptoms of listed potential problems will be absent, minimized or managed by discharge/transition of care (reference Pneumonia (Adult) CPG).   Outcome: No Change  A/o x4 confused at times. HTN prn meds given, BP WDL. IV SL. Mod carb diet. Low appetite. No insulin coverage given this shift. LS coarse, slight wheezes. Frequent cough, non productive. Up with a/1 walker to bsc. Three loose stools this shift, c diff sample sent, and pending. HA x1 medicated with tylenol. Neuropathy in hands baseline.  D/c pending pt progress with cough, strength and appetite.

## 2018-02-20 NOTE — PLAN OF CARE
Problem: Patient Care Overview  Goal: Plan of Care/Patient Progress Review  Outcome: Improving  Pt A&O, forgetful at times. Frequent non-productive cough. Lungs sound coarse and wheezing expiratory. Up w/A1 to BSC. C-diff- negative. Headache relieved after given tylenol. Continue to monitor.

## 2018-02-20 NOTE — PROGRESS NOTES
Clinic Care Coordination Contact    Situation: Pt's daughter Priti called the clinic and requested GREGORIO MORGAN call back to discuss pt's situation and disposition planning. Patient chart reviewed by care coordinator.    Background: Pt had contacted GREGORIO MORGAN on 2/15/2018 and reported that she could no longer care for herself in her home and needed help. Pt was wanting to go to a SNF facility. GREGORIO MORGAN had spoken with the family and began planning. [facility] has been in contact with the family to explore options for placement.      Assessment: Pt has been hospitalized with Influenza B. Pt is experiencing weakness and therapy is recommending TCU placement, however pt would like to go home. Discussed concerns with pt's daughter who is planning on going back to [place] on Sunday. Pt's support is limited following her departure. Pt's daughter is in agreement and has been thinking that TCU placement would be best.    Plan: Pt's daughter will discuss discharge planning with Care Coordinator in the hospital. GREGORIO MORGAN will remain available as needed.    CANDIS Proctor  Clinic Care Coordinator  [phone]  [email or web address]

## 2018-02-20 NOTE — PROGRESS NOTES
Care Transition Initial Assessment - RN        Met with: Patient and Family.  DATA   Active Problems:    Influenza due to influenza virus, type B    Influenza and pneumonia       Cognitive Status: awake, alert and oriented.        Contact information and PCP information verified: Yes  Lives With: alone  Living Arrangements: condominium                 Insurance concerns: No Insurance issues identified  ASSESSMENT  Patient currently receives the following services:  none        Identified issues/concerns regarding health management: needs TCU at discharge, and at first patient wanted home with family but they are unable to support her as [place] state, and son dealing with his immediate family with illness and unable to assist.  PLAN  Financial costs for the patient include N/A .  Patient given options and choices for discharge yes, they have approved a referral sent to [facility], and have a listing of other facilities to tour.  They understand that discharge may happen before other facilities are toured, and this may be as soon as today/tomorrow.  Awaiting hospitalist clearance for discharge.  Patient/family is agreeable to the plan?  Yes, she is now agreeable to TCU  Patient anticipates discharging to TCU .        Patient anticipates needs for home equipment: Yes  Discharge Planner   Discharge Plans in progress: yes  Barriers to discharge plan: none  Plan/Disposition: Home   Appointments: await hospitalist  Care  (CTS) will continue to follow as needed.

## 2018-02-20 NOTE — PLAN OF CARE
Problem: Patient Care Overview  Goal: Plan of Care/Patient Progress Review  PT: Attempted AM PT session; pt requesting AM meds and to eat prior to participating with PT.

## 2018-02-21 ENCOUNTER — APPOINTMENT (OUTPATIENT)
Dept: PHYSICAL THERAPY | Facility: CLINIC | Age: 83
DRG: 871 | End: 2018-02-21
Payer: MEDICARE

## 2018-02-21 LAB
ALBUMIN UR-MCNC: 100 MG/DL
APPEARANCE UR: CLEAR
BACTERIA #/AREA URNS HPF: ABNORMAL /HPF
BILIRUB UR QL STRIP: NEGATIVE
COLOR UR AUTO: YELLOW
GLUCOSE BLDC GLUCOMTR-MCNC: 108 MG/DL (ref 70–99)
GLUCOSE BLDC GLUCOMTR-MCNC: 120 MG/DL (ref 70–99)
GLUCOSE BLDC GLUCOMTR-MCNC: 145 MG/DL (ref 70–99)
GLUCOSE BLDC GLUCOMTR-MCNC: 164 MG/DL (ref 70–99)
GLUCOSE BLDC GLUCOMTR-MCNC: 201 MG/DL (ref 70–99)
GLUCOSE UR STRIP-MCNC: NEGATIVE MG/DL
HGB UR QL STRIP: NEGATIVE
KETONES UR STRIP-MCNC: 40 MG/DL
LEUKOCYTE ESTERASE UR QL STRIP: NEGATIVE
MUCOUS THREADS #/AREA URNS LPF: PRESENT /LPF
NITRATE UR QL: NEGATIVE
PH UR STRIP: 6.5 PH (ref 5–7)
RBC #/AREA URNS AUTO: 1 /HPF (ref 0–2)
SOURCE: ABNORMAL
SP GR UR STRIP: 1.02 (ref 1–1.03)
SQUAMOUS #/AREA URNS AUTO: 4 /HPF (ref 0–1)
UROBILINOGEN UR STRIP-MCNC: NORMAL MG/DL (ref 0–2)
WBC #/AREA URNS AUTO: 1 /HPF (ref 0–2)

## 2018-02-21 PROCEDURE — 12000007 ZZH R&B INTERMEDIATE

## 2018-02-21 PROCEDURE — 40000193 ZZH STATISTIC PT WARD VISIT

## 2018-02-21 PROCEDURE — 27210995 ZZH RX 272: Performed by: INTERNAL MEDICINE

## 2018-02-21 PROCEDURE — 25000125 ZZHC RX 250: Performed by: INTERNAL MEDICINE

## 2018-02-21 PROCEDURE — 40000275 ZZH STATISTIC RCP TIME EA 10 MIN

## 2018-02-21 PROCEDURE — 81001 URINALYSIS AUTO W/SCOPE: CPT | Performed by: EMERGENCY MEDICINE

## 2018-02-21 PROCEDURE — 99232 SBSQ HOSP IP/OBS MODERATE 35: CPT | Performed by: INTERNAL MEDICINE

## 2018-02-21 PROCEDURE — 25000132 ZZH RX MED GY IP 250 OP 250 PS 637: Mod: GY | Performed by: INTERNAL MEDICINE

## 2018-02-21 PROCEDURE — 25000128 H RX IP 250 OP 636: Performed by: INTERNAL MEDICINE

## 2018-02-21 PROCEDURE — 94640 AIRWAY INHALATION TREATMENT: CPT

## 2018-02-21 PROCEDURE — 00000146 ZZHCL STATISTIC GLUCOSE BY METER IP

## 2018-02-21 PROCEDURE — 94640 AIRWAY INHALATION TREATMENT: CPT | Mod: 76

## 2018-02-21 PROCEDURE — 25000131 ZZH RX MED GY IP 250 OP 636 PS 637: Mod: GY | Performed by: INTERNAL MEDICINE

## 2018-02-21 PROCEDURE — A9270 NON-COVERED ITEM OR SERVICE: HCPCS | Mod: GY | Performed by: INTERNAL MEDICINE

## 2018-02-21 PROCEDURE — 97116 GAIT TRAINING THERAPY: CPT | Mod: GP

## 2018-02-21 RX ORDER — LOSARTAN POTASSIUM 50 MG/1
50 TABLET ORAL DAILY
Status: DISCONTINUED | OUTPATIENT
Start: 2018-02-21 | End: 2018-02-22 | Stop reason: HOSPADM

## 2018-02-21 RX ADMIN — TIMOLOL MALEATE 1 DROP: 5 SOLUTION/ DROPS OPHTHALMIC at 09:16

## 2018-02-21 RX ADMIN — AZITHROMYCIN 250 MG: 250 TABLET, FILM COATED ORAL at 09:16

## 2018-02-21 RX ADMIN — LIDOCAINE 1 PATCH: 560 PATCH PERCUTANEOUS; TOPICAL; TRANSDERMAL at 09:17

## 2018-02-21 RX ADMIN — ATORVASTATIN CALCIUM 80 MG: 80 TABLET, FILM COATED ORAL at 20:25

## 2018-02-21 RX ADMIN — LEVOTHYROXINE SODIUM 75 MCG: 50 TABLET ORAL at 09:15

## 2018-02-21 RX ADMIN — LOSARTAN POTASSIUM 50 MG: 50 TABLET, FILM COATED ORAL at 13:59

## 2018-02-21 RX ADMIN — INSULIN ASPART 2 UNITS: 100 INJECTION, SOLUTION INTRAVENOUS; SUBCUTANEOUS at 12:24

## 2018-02-21 RX ADMIN — SODIUM CHLORIDE 1 G: 50 OINTMENT OPHTHALMIC at 09:16

## 2018-02-21 RX ADMIN — GUAIFENESIN AND DEXTROMETHORPHAN 5 ML: 100; 10 SYRUP ORAL at 09:16

## 2018-02-21 RX ADMIN — ACETAMINOPHEN 650 MG: 325 TABLET, FILM COATED ORAL at 14:00

## 2018-02-21 RX ADMIN — ALBUTEROL SULFATE 2.5 MG: 2.5 SOLUTION RESPIRATORY (INHALATION) at 07:23

## 2018-02-21 RX ADMIN — ACETAMINOPHEN 650 MG: 325 TABLET, FILM COATED ORAL at 02:55

## 2018-02-21 RX ADMIN — PREDNISONE 40 MG: 20 TABLET ORAL at 09:16

## 2018-02-21 RX ADMIN — ASPIRIN 81 MG 81 MG: 81 TABLET ORAL at 20:25

## 2018-02-21 RX ADMIN — TIMOLOL MALEATE 1 DROP: 5 SOLUTION/ DROPS OPHTHALMIC at 20:25

## 2018-02-21 RX ADMIN — OMEPRAZOLE 20 MG: 20 CAPSULE, DELAYED RELEASE ORAL at 09:16

## 2018-02-21 RX ADMIN — ALBUTEROL SULFATE 2.5 MG: 2.5 SOLUTION RESPIRATORY (INHALATION) at 02:45

## 2018-02-21 RX ADMIN — ALBUTEROL SULFATE 2.5 MG: 2.5 SOLUTION RESPIRATORY (INHALATION) at 20:04

## 2018-02-21 RX ADMIN — MINERAL OIL, PETROLATUM, PHENYLEPHRINE HCL: 2.5; 140; 749 OINTMENT TOPICAL at 09:21

## 2018-02-21 RX ADMIN — CEFTRIAXONE 1 G: 10 INJECTION, POWDER, FOR SOLUTION INTRAVENOUS at 12:06

## 2018-02-21 RX ADMIN — SODIUM CHLORIDE 1 G: 50 OINTMENT OPHTHALMIC at 20:35

## 2018-02-21 RX ADMIN — GUAIFENESIN AND DEXTROMETHORPHAN 5 ML: 100; 10 SYRUP ORAL at 16:03

## 2018-02-21 RX ADMIN — OSELTAMIVIR PHOSPHATE 30 MG: 30 CAPSULE ORAL at 09:16

## 2018-02-21 RX ADMIN — INSULIN DETEMIR 20 UNITS: 100 INJECTION, SOLUTION SUBCUTANEOUS at 20:27

## 2018-02-21 RX ADMIN — OSELTAMIVIR PHOSPHATE 30 MG: 30 CAPSULE ORAL at 20:25

## 2018-02-21 RX ADMIN — INSULIN ASPART 12 UNITS: 100 INJECTION, SOLUTION INTRAVENOUS; SUBCUTANEOUS at 12:24

## 2018-02-21 RX ADMIN — HYDRALAZINE HYDROCHLORIDE 10 MG: 20 INJECTION INTRAMUSCULAR; INTRAVENOUS at 03:01

## 2018-02-21 NOTE — PROGRESS NOTES
[facility]    Hospitalist Progress Note  Mitch Cardona MD    Assessment & Plan     89 year old female, with PmHx of coronary artery disease, type 2 diabetes mellitus,   GERD, hypertension, hyperlipidemia, hypothyroidism who presented to the emergency   room at [facility] on 2/17/18 with complaints of fever as high as 102 F, productive cough of white sputum with yellow specks, body aches, chills and reduced appetite.  Workup done on 2/17/18 revealed, BMP significant for Na+ 129. CBC revealed,   Plts 100. INR was 2.1. Influenza antigen screen was positive for influenza B. 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 Heart size is normal. No pleural effusion, pneumothorax. There is a small focus of opacity in the medial right lower lobe, which may be an early focus of pneumonia.       1. Influenza B: continue Tamiflu 30 mg twice daily for 1 week (to complete 2/23/18).   For O2 via NC as needed. Commenced Prednisone 40mg po x 5 days, due to rhonchi on exam today.     2. Right lower lobe community acquired pneumonia, with sepsis: continue IV Rocephin and oral azithromycin. For scheduled albuterol nebulizer, Albuterol nebs  and scheduled Robitussin-DM. Sputum cultures on 2/18/18 was oral contamination. Blood and urine cultures on 2/17/18 is negative.     3.  Type 2 diabetes mellitus: BG on 134 this am. Continue scheduled insulin aspart with meals. Continue insulin detemir qhs. For medium dose insulin aspart sliding scale as needed. HbA1C was 7.1% on 2/6/18.     4.  Hyperlipidemia: Continue Lipitor.     5.  Hypothyroidism: Continue levothyroxine.     6.  GERD: Continue omeprazole.     7.  Glaucoma: Continue timolol eyedrops.    8. Elevated blood pressure, without diagnosis of hypertension: pt's SBP is ranging 150s and 180s. She was not on B.P. Medications at home. Continue IV hydralazine prn.     DVT Prophylaxis: Pneumatic  Compression Devices  Code Status: Full Code    Disposition: Expected discharge in 1-2 days.      Interval History   The pt reported feeling tired today. She is eating fine. She still has a congested cough, which is triggered with taking in deep breaths. She had an episode of diarrhea last night and stool for C. Difficile toxin B PCR was negative.     -Data reviewed today: I reviewed all new labs and imaging results over the last 24 hours. I personally reviewed no images or EKG's today.    Physical Exam   Temp: 98.3  F (36.8  C) Temp src: Oral BP: 159/70 Pulse: 83   Resp: 14 SpO2: 94 % O2 Device: None (Room air)    Vitals:    02/17/18 0925 02/20/18 0549   Weight: 64.4 kg (142 lb) 64.4 kg (141 lb 14.4 oz)     Vital Signs with Ranges  Temp:  [98.3  F (36.8  C)-98.4  F (36.9  C)] 98.3  F (36.8  C)  Pulse:  [73-83] 83  Resp:  [14-18] 14  BP: (121-184)/(56-85) 159/70  SpO2:  [94 %-99 %] 94 %  I/O last 3 completed shifts:  In: 440 [P.O.:440]  Out: 950 [Urine:950]    Constitutional: Elderly white female, awake, cooperative, lethargic+, O2 Sats 94% on RA  Respiratory: BS vesicular bilaterally, no crackles, widespread insp rhonchi  Cardiovascular: S1 and S2, reg, no murmur noted  GI: Soft, non-distended, non-tender, no masses, BS present+  Skin/Integumen: No rashes  Extremities: No pedal edema    Medications       pramoxine   Rectal QAM     predniSONE  40 mg Oral Daily     albuterol  2.5 mg Nebulization Q6H     aspirin  81 mg Oral QPM     atorvastatin  80 mg Oral QPM     docusate sodium  100 mg Oral At Bedtime     insulin aspart  12 Units Subcutaneous 4x Daily w/meals     insulin detemir  20 Units Subcutaneous At Bedtime     levothyroxine  75 mcg Oral Daily     omeprazole  20 mg Oral QAM     phenylephrine-shark liver oil-mineral oil-petrolatum   Rectal Daily     timolol  1 drop Left Eye BID     Lidocaine  3 patch Transdermal Q24h     insulin aspart  1-7 Units Subcutaneous TID AC     insulin aspart  1-5 Units Subcutaneous At  Bedtime     sodium chloride (PF)  3 mL Intracatheter Q8H     cefTRIAXone  1 g Intravenous Q24H     azithromycin  250 mg Oral Daily     guaiFENesin-dextromethorphan  5 mL Oral Q6H     lidocaine   Transdermal Q24H     lidocaine   Transdermal Q8H     oseltamivir  30 mg Oral BID       Data     Recent Labs  Lab 02/18/18  0707 02/17/18  0938   WBC 3.0* 4.0   HGB 12.4 14.2   MCV 91 90   PLT 85* 100*    129*   POTASSIUM 3.5 4.4   CHLORIDE 104 97   CO2 25 22   BUN 9 18   CR 0.51* 0.55   ANIONGAP 6 10   JEN 7.9* 8.5   * 186*       No results found for this or any previous visit (from the past 24 hour(s)).

## 2018-02-21 NOTE — PROGRESS NOTES
[facility]    Hospitalist Progress Note  Mitch Cardona MD    Assessment & Plan     89 year old female, with PmHx of coronary artery disease, type 2 diabetes mellitus,   GERD, hypertension, hyperlipidemia, hypothyroidism who presented to the emergency   room at [facility] on 2/17/18 with complaints of fever as high as 102 F, productive cough of white sputum with yellow specks, body aches, chills and reduced appetite.  Workup done on 2/17/18 revealed, BMP significant for Na+ 129. CBC revealed,   Plts 100. INR was 2.1. Influenza antigen screen was positive for influenza B. 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 Heart size is normal. No pleural effusion, pneumothorax. There is a small focus of opacity in the medial right lower lobe, which may be an early focus of pneumonia.       1. Influenza B: continue Tamiflu 30 mg twice daily for 1 week (to complete 2/23/18).   For O2 via NC as needed. Continue Prednisone 40mg po x 5 days - to end on 2/24/18.     2. Right lower lobe community acquired pneumonia, with sepsis: continue IV Rocephin.  S/P oral azithromycin. For scheduled albuterol nebulizer, Albuterol nebs  and scheduled Robitussin-DM. Sputum cultures on 2/18/18 was oral contamination. Blood and urine cultures on 2/17/18 is negative.     3.  Type 2 diabetes mellitus: BG on 145 this am. Continue scheduled insulin aspart with meals. Continue insulin detemir qhs. For medium dose insulin aspart sliding scale as needed. HbA1C was 7.1% on 2/6/18.     4.  Hyperlipidemia: Continue Lipitor.     5.  Hypothyroidism: Continue levothyroxine.     6.  GERD: Continue omeprazole.     7.  Glaucoma: Continue timolol eyedrops.    8. Hypertension: pt's SBP is ranging 150s [id number].TReviewing the chart, the pt was previously on Losartan and this was discontinued in 2012. Recommenced Losartan 50mg po qd on 2/21/18. Continue IV hydralazine prn.      DVT Prophylaxis: Pneumatic Compression Devices  Code Status: Full Code    Disposition: Expected discharge in 1 day to TCU.      Interval History   The pt still reported feeling tired and weak. She was able to eat her breakfast today. She ambulated around the floor.     -Data reviewed today: I reviewed all new labs and imaging results over the last 24 hours. I personally reviewed no images or EKG's today.    Physical Exam   Temp: 98.5  F (36.9  C) Temp src: Oral BP: 162/70 Pulse: 67   Resp: 16 SpO2: 99 % O2 Device: None (Room air)    Vitals:    02/17/18 0925 02/20/18 0549   Weight: 64.4 kg (142 lb) 64.4 kg (141 lb 14.4 oz)     Vital Signs with Ranges  Temp:  [97.8  F (36.6  C)-98.5  F (36.9  C)] 98.5  F (36.9  C)  Pulse:  [67-97] 67  Resp:  [14-16] 16  BP: (137-184)/(65-85) 162/70  SpO2:  [94 %-99 %] 99 %  I/O last 3 completed shifts:  In: 240 [P.O.:240]  Out: 650 [Urine:650]    Constitutional: Elderly white female, awake, cooperative, O2 Sats 99% on RA  Respiratory: BS vesicular bilaterally, no crackles, no wheezes  Cardiovascular: S1 and S2, reg, no murmur noted  GI: Soft, non-distended, non-tender, no masses, BS present+  Skin/Integumen: No rashes  Extremities: No pedal edema    Medications       losartan  50 mg Oral Daily     pramoxine   Rectal QAM     predniSONE  40 mg Oral Daily     albuterol  2.5 mg Nebulization Q6H     aspirin  81 mg Oral QPM     atorvastatin  80 mg Oral QPM     docusate sodium  100 mg Oral At Bedtime     insulin aspart  12 Units Subcutaneous 4x Daily w/meals     insulin detemir  20 Units Subcutaneous At Bedtime     levothyroxine  75 mcg Oral Daily     omeprazole  20 mg Oral QAM     phenylephrine-shark liver oil-mineral oil-petrolatum   Rectal Daily     timolol  1 drop Left Eye BID     Lidocaine  3 patch Transdermal Q24h     insulin aspart  1-7 Units Subcutaneous TID AC     insulin aspart  1-5 Units Subcutaneous At Bedtime     sodium chloride (PF)  3 mL Intracatheter Q8H     cefTRIAXone   1 g Intravenous Q24H     guaiFENesin-dextromethorphan  5 mL Oral Q6H     lidocaine   Transdermal Q24H     lidocaine   Transdermal Q8H     oseltamivir  30 mg Oral BID       Data     Recent Labs  Lab 02/18/18  0707 02/17/18  0938   WBC 3.0* 4.0   HGB 12.4 14.2   MCV 91 90   PLT 85* 100*    129*   POTASSIUM 3.5 4.4   CHLORIDE 104 97   CO2 25 22   BUN 9 18   CR 0.51* 0.55   ANIONGAP 6 10   JEN 7.9* 8.5   * 186*       No results found for this or any previous visit (from the past 24 hour(s)).

## 2018-02-21 NOTE — PROGRESS NOTES
D: [facility] is unsure about bed availability for 2/22. Per nursing, patient's family are requesting [facility] and [facility] as backup choices. SW placed these referrals via DOD.

## 2018-02-21 NOTE — PLAN OF CARE
Problem: Patient Care Overview  Goal: Plan of Care/Patient Progress Review  Outcome: Improving  Pt remains A/O, forgetful. Up with A1 walker GB. IV SL. lidocaine patch for pain. Robitussin for cough. Mod cho diet. Lung sounds diminished, infrequent non productive cough. Droplet and enteric precautions. Last loose bm was on 2/20/18 at 1300. C.diff negative. Plan to d/c tomorrow. Continue to monitor.

## 2018-02-21 NOTE — PLAN OF CARE
Problem: Patient Care Overview  Goal: Plan of Care/Patient Progress Review  Discharge Planner PT   Patient plan for discharge: not stated this session; per SW/CC note, pt agreeable to TCU.  Current status: Supine to sit with SBA. Sit to/from stand with FWW and CGA, cues for safe hand placement. Amb 125 ft x 1 with FWW and CGA while on RA. Pt tolerates well, sats on RA post activity 99%, HR 91. Pt returned supine with min assist for LEs and cues for repositioning. All needs in reach and bed alarm on upon PT exit.  Barriers to return to prior living situation: Lives alone, Decreased activity tolerance, Fall risk  Recommendations for discharge: TCU per the plan established by the Physical Therapist   Rationale for recommendations: Pt will benefit from continued skilled PT intervention at TCU prior to returning home alone in order to increase independence and safety with functional mobility.       Entered by: Dhara Doe 02/21/2018 8:50 AM

## 2018-02-21 NOTE — PLAN OF CARE
Problem: Patient Care Overview  Goal: Plan of Care/Patient Progress Review  Outcome: Improving  [id number]: pt alert and oriented but forgetful. Lung sounds diminished. Non productive coughing present. Vital sings stable except BP slightly elevated MD order cozar  For BP. Blood glucose 201. Insulin was given. Using BSC. UA negative. Will continue to monitor

## 2018-02-21 NOTE — PLAN OF CARE
Problem: Patient Care Overview  Goal: Plan of Care/Patient Progress Review  Diagnosis: PNA  POD#:NA  Mental Status:A/o x4 confused at times, agitated  Activity: A/1/w to bsc  Diet: cons carb  Pain: Tylenol for HA  Type of bloc.NA  Dangled.NA  Mkceon/Voiding: Voiding to [facility]  Tele/Restraints/Iso:NA  02/LDA: BJ IVSL  D/C Date: Pending progress.   Other Info: Still low appetite. LS wheezes, scheduled nebs. HTN; hydralazine for >160; administered x 1.

## 2018-02-22 ENCOUNTER — APPOINTMENT (OUTPATIENT)
Dept: PHYSICAL THERAPY | Facility: CLINIC | Age: 83
DRG: 871 | End: 2018-02-22
Payer: MEDICARE

## 2018-02-22 VITALS
OXYGEN SATURATION: 100 % | BODY MASS INDEX: 27.86 KG/M2 | TEMPERATURE: 98.2 F | DIASTOLIC BLOOD PRESSURE: 70 MMHG | SYSTOLIC BLOOD PRESSURE: 141 MMHG | WEIGHT: 141.9 LBS | HEART RATE: 77 BPM | RESPIRATION RATE: 16 BRPM | HEIGHT: 60 IN

## 2018-02-22 LAB
ANION GAP SERPL CALCULATED.3IONS-SCNC: 9 MMOL/L (ref 3–14)
BUN SERPL-MCNC: 12 MG/DL (ref 7–30)
CALCIUM SERPL-MCNC: 8.6 MG/DL (ref 8.5–10.1)
CHLORIDE SERPL-SCNC: 101 MMOL/L (ref 94–109)
CO2 SERPL-SCNC: 25 MMOL/L (ref 20–32)
CREAT SERPL-MCNC: 0.52 MG/DL (ref 0.52–1.04)
GFR SERPL CREATININE-BSD FRML MDRD: >90 ML/MIN/1.7M2
GLUCOSE BLDC GLUCOMTR-MCNC: 128 MG/DL (ref 70–99)
GLUCOSE BLDC GLUCOMTR-MCNC: 218 MG/DL (ref 70–99)
GLUCOSE BLDC GLUCOMTR-MCNC: 242 MG/DL (ref 70–99)
GLUCOSE BLDC GLUCOMTR-MCNC: 98 MG/DL (ref 70–99)
GLUCOSE SERPL-MCNC: 103 MG/DL (ref 70–99)
POTASSIUM SERPL-SCNC: 2.8 MMOL/L (ref 3.4–5.3)
SODIUM SERPL-SCNC: 135 MMOL/L (ref 133–144)

## 2018-02-22 PROCEDURE — 97116 GAIT TRAINING THERAPY: CPT | Mod: GP

## 2018-02-22 PROCEDURE — 40000193 ZZH STATISTIC PT WARD VISIT

## 2018-02-22 PROCEDURE — A9270 NON-COVERED ITEM OR SERVICE: HCPCS | Mod: GY | Performed by: INTERNAL MEDICINE

## 2018-02-22 PROCEDURE — 80048 BASIC METABOLIC PNL TOTAL CA: CPT | Performed by: INTERNAL MEDICINE

## 2018-02-22 PROCEDURE — 25000125 ZZHC RX 250: Performed by: INTERNAL MEDICINE

## 2018-02-22 PROCEDURE — 25000128 H RX IP 250 OP 636: Performed by: INTERNAL MEDICINE

## 2018-02-22 PROCEDURE — 94640 AIRWAY INHALATION TREATMENT: CPT

## 2018-02-22 PROCEDURE — 99238 HOSP IP/OBS DSCHRG MGMT 30/<: CPT | Performed by: INTERNAL MEDICINE

## 2018-02-22 PROCEDURE — 00000146 ZZHCL STATISTIC GLUCOSE BY METER IP

## 2018-02-22 PROCEDURE — 25000132 ZZH RX MED GY IP 250 OP 250 PS 637: Mod: GY | Performed by: INTERNAL MEDICINE

## 2018-02-22 PROCEDURE — 40000275 ZZH STATISTIC RCP TIME EA 10 MIN

## 2018-02-22 PROCEDURE — 36415 COLL VENOUS BLD VENIPUNCTURE: CPT | Performed by: INTERNAL MEDICINE

## 2018-02-22 PROCEDURE — 94640 AIRWAY INHALATION TREATMENT: CPT | Mod: 76

## 2018-02-22 RX ORDER — AZITHROMYCIN 250 MG/1
250 TABLET, FILM COATED ORAL DAILY
Qty: 2 TABLET | DISCHARGE
Start: 2018-02-23 | End: 2018-02-25

## 2018-02-22 RX ORDER — OSELTAMIVIR PHOSPHATE 30 MG/1
30 CAPSULE ORAL 2 TIMES DAILY
Qty: 5 CAPSULE | DISCHARGE
Start: 2018-02-22 | End: 2018-02-24

## 2018-02-22 RX ORDER — TRAMADOL HYDROCHLORIDE 50 MG/1
50 TABLET ORAL EVERY 6 HOURS PRN
Qty: 20 TABLET | Refills: 0 | Status: SHIPPED | OUTPATIENT
Start: 2018-02-22 | End: 2018-03-07

## 2018-02-22 RX ORDER — ALBUTEROL SULFATE 0.83 MG/ML
2.5 SOLUTION RESPIRATORY (INHALATION)
Qty: 360 ML | DISCHARGE
Start: 2018-02-22 | End: 2018-02-27

## 2018-02-22 RX ORDER — AZITHROMYCIN 250 MG/1
250 TABLET, FILM COATED ORAL ONCE
Status: COMPLETED | OUTPATIENT
Start: 2018-02-22 | End: 2018-02-22

## 2018-02-22 RX ORDER — LOSARTAN POTASSIUM 50 MG/1
100 TABLET ORAL DAILY
Qty: 30 TABLET | DISCHARGE
Start: 2018-02-23 | End: 2018-06-22

## 2018-02-22 RX ORDER — LOSARTAN POTASSIUM 50 MG/1
50 TABLET ORAL DAILY
Qty: 30 TABLET | DISCHARGE
Start: 2018-02-23 | End: 2018-02-22

## 2018-02-22 RX ORDER — PREDNISONE 20 MG/1
40 TABLET ORAL DAILY
DISCHARGE
Start: 2018-02-23 | End: 2018-02-25

## 2018-02-22 RX ADMIN — AZITHROMYCIN 250 MG: 250 TABLET, FILM COATED ORAL at 14:11

## 2018-02-22 RX ADMIN — LOSARTAN POTASSIUM 50 MG: 50 TABLET, FILM COATED ORAL at 09:49

## 2018-02-22 RX ADMIN — HYDRALAZINE HYDROCHLORIDE 10 MG: 20 INJECTION INTRAMUSCULAR; INTRAVENOUS at 02:45

## 2018-02-22 RX ADMIN — LIDOCAINE 1 PATCH: 560 PATCH PERCUTANEOUS; TOPICAL; TRANSDERMAL at 09:51

## 2018-02-22 RX ADMIN — ACETAMINOPHEN 650 MG: 325 TABLET, FILM COATED ORAL at 10:20

## 2018-02-22 RX ADMIN — TIMOLOL MALEATE 1 DROP: 5 SOLUTION/ DROPS OPHTHALMIC at 09:53

## 2018-02-22 RX ADMIN — PREDNISONE 40 MG: 20 TABLET ORAL at 09:48

## 2018-02-22 RX ADMIN — MINERAL OIL, PETROLATUM, PHENYLEPHRINE HCL: 2.5; 140; 749 OINTMENT TOPICAL at 14:12

## 2018-02-22 RX ADMIN — ALBUTEROL SULFATE 2.5 MG: 2.5 SOLUTION RESPIRATORY (INHALATION) at 08:00

## 2018-02-22 RX ADMIN — GUAIFENESIN AND DEXTROMETHORPHAN 5 ML: 100; 10 SYRUP ORAL at 09:48

## 2018-02-22 RX ADMIN — OMEPRAZOLE 20 MG: 20 CAPSULE, DELAYED RELEASE ORAL at 09:48

## 2018-02-22 RX ADMIN — ALBUTEROL SULFATE 2.5 MG: 2.5 SOLUTION RESPIRATORY (INHALATION) at 12:57

## 2018-02-22 RX ADMIN — LEVOTHYROXINE SODIUM 75 MCG: 50 TABLET ORAL at 09:49

## 2018-02-22 RX ADMIN — POTASSIUM CHLORIDE 40 MEQ: 1.5 POWDER, FOR SOLUTION ORAL at 12:54

## 2018-02-22 RX ADMIN — POTASSIUM CHLORIDE 40 MEQ: 1.5 POWDER, FOR SOLUTION ORAL at 10:28

## 2018-02-22 RX ADMIN — OSELTAMIVIR PHOSPHATE 30 MG: 30 CAPSULE ORAL at 10:20

## 2018-02-22 RX ADMIN — INSULIN ASPART 3 UNITS: 100 INJECTION, SOLUTION INTRAVENOUS; SUBCUTANEOUS at 14:03

## 2018-02-22 ASSESSMENT — PAIN DESCRIPTION - DESCRIPTORS: DESCRIPTORS: ACHING

## 2018-02-22 NOTE — PROGRESS NOTES
PAS-RR    D: Per DHS regulation, SW completed and submitted PAS-RR to [place] [employer] Direct Connect via the Senior [employer] Line.  PAS-RR confirmation # is : [id number]    I: SW spoke with patient and family and they are aware a PAS-RR has been submitted.  GREGORIO reviewed with patient and family that they may be contacted for a follow up appointment within 10 days of hospital discharge if their SNF stay is < 30 days.  Contact information for [facility] Link[facility] Line was also provided.    A: patient and family verbalized understanding.    P: Further questions may be directed to [facility]Age Line at #[phone], option #4 for PAS-RR staff.    D: Discharge orders received and faxed to [facility]. Facility has  Bed availability for today. Patient will be transported via [facility]. Patient and family are in agreement with this plan.

## 2018-02-22 NOTE — DISCHARGE SUMMARY
[facility]    Discharge Summary  Hospitalist  Mitch Cardona MD    Date of Admission:  2/17/2018  Date of Discharge:  2/22/2018  Discharging Provider: Mitch Cardona    Discharge Diagnoses      1. Influenza B.    2. Right lower lobe community acquired pneumonia, with sepsis.      History of Present Illness   Serena Marie is an 89 year old female, who presented with fever, productive cough,   body aches, chills and reduced appetite.    Hospital Course     89 year old female, with PmHx of coronary artery disease, type 2 diabetes mellitus,   GERD, hypertension, hyperlipidemia, hypothyroidism who presented to the emergency   room at [facility] on 2/17/18 with complaints of fever as high as 102 F, productive cough of white sputum with yellow specks, body aches, chills and reduced appetite.  Workup done on 2/17/18 revealed, BMP significant for Na+ 129. CBC revealed,   Plts 100. INR was 2.1. Influenza antigen screen was positive for influenza B. HbA1C was 7.1% on 2/6/18. 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 Heart size is normal. No pleural effusion, pneumothorax. There is a small focus of opacity in the medial right lower lobe, which may be an early focus of pneumonia.       The patient was admitted to the medical floor and was commenced on Tamiflu 30 mg bid,   IV Rocephin, oral azithromycin, Prednisone 40mg po x 5 days, scheduled albuterol nebulizer, Albuterol nebs  and scheduled Robitussin-DM. Her Sputum cultures on 2/18/18 was oral contamination. Blood and urine cultures on 2/17/18 is negative. The patient had SBP ranging 150s-->170s. She was commenced on losartan 50mg qd and this was increased to 100mg qd at discharge. She was previously on losartan, but this was discontinued by her PCP in 2012. She was reviewed by the Physical and Occupational Therapists and their recommendation was for TCU at discharge.  She is to follow up with her Primary Care Provider in 2-3 weeks.        Significant Results and Procedures   See above.    Pending Results   None.  Unresulted Labs Ordered in the Past 30 Days of this Admission     Date and Time Order Name Status Description    2/17/2018 0945 Blood culture Preliminary     2/17/2018 0943 Blood culture Preliminary           Code Status   Full Code       Primary Care Physician   Lin Cruz    Physical Exam   Temp: 98.4  F (36.9  C) Temp src: Oral BP: 150/87 Pulse: 75   Resp: 16 SpO2: 100 % O2 Device: None (Room air)    Vitals:    02/17/18 0925 02/20/18 0549   Weight: 64.4 kg (142 lb) 64.4 kg (141 lb 14.4 oz)       Constitutional: Elderly white female, awake, cooperative, O2 Sats 100% on RA  Respiratory: BS vesicular bilaterally, no crackles, no wheezes  Cardiovascular: S1 and S2, reg, no murmur noted  GI: Soft, non-distended, non-tender, no masses, BS present+  Skin/Integumen: No rashes  Extremities: No pedal edema    Discharge Disposition   Discharged to rehabilitation facility  Condition at discharge: Stable    Consultations This Hospital Stay   PHYSICAL THERAPY ADULT IP CONSULT  PHYSICAL THERAPY ADULT IP CONSULT  OCCUPATIONAL THERAPY ADULT IP CONSULT    Time Spent on this Encounter   I, Mitch Cardona, personally saw the patient today and spent less than or equal to 30 minutes discharging this patient.    Discharge Orders     General info for SNF   Length of Stay Estimate: Short Term Care: Estimated # of Days <30  Condition at Discharge: Improving  Level of care:skilled   Rehabilitation Potential: Good  Admission H&P remains valid and up-to-date: Yes  Recent Chemotherapy: N/A  Use Nursing Home Standing Orders: Yes     Mantoux instructions   Give two-step Mantoux (PPD) Per Facility Policy Yes.     Reason for your hospital stay   Influenza B and Community Acquired Pneumonia     Intake and output   Every shift     Activity - Up with assistive device     Physical Therapy  Adult Consult   Evaluate and treat as clinically indicated.    Reason:  Influenza B and Community acquired pneumonia.     Occupational Therapy Adult Consult   Evaluate and treat as clinically indicated.    Reason:   Influenza B and Community acquired pneumonia.     Advance Diet as Tolerated   Follow this diet upon discharge:     Consistent Carbohydrate Diet 6616-8553 Calories: Moderate Consistent CHO (4-6 CHO units/meal)       Discharge Medications   Current Discharge Medication List      START taking these medications    Details   oseltamivir (TAMIFLU) 30 MG capsule Take 1 capsule (30 mg) by mouth 2 times daily for 2 days  Qty: 5 capsule    Associated Diagnoses: Influenza B      predniSONE (DELTASONE) 20 MG tablet Take 2 tablets (40 mg) by mouth daily for 2 days    Associated Diagnoses: Influenza B      albuterol (2.5 MG/3ML) 0.083% neb solution Take 1 vial (2.5 mg) by nebulization every 2 hours as needed for other (dyspnea)  Qty: 360 mL    Associated Diagnoses: Influenza and pneumonia; Influenza B      azithromycin (ZITHROMAX) 250 MG tablet Take 1 tablet (250 mg) by mouth daily for 2 days  Qty: 2 tablet    Associated Diagnoses: Pneumonia of right middle lobe due to infectious organism (H)      losartan (COZAAR) 50 MG tablet Take 2 tablets (100 mg) by mouth daily  Qty: 30 tablet    Associated Diagnoses: Essential hypertension         CONTINUE these medications which have CHANGED    Details   traMADol (ULTRAM) 50 MG tablet Take 1 tablet (50 mg) by mouth every 6 hours as needed for pain No driving a car or drinking alcohol for at least 8 hours after taking this medication.  Qty: 20 tablet, Refills: 0    Associated Diagnoses: Cervicalgia         CONTINUE these medications which have NOT CHANGED    Details   insulin aspart (NOVOLOG FLEXPEN) 100 UNIT/ML injection Inject 12 Units Subcutaneous 4 times daily (with meals and nightly)      omeprazole (PRILOSEC) 20 MG CR capsule TAKE ONE CAPSULE BY MOUTH AT BEDTIME - NO TUMS  WITHIN 2 HOURS OF MED  Qty: 30 capsule, Refills: 10    Associated Diagnoses: Gastroesophageal reflux disease, esophagitis presence not specified      levothyroxine (SYNTHROID/LEVOTHROID) 75 MCG tablet TAKE ONE TABLET BY MOUTH ONE TIME DAILY   Qty: 90 tablet, Refills: 1    Associated Diagnoses: Encounter for medication refill      lidocaine (LIDODERM) 5 % Patch APPLY UP TO 3 PATCHES TO PAINFUL AREA AT ONCE FOR UP TO 12 H WITHIN A 24 H PERIOD.  REMOVE AFTER 12 HOURS.  Qty: 60 patch, Refills: 11    Associated Diagnoses: Encounter for medication refill      insulin detemir (LEVEMIR FLEXTOUCH) 100 UNIT/ML injection INJECT 20 UNITS UNDER THE SKIN AT BEDTIME  Qty: 15 mL, Refills: 8    Comments: New dose  Associated Diagnoses: Diabetes mellitus type 2 with peripheral artery disease (H)      Tetrahydroz-Dextran-PEG-Povid (TH EYE DROP ADVANCED RELIEF) 0.05-0.1-1-1 % SOLN Apply to eye as needed For dry eye relief- 5 x per day    Associated Diagnoses: Blindness and low vision      sodium chloride (TITO 128) 5 % ophthalmic ointment Place 1 Application into the right eye 2 times daily as needed for dry eyes    Associated Diagnoses: Blindness and low vision      acetaminophen (TYLENOL ARTHRITIS PAIN) 650 MG CR tablet Take 1,300 mg by mouth daily States 2 tablets qam & usually 2 tablets qpm      RaNITidine HCl (ZANTAC 150 MAXIMUM STRENGTH PO) Take 150 mg by mouth daily as needed      cholecalciferol (VITAMIN D) 1000 UNIT tablet Take 2,000 Units by mouth daily      atorvastatin (LIPITOR) 80 MG tablet Take 1 tablet (80 mg) by mouth daily  Qty: 90 tablet, Refills: 3    Associated Diagnoses: PAD (peripheral artery disease) (H)      Multiple Vitamins-Minerals (PRESERVISION AREDS 2 PO) Take 1 tablet by mouth every morning      docusate sodium (DOCQLACE) 100 MG capsule Take 100 mg by mouth At Bedtime      Terbinafine (LAMISIL EX) Externally apply topically daily For under breasts, in between toes      Pramoxine HCl (VAGISIL ANTI-ITCH  MEDICATED EX) Externally apply topically every morning      phenylephrine-shark liver oil-mineral oil-petrolatum (PREPARATION H) 0.25-14-74.9 % rectal ointment Place rectally daily      fish oil-omega-3 fatty acids 1000 MG capsule Take 1,200 mg by mouth daily     Associated Diagnoses: PAD (peripheral artery disease) (H)      timolol (TIMOPTIC) 0.5 % ophthalmic solution Place 1 drop Into the left eye 2 times daily   Qty: 1 Bottle      sodium chloride (TITO 128) 5 % ophthalmic solution Place 1 drop Into the left eye daily as needed for dry eyes       fluocinolone acetonide 0.01 % OIL Apply to scalp when wet massage well cover with cap leave on over night  Wash in am.  Qty: 118 mL, Refills: 12    Associated Diagnoses: DM type 2, goal A1c below 7      aspirin 81 MG tablet Take 1 tablet by mouth every evening       UNIFINE PENTIPS 31G X 6 MM USE ONCE DAILY  Qty: 100 each, Refills: 11    Associated Diagnoses: Encounter for medication refill      blood glucose monitoring (ONE TOUCH ULTRASOFT) lancets Use to test blood sugar 3-4 times daily or as directed.  Qty: 300 Box, Refills: 11    Comments: Entered rx from fax      ONE TOUCH ULTRA test strip TEST BLOOD GLUCOSE FOUR TIMES A DAY  Qty: 300 each, Refills: 11    Associated Diagnoses: Encounter for medication refill      Nutritional Supplements (GLUCERNA 1.5 JEN PO) Take 1 Can by mouth daily       UNABLE TO FIND Take 1 Bar by mouth 2 times daily MEDICATION NAME: FiberONe Bar       ACE/ARB NOT PRESCRIBED, INTENTIONAL, 1 each continuous prn ACE & ARB not prescribed due to Risk for drug interaction and Worsening renal function on ACE/ARB therapy    Associated Diagnoses: CAD (coronary artery disease)           Allergies   Allergies   Allergen Reactions     Contrast Dye Hives and Swelling     Pt. States no history of allergy to topical betadine. Tolerated surgical betadine prep 2-6-2012.     Lisinopril Cough     dizzy     Metoprolol      Depressed       Data   Most Recent 3  CBC's:  Recent Labs   Lab Test  02/18/18   0707  02/17/18   0938  09/17/16   0635  09/16/16   0855   WBC  3.0*  4.0   --   12.9*   HGB  12.4  14.2   --   12.2   MCV  91  90   --   92   PLT  85*  100*  176  180      Most Recent 3 BMP's:  Recent Labs   Lab Test  02/22/18   0637  02/18/18   0707  02/17/18   0938   NA  135  135  129*   POTASSIUM  2.8*  3.5  4.4   CHLORIDE  101  104  97   CO2  25  25  22   BUN  12  9  18   CR  0.52  0.51*  0.55   ANIONGAP  9  6  10   JEN  8.6  7.9*  8.5   GLC  103*  101*  186*     Most Recent 2 LFT's:  Recent Labs   Lab Test  02/06/18   1149  03/07/17   1308   AST  21  22   ALT  18  14   ALKPHOS  74  79   BILITOTAL  0.4  0.3     Most Recent INR's and Anticoagulation Dosing History:  Anticoagulation Dose History     Recent Dosing and Labs Latest Ref Rng & Units 9/15/2008 8/6/2009 8/9/2012 11/22/2012 12/10/2013    INR 0.86 - 1.14 0.91 0.96 1.02 1.00 0.93        Most Recent 3 Troponin's:  Recent Labs   Lab Test  09/14/16   1258  09/14/16   0613  09/13/16   2120   TROPI  0.140*  0.185*  0.049*     Most Recent Cholesterol Panel:  Recent Labs   Lab Test  03/07/17   1308   CHOL  123   LDL  40   HDL  49   TRIG  169*     Most Recent 6 Bacteria Isolates From Any Culture (See EPIC Reports for Culture Details):  Recent Labs   Lab Test  02/18/18   0145  02/17/18   2045  02/17/18   1046  02/17/18   0938  09/14/16   1600  09/13/16   2125   CULT  >10 Squamous epithelial cells/low power field indicates oral contamination. Please   recollect.  *  Notification of test cancellation was given to  Maylin Leyva RN at 1419 2/18/18 [id number]    No growth  No growth after 5 days  No growth after 5 days  Moderate growth Normal tammy  No growth     Most Recent TSH, T4 and A1c Labs:  Recent Labs   Lab Test  02/06/18   1149   11/25/12   0619   TSH   --    --   12.30*   A1C  7.1*   < >   --     < > = values in this interval not displayed.       Results for orders placed or performed during the hospital encounter of  02/17/18   XR Chest 2 Views    Narrative    CHEST TWO VIEWS  2/17/2018 9:59 AM     HISTORY: 89-year-old with fever and cough.       Impression    IMPRESSION: Since September 13, 2016, dual-lead pacemaker is again  noted overlying the left hemithorax. Heart size is normal. No pleural  effusion, pneumothorax. There is a small focus of opacity in the  medial right lower lobe, which may be an early focus of pneumonia.  Recommend continued radiographic surveillance until resolution.    LAUREN KIRAN MD

## 2018-02-22 NOTE — PLAN OF CARE
Problem: Patient Care Overview  Goal: Plan of Care/Patient Progress Review  Outcome: Improving  A&O but forgetful at times, BP's elevated, prn hydralazine given x 1.  Lungs diminished with expiratory wheezes, infrequent nonproductive cough.  Up with assist x 1 and walker to the bsc, loose Bm, generalized weakness.  Will continue to monitor.

## 2018-02-22 NOTE — PLAN OF CARE
Problem: Patient Care Overview  Goal: Plan of Care/Patient Progress Review  Discharge Planner PT   Patient plan for discharge: TCU  Current status: Supine to/from sit with min assist and use of bed rail. Sit to/from stand with FWW and CGA, cues for safe hand placement. Amb 250 ft x 1 with FWW and CGA/SBA while on RA. Pt fatigues quickly, needs frequent short standing rest breaks today. Returned supine at end of session with all needs in reach and bed alarm on.  Barriers to return to prior living situation: Lives alone, Decreased activity tolerance, Fall risk  Recommendations for discharge: TCU per the plan established by the Physical Therapist   Rationale for recommendations: TCU to progress strengthening and independence with mobility.       Entered by: Dhara Doe 02/22/2018 9:01 AM         Pt discharged to TCU today.    Pt goals not met.

## 2018-02-22 NOTE — PLAN OF CARE
Problem: Patient Care Overview  Goal: Plan of Care/Patient Progress Review  Outcome: Adequate for Discharge Date Met: 02/22/18  1500: Pt A&Ox4 but forgetful; calm and cooperative this shift. Slightly hypertensive but stable. On RA. Noted nonproductive cough but no c/o sob. Robitussin DM given as scheduled. Continues on Tamiflu. Switched to PO abx. Droplet precautions maintained.  C/o generalized pain managed with tylenol and lidocaine patch. Poor to fair appetite/ encouragement provided. Low potassium replaced per protocol. Order received for discharge/ plan discussed with pt/daughter. Pt discharged to [facility] via w/c accompanied by [facility] and family. A copy of AVS given to pt/daughter. Discharge paperwork/envelope for TCU sent with pt; All belongings returned to pt.

## 2018-02-22 NOTE — PLAN OF CARE
Problem: Patient Care Overview  Goal: Plan of Care/Patient Progress Review  Outcome: Improving  Pt is A/O, VSS on RA. Up with 1. CMS intact, LS diminished with audible expiratory wheezes. Denies pain. D/c pending. Will continue to monitor.

## 2018-02-23 ENCOUNTER — NURSING HOME VISIT (OUTPATIENT)
Dept: GERIATRICS | Facility: CLINIC | Age: 83
End: 2018-02-23
Payer: MEDICARE

## 2018-02-23 VITALS
OXYGEN SATURATION: 99 % | HEART RATE: 83 BPM | WEIGHT: 141 LBS | DIASTOLIC BLOOD PRESSURE: 74 MMHG | TEMPERATURE: 98 F | BODY MASS INDEX: 30.42 KG/M2 | HEIGHT: 57 IN | RESPIRATION RATE: 18 BRPM | SYSTOLIC BLOOD PRESSURE: 131 MMHG

## 2018-02-23 DIAGNOSIS — E11.51 DIABETES MELLITUS TYPE 2 WITH PERIPHERAL ARTERY DISEASE (H): ICD-10-CM

## 2018-02-23 DIAGNOSIS — H54.10 BLINDNESS AND LOW VISION: ICD-10-CM

## 2018-02-23 DIAGNOSIS — Z95.0 PACEMAKER: ICD-10-CM

## 2018-02-23 DIAGNOSIS — R53.81 PHYSICAL DECONDITIONING: ICD-10-CM

## 2018-02-23 DIAGNOSIS — J10.1 INFLUENZA DUE TO INFLUENZA VIRUS, TYPE B: ICD-10-CM

## 2018-02-23 DIAGNOSIS — J18.9 COMMUNITY ACQUIRED PNEUMONIA OF RIGHT LOWER LOBE OF LUNG: Primary | ICD-10-CM

## 2018-02-23 DIAGNOSIS — I10 BENIGN ESSENTIAL HYPERTENSION: ICD-10-CM

## 2018-02-23 DIAGNOSIS — I25.10 CORONARY ARTERY DISEASE INVOLVING NATIVE CORONARY ARTERY OF NATIVE HEART WITHOUT ANGINA PECTORIS: ICD-10-CM

## 2018-02-23 DIAGNOSIS — Z90.01 H/O ENUCLEATION OF RIGHT EYEBALL: ICD-10-CM

## 2018-02-23 PROBLEM — E87.1 HYPONATREMIA: Status: ACTIVE | Noted: 2018-02-23

## 2018-02-23 LAB
BACTERIA SPEC CULT: NO GROWTH
BACTERIA SPEC CULT: NO GROWTH
Lab: NORMAL
Lab: NORMAL
SPECIMEN SOURCE: NORMAL
SPECIMEN SOURCE: NORMAL

## 2018-02-23 PROCEDURE — 99310 SBSQ NF CARE HIGH MDM 45: CPT | Performed by: NURSE PRACTITIONER

## 2018-02-23 NOTE — LETTER
2/23/2018        RE: Serena Marie  [address]  [facility] [id number]        [place] [facility]  PRIMARY CARE PROVIDER AND CLINIC:  [facility] [address] / [facility]*  Chief Complaint   Patient presents with     Hospital F/U       HPI:    Serena Marie is a 89 year old  (8/21/1924),admitted to the [facility] from [facility].  Hospital stay 02/17/2018 through 02/22/2018.  Admitted to this facility for  rehab, medical management and nursing care.  HPI information obtained from: facility chart records, facility staff, patient report and [facility] chart review.  Current issues are:      Community acquired pneumonia of right lower lobe of lung (H)  Influenza due to influenza virus, type B  Patient transferred to TCU following hospital stay due to fever, cough and weakness. She was found to have influenza B and RML opacity. She was started on tamiflu, rocephin and azitrhomycin. Prednisone 40mg for 5 d was added due to rhonchi.  The antibiotics were changed azithromyin. which will be completed 2/24 and the tamiflu will be done on 2/24 as well.   Today she has cough with deep breath. O2 sats >90% on RA. No shortness of breath. She is eating and drinking.     Diabetes mellitus type 2 with peripheral artery disease (H)  A1C 7.1 on 2/16/18. PTA insulin levemir 20 u in am and novolog 12 u with each meal and at bedtime.    BS in TCU  PM: 205  HS: 269  Coronary artery disease involving native coronary artery of native heart without angina pectoris- remote h/o PCI  In right coronary artery.   EF in September 2017 was 60-65%  No complaint of CP or shortness of breath today.   Pacemaker- checked in January    Benign essential hypertension  Some elevated BPs while in hospital. Losartan was added.   BP's: 131/74, 151/75, 146/82    Blindness and low vision  H/O enucleation of right eyeball  Right eyeball  removed 2009 due to endophthalmitis. She has several eye drops she uses for dry eyes and pain.   She has about 30% vision in left eye. There is a family history of macular disease    Physical deconditioning  Has been living alone in a condo at [facility] [place] by [facility]. Family is in process of having her move to [facility]. She uses a 4WW.           CODE STATUS/ADVANCE DIRECTIVES DISCUSSION:   DNR / DNI  Patient's living condition: lives alone    ALLERGIES:Contrast dye; Lisinopril; and Metoprolol  PAST MEDICAL HISTORY:  has a past medical history of CAD (coronary artery disease) (8/2012); Diabetes mellitus type 2 with peripheral artery disease (H); DM type 2, goal A1c below 7; GERD (gastroesophageal reflux disease); Gluten intolerance; HTN (hypertension); Hyperlipidemia LDL goal < 100; Pacemaker (8/2009); PAD (peripheral artery disease) (H) (2008); S/P coronary angiogram (8/2012); Syncope (08/2009); and Unspecified hypothyroidism.  PAST SURGICAL HISTORY:  has a past surgical history that includes colectomy; tonsillectomy & adenoidectomy; appendectomy; hysterectomy, marlin; back surgery; cholecystectomy, laporoscopic; stent, coronary, carlton (8-12); Implant pacemaker (8/2009); Eye surgery; REMOVAL OF EYE; and Angioplasty (2008).  FAMILY HISTORY: family history includes CANCER in her father and mother; Unknown/Adopted in her maternal grandfather, maternal grandmother, paternal grandfather, and paternal grandmother.  SOCIAL HISTORY:  reports that she quit smoking about 70 years ago. Her smoking use included Cigarettes. She has a 3.00 pack-year smoking history. She has never used smokeless tobacco. She reports that she does not drink alcohol or use illicit drugs.    Post Discharge Medication Reconciliation Status: discharge medications reconciled, continue medications without change.  Current Outpatient Prescriptions   Medication Sig Dispense Refill     traMADol (ULTRAM) 50 MG tablet Take 1 tablet (50 mg) by mouth every 6 hours as needed for pain  No driving a car or drinking alcohol for at least 8 hours after taking this medication. 20 tablet 0     oseltamivir (TAMIFLU) 30 MG capsule Take 1 capsule (30 mg) by mouth 2 times daily for 2 days 5 capsule      predniSONE (DELTASONE) 20 MG tablet Take 2 tablets (40 mg) by mouth daily for 2 days       albuterol (2.5 MG/3ML) 0.083% neb solution Take 1 vial (2.5 mg) by nebulization every 2 hours as needed for other (dyspnea) 360 mL      azithromycin (ZITHROMAX) 250 MG tablet Take 1 tablet (250 mg) by mouth daily for 2 days 2 tablet      losartan (COZAAR) 50 MG tablet Take 2 tablets (100 mg) by mouth daily 30 tablet      insulin aspart (NOVOLOG FLEXPEN) 100 UNIT/ML injection Inject 12 Units Subcutaneous 4 times daily (with meals and nightly)       omeprazole (PRILOSEC) 20 MG CR capsule TAKE ONE CAPSULE BY MOUTH AT BEDTIME - NO TUMS WITHIN 2 HOURS OF MED 30 capsule 10     levothyroxine (SYNTHROID/LEVOTHROID) 75 MCG tablet TAKE ONE TABLET BY MOUTH ONE TIME DAILY  90 tablet 1     lidocaine (LIDODERM) 5 % Patch APPLY UP TO 3 PATCHES TO PAINFUL AREA AT ONCE FOR UP TO 12 H WITHIN A 24 H PERIOD.  REMOVE AFTER 12 HOURS. 60 patch 11     UNIFINE PENTIPS 31G X 6 MM USE ONCE DAILY 100 each 11     insulin detemir (LEVEMIR FLEXTOUCH) 100 UNIT/ML injection INJECT 20 UNITS UNDER THE SKIN AT BEDTIME 15 mL 8     Tetrahydroz-Dextran-PEG-Povid (TH EYE DROP ADVANCED RELIEF) 0.05-0.1-1-1 % SOLN Apply to eye as needed For dry eye relief- 5 x per day       sodium chloride (TITO 128) 5 % ophthalmic ointment Place 1 Application into the right eye 2 times daily as needed for dry eyes       acetaminophen (TYLENOL ARTHRITIS PAIN) 650 MG CR tablet Take 1,300 mg by mouth daily States 2 tablets qam & usually 2 tablets qpm       RaNITidine HCl (ZANTAC 150 MAXIMUM STRENGTH PO) Take 150 mg by mouth daily as needed       cholecalciferol (VITAMIN D) 1000 UNIT tablet Take 2,000 Units by mouth daily       atorvastatin (LIPITOR) 80 MG tablet Take 1 tablet  "(80 mg) by mouth daily 90 tablet 3     Multiple Vitamins-Minerals (PRESERVISION AREDS 2 PO) Take 1 tablet by mouth every morning       docusate sodium (DOCQLACE) 100 MG capsule Take 100 mg by mouth At Bedtime       Terbinafine (LAMISIL EX) Externally apply topically daily For under breasts, in between toes       Pramoxine HCl (VAGISIL ANTI-ITCH MEDICATED EX) Externally apply topically every morning       phenylephrine-shark liver oil-mineral oil-petrolatum (PREPARATION H) 0.25-14-74.9 % rectal ointment Place rectally daily       blood glucose monitoring (ONE TOUCH ULTRASOFT) lancets Use to test blood sugar 3-4 times daily or as directed. [address]     ONE TOUCH ULTRA test strip TEST BLOOD GLUCOSE FOUR TIMES A  each 11     Nutritional Supplements (GLUCERNA 1.5 JEN PO) Take 1 Can by mouth daily        fish oil-omega-3 fatty acids 1000 MG capsule Take 1 g by mouth daily        timolol (TIMOPTIC) 0.5 % ophthalmic solution Place 1 drop Into the left eye 2 times daily  1 Bottle      sodium chloride (TITO 128) 5 % ophthalmic solution Place 1 drop Into the left eye daily as needed for dry eyes        fluocinolone acetonide 0.01 % OIL Apply to scalp when wet massage well cover with cap leave on over night  Wash in am. 118 mL 12     aspirin 81 MG tablet Take 1 tablet by mouth every evening        UNABLE TO FIND Take 1 Bar by mouth 2 times daily MEDICATION NAME: FiberONe Bar        ACE/ARB NOT PRESCRIBED, INTENTIONAL, 1 each continuous prn ACE & ARB not prescribed due to Risk for drug interaction and Worsening renal function on ACE/ARB therapy (Patient not taking: Reported on 2/23/2018)         ROS:  4 point ROS including Respiratory, CV, GI and , other than that noted in the HPI,  is negative    Exam:  /74  Pulse 83  Temp 98  F (36.7  C)  Resp 18  Ht 4' 9\" (1.448 m)  Wt 141 lb (64 kg)  SpO2 99%  BMI 30.51 kg/m2  GENERAL APPEARANCE:  Alert, in no distress  ENT:  Mouth and posterior oropharynx normal, " moist mucous membranes, hearing acuity adequate   EYES: left EOM, conjunctivae, lids, pupils and irises normal. Right eye is closed. No drainage    RESP:  respiratory effort and palpation of chest normal, no respiratory distress, Lung sounds coarse rhonchi.   CV:  Palpation and auscultation of heart done , rate and rhythm reg, no murmur, no rub or gallop, Edema none  ABDOMEN:  normal bowel sounds, soft, nontender, no hepatosplenomegaly or other masses  M/S:   Gait and station not observed. Able to reposition in bed, Digits and nails onychomycosis   SKIN:  Inspection/Palpation of skin and subcutaneous tissue no rash  NEURO: 2-12 in normal limits and at patient's baseline  PSYCH:  insight and judgement, memory impaired , affect and mood normal      Lab/Diagnostic data:  CBC RESULTS:   Recent Labs   Lab Test  02/18/18   0707  02/17/18   0938   WBC  3.0*  4.0   RBC  3.99  4.58   HGB  12.4  14.2   HCT  36.4  41.3   MCV  91  90   MCH  31.1  31.0   MCHC  34.1  34.4   RDW  14.4  14.1   PLT  85*  100*       Last Basic Metabolic Panel:  Recent Labs   Lab Test  02/22/18   0637  02/18/18   0707   NA  135  135   POTASSIUM  2.8*  3.5   CHLORIDE  101  104   JEN  8.6  7.9*   CO2  25  25   BUN  12  9   CR  0.52  0.51*   GLC  103*  101*       Liver Function Studies -   Recent Labs   Lab Test  02/06/18   1149  03/07/17   1308   PROTTOTAL  6.1  6.4   ALBUMIN  4.2  4.1   BILITOTAL  0.4  0.3   ALKPHOS  74  79   AST  21  22   ALT  18  14       TSH   Date Value Ref Range Status   11/25/2012 12.30 (H) 0.4 - 5.0 mU/L Final     Lab Results   Component Value Date    A1C 7.1 02/06/2018    A1C 7.3 12/05/2017       ASSESSMENT/PLAN:  (J18.1) Community acquired pneumonia of right lower lobe of lung (H)  (primary encounter diagnosis)  (J10.1) Influenza due to influenza virus, type B  Comment: recent hospitalization due to cough and weakness with fever. Found to have influenza B and CAP. Treated with tamiflu, antibiotics and prednisone with nebs.  Today O2 sats in 90s, cough with deep breath. She is eating and drinking.   Plan: finish tamiflu and azithromycin. Continue nebs q 4 h WA  CBC 2/26    (E11.51) Diabetes mellitus type 2 with peripheral artery disease (H)  Comment: adequate control for age. Taking PTA levemir and novolog. 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 Will have nursing hold hs novolg is BG <150    (I25.10) Coronary artery disease involving native coronary artery of native heart without angina pectoris  (I10) Benign essential hypertension  (Z95.0) Pacemaker  Comment: some elevated BPs in hospital. Losartan added. Followed by [facility] heart. No other cardiac issues during this recent hospitalization  Plan: monitor BP and wts BMP 2/26    (H54.10) Blindness and low vision  (Z90.01) H/O enucleation of right eyeball  Comment: right eye removed in 2009. She does have pain in eyes if eye drops are not administered. Discussed with nursing.   Plan: eye medications clarified with nursing in TCU.     (R53.81) Physical deconditioning  Comment: due to respiratory illness. Needs more care upon discharge. Family is working on move to [facility]  Plan: physical therapy and OCCUPATIONAL THERAPY, SW to assist with discharge    Daughter here. Discussed plan of care and discharge plans.    Electronically signed by:  ANNEMARIE Moody CNP                    Sincerely,        ANNEMARIE Moody CNP

## 2018-02-23 NOTE — PROGRESS NOTES
[place] [facility]  PRIMARY CARE PROVIDER AND CLINIC:  [facility] [address] / [facility]*  Chief Complaint   Patient presents with     Hospital F/U       HPI:    Serena Marie is a 89 year old  (8/21/1924),admitted to the [facility] from [facility].  Hospital stay 02/17/2018 through 02/22/2018.  Admitted to this facility for  rehab, medical management and nursing care.  HPI information obtained from: facility chart records, facility staff, patient report and [facility] chart review.  Current issues are:      Community acquired pneumonia of right lower lobe of lung (H)  Influenza due to influenza virus, type B  Patient transferred to TCU following hospital stay due to fever, cough and weakness. She was found to have influenza B and RML opacity. She was started on tamiflu, rocephin and azitrhomycin. Prednisone 40mg for 5 d was added due to rhonchi.  The antibiotics were changed azithromyin. which will be completed 2/24 and the tamiflu will be done on 2/24 as well.   Today she has cough with deep breath. O2 sats >90% on RA. No shortness of breath. She is eating and drinking.     Diabetes mellitus type 2 with peripheral artery disease (H)  A1C 7.1 on 2/16/18. PTA insulin levemir 20 u in am and novolog 12 u with each meal and at bedtime.    BS in TCU  PM: 205  HS: 269  Coronary artery disease involving native coronary artery of native heart without angina pectoris- remote h/o PCI  In right coronary artery.   EF in September 2017 was 60-65%  No complaint of CP or shortness of breath today.   Pacemaker- checked in January    Benign essential hypertension  Some elevated BPs while in hospital. Losartan was added.   BP's: 131/74, 151/75, 146/82    Blindness and low vision  H/O enucleation of right eyeball  Right eyeball removed 2009 due to endophthalmitis. She has several eye drops she uses for dry eyes and pain.   She has  about 30% vision in left eye. There is a family history of macular disease    Physical deconditioning  Has been living alone in a condo at [facility]. Family is in process of having her move to [facility]. She uses a 4WW.           CODE STATUS/ADVANCE DIRECTIVES DISCUSSION:   DNR / DNI  Patient's living condition: lives alone    ALLERGIES:Contrast dye; Lisinopril; and Metoprolol  PAST MEDICAL HISTORY:  has a past medical history of CAD (coronary artery disease) (8/2012); Diabetes mellitus type 2 with peripheral artery disease (H); DM type 2, goal A1c below 7; GERD (gastroesophageal reflux disease); Gluten intolerance; HTN (hypertension); Hyperlipidemia LDL goal < 100; Pacemaker (8/2009); PAD (peripheral artery disease) (H) (2008); S/P coronary angiogram (8/2012); Syncope (08/2009); and Unspecified hypothyroidism.  PAST SURGICAL HISTORY:  has a past surgical history that includes colectomy; tonsillectomy & adenoidectomy; appendectomy; hysterectomy, marlin; back surgery; cholecystectomy, laporoscopic; stent, coronary, carlton (8-12); Implant pacemaker (8/2009); Eye surgery; REMOVAL OF EYE; and Angioplasty (2008).  FAMILY HISTORY: family history includes CANCER in her father and mother; Unknown/Adopted in her maternal grandfather, maternal grandmother, paternal grandfather, and paternal grandmother.  SOCIAL HISTORY:  reports that she quit smoking about 70 years ago. Her smoking use included Cigarettes. She has a 3.00 pack-year smoking history. She has never used smokeless tobacco. She reports that she does not drink alcohol or use illicit drugs.    Post Discharge Medication Reconciliation Status: discharge medications reconciled, continue medications without change.  Current Outpatient Prescriptions   Medication Sig Dispense Refill     traMADol (ULTRAM) 50 MG tablet Take 1 tablet (50 mg) by mouth every 6 hours as needed for pain No driving a car or drinking alcohol for at least 8 hours after taking this medication. 20 tablet 0      oseltamivir (TAMIFLU) 30 MG capsule Take 1 capsule (30 mg) by mouth 2 times daily for 2 days 5 capsule      predniSONE (DELTASONE) 20 MG tablet Take 2 tablets (40 mg) by mouth daily for 2 days       albuterol (2.5 MG/3ML) 0.083% neb solution Take 1 vial (2.5 mg) by nebulization every 2 hours as needed for other (dyspnea) 360 mL      azithromycin (ZITHROMAX) 250 MG tablet Take 1 tablet (250 mg) by mouth daily for 2 days 2 tablet      losartan (COZAAR) 50 MG tablet Take 2 tablets (100 mg) by mouth daily 30 tablet      insulin aspart (NOVOLOG FLEXPEN) 100 UNIT/ML injection Inject 12 Units Subcutaneous 4 times daily (with meals and nightly)       omeprazole (PRILOSEC) 20 MG CR capsule TAKE ONE CAPSULE BY MOUTH AT BEDTIME - NO TUMS WITHIN 2 HOURS OF MED 30 capsule 10     levothyroxine (SYNTHROID/LEVOTHROID) 75 MCG tablet TAKE ONE TABLET BY MOUTH ONE TIME DAILY  90 tablet 1     lidocaine (LIDODERM) 5 % Patch APPLY UP TO 3 PATCHES TO PAINFUL AREA AT ONCE FOR UP TO 12 H WITHIN A 24 H PERIOD.  REMOVE AFTER 12 HOURS. 60 patch 11     UNIFINE PENTIPS 31G X 6 MM USE ONCE DAILY 100 each 11     insulin detemir (LEVEMIR FLEXTOUCH) 100 UNIT/ML injection INJECT 20 UNITS UNDER THE SKIN AT BEDTIME 15 mL 8     Tetrahydroz-Dextran-PEG-Povid (TH EYE DROP ADVANCED RELIEF) 0.05-0.1-1-1 % SOLN Apply to eye as needed For dry eye relief- 5 x per day       sodium chloride (TITO 128) 5 % ophthalmic ointment Place 1 Application into the right eye 2 times daily as needed for dry eyes       acetaminophen (TYLENOL ARTHRITIS PAIN) 650 MG CR tablet Take 1,300 mg by mouth daily States 2 tablets qam & usually 2 tablets qpm       RaNITidine HCl (ZANTAC 150 MAXIMUM STRENGTH PO) Take 150 mg by mouth daily as needed       cholecalciferol (VITAMIN D) 1000 UNIT tablet Take 2,000 Units by mouth daily       atorvastatin (LIPITOR) 80 MG tablet Take 1 tablet (80 mg) by mouth daily 90 tablet 3     Multiple Vitamins-Minerals (PRESERVISION AREDS 2 PO) Take 1 tablet  "by mouth every morning       docusate sodium (DOCQLACE) 100 MG capsule Take 100 mg by mouth At Bedtime       Terbinafine (LAMISIL EX) Externally apply topically daily For under breasts, in between toes       Pramoxine HCl (VAGISIL ANTI-ITCH MEDICATED EX) Externally apply topically every morning       phenylephrine-shark liver oil-mineral oil-petrolatum (PREPARATION H) 0.25-14-74.9 % rectal ointment Place rectally daily       blood glucose monitoring (ONE TOUCH ULTRASOFT) lancets Use to test blood sugar 3-4 times daily or as directed. 300 [address] 11     ONE TOUCH ULTRA test strip TEST BLOOD GLUCOSE FOUR TIMES A  each 11     Nutritional Supplements (GLUCERNA 1.5 JEN PO) Take 1 Can by mouth daily        fish oil-omega-3 fatty acids 1000 MG capsule Take 1 g by mouth daily        timolol (TIMOPTIC) 0.5 % ophthalmic solution Place 1 drop Into the left eye 2 times daily  1 Bottle      sodium chloride (TITO 128) 5 % ophthalmic solution Place 1 drop Into the left eye daily as needed for dry eyes        fluocinolone acetonide 0.01 % OIL Apply to scalp when wet massage well cover with cap leave on over night  Wash in am. 118 mL 12     aspirin 81 MG tablet Take 1 tablet by mouth every evening        UNABLE TO FIND Take 1 Bar by mouth 2 times daily MEDICATION NAME: FiberONe Bar        ACE/ARB NOT PRESCRIBED, INTENTIONAL, 1 each continuous prn ACE & ARB not prescribed due to Risk for drug interaction and Worsening renal function on ACE/ARB therapy (Patient not taking: Reported on 2/23/2018)         ROS:  4 point ROS including Respiratory, CV, GI and , other than that noted in the HPI,  is negative    Exam:  /74  Pulse 83  Temp 98  F (36.7  C)  Resp 18  Ht 4' 9\" (1.448 m)  Wt 141 lb (64 kg)  SpO2 99%  BMI 30.51 kg/m2  GENERAL APPEARANCE:  Alert, in no distress  ENT:  Mouth and posterior oropharynx normal, moist mucous membranes, hearing acuity adequate   EYES: left EOM, conjunctivae, lids, pupils and irises " normal. Right eye is closed. No drainage    RESP:  respiratory effort and palpation of chest normal, no respiratory distress, Lung sounds coarse rhonchi.   CV:  Palpation and auscultation of heart done , rate and rhythm reg, no murmur, no rub or gallop, Edema none  ABDOMEN:  normal bowel sounds, soft, nontender, no hepatosplenomegaly or other masses  M/S:   Gait and station not observed. Able to reposition in bed, Digits and nails onychomycosis   SKIN:  Inspection/Palpation of skin and subcutaneous tissue no rash  NEURO: 2-12 in normal limits and at patient's baseline  PSYCH:  insight and judgement, memory impaired , affect and mood normal      Lab/Diagnostic data:  CBC RESULTS:   Recent Labs   Lab Test  02/18/18   0707  02/17/18   0938   WBC  3.0*  4.0   RBC  3.99  4.58   HGB  12.4  14.2   HCT  36.4  41.3   MCV  91  90   MCH  31.1  31.0   MCHC  34.1  34.4   RDW  14.4  14.1   PLT  85*  100*       Last Basic Metabolic Panel:  Recent Labs   Lab Test  02/22/18   0637  02/18/18   0707   NA  135  135   POTASSIUM  2.8*  3.5   CHLORIDE  101  104   JEN  8.6  7.9*   CO2  25  25   BUN  12  9   CR  0.52  0.51*   GLC  103*  101*       Liver Function Studies -   Recent Labs   Lab Test  02/06/18   1149  03/07/17   1308   PROTTOTAL  6.1  6.4   ALBUMIN  4.2  4.1   BILITOTAL  0.4  0.3   ALKPHOS  74  79   AST  21  22   ALT  18  14       TSH   Date Value Ref Range Status   11/25/2012 12.30 (H) 0.4 - 5.0 mU/L Final     Lab Results   Component Value Date    A1C 7.1 02/06/2018    A1C 7.3 12/05/2017       ASSESSMENT/PLAN:  (J18.1) Community acquired pneumonia of right lower lobe of lung (H)  (primary encounter diagnosis)  (J10.1) Influenza due to influenza virus, type B  Comment: recent hospitalization due to cough and weakness with fever. Found to have influenza B and CAP. Treated with tamiflu, antibiotics and prednisone with nebs. Today O2 sats in 90s, cough with deep breath. She is eating and drinking.   Plan: finish tamiflu and  azithromycin. Continue nebs q 4 h WA  CBC 2/26    (E11.51) Diabetes mellitus type 2 with peripheral artery disease (H)  Comment: adequate control for age. Taking PTA levemir and novolog. 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 Will have nursing hold hs novolg is BG <150    (I25.10) Coronary artery disease involving native coronary artery of native heart without angina pectoris  (I10) Benign essential hypertension  (Z95.0) Pacemaker  Comment: some elevated BPs in hospital. Losartan added. Followed by [facility] heart. No other cardiac issues during this recent hospitalization  Plan: monitor BP and wts BMP 2/26    (H54.10) Blindness and low vision  (Z90.01) H/O enucleation of right eyeball  Comment: right eye removed in 2009. She does have pain in eyes if eye drops are not administered. Discussed with nursing.   Plan: eye medications clarified with nursing in TCU.     (R53.81) Physical deconditioning  Comment: due to respiratory illness. Needs more care upon discharge. Family is working on move to [facility]  Plan: physical therapy and OCCUPATIONAL THERAPY, SW to assist with discharge    Daughter here. Discussed plan of care and discharge plans.    Electronically signed by:  ANNEMARIE Moody CNP

## 2018-02-26 ENCOUNTER — TRANSFERRED RECORDS (OUTPATIENT)
Dept: HEALTH INFORMATION MANAGEMENT | Facility: CLINIC | Age: 83
End: 2018-02-26

## 2018-02-26 ENCOUNTER — HOSPITAL LABORATORY (OUTPATIENT)
Dept: OTHER | Facility: CLINIC | Age: 83
End: 2018-02-26

## 2018-02-26 LAB
ANION GAP SERPL CALCULATED.3IONS-SCNC: 7 MMOL/L (ref 3–14)
BUN SERPL-MCNC: 18 MG/DL (ref 7–30)
CALCIUM SERPL-MCNC: 8.7 MG/DL (ref 8.5–10.5)
CHLORIDE SERPLBLD-SCNC: 102 MMOL/L (ref 94–109)
CO2 SERPL-SCNC: 24 MMOL/L (ref 20–32)
CREAT SERPL-MCNC: 0.58 MG/DL (ref 0.52–1.04)
ERYTHROCYTE [DISTWIDTH] IN BLOOD BY AUTOMATED COUNT: 14.7 % (ref 10–15)
GFR SERPL CREATININE-BSD FRML MDRD: >90 ML/MIN/1.73M2
GLUCOSE SERPL-MCNC: 122 MG/DL (ref 70–99)
HCT VFR BLD AUTO: 37.1 % (ref 35–47)
HEMOGLOBIN: 12.7 G/DL (ref 11.7–15.7)
MCH RBC QN AUTO: 30.8 PG (ref 26.5–33)
MCHC RBC AUTO-ENTMCNC: 34.2 G/DL (ref 31.5–36.5)
MCV RBC AUTO: 90 FL (ref 78–100)
PLATELET # BLD AUTO: 207 10^9/L (ref 150–450)
POTASSIUM SERPL-SCNC: 3.6 MMOL/L (ref 3.4–5.3)
RBC # BLD AUTO: 4.13 10^12/L (ref 3.8–5.2)
SODIUM SERPL-SCNC: 133 MMOL/L (ref 133–144)
WBC # BLD AUTO: 7.5 10^9/L (ref 4–11)

## 2018-02-27 ENCOUNTER — NURSING HOME VISIT (OUTPATIENT)
Dept: GERIATRICS | Facility: CLINIC | Age: 83
End: 2018-02-27
Payer: MEDICARE

## 2018-02-27 VITALS
HEART RATE: 75 BPM | RESPIRATION RATE: 20 BRPM | OXYGEN SATURATION: 99 % | TEMPERATURE: 98 F | HEIGHT: 57 IN | DIASTOLIC BLOOD PRESSURE: 76 MMHG | BODY MASS INDEX: 29.77 KG/M2 | WEIGHT: 138 LBS | SYSTOLIC BLOOD PRESSURE: 114 MMHG

## 2018-02-27 DIAGNOSIS — F02.80 LATE ONSET ALZHEIMER'S DISEASE WITHOUT BEHAVIORAL DISTURBANCE (H): ICD-10-CM

## 2018-02-27 DIAGNOSIS — I25.10 CORONARY ARTERY DISEASE INVOLVING NATIVE CORONARY ARTERY OF NATIVE HEART WITHOUT ANGINA PECTORIS: ICD-10-CM

## 2018-02-27 DIAGNOSIS — I10 BENIGN ESSENTIAL HYPERTENSION: ICD-10-CM

## 2018-02-27 DIAGNOSIS — J11.00 INFLUENZA AND PNEUMONIA: Primary | ICD-10-CM

## 2018-02-27 DIAGNOSIS — E11.51 DIABETES MELLITUS TYPE 2 WITH PERIPHERAL ARTERY DISEASE (H): ICD-10-CM

## 2018-02-27 DIAGNOSIS — G30.1 LATE ONSET ALZHEIMER'S DISEASE WITHOUT BEHAVIORAL DISTURBANCE (H): ICD-10-CM

## 2018-02-27 DIAGNOSIS — H54.10 BLINDNESS AND LOW VISION: ICD-10-CM

## 2018-02-27 PROCEDURE — 99306 1ST NF CARE HIGH MDM 50: CPT | Performed by: INTERNAL MEDICINE

## 2018-02-27 RX ORDER — ALBUTEROL SULFATE 0.83 MG/ML
1 SOLUTION RESPIRATORY (INHALATION) EVERY 4 HOURS
COMMUNITY
Start: 2018-02-23 | End: 2018-03-02

## 2018-02-27 NOTE — PROGRESS NOTES
"Serena Marie is a 89 year old female seen February 27, 2018 at [facility] where she was admitted last week after [facility] hospitalization for influenza A with community acquired pneumonia and sepsis.    Patient has some residual cough, but is overall feeling much better     Patient is seen in her room with her grandson Biju present and he helps with history.   Patient reports \"I have dementia\" and knows that she needs more support in her living situation.     This is compounded by worsening vision; she had right eye enucleation many years ago, and has fading vision in her remaining eye.    Is now legally blind  Patient presented to the ER 2/17/18 with fever to 102 degrees, cough and chills     She was influenza B + and CXR showed opacity in the RLL.   She was treated with Tamiflu and IV ceftriaxone /azithromycin.     She was also started on losartan for high bps     Because of ongoing weakness she was transferred to TCU for Rehab     Family hopes to use this time to find [facility] to transfer patient at discharge.    They have been touring various places in the area.       Past Medical History:   Diagnosis Date     CAD (coronary artery disease) 8/2012 8/2012 Cath - 75% RCA with BRIGITTE placed, 40% ostial and distal left main stenosis     Diabetes mellitus type 2 with peripheral artery disease (H)      DM type 2, goal A1c below 7      GERD (gastroesophageal reflux disease)      Gluten intolerance      HTN (hypertension)     been off meds since 2012     Hyperlipidemia LDL goal < 100      Pacemaker 8/2009 8/2009 Near syncopal episode with HR 40s and BP 60/sys - PPM implanted     PAD (peripheral artery disease) (H) 2008    70% proximal right SFA stenosis with successful angioplasy     S/P coronary angiogram 8/2012 8/2012 Cath - 75% RCA with BRIGITTE placed, 40% ostial and distal left main stenosis     Syncope 08/2009 8/2009 Near syncopal episode with HR 40s and BP 60/sys - PPM implanted     Unspecified hypothyroidism  " "      Past Surgical History:   Procedure Laterality Date     ANGIOPLASTY  2008    Angioplasty of proximal right superficial femoral artery     APPENDECTOMY       BACK SURGERY       C REMOVAL OF EYE      right      CHOLECYSTECTOMY, LAPOROSCOPIC      Cholecystectomy, Laparoscopic     COLECTOMY       EYE SURGERY      corneal transplant      HYSTERECTOMY, EMANI      BSO     IMPLANT PACEMAKER  8/2009     STENT, CORONARY, FATOU  8-12 8/2012 Cath - 75% RCA with BRIGITTE placed, 40% ostial and distal left main stenosis     TONSILLECTOMY & ADENOIDECTOMY         Family History   Problem Relation Age of Onset     CANCER Mother      CANCER Father      Unknown/Adopted Maternal Grandmother      Unknown/Adopted Maternal Grandfather      Unknown/Adopted Paternal Grandmother      Unknown/Adopted Paternal Grandfather        Social History   Substance Use Topics     Smoking status: Former Smoker     Packs/day: 1.00     Years: 3.00     Types: Cigarettes     Quit date: 8/17/1947     Smokeless tobacco: Never Used     Alcohol use No      SH:  , lives alone, [place] apartment in [place].   Her daughter lives in [place].   Two grandchildren in [place], including Biju who is here today.     Review Of Systems  Skin: negative  Eyes: impaired vision, legally blind    Ears/Nose/Throat: hearing loss  Respiratory: as above    Cardiovascular: negative  Gastrointestinal: reflux  Genitourinary: negative  Musculoskeletal: generalized weakness, ambulates 1200' with FWW  Neurologic: SLUMS 22/30   CPT 4.3     Psychiatric: negative  Hematologic/Lymphatic/Immunologic: negative  Endocrine: diabetes   Patient previously on levemir 20 unit/day and Novolog 12 units 4 x/day        GENERAL APPEARANCE: alert and no distress  /76  Pulse 75  Temp 98  F (36.7  C)  Resp 20  Ht 4' 9\" (1.448 m)  Wt 138 lb (62.6 kg)  SpO2 99%  BMI 29.86 kg/m2   HEENT: normocephalic, no lesion or abnormalities; s/o right eye enucleation  NECK: no adenopathy, no asymmetry, " masses, or scars and thyroid normal to palpation  RESP: lungs clear to auscultation - no rales, rhonchi or wheezes  CV: regular rate and rhythm, normal S1 S2  ABDOMEN:  soft, nontender, no HSM or masses and bowel sounds normal  MS: extremities normal- no gross deformities noted, no evidence of inflammation in joints, FROM in all extremities.  SKIN: no suspicious lesions or rashes  NEURO: Normal strength and tone, sensory exam grossly normal, and speech normal  PSYCH: affect okay  LYMPHATICS: No cervical,  or supraclavicular nodes     Last Basic Metabolic Panel:  Lab Results   Component Value Date     02/26/2018      Lab Results   Component Value Date    POTASSIUM 3.6 02/26/2018     Lab Results   Component Value Date    CHLORIDE 102 02/26/2018     Lab Results   Component Value Date    JEN 8.7 02/26/2018     Lab Results   Component Value Date    CO2 24 02/26/2018     Lab Results   Component Value Date    BUN 18 02/26/2018     Lab Results   Component Value Date    CR 0.58 02/26/2018     Lab Results   Component Value Date     02/26/2018     Lab Results   Component Value Date    WBC 7.5 02/26/2018      HGB 12.7 02/26/2018      MCV 90 02/26/2018       02/26/2018     Lab Results   Component Value Date    A1C 7.1 02/06/2018    A1C 7.3 12/05/2017       IMP/PLAN:  (J11.00) Influenza and pneumonia  (primary encounter diagnosis)  Comment: patient recovering from acute illness  Plan: finish course of abx.   Can change nebs to prn soon.   PHYSICAL THERAPY / OCCUPATIONAL THERAPY for strengthening, ADLs.    Discharge goal is AL    (G30.1,  F02.80) Late onset Alzheimer's disease without behavioral disturbance  Comment: low cognitive scores and decline in functional status  Plan: reviewed at length with patient, daughter Priti by phone, and grandson Biju who is here.   Recommend Memory Care unit given current dementia compounded by vision loss, and potential for ongoing decline.       (H54.10) Blindness and low  vision  Comment: vision down to 30% in remaining eye     Plan: escorts to meals, help with ADLs.     Continue usual eye gtts     (I10) Benign essential hypertension  Comment:   BP Readings from Last 3 Encounters:   02/27/18 114/76   02/23/18 131/74   02/22/18 141/70      Plan: continue losartan    (E11.51) Diabetes mellitus type 2 with peripheral artery disease (H)  Comment: insulin requiring      Plan: continue Levemir and short acting Novolog.   May help to simplify regimen prior to discharge.     She is on statin, daily ASA and AARB      (I25.10) Coronary artery disease involving native coronary artery of native heart without angina pectoris  Comment: no current symptoms    Plan: continue current regimen as above       Adri Hinton MD

## 2018-03-01 LAB
ANION GAP SERPL CALCULATED.3IONS-SCNC: 7 MMOL/L (ref 3–14)
BUN SERPL-MCNC: 18 MG/DL (ref 7–30)
CALCIUM SERPL-MCNC: 8.7 MG/DL (ref 8.5–10.1)
CHLORIDE SERPL-SCNC: 102 MMOL/L (ref 94–109)
CO2 SERPL-SCNC: 24 MMOL/L (ref 20–32)
CREAT SERPL-MCNC: 0.58 MG/DL (ref 0.52–1.04)
ERYTHROCYTE [DISTWIDTH] IN BLOOD BY AUTOMATED COUNT: 14.7 % (ref 10–15)
GFR SERPL CREATININE-BSD FRML MDRD: >90 ML/MIN/1.7M2
GLUCOSE SERPL-MCNC: 122 MG/DL (ref 70–99)
HCT VFR BLD AUTO: 37.1 % (ref 35–47)
HGB BLD-MCNC: 12.7 G/DL (ref 11.7–15.7)
MCH RBC QN AUTO: 30.8 PG (ref 26.5–33)
MCHC RBC AUTO-ENTMCNC: 34.2 G/DL (ref 31.5–36.5)
MCV RBC AUTO: 90 FL (ref 78–100)
PLATELET # BLD AUTO: 207 10E9/L (ref 150–450)
POTASSIUM SERPL-SCNC: 3.6 MMOL/L (ref 3.4–5.3)
RBC # BLD AUTO: 4.13 10E12/L (ref 3.8–5.2)
SODIUM SERPL-SCNC: 133 MMOL/L (ref 133–144)
WBC # BLD AUTO: 7.5 10E9/L (ref 4–11)

## 2018-03-07 ENCOUNTER — DISCHARGE SUMMARY NURSING HOME (OUTPATIENT)
Dept: GERIATRICS | Facility: CLINIC | Age: 83
End: 2018-03-07
Payer: MEDICARE

## 2018-03-07 VITALS
OXYGEN SATURATION: 99 % | HEART RATE: 81 BPM | TEMPERATURE: 97.6 F | RESPIRATION RATE: 16 BRPM | DIASTOLIC BLOOD PRESSURE: 74 MMHG | WEIGHT: 137 LBS | SYSTOLIC BLOOD PRESSURE: 135 MMHG | BODY MASS INDEX: 29.65 KG/M2

## 2018-03-07 DIAGNOSIS — I10 BENIGN ESSENTIAL HYPERTENSION: ICD-10-CM

## 2018-03-07 DIAGNOSIS — I25.10 CORONARY ARTERY DISEASE INVOLVING NATIVE CORONARY ARTERY OF NATIVE HEART WITHOUT ANGINA PECTORIS: ICD-10-CM

## 2018-03-07 DIAGNOSIS — J11.00 INFLUENZA AND PNEUMONIA: ICD-10-CM

## 2018-03-07 DIAGNOSIS — J18.9 COMMUNITY ACQUIRED PNEUMONIA OF RIGHT LOWER LOBE OF LUNG: Primary | ICD-10-CM

## 2018-03-07 DIAGNOSIS — Z95.0 PACEMAKER: ICD-10-CM

## 2018-03-07 DIAGNOSIS — Z90.01 H/O ENUCLEATION OF RIGHT EYEBALL: ICD-10-CM

## 2018-03-07 DIAGNOSIS — E11.51 DIABETES MELLITUS TYPE 2 WITH PERIPHERAL ARTERY DISEASE (H): ICD-10-CM

## 2018-03-07 DIAGNOSIS — R53.81 PHYSICAL DECONDITIONING: ICD-10-CM

## 2018-03-07 DIAGNOSIS — H54.10 BLINDNESS AND LOW VISION: ICD-10-CM

## 2018-03-07 PROCEDURE — 99316 NF DSCHRG MGMT 30 MIN+: CPT | Performed by: NURSE PRACTITIONER

## 2018-03-07 RX ORDER — ALBUTEROL SULFATE 0.83 MG/ML
1 SOLUTION RESPIRATORY (INHALATION) EVERY 4 HOURS PRN
COMMUNITY
End: 2018-10-15

## 2018-03-07 NOTE — LETTER
3/7/2018        RE: Serena Marie  [address]  [address]  [place] [address]          [facility] DISCHARGE SUMMARY    PATIENT'S NAME: Serena Marie  YOB: 1924  MEDICAL RECORD NUMBER:  [id number]    PRIMARY CARE PROVIDER AND CLINIC RESPONSIBLE AFTER TRANSFER: [facility] [id number] / [facility]     CODE STATUS/ADVANCE DIRECTIVES DISCUSSION:   DNR / DNI       Allergies   Allergen Reactions     Contrast Dye Hives and Swelling     Pt. States no history of allergy to topical betadine. Tolerated surgical betadine prep 2-6-2012.     Lisinopril Cough     dizzy     Metoprolol      Depressed         TRANSFERRING PROVIDERS: ANNEMARIE Moody CNP, Adri Hinton MD  DATE OF SNF ADMISSION:  February / 22 / 2018  DATE OF SNF (anticipated) DISCHARGE: March / 08 / 2018  [place] [facility] [place] [facility] stay 02/17/2018 to 02/22/2018.     Condition on Discharge:  Improving.  Function:  Needs some assist with ADLs  Cognitive Scores: SLUMS 22/30 and CPT 4.3/5.6    Equipment: walker    DISCHARGE DIAGNOSIS:   1. Community acquired pneumonia of right lower lobe of lung (H)    2. Influenza and pneumonia    3. Diabetes mellitus type 2 with peripheral artery disease (H)    4. Coronary artery disease involving native coronary artery of native heart without angina pectoris    5. Pacemaker    6. Benign essential hypertension    7. Blindness and low vision    8. H/O enucleation of right eyeball    9. Physical deconditioning        HPI Nursing Facility Course:  HPI information obtained from: facility chart records, facility staff, patient report and [facility] chart review.    Community acquired pneumonia of right lower lobe of lung (H)  Influenza due to influenza virus, type B  Patient transferred to TCU following hospital stay due to fever, cough and  weakness. She was found to have influenza B and RML opacity. She was started on tamiflu, rocephin and azitrhomycin. Prednisone 40mg for 5 d was added due to rhonchi.  The antibiotics were changed azithromyin. which will be completed 2/24 and the tamiflu will be done on 2/24 as well.   Today she has cough with deep breath. O2 sats >90% on RA. No shortness of breath. She is eating and drinking.     Diabetes mellitus type 2 with peripheral artery disease (H)  A1C 7.1 on 2/16/18. PTA insulin levemir 20 u in am and novolog 12 u with each meal and at bedtime.    BS in TCU  AM: 114, 131, 143  Noon: 143, 223, 114  PM: 295, 95, 95  HS: 115, 295, 115  Coronary artery disease involving native coronary artery of native heart without angina pectoris- remote h/o PCI  In right coronary artery.   EF in September 2017 was 60-65%  No complaint of CP or shortness of breath today.   Pacemaker- checked in January    Benign essential hypertension  Some elevated BPs while in hospital. Losartan was added.   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 She has several eye drops she uses for dry eyes and pain.   She has about 30% vision in left eye. There is a family history of macular disease    Physical deconditioning  Has been living alone in a condo at [facility]. Family is in process of having her move to [facility]. She uses a 4WW. Therapy reports she is walking up to 1500' with a walker.     PAST MEDICAL HISTORY:  has a past medical history of CAD (coronary artery disease) (8/2012); Diabetes mellitus type 2 with peripheral artery disease (H); DM type 2, goal A1c below 7; GERD (gastroesophageal reflux disease); Gluten intolerance; HTN (hypertension); Hyperlipidemia LDL goal < 100; Pacemaker (8/2009); PAD (peripheral artery disease) (H) (2008); S/P coronary angiogram (8/2012); Syncope (08/2009); and Unspecified  hypothyroidism.    DISCHARGE MEDICATIONS:  Current Outpatient Prescriptions   Medication Sig Dispense Refill     albuterol (2.5 MG/3ML) 0.083% neb solution Take 1 vial by nebulization every 4 hours as needed for shortness of breath / dyspnea or wheezing       Acetaminophen (TYLENOL 8 HOUR ARTHRITIS PAIN PO) Take 650 mg by mouth every 8 hours as needed       traMADol (ULTRAM) 50 MG tablet Take 1 tablet (50 mg) by mouth every 6 hours as needed for pain No driving a car or drinking alcohol for at least 8 hours after taking this medication. 20 tablet 0     losartan (COZAAR) 50 MG tablet Take 2 tablets (100 mg) by mouth daily 30 tablet      insulin aspart (NOVOLOG FLEXPEN) 100 UNIT/ML injection Inject 12 Units Subcutaneous 4 times daily (with meals and nightly)       omeprazole (PRILOSEC) 20 MG CR capsule TAKE ONE CAPSULE BY MOUTH AT BEDTIME - NO TUMS WITHIN 2 HOURS OF MED 30 capsule 10     levothyroxine (SYNTHROID/LEVOTHROID) 75 MCG tablet TAKE ONE TABLET BY MOUTH ONE TIME DAILY  90 tablet 1     lidocaine (LIDODERM) 5 % Patch APPLY UP TO 3 PATCHES TO PAINFUL AREA AT ONCE FOR UP TO 12 H WITHIN A 24 H PERIOD.  REMOVE AFTER 12 HOURS. 60 patch 11     UNIFINE PENTIPS 31G X 6 MM USE ONCE DAILY 100 each 11     insulin detemir (LEVEMIR FLEXTOUCH) 100 UNIT/ML injection INJECT 20 UNITS UNDER THE SKIN AT BEDTIME 15 mL 8     Tetrahydroz-Dextran-PEG-Povid (TH EYE DROP ADVANCED RELIEF) 0.05-0.1-1-1 % SOLN Apply 1 drop to eye 2 times daily To left eye       sodium chloride (TITO 128) 5 % ophthalmic ointment Place 1 Application into the right eye 2 times daily       acetaminophen (TYLENOL ARTHRITIS PAIN) 650 MG CR tablet Take 1,300 mg by mouth daily States 2 tablets qam & usually 2 tablets qpm       RaNITidine HCl (ZANTAC 150 MAXIMUM STRENGTH PO) Take 150 mg by mouth daily as needed       cholecalciferol (VITAMIN D) 1000 UNIT tablet Take 2,000 Units by mouth daily       atorvastatin (LIPITOR) 80 MG tablet Take 1 tablet (80 mg) by mouth  daily 90 tablet 3     Multiple Vitamins-Minerals (PRESERVISION AREDS 2 PO) Take 1 tablet by mouth every morning       docusate sodium (DOCQLACE) 100 MG capsule Take 100 mg by mouth At Bedtime       Terbinafine (LAMISIL EX) Externally apply topically daily For under breasts, in between toes       Pramoxine HCl (VAGISIL ANTI-ITCH MEDICATED EX) Externally apply topically every morning       phenylephrine-shark liver oil-mineral oil-petrolatum (PREPARATION H) 0.25-14-74.9 % rectal ointment Place rectally daily       blood glucose monitoring (ONE TOUCH ULTRASOFT) lancets Use to test blood sugar 3-4 times daily or as directed. [address]     ONE TOUCH ULTRA test strip TEST BLOOD GLUCOSE FOUR TIMES A  each 11     Nutritional Supplements (GLUCERNA 1.5 JEN PO) Take 1 Can by mouth daily        fish oil-omega-3 fatty acids 1000 MG capsule Take 1 g by mouth daily        timolol (TIMOPTIC) 0.5 % ophthalmic solution Place 1 drop Into the left eye 2 times daily  1 Bottle      sodium chloride (TITO 128) 5 % ophthalmic solution Place 1 drop Into the left eye 2 times daily       fluocinolone acetonide 0.01 % OIL Apply to scalp when wet massage well cover with cap leave on over night  Wash in am. 118 mL 12     aspirin 81 MG tablet Take 1 tablet by mouth every evening        UNABLE TO FIND Take 1 Bar by mouth 2 times daily MEDICATION NAME: FiberONe Bar        ACE/ARB NOT PRESCRIBED, INTENTIONAL, 1 each continuous prn ACE & ARB not prescribed due to Risk for drug interaction and Worsening renal function on ACE/ARB therapy (Patient not taking: Reported on 2/23/2018)         MEDICATION CHANGES/RATIONALE:   3/7/18 DISCONTINUE tramadol  Controlled medications sent with patient:   not applicable/none     ROS:    4 point ROS including Respiratory, CV, GI and , other than that noted in the HPI,  is negative    Physical Exam:   Vitals: /74  Pulse 81  Temp 97.6  F (36.4  C)  Resp 16  Wt 137 lb (62.1 kg)  SpO2 99%  BMI 29.65  kg/m2  BMI= Body mass index is 29.65 kg/(m^2).    GENERAL APPEARANCE:  Alert, in no distress  ENT:  Mouth and posterior oropharynx normal, moist mucous membranes, hearing acuity adequate   EYES: left EOM, conjunctivae, lids, pupils and irises normal. Right eye is closed. No drainage  RESP:  respiratory effort and palpation of chest normal, no respiratory distress, Lung sounds coarse rhonchi.   CV:  Palpation and auscultation of heart done , rate and rhythm reg, no murmur, no rub or gallop, Edema none  ABDOMEN:  normal bowel sounds, soft, nontender, no hepatosplenomegaly or other masses  M/S:   Gait and station not observed. Able to reposition in bed, Digits and nails onychomycosis   SKIN:  Inspection/Palpation of skin and subcutaneous tissue no rash  NEURO: 2-12 in normal limits and at patient's baseline  PSYCH:  insight and judgement, memory impaired , affect and mood normal    DISCHARGE PLAN:  Physical Therapy and Registered Nurse  Patient instructed to follow-up with:  PCP in 7 days      [facility] scheduled appointments:  Future Appointments  Date Time Provider Department Center   4/12/2018 1:00 PM STEPHEN GARCIA [facility]   5/9/2018 10:00 AM Cass Zeng RPH [place]   5/9/2018 11:15 AM Lin Cruz MD [facility] referral needed and placed by this provider: No    Pending labs: npne  SNF labs   CBC RESULTS:   Recent Labs   Lab Test  02/26/18   0910 02/26/18   WBC  7.5  7.5   RBC  4.13  4.13   HGB  12.7  12.7   HCT  37.1  37.1   MCV  90  90   MCH  30.8  30.8   MCHC  34.2  34.2   RDW  14.7  14.7   PLT  207  207       Last Basic Metabolic Panel:  Recent Labs   Lab Test  02/26/18   0910 02/26/18   NA  133  133   POTASSIUM  3.6  3.6   CHLORIDE  102  102   JEN  8.7  8.7   CO2  24  24   BUN  18  18   CR  0.58  0.58   GLC  122*  122*       Liver Function Studies -   Recent Labs   Lab Test  02/06/18   1149  03/07/17   1308   PROTTOTAL  6.1  6.4   ALBUMIN  4.2  4.1    BILITOTAL  0.4  0.3   ALKPHOS  74  79   AST  21  22   ALT  18  14       TSH   Date Value Ref Range Status   11/25/2012 12.30 (H) 0.4 - 5.0 mU/L Final     Lab Results   Component Value Date    A1C 7.1 02/06/2018    A1C 7.3 12/05/2017     Discharge Treatments:none    TOTAL DISCHARGE TIME:   Greater than 30 minutes  Electronically signed by:  ANNEMARIE Moody CNP      Sincerely,        ANNEMARIE Moody CNP

## 2018-03-07 NOTE — PROGRESS NOTES
[facility] DISCHARGE SUMMARY    PATIENT'S NAME: Serena Marie  YOB: 1924  MEDICAL RECORD NUMBER:  [id number]    PRIMARY CARE PROVIDER AND CLINIC RESPONSIBLE AFTER TRANSFER: [facility] [id number] / [place]     CODE STATUS/ADVANCE DIRECTIVES DISCUSSION:   DNR / DNI       Allergies   Allergen Reactions     Contrast Dye Hives and Swelling     Pt. States no history of allergy to topical betadine. Tolerated surgical betadine prep 2-6-2012.     Lisinopril Cough     dizzy     Metoprolol      Depressed         TRANSFERRING PROVIDERS: ANNEMARIE Moody CNP, Adri Hinton MD  DATE OF SNF ADMISSION:  February / 22 / 2018  DATE OF SNF (anticipated) DISCHARGE: March / 08 / 2018  DISCHARGE DISPOSITION: [facility] Facility: [facility] stay 02/17/2018 to 02/22/2018.     Condition on Discharge:  Improving.  Function:  Needs some assist with ADLs  Cognitive Scores: SLUMS 22/30 and CPT 4.3/5.6    Equipment: walker    DISCHARGE DIAGNOSIS:   1. Community acquired pneumonia of right lower lobe of lung (H)    2. Influenza and pneumonia    3. Diabetes mellitus type 2 with peripheral artery disease (H)    4. Coronary artery disease involving native coronary artery of native heart without angina pectoris    5. Pacemaker    6. Benign essential hypertension    7. Blindness and low vision    8. H/O enucleation of right eyeball    9. Physical deconditioning        HPI Nursing Facility Course:  HPI information obtained from: facility chart records, facility staff, patient report and [facility] chart review.    Community acquired pneumonia of right lower lobe of lung (H)  Influenza due to influenza virus, type B  Patient transferred to TCU following hospital stay due to fever, cough and weakness. She was found to have influenza B and RML opacity. She was started on tamiflu, rocephin and  azitrhomycin. Prednisone 40mg for 5 d was added due to rhonchi.  The antibiotics were changed azithromyin. which will be completed 2/24 and the tamiflu will be done on 2/24 as well.   Today she has cough with deep breath. O2 sats >90% on RA. No shortness of breath. She is eating and drinking.     Diabetes mellitus type 2 with peripheral artery disease (H)  A1C 7.1 on 2/16/18. PTA insulin levemir 20 u in am and novolog 12 u with each meal and at bedtime.    BS in TCU  AM: 114, 131, 143  Noon: 143, 223, 114  PM: 295, 95, 95  HS: 115, 295, 115  Coronary artery disease involving native coronary artery of native heart without angina pectoris- remote h/o PCI  In right coronary artery.   EF in September 2017 was 60-65%  No complaint of CP or shortness of breath today.   Pacemaker- checked in January    Benign essential hypertension  Some elevated BPs while in hospital. Losartan was added.   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 She has several eye drops she uses for dry eyes and pain.   She has about 30% vision in left eye. There is a family history of macular disease    Physical deconditioning  Has been living alone in a condo at [facility]. Family is in process of having her move to [facility]. She uses a 4WW. Therapy reports she is walking up to 1500' with a walker.     PAST MEDICAL HISTORY:  has a past medical history of CAD (coronary artery disease) (8/2012); Diabetes mellitus type 2 with peripheral artery disease (H); DM type 2, goal A1c below 7; GERD (gastroesophageal reflux disease); Gluten intolerance; HTN (hypertension); Hyperlipidemia LDL goal < 100; Pacemaker (8/2009); PAD (peripheral artery disease) (H) (2008); S/P coronary angiogram (8/2012); Syncope (08/2009); and Unspecified hypothyroidism.    DISCHARGE MEDICATIONS:  Current Outpatient Prescriptions   Medication Sig Dispense Refill      albuterol (2.5 MG/3ML) 0.083% neb solution Take 1 vial by nebulization every 4 hours as needed for shortness of breath / dyspnea or wheezing       Acetaminophen (TYLENOL 8 HOUR ARTHRITIS PAIN PO) Take 650 mg by mouth every 8 hours as needed       traMADol (ULTRAM) 50 MG tablet Take 1 tablet (50 mg) by mouth every 6 hours as needed for pain No driving a car or drinking alcohol for at least 8 hours after taking this medication. 20 tablet 0     losartan (COZAAR) 50 MG tablet Take 2 tablets (100 mg) by mouth daily 30 tablet      insulin aspart (NOVOLOG FLEXPEN) 100 UNIT/ML injection Inject 12 Units Subcutaneous 4 times daily (with meals and nightly)       omeprazole (PRILOSEC) 20 MG CR capsule TAKE ONE CAPSULE BY MOUTH AT BEDTIME - NO TUMS WITHIN 2 HOURS OF MED 30 capsule 10     levothyroxine (SYNTHROID/LEVOTHROID) 75 MCG tablet TAKE ONE TABLET BY MOUTH ONE TIME DAILY  90 tablet 1     lidocaine (LIDODERM) 5 % Patch APPLY UP TO 3 PATCHES TO PAINFUL AREA AT ONCE FOR UP TO 12 H WITHIN A 24 H PERIOD.  REMOVE AFTER 12 HOURS. 60 patch 11     UNIFINE PENTIPS 31G X 6 MM USE ONCE DAILY 100 each 11     insulin detemir (LEVEMIR FLEXTOUCH) 100 UNIT/ML injection INJECT 20 UNITS UNDER THE SKIN AT BEDTIME 15 mL 8     Tetrahydroz-Dextran-PEG-Povid (TH EYE DROP ADVANCED RELIEF) 0.05-0.1-1-1 % SOLN Apply 1 drop to eye 2 times daily To left eye       sodium chloride (TITO 128) 5 % ophthalmic ointment Place 1 Application into the right eye 2 times daily       acetaminophen (TYLENOL ARTHRITIS PAIN) 650 MG CR tablet Take 1,300 mg by mouth daily States 2 tablets qam & usually 2 tablets qpm       RaNITidine HCl (ZANTAC 150 MAXIMUM STRENGTH PO) Take 150 mg by mouth daily as needed       cholecalciferol (VITAMIN D) 1000 UNIT tablet Take 2,000 Units by mouth daily       atorvastatin (LIPITOR) 80 MG tablet Take 1 tablet (80 mg) by mouth daily 90 tablet 3     Multiple Vitamins-Minerals (PRESERVISION AREDS 2 PO) Take 1 tablet by mouth every morning        docusate sodium (DOCQLACE) 100 MG capsule Take 100 mg by mouth At Bedtime       Terbinafine (LAMISIL EX) Externally apply topically daily For under breasts, in between toes       Pramoxine HCl (VAGISIL ANTI-ITCH MEDICATED EX) Externally apply topically every morning       phenylephrine-shark liver oil-mineral oil-petrolatum (PREPARATION H) 0.25-14-74.9 % rectal ointment Place rectally daily       blood glucose monitoring (ONE TOUCH ULTRASOFT) lancets Use to test blood sugar 3-4 times daily or as directed. [address]     ONE TOUCH ULTRA test strip TEST BLOOD GLUCOSE FOUR TIMES A  each 11     Nutritional Supplements (GLUCERNA 1.5 JEN PO) Take 1 Can by mouth daily        fish oil-omega-3 fatty acids 1000 MG capsule Take 1 g by mouth daily        timolol (TIMOPTIC) 0.5 % ophthalmic solution Place 1 drop Into the left eye 2 times daily  1 Bottle      sodium chloride (TITO 128) 5 % ophthalmic solution Place 1 drop Into the left eye 2 times daily       fluocinolone acetonide 0.01 % OIL Apply to scalp when wet massage well cover with cap leave on over night  Wash in am. 118 mL 12     aspirin 81 MG tablet Take 1 tablet by mouth every evening        UNABLE TO FIND Take 1 Bar by mouth 2 times daily MEDICATION NAME: FiberONe Bar        ACE/ARB NOT PRESCRIBED, INTENTIONAL, 1 each continuous prn ACE & ARB not prescribed due to Risk for drug interaction and Worsening renal function on ACE/ARB therapy (Patient not taking: Reported on 2/23/2018)         MEDICATION CHANGES/RATIONALE:   3/7/18 DISCONTINUE tramadol  Controlled medications sent with patient:   not applicable/none     ROS:    4 point ROS including Respiratory, CV, GI and , other than that noted in the HPI,  is negative    Physical Exam:   Vitals: /74  Pulse 81  Temp 97.6  F (36.4  C)  Resp 16  Wt 137 lb (62.1 kg)  SpO2 99%  BMI 29.65 kg/m2  BMI= Body mass index is 29.65 kg/(m^2).    GENERAL APPEARANCE:  Alert, in no distress  ENT:  Mouth and  posterior oropharynx normal, moist mucous membranes, hearing acuity adequate   EYES: left EOM, conjunctivae, lids, pupils and irises normal. Right eye is closed. No drainage  RESP:  respiratory effort and palpation of chest normal, no respiratory distress, Lung sounds coarse rhonchi.   CV:  Palpation and auscultation of heart done , rate and rhythm reg, no murmur, no rub or gallop, Edema none  ABDOMEN:  normal bowel sounds, soft, nontender, no hepatosplenomegaly or other masses  M/S:   Gait and station not observed. Able to reposition in bed, Digits and nails onychomycosis   SKIN:  Inspection/Palpation of skin and subcutaneous tissue no rash  NEURO: 2-12 in normal limits and at patient's baseline  PSYCH:  insight and judgement, memory impaired , affect and mood normal    DISCHARGE PLAN:  Physical Therapy and Registered Nurse  Patient instructed to follow-up with:  PCP in 7 days      [facility] scheduled appointments:  Future Appointments  Date Time Provider Department Center   4/12/2018 1:00 PM MITCHELL DCRN [facility]   5/9/2018 10:00 AM Cass Zeng RPH [facility] MTM   5/9/2018 11:15 AM Lin Cruz MD [facility] referral needed and placed by this provider: No    Pending labs: npne  SNF labs   CBC RESULTS:   Recent Labs   Lab Test  02/26/18   0910 02/26/18   WBC  7.5  7.5   RBC  4.13  4.13   HGB  12.7  12.7   HCT  37.1  37.1   MCV  90  90   MCH  30.8  30.8   MCHC  34.2  34.2   RDW  14.7  14.7   PLT  207  207       Last Basic Metabolic Panel:  Recent Labs   Lab Test  02/26/18   0910 02/26/18   NA  133  133   POTASSIUM  3.6  3.6   CHLORIDE  102  102   JEN  8.7  8.7   CO2  24  24   BUN  18  18   CR  0.58  0.58   GLC  122*  122*       Liver Function Studies -   Recent Labs   Lab Test  02/06/18   1149  03/07/17   1308   PROTTOTAL  6.1  6.4   ALBUMIN  4.2  4.1   BILITOTAL  0.4  0.3   ALKPHOS  74  79   AST  21  22   ALT  18  14       TSH   Date Value Ref Range Status    11/25/2012 12.30 (H) 0.4 - 5.0 mU/L Final     Lab Results   Component Value Date    A1C 7.1 02/06/2018    A1C 7.3 12/05/2017     Discharge Treatments:none    TOTAL DISCHARGE TIME:   Greater than 30 minutes  Electronically signed by:  ANNEMARIE Moody CNP

## 2018-03-08 ENCOUNTER — TELEPHONE (OUTPATIENT)
Dept: FAMILY MEDICINE | Facility: CLINIC | Age: 83
End: 2018-03-08

## 2018-03-15 ENCOUNTER — CARE COORDINATION (OUTPATIENT)
Dept: CARE COORDINATION | Facility: CLINIC | Age: 83
End: 2018-03-15

## 2018-03-15 NOTE — PROGRESS NOTES
SUBJECTIVE:                                                      Patient presents for Hospital Followup Visit:    Hospital:  [facility]      Date of Admission: 2/17/2018  Date of Discharge: 2/22/2018  Reason(s) for Admission: Influenza virus B, Pneumonia            Problems taking medications regularly:  None       Medication changes since discharge: None       Problems adhering to non-medication therapy:  None    Summary of hospitalization:  [facility] discharge summary reviewed  Diagnostic Tests/Treatments reviewed.  Follow up needed: none  Other Healthcare Providers Involved in Patient s Care:         None  Update since discharge: improved.     GRIS Kapadia d/bijal 3/8/18 to current living setting  D/C Kacie    White female with glasses  Dementia   here with RN at [facility]    Influenza and pneumonia is better    Sleep good  Appetite overeating  Breakfast: pancake  3 meals where she lives  Exercise walking seated walker with brakes    Smoking former 25 years old none  ETOH none since 44 yo  Street drugs/MJ no  Caffeine coffee with meals with water    Dressing independent  Toileting independent  Depends   In and out of bed ok  Private unit  No cooking  No other group things other then Mass yesterday    Right eye removed due to infection.  Cataract removed  Macular degeneration  [facility] eye    Dental no issues    NO hearing aids    GERD zantac  Burping  Heart valves   Pacemaker    No fall/Seizure/LOC    Lab Results   Component Value Date    A1C 7.1 02/06/2018    A1C 7.3 12/05/2017    A1C 7.0 09/05/2017    A1C 8.3 06/06/2017    A1C 8.0 03/07/2017     BS brought list  In past Levemir- wondering if she should get this back  Feet exam today    Exam is good          ROS:  Constitutional, neuro, ENT, endocrine, pulmonary, cardiac, gastrointestinal, genitourinary, musculoskeletal, integument and psychiatric systems are negative, except as otherwise noted.    OBJECTIVE:                                                     BP 96/60  Pulse 72  Temp 97.9  F (36.6  C)  Resp 16  Wt 64.4 kg (142 lb)  SpO2 98%  BMI 30.73 kg/m2  Exam:  Constitutional: healthy, alert and no distress Dementia patient Right enucleated eye  Head: Normocephalic. No masses, lesions, tenderness or abnormalities  Neck: Neck supple. No adenopathy. Thyroid symmetric, normal size,, Carotids without bruits.  ENT: ENT exam normal, no neck nodes or sinus tenderness  Cardiovascular: negative, PMI normal. No lifts, heaves, or thrills. RRR. No murmurs, clicks gallops or rub  Respiratory: negative, Percussion normal. Good diaphragmatic excursion. Lungs clear  Gastrointestinal: Abdomen soft, non-tender. BS normal. No masses, organomegaly  : Deferred  Musculoskeletal: extremities normal- no gross deformities noted, gait normal and normal muscle tone  Skin: no suspicious lesions or rashes  Neurologic: Gait normal. Reflexes normal and symmetric. Sensation grossly WNL.  Psychiatric: mentation appears normal and affect normal/bright  Hematologic/Lymphatic/Immunologic: Normal cervical lymph nodes      ASSESSMENT/PLAN:                                                      ASSESSMENT / PLAN:  (Z09) Hospital discharge follow-up  (primary encounter diagnosis)  Comment:   Plan: observe    (Z87.01) H/O influenza pneumonia  Comment:   Plan: resolved. Prevention by vaccination yearly    (E11.51) Diabetes mellitus type 2 with peripheral artery disease (H)  Comment:   Lab Results   Component Value Date    A1C 7.1 02/06/2018    A1C 7.3 12/05/2017    A1C 7.0 09/05/2017    A1C 8.3 06/06/2017    A1C 8.0 03/07/2017       Plan: insulin detemir (LEVEMIR FLEXTOUCH) 100 UNIT/ML        injection, blood glucose monitoring (ONE TOUCH         ULTRASOFT) lancets            (Z76.0) Encounter for medication refill  Comment:   Plan: insulin pen needle (UNIFINE PENTIPS) 31G X 6         MM, blood glucose monitoring (ONETOUCH ULTRA)         test strip            (Z90.01) H/O  enucleation of right eyeball  Comment:   Plan: observe    (I25.10) Coronary artery disease involving native coronary artery of native heart without angina pectoris  Comment: NO CP  Plan: Continue cares per cardiology    (I10) Benign essential hypertension  Comment:   BP Readings from Last 3 Encounters:   03/16/18 96/60   03/07/18 135/74   02/27/18 114/76       Plan: Continue cares    (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  Issues to address: No issues identified.  Plan of care communicated with patient      Type of Medical Decision Making Face-to-Face Visit within 7 Days Face-to-Face Visit within 14 days   Moderate Complexity [id number]   High Complexity [id number]       Tracie Marc MD, MEd  [facility]

## 2018-03-16 ENCOUNTER — OFFICE VISIT (OUTPATIENT)
Dept: FAMILY MEDICINE | Facility: CLINIC | Age: 83
End: 2018-03-16

## 2018-03-16 VITALS
WEIGHT: 142 LBS | TEMPERATURE: 97.9 F | HEART RATE: 72 BPM | SYSTOLIC BLOOD PRESSURE: 96 MMHG | OXYGEN SATURATION: 98 % | DIASTOLIC BLOOD PRESSURE: 60 MMHG | BODY MASS INDEX: 30.73 KG/M2 | RESPIRATION RATE: 16 BRPM

## 2018-03-16 DIAGNOSIS — Z90.01 H/O ENUCLEATION OF RIGHT EYEBALL: ICD-10-CM

## 2018-03-16 DIAGNOSIS — Z87.01: ICD-10-CM

## 2018-03-16 DIAGNOSIS — I25.10 CORONARY ARTERY DISEASE INVOLVING NATIVE CORONARY ARTERY OF NATIVE HEART WITHOUT ANGINA PECTORIS: ICD-10-CM

## 2018-03-16 DIAGNOSIS — Z76.0 ENCOUNTER FOR MEDICATION REFILL: ICD-10-CM

## 2018-03-16 DIAGNOSIS — E11.51 DIABETES MELLITUS TYPE 2 WITH PERIPHERAL ARTERY DISEASE (H): ICD-10-CM

## 2018-03-16 DIAGNOSIS — I10 BENIGN ESSENTIAL HYPERTENSION: ICD-10-CM

## 2018-03-16 DIAGNOSIS — F03.90 SENILE DEMENTIA WITHOUT BEHAVIORAL DISTURBANCE (H): ICD-10-CM

## 2018-03-16 DIAGNOSIS — Z09 HOSPITAL DISCHARGE FOLLOW-UP: Primary | ICD-10-CM

## 2018-03-16 PROCEDURE — 99495 TRANSJ CARE MGMT MOD F2F 14D: CPT | Performed by: FAMILY MEDICINE

## 2018-03-16 RX ORDER — LANCETS
EACH MISCELLANEOUS
Qty: 300 EACH | Refills: 3 | Status: SHIPPED | OUTPATIENT
Start: 2018-03-16 | End: 2018-07-25

## 2018-03-16 RX ORDER — BACITRACIN 500 [USP'U]/G
OINTMENT OPHTHALMIC
Refills: 2 | COMMUNITY
Start: 2018-02-02 | End: 2020-08-25

## 2018-03-16 NOTE — PROGRESS NOTES
Clinic Care Coordination Contact  OUTREACH    Referral Information:  Referral Source: PCP  Reason for Contact: Follow-up on Long Term Care placement and Discharge from [facility]  Care Conference: No     Universal Utilization:   ED Visits in last year: 0  Hospital visits in last year: 1  Last PCP appointment: 02/06/18  Missed Appointments: 4  Concerns: No concerns  Multiple Providers or Specialists: Yes    Clinical Concerns:  Current Medical Concerns: Pt was hospitalized from 2/17-2/22 with influenza and pneumonia. Pt was treated and discharged to [facility] for rehabilitation services. Pt received restorative services and was assisted with finding LTC placement. Pt presented to the clinic today for a follow-up appointment. GREGORIO MORGAN met with pt. Pt is currently residing at [facility] and receiving supportive living services from [facility]. [employer] nurse is present for appointment today. Pt receives three meals a day through the facility and also is getting help with diabetic management including: glucose monitoring and insulin management as well as medication management. Pt also gets escorts to and from meals. Pt is able to perform all her ADL's independently.   Current Behavioral Concerns: None noted    Education Provided to patient: Care Coordination   Clinical Pathway Name: None    Medication Management:  [facility] manages pt's medications, RN is present today for appointment    Functional Status:  Mobility Status: Independent w/Device  Equipment Currently Used at Home: walker, rolling  Transportation: Facility or friends provide     Psychosocial:  Current living arrangement:: I live alone  Financial/Insurance: Medicare, [place], concerned about costs involved with senior living community, current home care does not accept the elderly waiver and pt is on a low fixed income, unsure if pt's daughter manages pt's finances, Left message with daughter Priti to discuss. Will be available to  discuss if needed.     Resources and Interventions:  [facility]  Advanced Care Plans/Directives on file:: Yes      Goals:   Goal 1 Statement: I would like to find a SNF/SACHI by 3/2018.  Goal 1 Supportive Steps: I will review resources and work with family and SW CC to find a place that I am comfortable with.  Goal 1 Progression Percent: 100%  Goal 1 Progression Date: 03/16/18  Patient/Caregiver understanding: Pt reports understanding and denies any additional questions or concerns at this times. SW CC engaged in AIDET communication during encounter.  Frequency of Care Coordination: No further outreaches will be made at this time unless a new referral is made or a change in the pt's status occurs. Patient was provided with this writer's contact information and encouraged to call with any questions or concerns.     Plan: SW CC will be available to family and pt if needed.    Alma Rivera [facility]  Clinic Care Coordinator  [phone]  [email or web address]

## 2018-03-16 NOTE — PATIENT INSTRUCTIONS
Resume levemir 10 U at bedtime and continue Novolog 12 U QID with meals/food.    Update Blood sugar log with provider in one week.

## 2018-03-16 NOTE — MR AVS SNAPSHOT
After Visit Summary   3/16/2018    Serena Marie    MRN: [id number]           Patient Information     Date Of Birth          8/21/1924        Visit Information        Provider Department      3/16/2018 11:00 AM Tracie Marc MD [facility]        Today's Diagnoses     Diabetes mellitus type 2 with peripheral artery disease (H)          Care Instructions    Resume levemir 10 U at bedtime and continue Novolog 12 U QID with meals/food.    Update Blood sugar log with provider in one week.              Follow-ups after your visit        Your next 10 appointments already scheduled     Apr 12, 2018  1:00 PM CDT   Pacemaker Check with STEPHEN DONALD   [facility] ([facility])    [address] [place] [address] [address]  [facility] [address] [phone]            May 09, 2018 10:00 AM CDT   Office Visit with Cass Zeng [facility] [place] [facility] ([facility])    [address] [place] [address] [phone]           Bring a current list of meds and any records pertaining to this visit. For Physicals, please bring immunization records and any forms needing to be filled out. Please arrive 10 minutes early to complete paperwork.            May 09, 2018 11:15 AM CDT   [facility] with Lin Cruz MD   [facility]    [address] [place] [address] [phone]           Bring a current list of meds and any records pertaining to this visit. For Physicals, please bring immunization records and any forms needing to be filled out. Please arrive 10 minutes early to complete paperwork.              Who to contact     If you have questions or need follow up information about today's clinic visit or your schedule please contact [facility] directly at [phone].  Normal or non-critical lab and imaging results will be communicated to you by  "MyChart, letter or phone within 4 business days after the clinic has received the results. If you do not hear from us within 7 days, please contact the clinic through [employer] or phone. If you have a critical or abnormal lab result, we will notify you by phone as soon as possible.  Submit refill requests through [employer] or call your pharmacy and they will forward the refill request to us. Please allow 3 business days for your refill to be completed.          Additional Information About Your Visit        [email or web address] Information     [employer] lets you send messages to your doctor, view your test results, renew your prescriptions, schedule appointments and more. To sign up, go to [email or web address] . Click on \"Log in\" on the left side of the screen, which will take you to the Welcome page. Then click on \"Sign up Now\" on the right side of the page.     You will be asked to enter the access code listed below, as well as some personal information. Please follow the directions to create your username and password.     Your access code is: [email or web address]  Expires: 2018 12:09 PM     Your access code will  in 90 days. If you need help or a new code, please call your [facility] clinic or [phone].        Care EveryWhere ID     This is your Care EveryWhere ID. This could be used by other organizations to access your [facility] medical records  [id number]        Your Vitals Were     Pulse Temperature Respirations Pulse Oximetry BMI (Body Mass Index)       72 97.9  F (36.6  C) 16 98% 30.73 kg/m2        Blood Pressure from Last 3 Encounters:   18 96/60   18 135/74   18 114/76    Weight from Last 3 Encounters:   18 64.4 kg (142 lb)   18 62.1 kg (137 lb)   18 62.6 kg (138 lb)              Today, you had the following     No orders found for display         Today's Medication Changes          These changes are accurate as of 3/16/18 12:04 PM.  If you have any questions, ask your nurse or " doctor.               These medicines have changed or have updated prescriptions.        Dose/Directions    insulin detemir 100 UNIT/ML injection   Commonly known as:  LEVEMIR FLEXTOUCH   This may have changed:  additional instructions   Used for:  Diabetes mellitus type 2 with peripheral artery disease (H)        INJECT 10 UNITS UNDER THE SKIN AT BEDTIME   Quantity:  15 mL   Refills:  8            Where to get your medicines      These medications were sent to [facility] [address] [facility]  [address] [facility] [address]     Phone:  [phone]     insulin detemir 100 UNIT/ML injection                Primary Care Provider Office Phone # Fax #    Lin Berenice Cruz [phone]       [facility] [address] [address]        Goals        General    I want to have good control of Diabetes (pt-stated)     Notes - Note edited  1/28/2016  1:17 PM by Leatha Campos, RN    I will check my blood sugars 4 x a day.  I will take Novolog with my meals.  I will take my Levemir at bedtime.              Equal Access to Services     [facility]: Hadii osiris resendiz hadasho Sopriyank, waaxda luqadaha, qaybta kaalmada adeegyamatthew, zoila kerns . So [facility] [phone].    ATENCIÓN: Si habla español, tiene a murcia disposición servicios gratuitos de asistencia lingüística. Pricilaame al [phone].    We comply with applicable federal civil rights laws and [place] laws. We do not discriminate on the basis of race, color, national origin, age, disability, sex, sexual orientation, or gender identity.            Thank you!     Thank you for choosing [facility]  for your care. Our goal is always to provide you with excellent care. Hearing back from our patients is one way we can continue to improve our services. Please take a few minutes to complete the written survey that you may receive in the mail after your visit with  us. Thank you!             Your Updated Medication List - Protect others around you: Learn how to safely use, store and throw away your medicines at www.disposemymeds.org.          This list is accurate as of 3/16/18 12:04 PM.  Always use your most recent med list.                   Brand Name Dispense Instructions for use Diagnosis    ACE/ARB/ARNI NOT PRESCRIBED (INTENTIONAL)      1 each continuous prn ACE & ARB not prescribed due to Risk for drug interaction and Worsening renal function on ACE/ARB therapy    CAD (coronary artery disease)       albuterol (2.5 MG/3ML) 0.083% neb solution      Take 1 vial by nebulization every 4 hours as needed for shortness of breath / dyspnea or wheezing        aspirin 81 MG tablet      Take 1 tablet by mouth every evening        atorvastatin 80 MG tablet    LIPITOR    90 tablet    Take 1 tablet (80 mg) by mouth daily    PAD (peripheral artery disease) (H)       bacitracin ophthalmic ointment           blood glucose monitoring lancets     300 Box    Use to test blood sugar 3-4 times daily or as directed.        cholecalciferol 1000 UNIT tablet    vitamin D3     Take 2,000 Units by mouth daily        DOCQLACE 100 MG capsule   Generic drug:  docusate sodium      Take 100 mg by mouth At Bedtime        fish oil-omega-3 fatty acids 1000 MG capsule      Take 1 g by mouth daily    PAD (peripheral artery disease) (H)       fluocinolone acetonide 0.01 % Oil     118 mL    Apply to scalp when wet massage well cover with cap leave on over night Wash in am.    DM type 2, goal A1c below 7       GLUCERNA 1.5 JEN PO      Take 1 Can by mouth daily        insulin detemir 100 UNIT/ML injection    LEVEMIR FLEXTOUCH    15 mL    INJECT 10 UNITS UNDER THE SKIN AT BEDTIME    Diabetes mellitus type 2 with peripheral artery disease (H)       LAMISIL EX      Externally apply topically daily For under breasts, in between toes        levothyroxine 75 MCG tablet    SYNTHROID/LEVOTHROID    90 tablet    TAKE ONE  TABLET BY MOUTH ONE TIME DAILY    Encounter for medication refill       lidocaine 5 % Patch    LIDODERM    60 patch    APPLY UP TO 3 PATCHES TO PAINFUL AREA AT ONCE FOR UP TO 12 H WITHIN A 24 H PERIOD.  REMOVE AFTER 12 HOURS.    Encounter for medication refill       losartan 50 MG tablet    COZAAR    30 tablet    Take 2 tablets (100 mg) by mouth daily    Essential hypertension       * TITO 128 5 % ophthalmic solution   Generic drug:  sodium chloride      Place 1 drop Into the left eye 2 times daily        * TITO 128 5 % ophthalmic ointment   Generic drug:  sodium chloride      Place 1 Application into the right eye 2 times daily    Blindness and low vision       NovoLOG FLEXPEN 100 UNIT/ML injection   Generic drug:  insulin aspart      Inject 12 Units Subcutaneous 4 times daily (with meals and nightly)        omeprazole 20 MG CR capsule    priLOSEC    30 capsule    TAKE ONE CAPSULE BY MOUTH AT BEDTIME - NO TUMS WITHIN 2 HOURS OF MED    Gastroesophageal reflux disease, esophagitis presence not specified       ONETOUCH ULTRA test strip   Generic drug:  blood glucose monitoring     300 each    TEST BLOOD GLUCOSE FOUR TIMES A DAY    Encounter for medication refill       PREPARATION H 0.25-14-74.9 % rectal ointment   Generic drug:  phenylephrine-shark liver oil-mineral oil-petrolatum      Place rectally daily        PRESERVISION AREDS 2 PO      Take 1 tablet by mouth every morning        TH EYE DROP ADVANCED RELIEF 0.05-0.1-1-1 % Soln   Generic drug:  Tetrahydroz-Dextran-PEG-Povid      Apply 1 drop to eye 2 times daily To left eye    Blindness and low vision       timolol 0.5 % ophthalmic solution    TIMOPTIC    1 Bottle    Place 1 drop Into the left eye 2 times daily        * TYLENOL ARTHRITIS PAIN 650 MG CR tablet   Generic drug:  acetaminophen      Take 1,300 mg by mouth daily States 2 tablets qam & usually 2 tablets qpm        * TYLENOL 8 HOUR ARTHRITIS PAIN PO      Take 650 mg by mouth every 8 hours as needed         UNABLE TO FIND      Take 1 Bar by mouth 2 times daily MEDICATION NAME: FiberONe Bar        UNIFINE PENTIPS 31G X 6 MM   Generic drug:  insulin pen needle     100 each    USE ONCE DAILY    Encounter for medication refill       VAGISIL ANTI-ITCH MEDICATED EX      Externally apply topically every morning        ZANTAC 150 MAXIMUM STRENGTH PO      Take 150 mg by mouth daily as needed        * Notice:  This list has 4 medication(s) that are the same as other medications prescribed for you. Read the directions carefully, and ask your doctor or other care provider to review them with you.

## 2018-04-03 ENCOUNTER — TELEPHONE (OUTPATIENT)
Dept: FAMILY MEDICINE | Facility: CLINIC | Age: 83
End: 2018-04-03

## 2018-04-03 DIAGNOSIS — E11.51 DIABETES MELLITUS TYPE 2 WITH PERIPHERAL ARTERY DISEASE (H): ICD-10-CM

## 2018-04-03 NOTE — TELEPHONE ENCOUNTER
Received fax 03/23 from [facility] with patients blood sugar readings.  They requested an order to increase patients Levimir to 12 units daily.  OK per Dr Marc.  Also requesting order for Omeprazole 20 mg as Zantac not helping patient.  OK per  for rx.  Faxed over orders to [facility].

## 2018-04-10 NOTE — TELEPHONE ENCOUNTER
3/20/18 Received physician orders for medication refills.  List was signed and faxed back to [facility].      Eliseo Trujillo,   [facility]  [phone]

## 2018-04-10 NOTE — TELEPHONE ENCOUNTER
Received physician order for medication from [facility].  Order was signed and faxed back.  There is a couple things that need to updated in EPIC.    Discontinue:  Multiple vitamins, nutritional supplements, ranitidine, sodium chloride (duplicate)    Eliseo Trujillo,   [facility]  [phone]

## 2018-05-14 ENCOUNTER — MEDICAL CORRESPONDENCE (OUTPATIENT)
Dept: FAMILY MEDICINE | Facility: CLINIC | Age: 83
End: 2018-05-14

## 2018-05-14 DIAGNOSIS — Z76.0 ENCOUNTER FOR MEDICATION REFILL: ICD-10-CM

## 2018-05-14 RX ORDER — LIDOCAINE 50 MG/G
PATCH TOPICAL
Qty: 60 PATCH | Refills: 6 | Status: SHIPPED | OUTPATIENT
Start: 2018-05-14 | End: 2018-06-19

## 2018-05-16 PROCEDURE — G0180 MD CERTIFICATION HHA PATIENT: HCPCS | Performed by: FAMILY MEDICINE

## 2018-05-23 ENCOUNTER — MEDICAL CORRESPONDENCE (OUTPATIENT)
Dept: FAMILY MEDICINE | Facility: CLINIC | Age: 83
End: 2018-05-23

## 2018-05-24 ENCOUNTER — MEDICAL CORRESPONDENCE (OUTPATIENT)
Dept: FAMILY MEDICINE | Facility: CLINIC | Age: 83
End: 2018-05-24

## 2018-05-29 ENCOUNTER — MEDICAL CORRESPONDENCE (OUTPATIENT)
Dept: FAMILY MEDICINE | Facility: CLINIC | Age: 83
End: 2018-05-29

## 2018-05-31 ENCOUNTER — MEDICAL CORRESPONDENCE (OUTPATIENT)
Dept: FAMILY MEDICINE | Facility: CLINIC | Age: 83
End: 2018-05-31

## 2018-06-01 ENCOUNTER — ALLIED HEALTH/NURSE VISIT (OUTPATIENT)
Dept: CARDIOLOGY | Facility: CLINIC | Age: 83
End: 2018-06-01
Payer: MEDICARE

## 2018-06-01 ENCOUNTER — MEDICAL CORRESPONDENCE (OUTPATIENT)
Dept: FAMILY MEDICINE | Facility: CLINIC | Age: 83
End: 2018-06-01

## 2018-06-01 DIAGNOSIS — I44.30 AV BLOCK: ICD-10-CM

## 2018-06-01 DIAGNOSIS — Z95.0 CARDIAC PACEMAKER IN SITU: Primary | ICD-10-CM

## 2018-06-01 PROCEDURE — 93280 PM DEVICE PROGR EVAL DUAL: CPT | Performed by: INTERNAL MEDICINE

## 2018-06-01 NOTE — MR AVS SNAPSHOT
After Visit Summary   6/1/2018    Serena Marie    MRN: [id number]           Patient Information     Date Of Birth          8/21/1924        Visit Information        Provider Department      6/1/2018 10:30 AM STEPHEN CHON [facility]        Today's Diagnoses     Cardiac pacemaker in situ    -  1    AV block           Follow-ups after your visit        Additional Services     Follow-Up with Cardiologist       Annual f/u (overdue)                  Your next 10 appointments already scheduled     Jun 22, 2018  8:45 AM CDT   Return Visit with Sis Auguste DO   [facility] ([facility])    [address]  [facility] [address] [phone] [address]            Sep 05, 2018  9:45 AM CDT   Phone Device Check with MITCHELL TECH1   [facility] ([facility])    [address]  [facility] [place] [address] [phone] [address]              Future tests that were ordered for you today     Open Future Orders        Priority Expected Expires Ordered    Follow-Up with Cardiologist Routine 8/1/2018 6/1/2019 6/1/2018            Who to contact     If you have questions or need follow up information about today's clinic visit or your schedule please contact [facility] directly at [phone].  Normal or non-critical lab and imaging results will be communicated to you by MyChart, letter or phone within 4 business days after the clinic has received the results. If you do not hear from us within 7 days, please contact the clinic through MyChart or phone. If you have a critical or abnormal lab result, we will notify you by phone as soon as possible.  Submit refill requests through [employer] or call your pharmacy and they will forward the refill request to us. Please allow 3 business days for your refill to be completed.           Additional Information About Your Visit        Care EveryWhere ID     This is your Care EveryWhere ID. This could be used by other organizations to access your [facility] medical records  [id number]         Blood Pressure from Last 3 Encounters:   03/16/18 96/60   03/07/18 135/74   02/27/18 114/76    Weight from Last 3 Encounters:   03/16/18 64.4 kg (142 lb)   03/07/18 62.1 kg (137 lb)   02/27/18 62.6 kg (138 lb)              We Performed the Following     PM DEVICE PROGRAMMING EVAL, DUAL LEAD PACER (36735)        Primary Care Provider Office Phone # Fax #    Tracie Marc [phone] [address]  [facility] [address]        Goals        General    I want to have good control of Diabetes (pt-stated)     Notes - Note edited  1/28/2016  1:17 PM by Leatha Campos, RN    I will check my blood sugars 4 x a day.  I will take Novolog with my meals.  I will take my Levemir at bedtime.              Equal Access to Services     [facility]: Hadii aad astrid Mcmullen, waaxda luqadaha, qaybta kaalmamatthew rodriguez, zoila kerns . So [facility] [phone].    ATENCIÓN: Si habla español, tiene a murcia disposición servicios gratuitos de asistencia lingüística. Llame al [phone].    We comply with applicable federal civil rights laws and [place] laws. We do not discriminate on the basis of race, color, national origin, age, disability, sex, sexual orientation, or gender identity.            Thank you!     Thank you for choosing [facility]  for your care. Our goal is always to provide you with excellent care. Hearing back from our patients is one way we can continue to improve our services. Please take a few minutes to complete the written survey that you may receive in the mail after your visit with us. Thank you!             Your Updated Medication List - Protect others around you: Learn how to safely use, store and throw away  your medicines at www.disposemymeds.org.          This list is accurate as of 6/1/18 10:50 AM.  Always use your most recent med list.                   Brand Name Dispense Instructions for use Diagnosis    ACE/ARB/ARNI NOT PRESCRIBED (INTENTIONAL)      1 each continuous prn ACE & ARB not prescribed due to Risk for drug interaction and Worsening renal function on ACE/ARB therapy    CAD (coronary artery disease)       albuterol (2.5 MG/3ML) 0.083% neb solution      Take 1 vial by nebulization every 4 hours as needed for shortness of breath / dyspnea or wheezing        aspirin 81 MG tablet      Take 1 tablet by mouth every evening        atorvastatin 80 MG tablet    LIPITOR    90 tablet    Take 1 tablet (80 mg) by mouth daily    PAD (peripheral artery disease) (H)       bacitracin ophthalmic ointment           blood glucose monitoring lancets     300 each    Use to test blood sugar 3-4 times daily or as directed.    Diabetes mellitus type 2 with peripheral artery disease (H)       * blood glucose monitoring test strip    ONETOUCH ULTRA    300 each    TEST BLOOD GLUCOSE FOUR TIMES A DAY    Encounter for medication refill       * blood glucose monitoring test strip    no brand specified    100 strip    Use to test blood sugar four times daily or as directed.    Encounter for medication refill       cholecalciferol 1000 UNIT tablet    vitamin D3     Take 2,000 Units by mouth daily        DOCQLACE 100 MG capsule   Generic drug:  docusate sodium      Take 100 mg by mouth At Bedtime        fish oil-omega-3 fatty acids 1000 MG capsule      Take 1 g by mouth daily    PAD (peripheral artery disease) (H)       fluocinolone acetonide 0.01 % Oil     118 mL    Apply to scalp when wet massage well cover with cap leave on over night Wash in am.    DM type 2, goal A1c below 7       insulin pen needle 31G X 6 MM    UNIFINE PENTIPS    100 each    USE ONCE DAILY    Encounter for medication refill       LAMISIL EX      Externally apply  topically daily For under breasts, in between toes        LEVEMIR FLEXTOUCH 100 UNIT/ML injection   Generic drug:  insulin detemir     15 mL    INJECT 12 UNITS UNDER THE SKIN AT BEDTIME    Diabetes mellitus type 2 with peripheral artery disease (H)       levothyroxine 75 MCG tablet    SYNTHROID/LEVOTHROID    90 tablet    TAKE ONE TABLET BY MOUTH ONE TIME DAILY    Encounter for medication refill       lidocaine 5 % Patch    LIDODERM    60 patch    APPLY UP TO 3 PATCHES TO PAINFUL AREA AT ONCE FOR UP TO 12 H WITHIN A 24 H PERIOD.  REMOVE AFTER 12 HOURS.    Encounter for medication refill       losartan 50 MG tablet    COZAAR    30 tablet    Take 2 tablets (100 mg) by mouth daily    Essential hypertension       TITO 128 5 % ophthalmic solution   Generic drug:  sodium chloride      Place 1 drop Into the left eye 2 times daily        NovoLOG FLEXPEN 100 UNIT/ML injection   Generic drug:  insulin aspart      Inject 12 Units Subcutaneous 4 times daily (with meals and nightly)        * omeprazole 20 MG CR capsule    priLOSEC    30 capsule    TAKE ONE CAPSULE BY MOUTH AT BEDTIME - NO TUMS WITHIN 2 HOURS OF MED    Gastroesophageal reflux disease, esophagitis presence not specified       * omeprazole 20 MG CR capsule    priLOSEC    90 capsule    Take 1 capsule (20 mg) by mouth daily        PREPARATION H 0.25-14-74.9 % rectal ointment   Generic drug:  phenylephrine-shark liver oil-mineral oil-petrolatum      Place rectally daily        TH EYE DROP ADVANCED RELIEF 0.05-0.1-1-1 % Soln   Generic drug:  Tetrahydroz-Dextran-PEG-Povid      Apply 1 drop to eye 2 times daily To left eye    Blindness and low vision       timolol 0.5 % ophthalmic solution    TIMOPTIC    1 Bottle    Place 1 drop Into the left eye 2 times daily        * TYLENOL ARTHRITIS PAIN 650 MG CR tablet   Generic drug:  acetaminophen      Take 1,300 mg by mouth daily States 2 tablets qam & usually 2 tablets qpm        * TYLENOL 8 HOUR ARTHRITIS PAIN PO      Take 650  mg by mouth every 8 hours as needed        UNABLE TO FIND      Take 1 Bar by mouth 2 times daily MEDICATION NAME: FiberONe Bar        VAGISIL ANTI-ITCH MEDICATED EX      Externally apply topically every morning        * Notice:  This list has 6 medication(s) that are the same as other medications prescribed for you. Read the directions carefully, and ask your doctor or other care provider to review them with you.

## 2018-06-01 NOTE — PROGRESS NOTES
[employer] (D) Pacemaker Device Check  AP: 18 % : 99 %  Mode: DDD         Underlying Rhythm: SR in the 70's  Heart Rate: excellent variability  Sensing: WNL    Pacing Threshold: WNL   Impedance: WNL  Battery Status: 4.5 yrs estimated longevity  Device Site: WNL  Atrial Arrhythmia: 2 episodes of PAT lasting < 10 seconds.   Ventricular Arrhythmia: none  Setting Change: turned on AV search hysteresis to encourage intrinsic AV conduction. Last year patient's underlying rhythm was CHB, today she is in SR. After changes, presenting rhythm was AS/VS.     Care Plan: scheduled phone check in 3 months. Pt had turned in her phone check box because she said she couldn't do the phone checks any more because of her neuropathy. Pt was with an assisted living staff at [facility] today, staff said she can help pt. Gave pt new teletrace box. Pt is overdue for OV with Dr. Auguste, pt scheduled this on the way out of clinic today.    ADELFO STEWART

## 2018-06-06 ENCOUNTER — MEDICAL CORRESPONDENCE (OUTPATIENT)
Dept: FAMILY MEDICINE | Facility: CLINIC | Age: 83
End: 2018-06-06

## 2018-06-13 ENCOUNTER — MEDICAL CORRESPONDENCE (OUTPATIENT)
Dept: FAMILY MEDICINE | Facility: CLINIC | Age: 83
End: 2018-06-13

## 2018-06-18 ENCOUNTER — TELEPHONE (OUTPATIENT)
Dept: CARDIOLOGY | Facility: CLINIC | Age: 83
End: 2018-06-18

## 2018-06-18 NOTE — TELEPHONE ENCOUNTER
Chart prep for visit on 6/22/18. Pt last seen in clinic by Dr. Auguste on 3/28/17. No recent lipid panel, Dr. Auguste prescribes pt's atorvastatin. Pt should also be overdue for refill, uncertain if pt is taking atorvastatin. Called pt, no answer, unable to LVM.

## 2018-06-19 ENCOUNTER — TELEPHONE (OUTPATIENT)
Dept: FAMILY MEDICINE | Facility: CLINIC | Age: 83
End: 2018-06-19

## 2018-06-19 NOTE — TELEPHONE ENCOUNTER
Received fax from [employer] that patients rx for Lidocaine patch is not covered under her insurance.  Per Dr Marc OK for patient to try Salon pas or other OTC product with lidocaine.  Called patient 06/14 and informed her.

## 2018-06-22 ENCOUNTER — OFFICE VISIT (OUTPATIENT)
Dept: CARDIOLOGY | Facility: CLINIC | Age: 83
End: 2018-06-22
Payer: MEDICARE

## 2018-06-22 VITALS
BODY MASS INDEX: 31.11 KG/M2 | HEIGHT: 57 IN | SYSTOLIC BLOOD PRESSURE: 72 MMHG | HEART RATE: 77 BPM | WEIGHT: 144.2 LBS | DIASTOLIC BLOOD PRESSURE: 45 MMHG

## 2018-06-22 DIAGNOSIS — Z95.0 CARDIAC PACEMAKER IN SITU: ICD-10-CM

## 2018-06-22 DIAGNOSIS — I44.30 AV BLOCK: ICD-10-CM

## 2018-06-22 DIAGNOSIS — I10 ESSENTIAL HYPERTENSION: ICD-10-CM

## 2018-06-22 PROCEDURE — 99214 OFFICE O/P EST MOD 30 MIN: CPT | Performed by: INTERNAL MEDICINE

## 2018-06-22 RX ORDER — LOSARTAN POTASSIUM 50 MG/1
50 TABLET ORAL DAILY
Qty: 30 TABLET | Refills: 11 | Status: SHIPPED | OUTPATIENT
Start: 2018-06-22 | End: 2018-08-13

## 2018-06-22 RX ORDER — PRENATAL VIT 91/IRON/FOLIC/DHA 28-975-200
COMBINATION PACKAGE (EA) ORAL DAILY
COMMUNITY
End: 2020-08-25

## 2018-06-22 NOTE — PROGRESS NOTES
Service Date: 06/22/2018      REFERRING PHYSICIAN:  Dr. Marc      HISTORY OF PRESENT ILLNESS:  Ms. Marie is a very pleasant 89-year-old female who has a history of coronary disease, remote PCI to her right coronary artery.  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  She is here for a followup visit.  She had a pacer interrogation at the 1st of this month and she was noted to have more of her own sinus rhythm.  They did make some modifications to her pacing, allowing for AV search hysteresis to encourage her own intrinsic AV conduction.  She is now living in an assisted-living facility.  She was having difficulty doing her home interrogations of her pacemaker on her own but they are now with assisting her with this.  She has not had any major cardiac issues in the last year.  She was hospitalized in February for influenza and was quite sick at that time.  I do note they made the change to her losartan dose, increasing it to 100 mg at that time.  She has had subsequent lowering of blood pressure on her visits in March.  She was seen in our system and her blood pressure was 96/60.  Today in our office, she was 72/45.  She did admit to taking her medications today.  She denies any lightheadedness or presyncope or gait instability this morning.  She states she feels her normal self.  She feels always tired but does not complain of any chest pains or shortness of breath at this time.  Her weight is stable over the past year.        PHYSICAL EXAMINATION:     CARDIOVASCULAR:  Tones are regular.  She does have systolic murmur, both in the right and left upper sternal borders.  I do not appreciate a gallop or rub.     LUNGS:  Clear and there is no peripheral edema.   MUSCULOSKELETAL:  She seems to walk well on her own without any assistance.     NEUROLOGIC:  She is present today with an assisted-living staff member who notes that her memory is worsening over the past year and she did have an  exacerbation of her memory impairment and cognitive issues while hospitalized which has not fully recovered.      SUMMARY:  Ms. Gandhi is a very pleasant 89-year-old female with a remote history of coronary disease and PCI to her right coronary artery.  She has a permanent pacemaker implant due to symptomatic bradycardia with some changes today with improvement in her own sinus node function.  She is hypotensive with changes to her antihypertensive medication performed in February following her discharge from the hospital with influenza.  I am going to recommend that we decrease her losartan back to 50 mg due to hypotension and risks of falling and I have explained this to her assisted-living staff member and also changed his prescription with her pharmacy as well.  She will need to continue her followup visits with our [facility] and I am happy to see her back as needed.        Please feel free to contact me with any questions you have in regards to her care.      cc:   Tracie Marc MD    [facility] [address] [place] [address]         JIMENEZ TOBAR DO             D: 2018   T: 2018   MT: MIGUEL      Name:     CHRISTINA GANDHI   MRN:      [id number]        Account:      [id number]   :      1924           Service Date: 2018      Document: [id number]

## 2018-06-22 NOTE — LETTER
6/22/2018    Tracie Marc MD  [address]  [facility] [address]    RE: Serena Marie       Dear Colleague,    I had the pleasure of seeing Serena Marie in the [facility].    HPI and Plan:   See dictation    No orders of the defined types were placed in this encounter.      Orders Placed This Encounter   Medications     terbinafine (LAMISIL AT) 1 % cream     Sig: Apply topically daily     Lido-Capsaicin-Men-Methyl Sal (MEDI-PATCH-LIDOCAINE EX)     Multiple Vitamins-Minerals (PRESERVISION AREDS 2 PO)     Sig: Take by mouth 2 times daily     Benzocaine-Resorcinol (VAGISIL) 5-2 % CREA     Sig: Place vaginally daily     ranitidine (ZANTAC) 150 MG tablet     Sig: Take 150 mg by mouth as needed for heartburn     losartan (COZAAR) 50 MG tablet     Sig: Take 1 tablet (50 mg) by mouth daily     Dispense:  30 tablet     Refill:  11       Medications Discontinued During This Encounter   Medication Reason     omeprazole (PRILOSEC) 20 MG CR capsule Medication Reconciliation Clean Up     losartan (COZAAR) 50 MG tablet Reorder         Encounter Diagnoses   Name Primary?     Cardiac pacemaker in situ      AV block      Essential hypertension        CURRENT MEDICATIONS:  Current Outpatient Prescriptions   Medication Sig Dispense Refill     Acetaminophen (TYLENOL 8 HOUR ARTHRITIS PAIN PO) Take 650 mg by mouth every 8 hours as needed       acetaminophen (TYLENOL ARTHRITIS PAIN) 650 MG CR tablet Take 1,300 mg by mouth daily States 2 tablets qam & usually 2 tablets qpm       aspirin 81 MG tablet Take 1 tablet by mouth every evening        atorvastatin (LIPITOR) 80 MG tablet Take 1 tablet (80 mg) by mouth daily 90 tablet 3     bacitracin ophthalmic ointment   2     Benzocaine-Resorcinol (VAGISIL) 5-2 % CREA Place vaginally daily       blood glucose monitoring (NO BRAND SPECIFIED) test strip Use to test blood sugar four times daily or as directed. 100 strip 6     blood glucose monitoring  (ONE TOUCH ULTRASOFT) lancets Use to test blood sugar 3-4 times daily or as directed. 300 each 3     blood glucose monitoring (ONETOUCH ULTRA) test strip TEST BLOOD GLUCOSE FOUR TIMES A  each 11     cholecalciferol (VITAMIN D) 1000 UNIT tablet Take 2,000 Units by mouth daily       docusate sodium (DOCQLACE) 100 MG capsule Take 100 mg by mouth At Bedtime       fish oil-omega-3 fatty acids 1000 MG capsule Take 1 g by mouth daily        fluocinolone acetonide 0.01 % OIL Apply to scalp when wet massage well cover with cap leave on over night  Wash in am. 118 mL 12     insulin aspart (NOVOLOG FLEXPEN) 100 UNIT/ML injection Inject 12 Units Subcutaneous 4 times daily (with meals and nightly)       insulin detemir (LEVEMIR FLEXTOUCH) 100 UNIT/ML injection INJECT 12 UNITS UNDER THE SKIN AT BEDTIME 15 mL 8     insulin pen needle (UNIFINE PENTIPS) 31G X 6 MM USE ONCE DAILY 100 each 11     levothyroxine (SYNTHROID/LEVOTHROID) 75 MCG tablet TAKE ONE TABLET BY MOUTH ONE TIME DAILY  90 tablet 1     Lido-Capsaicin-Men-Methyl Sal (MEDI-PATCH-LIDOCAINE EX)        losartan (COZAAR) 50 MG tablet Take 1 tablet (50 mg) by mouth daily 30 tablet 11     Multiple Vitamins-Minerals (PRESERVISION AREDS 2 PO) Take by mouth 2 times daily       omeprazole (PRILOSEC) 20 MG CR capsule Take 1 capsule (20 mg) by mouth daily 90 capsule 1     phenylephrine-shark liver oil-mineral oil-petrolatum (PREPARATION H) 0.25-14-74.9 % rectal ointment Place rectally daily       Pramoxine HCl (VAGISIL ANTI-ITCH MEDICATED EX) Externally apply topically every morning       ranitidine (ZANTAC) 150 MG tablet Take 150 mg by mouth as needed for heartburn       sodium chloride (TITO 128) 5 % ophthalmic solution Place 1 drop Into the left eye 2 times daily       terbinafine (LAMISIL AT) 1 % cream Apply topically daily       Terbinafine (LAMISIL EX) Externally apply topically daily For under breasts, in between toes       Tetrahydroz-Dextran-PEG-Povid (TH EYE DROP  ADVANCED RELIEF) 0.05-0.1-1-1 % SOLN Apply 1 drop to eye 2 times daily To left eye       timolol (TIMOPTIC) 0.5 % ophthalmic solution Place 1 drop Into the left eye 2 times daily  1 Bottle      UNABLE TO FIND Take 1 Bar by mouth 2 times daily MEDICATION NAME: FiberONe Bar        ACE/ARB NOT PRESCRIBED, INTENTIONAL, 1 each continuous prn ACE & ARB not prescribed due to Risk for drug interaction and Worsening renal function on ACE/ARB therapy (Patient not taking: Reported on 2/23/2018)       albuterol (2.5 MG/3ML) 0.083% neb solution Take 1 vial by nebulization every 4 hours as needed for shortness of breath / dyspnea or wheezing       [DISCONTINUED] losartan (COZAAR) 50 MG tablet Take 2 tablets (100 mg) by mouth daily 30 tablet        ALLERGIES     Allergies   Allergen Reactions     Contrast Dye Hives and Swelling     Pt. States no history of allergy to topical betadine. Tolerated surgical betadine prep 2-6-2012.     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 (H)      DM type 2, goal A1c below 7      GERD (gastroesophageal reflux disease)      Gluten intolerance      HTN (hypertension)     been off meds since 2012     Hyperlipidemia LDL goal < 100      Pacemaker 8/2009 8/2009 Near syncopal episode with HR 40s and BP 60/sys - PPM implanted     PAD (peripheral artery disease) (H) 2008    70% proximal right SFA stenosis with successful angioplasy     S/P coronary angiogram 8/2012 8/2012 Cath - 75% RCA with BRIGITTE placed, 40% ostial and distal left main stenosis     Syncope 08/2009 8/2009 Near syncopal episode with HR 40s and BP 60/sys - PPM implanted     Unspecified hypothyroidism        PAST SURGICAL HISTORY:  Past Surgical History:   Procedure  Laterality Date     ANGIOPLASTY  2008    Angioplasty of proximal right superficial femoral artery     APPENDECTOMY       BACK SURGERY       C REMOVAL OF EYE      right      CHOLECYSTECTOMY, LAPOROSCOPIC      Cholecystectomy, Laparoscopic     COLECTOMY       EYE SURGERY      corneal transplant      HYSTERECTOMY, EMANI      BSO     IMPLANT PACEMAKER  8/2009     STENT, CORONARY, FATOU  8-12 8/2012 Cath - 75% RCA with BRIGITTE placed, 40% ostial and distal left main stenosis     TONSILLECTOMY & ADENOIDECTOMY         FAMILY HISTORY:  Family History   Problem Relation Age of Onset     Cancer Mother      Cancer Father      Unknown/Adopted Maternal Grandmother      Unknown/Adopted Maternal Grandfather      Unknown/Adopted Paternal Grandmother      Unknown/Adopted Paternal Grandfather        SOCIAL HISTORY:  Social History     Social History     Marital status:      Spouse name: N/A     Number of children: N/A     Years of education: N/A     Social History Main Topics     Smoking status: Former Smoker     Packs/day: 1.00     Years: 3.00     Types: Cigarettes     Quit date: 8/17/1947     Smokeless tobacco: Never Used     Alcohol use No     Drug use: No     Sexual activity: Not Asked     Other Topics Concern     Caffeine Concern Yes     5 cups coffee in morning     Sleep Concern No     Stress Concern No     Special Diet No     Exercise Yes     walks hallways     Seat Belt Yes     Social History Narrative       Review of Systems:  Skin:  Negative       Eyes:  Positive for glasses    ENT:  Negative      Respiratory:  Negative       Cardiovascular:    Positive for chest pressure  Gastroenterology: Positive for heartburn treated  Genitourinary:  Positive for urinary frequency    Musculoskeletal:  Positive for back pain;neck pain    Neurologic:  Positive for numbness or tingling of hands    Psychiatric:  Positive for anxiety situational  Heme/Lymph/Imm:  Negative      Endocrine:  Positive for diabetes;thyroid disorder   "    Physical Exam:  Vitals: BP (!) 72/45  Pulse 77  Ht 1.448 m (4' 9\")  Wt 65.4 kg (144 lb 3.2 oz)  BMI 31.2 kg/m2    Constitutional:  cooperative, alert and oriented, well developed, well nourished, in no acute distress        Skin:  warm and dry to the touch          Head:  normocephalic        Eyes:      right eye closed    Lymph:      ENT:  no pallor or cyanosis pallor      Neck:  no carotid bruit        Respiratory:  clear to auscultation;normal symmetry         Cardiac: regular rhythm;no murmurs, gallops or rubs detected       systolic ejection murmur;RUSB;grade 1 holosystolic murmur;LLSB;apical;grade 2      pulses full and equal pulses below the femoral arteries are diminished                                      GI:  abdomen soft        Extremities and Muscular Skeletal:  no edema;no deformities, clubbing, cyanosis, erythema observed              Neurological:  no gross motor deficits   memory impairment    Psych:  Alert and Oriented x 3          CC  No referring provider defined for this encounter.                    Thank you for allowing me to participate in the care of your patient.      Sincerely,     Sis Auguste, DO     [facility]    cc:   No referring provider defined for this encounter.        "

## 2018-06-22 NOTE — MR AVS SNAPSHOT
"              After Visit Summary   6/22/2018    Serena Marie    MRN: [id number]           Patient Information     Date Of Birth          8/21/1924        Visit Information        Provider Department      6/22/2018 8:45 AM Sis Auguste DO [facility]        Today's Diagnoses     Cardiac pacemaker in situ        AV block        Essential hypertension           Follow-ups after your visit        Your next 10 appointments already scheduled     Sep 05, 2018  9:45 AM CDT   Phone Device Check with MITCHELL TECH1   [facility] ([facility])    [address] [place] [address] [address]  [facility] [address] [phone] OPT 2              Who to contact     If you have questions or need follow up information about today's clinic visit or your schedule please contact [facility] directly at [phone].  Normal or non-critical lab and imaging results will be communicated to you by MyChart, letter or phone within 4 business days after the clinic has received the results. If you do not hear from us within 7 days, please contact the clinic through MyChart or phone. If you have a critical or abnormal lab result, we will notify you by phone as soon as possible.  Submit refill requests through [employer] or call your pharmacy and they will forward the refill request to us. Please allow 3 business days for your refill to be completed.          Additional Information About Your Visit        Care EveryWhere ID     This is your Care EveryWhere ID. This could be used by other organizations to access your [facility] medical records  [id number]        Your Vitals Were     Pulse Height BMI (Body Mass Index)             77 1.448 m (4' 9\") 31.2 kg/m2          Blood Pressure from Last 3 Encounters:   06/22/18 (!) 72/45   03/16/18 96/60   03/07/18 135/74    Weight from Last 3 Encounters:   06/22/18 65.4 kg (144 lb 3.2 " oz)   03/16/18 64.4 kg (142 lb)   03/07/18 62.1 kg (137 lb)              We Performed the Following     Follow-Up with Cardiologist          Today's Medication Changes          These changes are accurate as of 6/22/18  9:10 AM.  If you have any questions, ask your nurse or doctor.               These medicines have changed or have updated prescriptions.        Dose/Directions    losartan 50 MG tablet   Commonly known as:  COZAAR   This may have changed:  how much to take   Used for:  Essential hypertension   Changed by:  Sis Auguste,         Dose:  50 mg   Take 1 tablet (50 mg) by mouth daily   Quantity:  30 tablet   Refills:  11            Where to get your medicines      These medications were sent to [facility] - [place], [place] - [address] [facility] [address], [facility] [address]     Phone:  [phone]     losartan 50 MG tablet                Primary Care Provider Office Phone # Fax #    Tracie Marc [phone] [address]  [facility] [address]        Goals        General    I want to have good control of Diabetes (pt-stated)     Notes - Note edited  1/28/2016  1:17 PM by Leatha Campos, RN    I will check my blood sugars 4 x a day.  I will take Novolog with my meals.  I will take my Levemir at bedtime.              Equal Access to Services     ERMA EUCEDA AH: Hadii aad ku hadasho Soelbaali, waaxda luqadaha, qaybta kaalmada adeegyada, zoila church. So [facility] [phone].    ATENCIÓN: Si habla español, tiene a murcia disposición servicios gratuitos de asistencia lingüística. Cullen davidson [phone].    We comply with applicable federal civil rights laws and [place] laws. We do not discriminate on the basis of race, color, national origin, age, disability, sex, sexual orientation, or gender identity.            Thank you!     Thank you for choosing [facility]  for your care. Our goal is  always to provide you with excellent care. Hearing back from our patients is one way we can continue to improve our services. Please take a few minutes to complete the written survey that you may receive in the mail after your visit with us. Thank you!             Your Updated Medication List - Protect others around you: Learn how to safely use, store and throw away your medicines at www.disposemymeds.org.          This list is accurate as of 6/22/18  9:10 AM.  Always use your most recent med list.                   Brand Name Dispense Instructions for use Diagnosis    ACE/ARB/ARNI NOT PRESCRIBED (INTENTIONAL)      1 each continuous prn ACE & ARB not prescribed due to Risk for drug interaction and Worsening renal function on ACE/ARB therapy    CAD (coronary artery disease)       albuterol (2.5 MG/3ML) 0.083% neb solution      Take 1 vial by nebulization every 4 hours as needed for shortness of breath / dyspnea or wheezing        aspirin 81 MG tablet      Take 1 tablet by mouth every evening        atorvastatin 80 MG tablet    LIPITOR    90 tablet    Take 1 tablet (80 mg) by mouth daily    PAD (peripheral artery disease) (H)       bacitracin ophthalmic ointment           blood glucose monitoring lancets     300 each    Use to test blood sugar 3-4 times daily or as directed.    Diabetes mellitus type 2 with peripheral artery disease (H)       * blood glucose monitoring test strip    ONETOUCH ULTRA    300 each    TEST BLOOD GLUCOSE FOUR TIMES A DAY    Encounter for medication refill       * blood glucose monitoring test strip    no brand specified    100 strip    Use to test blood sugar four times daily or as directed.    Encounter for medication refill       cholecalciferol 1000 UNIT tablet    vitamin D3     Take 2,000 Units by mouth daily        DOCQLACE 100 MG capsule   Generic drug:  docusate sodium      Take 100 mg by mouth At Bedtime        fish oil-omega-3 fatty acids 1000 MG capsule      Take 1 g by mouth daily     PAD (peripheral artery disease) (H)       fluocinolone acetonide 0.01 % Oil     118 mL    Apply to scalp when wet massage well cover with cap leave on over night Wash in am.    DM type 2, goal A1c below 7       insulin pen needle 31G X 6 MM    UNIFINE PENTIPS    100 each    USE ONCE DAILY    Encounter for medication refill       lamISIL AT 1 % cream   Generic drug:  terbinafine      Apply topically daily        LAMISIL EX      Externally apply topically daily For under breasts, in between toes        LEVEMIR FLEXTOUCH 100 UNIT/ML injection   Generic drug:  insulin detemir     15 mL    INJECT 12 UNITS UNDER THE SKIN AT BEDTIME    Diabetes mellitus type 2 with peripheral artery disease (H)       levothyroxine 75 MCG tablet    SYNTHROID/LEVOTHROID    90 tablet    TAKE ONE TABLET BY MOUTH ONE TIME DAILY    Encounter for medication refill       losartan 50 MG tablet    COZAAR    30 tablet    Take 1 tablet (50 mg) by mouth daily    Essential hypertension       MEDI-PATCH-LIDOCAINE EX           TITO 128 5 % ophthalmic solution   Generic drug:  sodium chloride      Place 1 drop Into the left eye 2 times daily        NovoLOG FLEXPEN 100 UNIT/ML injection   Generic drug:  insulin aspart      Inject 12 Units Subcutaneous 4 times daily (with meals and nightly)        omeprazole 20 MG CR capsule    priLOSEC    90 capsule    Take 1 capsule (20 mg) by mouth daily        PREPARATION H 0.25-14-74.9 % rectal ointment   Generic drug:  phenylephrine-shark liver oil-mineral oil-petrolatum      Place rectally daily        PRESERVISION AREDS 2 PO      Take by mouth 2 times daily        TH EYE DROP ADVANCED RELIEF 0.05-0.1-1-1 % Soln   Generic drug:  Tetrahydroz-Dextran-PEG-Povid      Apply 1 drop to eye 2 times daily To left eye    Blindness and low vision       timolol 0.5 % ophthalmic solution    TIMOPTIC    1 Bottle    Place 1 drop Into the left eye 2 times daily        * TYLENOL ARTHRITIS PAIN 650 MG CR tablet   Generic drug:   acetaminophen      Take 1,300 mg by mouth daily States 2 tablets qam & usually 2 tablets qpm        * TYLENOL 8 HOUR ARTHRITIS PAIN PO      Take 650 mg by mouth every 8 hours as needed        UNABLE TO FIND      Take 1 Bar by mouth 2 times daily MEDICATION NAME: FiberONe Bar        VAGISIL 5-2 % Crea   Generic drug:  Benzocaine-Resorcinol      Place vaginally daily        VAGISIL ANTI-ITCH MEDICATED EX      Externally apply topically every morning        ZANTAC 150 MG tablet   Generic drug:  ranitidine      Take 150 mg by mouth as needed for heartburn        * Notice:  This list has 4 medication(s) that are the same as other medications prescribed for you. Read the directions carefully, and ask your doctor or other care provider to review them with you.

## 2018-06-22 NOTE — PROGRESS NOTES
HPI and Plan:   See dictation    No orders of the defined types were placed in this encounter.      Orders Placed This Encounter   Medications     terbinafine (LAMISIL AT) 1 % cream     Sig: Apply topically daily     Lido-Capsaicin-Men-Methyl Sal (MEDI-PATCH-LIDOCAINE EX)     Multiple Vitamins-Minerals (PRESERVISION AREDS 2 PO)     Sig: Take by mouth 2 times daily     Benzocaine-Resorcinol (VAGISIL) 5-2 % CREA     Sig: Place vaginally daily     ranitidine (ZANTAC) 150 MG tablet     Sig: Take 150 mg by mouth as needed for heartburn     losartan (COZAAR) 50 MG tablet     Sig: Take 1 tablet (50 mg) by mouth daily     Dispense:  30 tablet     Refill:  11       Medications Discontinued During This Encounter   Medication Reason     omeprazole (PRILOSEC) 20 MG CR capsule Medication Reconciliation Clean Up     losartan (COZAAR) 50 MG tablet Reorder         Encounter Diagnoses   Name Primary?     Cardiac pacemaker in situ      AV block      Essential hypertension        CURRENT MEDICATIONS:  Current Outpatient Prescriptions   Medication Sig Dispense Refill     Acetaminophen (TYLENOL 8 HOUR ARTHRITIS PAIN PO) Take 650 mg by mouth every 8 hours as needed       acetaminophen (TYLENOL ARTHRITIS PAIN) 650 MG CR tablet Take 1,300 mg by mouth daily States 2 tablets qam & usually 2 tablets qpm       aspirin 81 MG tablet Take 1 tablet by mouth every evening        atorvastatin (LIPITOR) 80 MG tablet Take 1 tablet (80 mg) by mouth daily 90 tablet 3     bacitracin ophthalmic ointment   2     Benzocaine-Resorcinol (VAGISIL) 5-2 % CREA Place vaginally daily       blood glucose monitoring (NO BRAND SPECIFIED) test strip Use to test blood sugar four times daily or as directed. 100 strip 6     blood glucose monitoring (ONE TOUCH ULTRASOFT) lancets Use to test blood sugar 3-4 times daily or as directed. 300 each 3     blood glucose monitoring (ONETOUCH ULTRA) test strip TEST BLOOD GLUCOSE FOUR TIMES A  each 11     cholecalciferol  (VITAMIN D) 1000 UNIT tablet Take 2,000 Units by mouth daily       docusate sodium (DOCQLACE) 100 MG capsule Take 100 mg by mouth At Bedtime       fish oil-omega-3 fatty acids 1000 MG capsule Take 1 g by mouth daily        fluocinolone acetonide 0.01 % OIL Apply to scalp when wet massage well cover with cap leave on over night  Wash in am. 118 mL 12     insulin aspart (NOVOLOG FLEXPEN) 100 UNIT/ML injection Inject 12 Units Subcutaneous 4 times daily (with meals and nightly)       insulin detemir (LEVEMIR FLEXTOUCH) 100 UNIT/ML injection INJECT 12 UNITS UNDER THE SKIN AT BEDTIME 15 mL 8     insulin pen needle (UNIFINE PENTIPS) 31G X 6 MM USE ONCE DAILY 100 each 11     levothyroxine (SYNTHROID/LEVOTHROID) 75 MCG tablet TAKE ONE TABLET BY MOUTH ONE TIME DAILY  90 tablet 1     Lido-Capsaicin-Men-Methyl Sal (MEDI-PATCH-LIDOCAINE EX)        losartan (COZAAR) 50 MG tablet Take 1 tablet (50 mg) by mouth daily 30 tablet 11     Multiple Vitamins-Minerals (PRESERVISION AREDS 2 PO) Take by mouth 2 times daily       omeprazole (PRILOSEC) 20 MG CR capsule Take 1 capsule (20 mg) by mouth daily 90 capsule 1     phenylephrine-shark liver oil-mineral oil-petrolatum (PREPARATION H) 0.25-14-74.9 % rectal ointment Place rectally daily       Pramoxine HCl (VAGISIL ANTI-ITCH MEDICATED EX) Externally apply topically every morning       ranitidine (ZANTAC) 150 MG tablet Take 150 mg by mouth as needed for heartburn       sodium chloride (TITO 128) 5 % ophthalmic solution Place 1 drop Into the left eye 2 times daily       terbinafine (LAMISIL AT) 1 % cream Apply topically daily       Terbinafine (LAMISIL EX) Externally apply topically daily For under breasts, in between toes       Tetrahydroz-Dextran-PEG-Povid (TH EYE DROP ADVANCED RELIEF) 0.05-0.1-1-1 % SOLN Apply 1 drop to eye 2 times daily To left eye       timolol (TIMOPTIC) 0.5 % ophthalmic solution Place 1 drop Into the left eye 2 times daily  1 Bottle      UNABLE TO FIND Take 1 Bar by  mouth 2 times daily MEDICATION NAME: FiberONe Bar        ACE/ARB NOT PRESCRIBED, INTENTIONAL, 1 each continuous prn ACE & ARB not prescribed due to Risk for drug interaction and Worsening renal function on ACE/ARB therapy (Patient not taking: Reported on 2/23/2018)       albuterol (2.5 MG/3ML) 0.083% neb solution Take 1 vial by nebulization every 4 hours as needed for shortness of breath / dyspnea or wheezing       [DISCONTINUED] losartan (COZAAR) 50 MG tablet Take 2 tablets (100 mg) by mouth daily 30 tablet        ALLERGIES     Allergies   Allergen Reactions     Contrast Dye Hives and Swelling     Pt. States no history of allergy to topical betadine. Tolerated surgical betadine prep 2-6-2012.     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 (H)      DM type 2, goal A1c below 7      GERD (gastroesophageal reflux disease)      Gluten intolerance      HTN (hypertension)     been off meds since 2012     Hyperlipidemia LDL goal < 100      Pacemaker 8/2009 8/2009 Near syncopal episode with HR 40s and BP 60/sys - PPM implanted     PAD (peripheral artery disease) (H) 2008    70% proximal right SFA stenosis with successful angioplasy     S/P coronary angiogram 8/2012 8/2012 Cath - 75% RCA with BRIGITTE placed, 40% ostial and distal left main stenosis     Syncope 08/2009 8/2009 Near syncopal episode with HR 40s and BP 60/sys - PPM implanted     Unspecified hypothyroidism        PAST SURGICAL HISTORY:  Past Surgical History:   Procedure Laterality Date     ANGIOPLASTY  2008    Angioplasty of proximal right superficial femoral artery     APPENDECTOMY       BACK SURGERY       C REMOVAL OF EYE      right      CHOLECYSTECTOMY, LAPOROSCOPIC      Cholecystectomy,  "Laparoscopic     COLECTOMY       EYE SURGERY      corneal transplant      HYSTERECTOMY, EMANI      BSO     IMPLANT PACEMAKER  8/2009     STENT, CORONARY, FATOU  8-12 8/2012 Cath - 75% RCA with BRIGITTE placed, 40% ostial and distal left main stenosis     TONSILLECTOMY & ADENOIDECTOMY         FAMILY HISTORY:  Family History   Problem Relation Age of Onset     Cancer Mother      Cancer Father      Unknown/Adopted Maternal Grandmother      Unknown/Adopted Maternal Grandfather      Unknown/Adopted Paternal Grandmother      Unknown/Adopted Paternal Grandfather        SOCIAL HISTORY:  Social History     Social History     Marital status:      Spouse name: N/A     Number of children: N/A     Years of education: N/A     Social History Main Topics     Smoking status: Former Smoker     Packs/day: 1.00     Years: 3.00     Types: Cigarettes     Quit date: 8/17/1947     Smokeless tobacco: Never Used     Alcohol use No     Drug use: No     Sexual activity: Not Asked     Other Topics Concern     Caffeine Concern Yes     5 cups coffee in morning     Sleep Concern No     Stress Concern No     Special Diet No     Exercise Yes     walks hallways     Seat Belt Yes     Social History Narrative       Review of Systems:  Skin:  Negative       Eyes:  Positive for glasses    ENT:  Negative      Respiratory:  Negative       Cardiovascular:    Positive for chest pressure  Gastroenterology: Positive for heartburn treated  Genitourinary:  Positive for urinary frequency    Musculoskeletal:  Positive for back pain;neck pain    Neurologic:  Positive for numbness or tingling of hands    Psychiatric:  Positive for anxiety situational  Heme/Lymph/Imm:  Negative      Endocrine:  Positive for diabetes;thyroid disorder      Physical Exam:  Vitals: BP (!) 72/45  Pulse 77  Ht 1.448 m (4' 9\")  Wt 65.4 kg (144 lb 3.2 oz)  BMI 31.2 kg/m2    Constitutional:  cooperative, alert and oriented, well developed, well nourished, in no acute distress    "     Skin:  warm and dry to the touch          Head:  normocephalic        Eyes:      right eye closed    Lymph:      ENT:  no pallor or cyanosis pallor      Neck:  no carotid bruit        Respiratory:  clear to auscultation;normal symmetry         Cardiac: regular rhythm;no murmurs, gallops or rubs detected       systolic ejection murmur;RUSB;grade 1 holosystolic murmur;LLSB;apical;grade 2      pulses full and equal pulses below the femoral arteries are diminished                                      GI:  abdomen soft        Extremities and Muscular Skeletal:  no edema;no deformities, clubbing, cyanosis, erythema observed              Neurological:  no gross motor deficits   memory impairment    Psych:  Alert and Oriented x 3          CC  No referring provider defined for this encounter.

## 2018-06-22 NOTE — LETTER
6/22/2018      Tracie Marc MD  [address]  [facility] [address]      RE: Serena Marie       Dear Colleague,    I had the pleasure of seeing Serena Marie in the [facility].    Service Date: 06/22/2018      REFERRING PHYSICIAN:  Dr. Marc      HISTORY OF PRESENT ILLNESS:  Ms. Marie is a very pleasant 89-year-old female who has a history of coronary disease, remote PCI to her right coronary artery.  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  She is here for a followup visit.  She had a pacer interrogation at the 1st of this month and she was noted to have more of her own sinus rhythm.  They did make some modifications to her pacing, allowing for AV search hysteresis to encourage her own intrinsic AV conduction.  She is now living in an assisted-living facility.  She was having difficulty doing her home interrogations of her pacemaker on her own but they are now with assisting her with this.  She has not had any major cardiac issues in the last year.  She was hospitalized in February for influenza and was quite sick at that time.  I do note they made the change to her losartan dose, increasing it to 100 mg at that time.  She has had subsequent lowering of blood pressure on her visits in March.  She was seen in our system and her blood pressure was 96/60.  Today in our office, she was 72/45.  She did admit to taking her medications today.  She denies any lightheadedness or presyncope or gait instability this morning.  She states she feels her normal self.  She feels always tired but does not complain of any chest pains or shortness of breath at this time.  Her weight is stable over the past year.        PHYSICAL EXAMINATION:     CARDIOVASCULAR:  Tones are regular.  She does have systolic murmur, both in the right and left upper sternal borders.  I do not appreciate a gallop or rub.     LUNGS:  Clear and there is no peripheral edema.    MUSCULOSKELETAL:  She seems to walk well on her own without any assistance.     NEUROLOGIC:  She is present today with an assisted-living staff member who notes that her memory is worsening over the past year and she did have an exacerbation of her memory impairment and cognitive issues while hospitalized which has not fully recovered.      SUMMARY:  Ms. Gandhi is a very pleasant 89-year-old female with a remote history of coronary disease and PCI to her right coronary artery.  She has a permanent pacemaker implant due to symptomatic bradycardia with some changes today with improvement in her own sinus node function.  She is hypotensive with changes to her antihypertensive medication performed in February following her discharge from the hospital with influenza.  I am going to recommend that we decrease her losartan back to 50 mg due to hypotension and risks of falling and I have explained this to her assisted-living staff member and also changed his prescription with her pharmacy as well.  She will need to continue her followup visits with our Pacemaker [facility] and I am happy to see her back as needed.        Please feel free to contact me with any questions you have in regards to her care.      cc:   Tracie Marc MD    [facility] [address] [place] [address]         JIMENEZ TOBAR DO             D: 2018   T: 2018   MT: MIGUEL      Name:     CHRISTINA GANDHI   MRN:      [id number]        Account:      [id number]   :      1924           Service Date: 2018      Document: [id number]         Outpatient Encounter Prescriptions as of 2018   Medication Sig Dispense Refill     Acetaminophen (TYLENOL 8 HOUR ARTHRITIS PAIN PO) Take 650 mg by mouth every 8 hours as needed       acetaminophen (TYLENOL ARTHRITIS PAIN) 650 MG CR tablet Take 1,300 mg by mouth daily States 2 tablets qam & usually 2 tablets qpm       aspirin 81 MG tablet Take 1 tablet by mouth  every evening        atorvastatin (LIPITOR) 80 MG tablet Take 1 tablet (80 mg) by mouth daily 90 tablet 3     bacitracin ophthalmic ointment   2     Benzocaine-Resorcinol (VAGISIL) 5-2 % CREA Place vaginally daily       blood glucose monitoring (NO BRAND SPECIFIED) test strip Use to test blood sugar four times daily or as directed. 100 strip 6     blood glucose monitoring (ONE TOUCH ULTRASOFT) lancets Use to test blood sugar 3-4 times daily or as directed. 300 each 3     blood glucose monitoring (ONETOUCH ULTRA) test strip TEST BLOOD GLUCOSE FOUR TIMES A  each 11     cholecalciferol (VITAMIN D) 1000 UNIT tablet Take 2,000 Units by mouth daily       docusate sodium (DOCQLACE) 100 MG capsule Take 100 mg by mouth At Bedtime       fish oil-omega-3 fatty acids 1000 MG capsule Take 1 g by mouth daily        fluocinolone acetonide 0.01 % OIL Apply to scalp when wet massage well cover with cap leave on over night  Wash in am. 118 mL 12     insulin aspart (NOVOLOG FLEXPEN) 100 UNIT/ML injection Inject 12 Units Subcutaneous 4 times daily (with meals and nightly)       insulin detemir (LEVEMIR FLEXTOUCH) 100 UNIT/ML injection INJECT 12 UNITS UNDER THE SKIN AT BEDTIME 15 mL 8     insulin pen needle (UNIFINE PENTIPS) 31G X 6 MM USE ONCE DAILY 100 each 11     levothyroxine (SYNTHROID/LEVOTHROID) 75 MCG tablet TAKE ONE TABLET BY MOUTH ONE TIME DAILY  90 tablet 1     Lido-Capsaicin-Men-Methyl Sal (MEDI-PATCH-LIDOCAINE EX)        losartan (COZAAR) 50 MG tablet Take 1 tablet (50 mg) by mouth daily 30 tablet 11     Multiple Vitamins-Minerals (PRESERVISION AREDS 2 PO) Take by mouth 2 times daily       omeprazole (PRILOSEC) 20 MG CR capsule Take 1 capsule (20 mg) by mouth daily 90 capsule 1     phenylephrine-shark liver oil-mineral oil-petrolatum (PREPARATION H) 0.25-14-74.9 % rectal ointment Place rectally daily       Pramoxine HCl (VAGISIL ANTI-ITCH MEDICATED EX) Externally apply topically every morning       ranitidine  (ZANTAC) 150 MG tablet Take 150 mg by mouth as needed for heartburn       sodium chloride (TITO 128) 5 % ophthalmic solution Place 1 drop Into the left eye 2 times daily       terbinafine (LAMISIL AT) 1 % cream Apply topically daily       Terbinafine (LAMISIL EX) Externally apply topically daily For under breasts, in between toes       Tetrahydroz-Dextran-PEG-Povid (TH EYE DROP ADVANCED RELIEF) 0.05-0.1-1-1 % SOLN Apply 1 drop to eye 2 times daily To left eye       timolol (TIMOPTIC) 0.5 % ophthalmic solution Place 1 drop Into the left eye 2 times daily  1 Bottle      UNABLE TO FIND Take 1 Bar by mouth 2 times daily MEDICATION NAME: FiberONe Bar        ACE/ARB NOT PRESCRIBED, INTENTIONAL, 1 each continuous prn ACE & ARB not prescribed due to Risk for drug interaction and Worsening renal function on ACE/ARB therapy (Patient not taking: Reported on 2/23/2018)       albuterol (2.5 MG/3ML) 0.083% neb solution Take 1 vial by nebulization every 4 hours as needed for shortness of breath / dyspnea or wheezing       [DISCONTINUED] losartan (COZAAR) 50 MG tablet Take 2 tablets (100 mg) by mouth daily 30 tablet      [DISCONTINUED] omeprazole (PRILOSEC) 20 MG CR capsule TAKE ONE CAPSULE BY MOUTH AT BEDTIME - NO TUMS WITHIN 2 HOURS OF MED (Patient not taking: Reported on 3/16/2018) 30 capsule 10     No facility-administered encounter medications on file as of 6/22/2018.        Again, thank you for allowing me to participate in the care of your patient.      Sincerely,    Sis Auguste, DO     [facility]

## 2018-07-13 DIAGNOSIS — Z76.0 ENCOUNTER FOR MEDICATION REFILL: ICD-10-CM

## 2018-07-13 NOTE — PROGRESS NOTES
5/15/18 faxed this office note to [employer] @ [phone].    Eliseo Trujillo,   [facility]  [phone]

## 2018-07-17 ENCOUNTER — PATIENT OUTREACH (OUTPATIENT)
Dept: CARE COORDINATION | Facility: CLINIC | Age: 83
End: 2018-07-17

## 2018-07-17 DIAGNOSIS — R41.3 MEMORY LOSS: Primary | ICD-10-CM

## 2018-07-17 DIAGNOSIS — F22 PARANOIA (H): ICD-10-CM

## 2018-07-17 NOTE — PROGRESS NOTES
Clinic Care Coordination Contact  Clinic Care Coordination Contact  OUTREACH    Referral Information:  Referral Source: Self-patient/Caregiver    Primary Diagnosis: Psychosocial    Chief Complaint   Patient presents with     Clinic Care Coordination - Initial     Memory Concerns      Universal Utilization:   Utilization    Last refreshed: 7/16/2018  2:42 PM:  No Show Count (past year) 0       Last refreshed: 7/16/2018  2:42 PM:  ED visits 1       Last refreshed: 7/16/2018  2:42 PM:  Hospital admissions 1          Current as of: 7/16/2018  2:42 PM           Clinical Concerns:  Current Medical Concerns:  Pt has been noted to have an increase in memory loss over the past several months. Pt's daughter calling  CC to discuss concerns and ask for resources.    Current Behavioral Concerns: Pt is refusing services offered by her assisted living including:housekeeping, medication management, etc. Pt has also refused to let daughter manage her check book and she is concerned about unpaid bills etc. Pt's daughter is looking at managing money that is in pt's trust and the holders of the trust informed pt that she will need two doctors to report that pt does not have the capacity to manage her own finances. Encouraged pt's daughter to call the clinic to schedule an appointment. GREGORIO MORGAN will facilitate a referral to Neurology for the second opinion.      Education Provided to patient: Guardianship process, Neurology Consult   Health Maintenance Reviewed: Due/Overdue     Medication Management:  Unsure if pt is compliant. Pt does receive nursing services, but unsure if she is accepting help at this time.     Functional Status:  Dependent ADLs:: Ambulation-walker  Dependent IADLs:: Cleaning, Cooking, Laundry, Medication Management, Meal Preparation, Money Management, Transportation  Mobility Status: Independent w/Device    Living Situation:  Current living arrangement:: I live alone- [facility]  Type of residence:: Assisted  living    Diet/Exercise/Sleep:  Diet:: Regular-unsure if pt is eating adequetly  Exercise:: Currently not exercising     Psychosocial:  Samaritan or spiritual beliefs that impact treatment:: No  Mental health DX:: No  Informal Support system:: Children  Financial/Insurance: Pt's daughter expressed concerns with pt managing finances appropriately.     Resources and Interventions:  Current Resources: List of home care services:: Skilled Nursing, Home Health Aid;   Equipment Currently Used at Home: walker, rolling  Advanced Care Plans/Directives on file:: Yes  Type Advanced Care Plans/Directives: DNR/DNI   Patient/Caregiver understanding: Pt reports understanding and denies any additional questions or concerns at this times. GREGORIO CC engaged in AIDET communication during encounter.    Future Appointments              In 1 week Pallavi Zayas MD [facility], [facility]    In 1 month [address] - [place], [facility]        Plan: GREGORIO CC will send pt's daughter information about the guardianship process and community supports including: Alzheimer's [employer] and APS. Pt's daughter will schedule an appointment with pt's PCP and a Neurology consult. Pt's daughter will be in town next week to help with pt's affairs. GERGORIO CC will be available as needed.     CANDIS Proctor  Clinic Care Coordinator  [phone]  [email or web address]

## 2018-07-25 ENCOUNTER — OFFICE VISIT (OUTPATIENT)
Dept: FAMILY MEDICINE | Facility: CLINIC | Age: 83
End: 2018-07-25

## 2018-07-25 VITALS
HEART RATE: 76 BPM | HEIGHT: 58 IN | WEIGHT: 149.4 LBS | SYSTOLIC BLOOD PRESSURE: 108 MMHG | RESPIRATION RATE: 16 BRPM | BODY MASS INDEX: 31.36 KG/M2 | OXYGEN SATURATION: 99 % | DIASTOLIC BLOOD PRESSURE: 70 MMHG

## 2018-07-25 DIAGNOSIS — E11.51 DIABETES MELLITUS TYPE 2 WITH PERIPHERAL ARTERY DISEASE (H): Primary | ICD-10-CM

## 2018-07-25 DIAGNOSIS — R35.1 NOCTURIA: ICD-10-CM

## 2018-07-25 DIAGNOSIS — R30.0 DYSURIA: ICD-10-CM

## 2018-07-25 DIAGNOSIS — E78.5 HYPERLIPIDEMIA WITH TARGET LDL LESS THAN 100: ICD-10-CM

## 2018-07-25 DIAGNOSIS — R35.0 URINARY FREQUENCY: ICD-10-CM

## 2018-07-25 LAB
BACTERIA URINE: 0
BILIRUB UR QL STRIP: 0
BLOOD URINE DIP: ABNORMAL
CASTS/LPF: 0
COLOR UR: YELLOW
CRYSTAL URINE: 0
EPITHELIAL CELLS - QUEST: ABNORMAL
GLUCOSE UR STRIP-MCNC: 0 MG/DL
KETONES UR QL STRIP: ABNORMAL
LEUKOCYTE ESTERASE URINE DIP: 0
MUCOUS URINE: 0
NITRITE UR QL STRIP: ABNORMAL
PH UR STRIP: 6.5 PH (ref 5–9)
PROT UR QL: 0 MG/DL (ref ?–0.01)
RBC URINE: ABNORMAL (ref 0–3)
SP GR UR STRIP: 1.01 (ref 1–1.02)
UROBILINOGEN UR QL STRIP: 0.2 EU/DL (ref 0.2–1)
WBC URINE: 0 (ref 0–3)

## 2018-07-25 PROCEDURE — 81003 URINALYSIS AUTO W/O SCOPE: CPT | Performed by: FAMILY MEDICINE

## 2018-07-25 PROCEDURE — 99214 OFFICE O/P EST MOD 30 MIN: CPT | Performed by: FAMILY MEDICINE

## 2018-07-25 NOTE — PROGRESS NOTES
"Problem(s) Oriented visit      SUBJECTIVE:                                                    Serena Marie is a 89 year old female who presents to clinic today for:  Patient presents with:  Consult: for checking blood sugars - needs more needles and strips to [facility]. only getting 100 per month but uses 120. - per insurance needs the prescription  Blood Draw: Due for fasting lipid and a1c - pended future orders  Memory Loss: Needs SLUMS  Urinary Problem: Frequency and urgency- getting up 3x night. - no blood or pain -- only drinks liquids at meals    Pt w/ multiple concerns today. Set expectations we will likely not be able to cover all of her concerns in a single visit.     Having urination problems. Having urgency and frequency. No pain. No blood. No fevers or chills. No new back pain. Wears Depends. Leaks urine. Difficult to discern if continual or if stress incontinence.   No stool difficulties. No fecal incontinence by her report.     Has seen outside doctor for pain. She does not recall which doctor or details about that visit. Using Lidocaine patches on neck and low back for chronic pain.     Has DM. Needs updated orders for QID testing for insurance coverage.   She brings in a copy of her blood sugars. They have been high especially at noon. Taking Levimir 12 units/daily and 12 units 4 times a day with meals and at night. No known lows. States she does not take insulin if she does not feel like eating. Does not count carbs - relies more on the \"recipe\" for insulin.     Slums done today. Poor performance. Pt states she knows she has memory problems. Does not like doing these tests.     Closing on condo tomorrow. Now in [facility]. She is sad about leaving her condo but recognizes she needs more help.     Problem list, Medication list, Allergies, and Medical/Social/Surgical histories reviewed in [facility] and updated as appropriate.     ROS:  10 point ROS completed and negative except noted above, including " Gen, HEENT, CV, Resp, GI, , MS, Neurologic, Psych    Histories:   Patient Active Problem List   Diagnosis     Hyperlipidemia with target LDL less than 100     Diabetes mellitus type 2 with peripheral artery disease (H)     PAD (peripheral artery disease) (H)     Lumbar spinal stenosis     H/O enucleation of right eyeball     Health Care Home     Pacemaker     Syncope     HCAP (healthcare-associated pneumonia)     Cephalalgia     Bilateral low back pain without sciatica     Cervicalgia     Community acquired pneumonia of right lower lobe of lung (H)     ACP (advance care planning)     Coronary artery disease involving native coronary artery of native heart without angina pectoris     Blindness and low vision     Influenza due to influenza virus, type B     Influenza and pneumonia     Benign essential hypertension     Physical deconditioning     Hyponatremia     Blind painful eye     Failure to thrive in adult     Past Medical History:   Diagnosis Date     CAD (coronary artery disease) 8/2012 8/2012 Cath - 75% RCA with BRIGITTE placed, 40% ostial and distal left main stenosis     Diabetes mellitus type 2 with peripheral artery disease (H)      DM type 2, goal A1c below 7      GERD (gastroesophageal reflux disease)      Gluten intolerance      HTN (hypertension)     been off meds since 2012     Hyperlipidemia LDL goal < 100      Pacemaker 8/2009 8/2009 Near syncopal episode with HR 40s and BP 60/sys - PPM implanted     PAD (peripheral artery disease) (H) 2008    70% proximal right SFA stenosis with successful angioplasy     S/P coronary angiogram 8/2012 8/2012 Cath - 75% RCA with BRIGITTE placed, 40% ostial and distal left main stenosis     Syncope 08/2009 8/2009 Near syncopal episode with HR 40s and BP 60/sys - PPM implanted     Unspecified hypothyroidism      Past Surgical History:   Procedure Laterality Date     ANGIOPLASTY  2008    Angioplasty of proximal right superficial femoral artery     APPENDECTOMY        "BACK SURGERY       C REMOVAL OF EYE      right      CHOLECYSTECTOMY, LAPOROSCOPIC      Cholecystectomy, Laparoscopic     COLECTOMY       EYE SURGERY      corneal transplant      HYSTERECTOMY, EMANI      BSO     IMPLANT PACEMAKER  8/2009     STENT, CORONARY, FATOU  8-12 8/2012 Cath - 75% RCA with BRIGITTE placed, 40% ostial and distal left main stenosis     TONSILLECTOMY & ADENOIDECTOMY       Social History   Substance Use Topics     Smoking status: Former Smoker     Packs/day: 1.00     Years: 3.00     Types: Cigarettes     Quit date: 8/17/1947     Smokeless tobacco: Never Used     Alcohol use No     Family History   Problem Relation Age of Onset     Cancer Mother      Cancer Father      Unknown/Adopted Maternal Grandmother      Unknown/Adopted Maternal Grandfather      Unknown/Adopted Paternal Grandmother      Unknown/Adopted Paternal Grandfather        OBJECTIVE:                                                    /70  Pulse 76  Resp 16  Ht 1.461 m (4' 9.5\")  Wt 67.8 kg (149 lb 6.4 oz)  SpO2 99%  BMI 31.77 kg/m2  Body mass index is 31.77 kg/(m^2).   Gen: Cooperative. No acute distress.   Eyes: PERRL, sclera white.   Ears/Nose/Mouth/Throat: Normal pinnae and ear canals, TMs without erythema or effusion. Oropharynx mucous membranes moist, without lesions, erythema, or exudate.   Neck: Supple, without masses, lymphadenopathy or tenderness.   Heart: Regular rate and rhythm without murmurs, rubs, or gallops.   Lungs: Normal respiratory effort. Lungs are clear to auscultation. Good breath sounds bilaterally.   Musculoskeletal: No lower extremity edema.   Skin: Warm and well perfused.   Neurologic: Alert. Impaired memory is present, but not exceptionally out of line w/ her age.    Psych:  Mood and affect are appropriate.     ASSESSMENT/PLAN:                                                      Serena was seen today for consult, blood draw, memory loss and urinary problem.    Diagnoses and all orders for this " visit:    Diabetes mellitus type 2 with peripheral artery disease (H)  -     blood glucose monitoring (ONETOUCH ULTRA) test strip; TEST BLOOD GLUCOSE FOUR TIMES A DAY  -     insulin aspart (NOVOLOG FLEXPEN) 100 UNIT/ML injection; Take 15 units with breakfast, 12 units each at lunch, dinner and before bed. Hold bedtime dose if FBG < 100.  -     insulin detemir (LEVEMIR FLEXTOUCH) 100 UNIT/ML injection; INJECT 13 UNITS UNDER THE SKIN AT BEDTIME   DM is challenging. Her vision may make insulin dosing difficult. She does not anticipate a problem w/ updated dosing plan. She is willing to give it a try.    I prefer to allow a little higher blood sugars to prevent a low with 89 yo patient. Her last A1c was very good, but she is worried because of her high blood sugars as of late.     Hyperlipidemia with target LDL less than 100   Due to be checked. She is not fasting today. Can collect at a future visit.    She is tolerating atorvastatin. Wants to continue taking it.     Dysuria and urinary frequency and nocturia  -     Urinalysis w/reflex protein, bili ([facility])  -     Urine Culture  Routine ([facility])   Assess for UTI. May need to consider meds in the future, but must use great caution for anti-cholinergic meds in a 89 yo patient.     Other orders  -     Cancel: C FOOT EXAM  NO CHARGE  -     Cancel: OPHTHALMOLOGY ADULT REFERRAL  -     Cancel: Lipid Panel ([facility]); Future  -     Cancel: Hemoglobin A1C ([facility]); Future   Collect these labs at a future visit.     Ideally, I would like to shorter her med list if able in the future if labs all OK.     Patient Instructions   Urinary frequency does not appear to be due to an infection.     I suspect two things are contributing to urinary frequency and urgency:   1) High blood sugars and/or   2) Overactive bladder    We'll start with correcting your blood sugars.  Increase Levimir to 13 units/day. (Up from 12 units/day)  Increase breakfast mealtime insulin from 12 units to 15 units  with breakfast.   Continue giving 12 units at lunch, at dinner, and bedtime. Hold bedtime if FBG < 100.     Please fax updated list of blood sugars to Dr. Hogan on Monday morning. Fax [phone]. We'll adjust your blood sugars based on this info if necessary. Please fax your blood sugars to me each week.     Please make an appointment to see me in about 3-4 weeks. You do NOT need to fast for this appointment.     The following health maintenance items are reviewed in Epic and correct as of today:  Health Maintenance   Topic Date Due     PHQ-2 Q1 YR  08/21/1936     MEDICARE ANNUAL WELLNESS VISIT  08/21/1942     FOOT EXAM Q1 YEAR  11/02/2017     EYE EXAM Q1 YEAR  02/23/2018     LIPID MONITORING Q1 YEAR  03/07/2018     A1C Q6 MO  08/06/2018     INFLUENZA VACCINE (1) 09/01/2018     MICROALBUMIN Q1 YEAR  12/05/2018     CREATININE Q1 YEAR  02/26/2019     FALL RISK ASSESSMENT  07/17/2019     TSH W/ FREE T4 REFLEX Q2 YEAR  02/06/2020     ADVANCE DIRECTIVE PLANNING Q5 YRS  11/18/2021     TETANUS IMMUNIZATION (SYSTEM ASSIGNED)  12/16/2023     PNEUMOCOCCAL  Addressed       Pallavi Zayas MD  Family Medicine    For any issues, please call my office  [facility] at [phone].

## 2018-07-25 NOTE — LETTER
"[facility]  [address] [place]  [address]  Phone: [phone]    August 13, 2018      [address]rena Marie  [address] [place] [id number]              Dear Serena,    Your urine ultimately grew out a large amount of bacteria that is native to our skin and vaginal area. This type of bacteria does not usually require antibiotics.   We can review these results in more detail at your follow-up visit with me this week.   I hope you are feeling better now!        Sincerely,     Pallavi \"Samira\" MD Marquez  Venecia, CMA    Results for orders placed or performed in visit on 07/25/18   Urinalysis w/reflex protein, bili ([facility])   Result Value Ref Range    Color Urine Yellow     pH Urine 6.5 5 - 9 pH    Specific Gravity Urine 1.015 1.005 - 1.025    Protein Urine 0 0.01 mg/dL    Glucose Urine 0     Ketones Urine trace (A)     Leukocyte Esterase Urine 0     Blood Urine trace-intact (A)     Nitrite Urine Neg NEG    Bilirubin Urine Dip 0     Urobilinogen Urine 0.2 0.2 - 1.0 EU/dL    WBC Urine 0 0 - 3    RBC Urine 0-6 0 - 3    Epithelial Cells moderate     Crystal Urine 0     Bacteria Urine 0     Mucous Urine 0     Casts/LPF 0    Urine Culture  Routine ([facility])   Result Value Ref Range    Urine Culture Final report     Result 1 Comment     Narrative    Performed at:  01 - [facility]  [address] [place] [address]  [id number]  : Kurtis Felix MD, Phone:  [phone]      "

## 2018-07-25 NOTE — MR AVS SNAPSHOT
After Visit Summary   7/25/2018    Serena Marie    MRN: [id number]           Patient Information     Date Of Birth          8/21/1924        Visit Information        Provider Department      7/25/2018 1:30 PM Pallavi Zayas MD [facility]        Today's Diagnoses     Diabetes mellitus type 2 with peripheral artery disease (H)    -  1    Hyperlipidemia with target LDL less than 100        Dysuria        Encounter for medication refill          Care Instructions    Urinary frequency does not appear to be due to an infection.     I suspect two things are contributing to urinary frequency and urgency:   1) High blood sugars and/or   2) Overactive bladder    We'll start with correcting your blood sugars.  Increase Levimir to 13 units/day. (Up from 12 units/day)  Increase breakfast mealtime insulin from 12 units to 15 units with breakfast.   Continue giving 12 units at lunch, at dinner, and bedtime. Hold bedtime if FBG < 100.     Please fax updated list of blood sugars to Dr. Hogan on Monday morning. Fax [phone]. We'll adjust your blood sugars based on this info if necessary. Please fax your blood sugars to me each week.     Please make an appointment to see me in about 3-4 weeks. You do NOT need to fast for this appointment.           Follow-ups after your visit        Your next 10 appointments already scheduled     Sep 05, 2018  9:45 AM CDT   Phone Device Check with MITCHELL TECH1   [facility] ([facility])    [address] [place] [id number] [phone] OPT 2              Who to contact     If you have questions or need follow up information about today's clinic visit or your schedule please contact [facility] directly at [phone].  Normal or non-critical lab and imaging results will be communicated to you by MyChart, letter or phone within 4 business days after the clinic has received the  "results. If you do not hear from us within 7 days, please contact the clinic through [employer] or phone. If you have a critical or abnormal lab result, we will notify you by phone as soon as possible.  Submit refill requests through [employer] or call your pharmacy and they will forward the refill request to us. Please allow 3 business days for your refill to be completed.          Additional Information About Your Visit        Care EveryWhere ID     This is your Care EveryWhere ID. This could be used by other organizations to access your [facility] medical records  [id number]        Your Vitals Were     Pulse Respirations Height Pulse Oximetry BMI (Body Mass Index)       76 16 1.461 m (4' 9.5\") 99% 31.77 kg/m2        Blood Pressure from Last 3 Encounters:   07/25/18 108/70   06/22/18 (!) 72/45   03/16/18 96/60    Weight from Last 3 Encounters:   07/25/18 67.8 kg (149 lb 6.4 oz)   06/22/18 65.4 kg (144 lb 3.2 oz)   03/16/18 64.4 kg (142 lb)              We Performed the Following     Urinalysis w/reflex protein, bili ([facility])          Today's Medication Changes          These changes are accurate as of 7/25/18  2:20 PM.  If you have any questions, ask your nurse or doctor.               These medicines have changed or have updated prescriptions.        Dose/Directions    insulin aspart 100 UNIT/ML injection   Commonly known as:  NovoLOG FLEXPEN   This may have changed:    - how much to take  - how to take this  - when to take this  - additional instructions   Used for:  Diabetes mellitus type 2 with peripheral artery disease (H)   Changed by:  Pallavi Zayas MD        Take 15 units with breakfast, 12 units each at lunch, dinner and before bed. Hold bedtime dose if FBG < 100.   Quantity:  15 mL   Refills:  11       insulin detemir 100 UNIT/ML injection   Commonly known as:  LEVEMIR FLEXTOUCH   This may have changed:  additional instructions   Used for:  Diabetes mellitus type 2 with peripheral artery disease (H) "   Changed by:  Pallavi Zayas MD        INJECT 13 UNITS UNDER THE SKIN AT BEDTIME   Quantity:  15 mL   Refills:  8            Where to get your medicines      These medications were sent to [facility] - [place], [place] - [address], [facility] [address]     Phone:  [phone]     blood glucose monitoring test strip    insulin aspart 100 UNIT/ML injection    insulin detemir 100 UNIT/ML injection                Primary Care Provider Office Phone # Fax #    Tracie Marc [phone] [address]  [facility] [address]        Goals        General    I want to have good control of Diabetes (pt-stated)     Notes - Note edited  1/28/2016  1:17 PM by Leatha Campos, RN    I will check my blood sugars 4 x a day.  I will take Novolog with my meals.  I will take my Levemir at bedtime.              Equal Access to Services     ERMA EUCEDA AH: Hadii aad ku hadasho Soomaali, waaxda luqadaha, qaybta kaalmada adeegyada, waxay naiin haycrystaln feliciano kerns . So [facility] [phone].    ATENCIÓN: Si habla español, tiene a murcia disposición servicios gratuitos de asistencia lingüística. Llame al [phone].    We comply with applicable federal civil rights laws and [place] laws. We do not discriminate on the basis of race, color, national origin, age, disability, sex, sexual orientation, or gender identity.            Thank you!     Thank you for choosing [facility]  for your care. Our goal is always to provide you with excellent care. Hearing back from our patients is one way we can continue to improve our services. Please take a few minutes to complete the written survey that you may receive in the mail after your visit with us. Thank you!             Your Updated Medication List - Protect others around you: Learn how to safely use, store and throw away your medicines at www.disposemymeds.org.          This list is accurate as of 7/25/18  2:20  PM.  Always use your most recent med list.                   Brand Name Dispense Instructions for use Diagnosis    ACE/ARB/ARNI NOT PRESCRIBED (INTENTIONAL)      1 each continuous prn ACE & ARB not prescribed due to Risk for drug interaction and Worsening renal function on ACE/ARB therapy    CAD (coronary artery disease)       albuterol (2.5 MG/3ML) 0.083% neb solution      Take 1 vial by nebulization every 4 hours as needed for shortness of breath / dyspnea or wheezing        aspirin 81 MG tablet      Take 1 tablet by mouth every evening        atorvastatin 80 MG tablet    LIPITOR    90 tablet    Take 1 tablet (80 mg) by mouth daily    PAD (peripheral artery disease) (H)       bacitracin ophthalmic ointment           * blood glucose monitoring test strip    no brand specified    100 strip    Use to test blood sugar four times daily or as directed.    Encounter for medication refill       * blood glucose monitoring test strip    ONETOUCH ULTRA    300 each    TEST BLOOD GLUCOSE FOUR TIMES A DAY    Diabetes mellitus type 2 with peripheral artery disease (H)       cholecalciferol 1000 UNIT tablet    vitamin D3     Take 2,000 Units by mouth daily        DOCQLACE 100 MG capsule   Generic drug:  docusate sodium      Take 100 mg by mouth At Bedtime        fish oil-omega-3 fatty acids 1000 MG capsule      Take 1 g by mouth daily    PAD (peripheral artery disease) (H)       fluocinolone acetonide 0.01 % Oil     118 mL    Apply to scalp when wet massage well cover with cap leave on over night Wash in am.    DM type 2, goal A1c below 7       insulin aspart 100 UNIT/ML injection    NovoLOG FLEXPEN    15 mL    Take 15 units with breakfast, 12 units each at lunch, dinner and before bed. Hold bedtime dose if FBG < 100.    Diabetes mellitus type 2 with peripheral artery disease (H)       insulin detemir 100 UNIT/ML injection    LEVEMIR FLEXTOUCH    15 mL    INJECT 13 UNITS UNDER THE SKIN AT BEDTIME    Diabetes mellitus type 2 with  peripheral artery disease (H)       insulin pen needle 31G X 6 MM    UNIFINE PENTIPS    100 each    USE ONCE DAILY    Encounter for medication refill       lamISIL AT 1 % cream   Generic drug:  terbinafine      Apply topically daily        LAMISIL EX      Externally apply topically daily For under breasts, in between toes        levothyroxine 75 MCG tablet    SYNTHROID/LEVOTHROID    90 tablet    TAKE ONE TABLET BY MOUTH ONE TIME DAILY    Encounter for medication refill       losartan 50 MG tablet    COZAAR    30 tablet    Take 1 tablet (50 mg) by mouth daily    Essential hypertension       MEDI-PATCH-LIDOCAINE EX           TITO 128 5 % ophthalmic solution   Generic drug:  sodium chloride      Place 1 drop Into the left eye 2 times daily        omeprazole 20 MG CR capsule    priLOSEC    90 capsule    Take 1 capsule (20 mg) by mouth daily        PREPARATION H 0.25-14-74.9 % rectal ointment   Generic drug:  phenylephrine-shark liver oil-mineral oil-petrolatum      Place rectally daily        PRESERVISION AREDS 2 PO      Take by mouth 2 times daily        TH EYE DROP ADVANCED RELIEF 0.05-0.1-1-1 % Soln   Generic drug:  Tetrahydroz-Dextran-PEG-Povid      Apply 1 drop to eye 2 times daily To left eye    Blindness and low vision       timolol 0.5 % ophthalmic solution    TIMOPTIC    1 Bottle    Place 1 drop Into the left eye 2 times daily        * TYLENOL ARTHRITIS PAIN 650 MG CR tablet   Generic drug:  acetaminophen      Take 1,300 mg by mouth daily States 2 tablets qam & usually 2 tablets qpm        * TYLENOL 8 HOUR ARTHRITIS PAIN PO      Take 650 mg by mouth every 8 hours as needed        UNABLE TO FIND      Take 1 Bar by mouth 2 times daily MEDICATION NAME: FiberONe Bar        VAGISIL 5-2 % Crea   Generic drug:  Benzocaine-Resorcinol      Place vaginally daily        VAGISIL ANTI-ITCH MEDICATED EX      Externally apply topically every morning        ZANTAC 150 MG tablet   Generic drug:  ranitidine      Take 150 mg by  mouth as needed for heartburn        * Notice:  This list has 4 medication(s) that are the same as other medications prescribed for you. Read the directions carefully, and ask your doctor or other care provider to review them with you.

## 2018-07-25 NOTE — PATIENT INSTRUCTIONS
Urinary frequency does not appear to be due to an infection.     I suspect two things are contributing to urinary frequency and urgency:   1) High blood sugars and/or   2) Overactive bladder    We'll start with correcting your blood sugars.  Increase Levimir to 13 units/day. (Up from 12 units/day)  Increase breakfast mealtime insulin from 12 units to 15 units with breakfast.   Continue giving 12 units at lunch, at dinner, and bedtime. Hold bedtime if FBG < 100.     Please fax updated list of blood sugars to Dr. Hogan on Monday morning. Fax [phone]. We'll adjust your blood sugars based on this info if necessary. Please fax your blood sugars to me each week.     Please make an appointment to see me in about 3-4 weeks. You do NOT need to fast for this appointment.

## 2018-07-25 NOTE — LETTER
"[facility]  [address] [place]  [address]  Phone: [phone]    July 26, 2018      Serena Marie  [address] [place] [phone]              Dear Serena,    I thought you might like to have this hard copy report of your urine test results. As we discussed in clinic today, your urine does not appear to have an infection. I suspect at least some of your urine frequency is from high blood sugars. Hopefully the changes we made to your insulin dosing today will be helpful.        Sincerely,     Pallavi \"Samira\" MD Marquez  Venecia, CMA    Results for orders placed or performed in visit on 07/25/18   Urinalysis w/reflex protein, bili ([facility])   Result Value Ref Range    Color Urine Yellow     pH Urine 6.5 5 - 9 pH    Specific Gravity Urine 1.015 1.005 - 1.025    Protein Urine 0 0.01 mg/dL    Glucose Urine 0     Ketones Urine trace (A)     Leukocyte Esterase Urine 0     Blood Urine trace-intact (A)     Nitrite Urine Neg NEG    Bilirubin Urine Dip 0     Urobilinogen Urine 0.2 0.2 - 1.0 EU/dL    WBC Urine 0 0 - 3    RBC Urine 0-6 0 - 3    Epithelial Cells moderate     Crystal Urine 0     Bacteria Urine 0     Mucous Urine 0     Casts/LPF 0       "

## 2018-07-28 LAB
Lab: NORMAL
URINE CULTURE: NORMAL

## 2018-07-31 ENCOUNTER — MEDICAL CORRESPONDENCE (OUTPATIENT)
Dept: FAMILY MEDICINE | Facility: CLINIC | Age: 83
End: 2018-07-31

## 2018-08-12 NOTE — PROGRESS NOTES
Problem(s) Oriented visit      SUBJECTIVE:                                                    Serena Marie is a 89 year old female who presents to clinic today for:  Patient presents with:  RECHECK    Appt today was to f/u on her long list of concerns from her last visit several weeks ago. She is primarily concerned about her memory. However, has a new problem w/ excoriation in the gluteal cleft that takes priority today. Present for about 1 month. Forgot to ask me about it at her last visit. Itchy. Will occas bleed after itching it. Getting worse. Has been using Neosporin on this. Painful when sitting. Present for 1 month. No other katt-anal or genital skin problems. Was not aware Neosporin can contribute to dermatitis w/ extended use.     Worried about her brain. Thinks memory is worse. Performance on SLUMS has been poor in the past. She is now in [place] so has more help. Lived in last home for 17 years. Having some grief about leaving her own home. Not on any meds yet for memory. Is very interested in trying something.     Wearing pain patch on R lateral neck and low back. When puts patch on her neck, pain is better. States has not had any problems having insurance company cover these to her knowledge.     Has been having relative hypotension on Losartan 50 mg daily. Brings in BP readings from outside locations. BP tends to be low. No falls or dizziness, but sometimes doesn't feel right. She thinks her blood sugars are improved with the changes we made to her insulin at her last visit.     Problem list, Medication list, Allergies, and Medical/Social/Surgical histories reviewed in [employer] and updated as appropriate.     ROS:  10 point ROS completed and negative except noted above, including Gen, HEENT, CV, Resp, GI, , MS, Neurologic, Psych    Histories:   Patient Active Problem List   Diagnosis     Hyperlipidemia with target LDL less than 100     Diabetes mellitus type 2 with peripheral artery disease (H)     PAD  (peripheral artery disease) (H)     Lumbar spinal stenosis     H/O enucleation of right eyeball     Health Care Home     Pacemaker     Syncope     HCAP (healthcare-associated pneumonia)     Cephalalgia     Bilateral low back pain without sciatica     Cervicalgia     Community acquired pneumonia of right lower lobe of lung (H)     ACP (advance care planning)     Coronary artery disease involving native coronary artery of native heart without angina pectoris     Blindness and low vision     Influenza due to influenza virus, type B     Influenza and pneumonia     Benign essential hypertension     Physical deconditioning     Hyponatremia     Blind painful eye     Failure to thrive in adult     Past Medical History:   Diagnosis Date     CAD (coronary artery disease) 8/2012 8/2012 Cath - 75% RCA with BRIGITTE placed, 40% ostial and distal left main stenosis     Diabetes mellitus type 2 with peripheral artery disease (H)      DM type 2, goal A1c below 7      GERD (gastroesophageal reflux disease)      Gluten intolerance      HTN (hypertension)     been off meds since 2012     Hyperlipidemia LDL goal < 100      Pacemaker 8/2009 8/2009 Near syncopal episode with HR 40s and BP 60/sys - PPM implanted     PAD (peripheral artery disease) (H) 2008    70% proximal right SFA stenosis with successful angioplasy     S/P coronary angiogram 8/2012 8/2012 Cath - 75% RCA with BRIGITTE placed, 40% ostial and distal left main stenosis     Syncope 08/2009 8/2009 Near syncopal episode with HR 40s and BP 60/sys - PPM implanted     Unspecified hypothyroidism      Past Surgical History:   Procedure Laterality Date     ANGIOPLASTY  2008    Angioplasty of proximal right superficial femoral artery     APPENDECTOMY       BACK SURGERY       C REMOVAL OF EYE      right      CHOLECYSTECTOMY, LAPOROSCOPIC      Cholecystectomy, Laparoscopic     COLECTOMY       EYE SURGERY      corneal transplant      HYSTERECTOMY, EMANI      BSO     IMPLANT PACEMAKER   8/2009     STENT, CORONARY, FATOU  8-12 8/2012 Cath - 75% RCA with BRIGITTE placed, 40% ostial and distal left main stenosis     TONSILLECTOMY & ADENOIDECTOMY       Social History   Substance Use Topics     Smoking status: Former Smoker     Packs/day: 1.00     Years: 3.00     Types: Cigarettes     Quit date: 8/17/1947     Smokeless tobacco: Never Used     Alcohol use No     Family History   Problem Relation Age of Onset     Cancer Mother      Cancer Father      Unknown/Adopted Maternal Grandmother      Unknown/Adopted Maternal Grandfather      Unknown/Adopted Paternal Grandmother      Unknown/Adopted Paternal Grandfather        OBJECTIVE:                                                    /66  Pulse 74  Resp 16  Wt 67 kg (147 lb 9.6 oz)  SpO2 99%  BMI 31.39 kg/m2  Body mass index is 31.39 kg/(m^2).   Gen: Cooperative. No acute distress. BP OK here today.   Eyes: PERRL, sclera white.   Neck: Supple, without masses, lymphadenopathy or tenderness.   Heart: Regular rate and rhythm without murmurs, rubs, or gallops.   Lungs: Normal respiratory effort. Lungs are clear to auscultation. Good breath sounds bilaterally.   Musculoskeletal: No lower extremity edema.   Skin: Warm and well perfused. 8 mm excoriation in gluteal cleft. Appears irritated but not infected.   Neurologic: Alert. Oriented x 3. CN II-XII grossly intact.   Psych:  Mood and affect are appropriate.    MMSE today - shows impairment.      ASSESSMENT/PLAN:                                                      Serena was seen today for recheck.    Diagnoses and all orders for this visit:    Essential hypertension  -     losartan (COZAAR) 25 MG tablet; Take 1 tablet (25 mg) by mouth daily    Memory difficulties  -     donepezil (ARICEPT) 5 MG tablet; Take 1 tablet (5 mg) by mouth every morning   MMSE was actually better than I was expecting today.     Skin excoriation  -     triamcinolone (KENALOG) 0.1 % cream; Apply sparingly to affected area two times  daily as needed for gluteal cleft irritation for up to 2 weeks.    Diabetes mellitus type 2 with peripheral artery disease (H)   Continue current plan for insulin. Working OK for her. Last A1c was 7.1%.    She was having some higher readings at her last visit - improved now.    Her A1c goal is 9% due to 89 yo. She prefers to keep it a little tighter than 9% - is aware of risks.    Despite her performance on MMSE and cognitive concerns, she seems to have a very good handle on her DM and insulin mgmt. If any lows, I would go back to trying to use the same dose of mealtime insulin at each administration to minimize confusion.     >25 min spent with patient, greater than 50% spent on discussion/education/planning, etc. About The primary encounter diagnosis was Essential hypertension. Diagnoses of Memory difficulties, Skin excoriation, and Diabetes mellitus type 2 with peripheral artery disease (H) were also pertinent to this visit.    Patient Instructions   Please see me in about a month.     Please stop using Neosporin on the wound in gluteal cleft - this is likely making the wound worse. OK to use a small amount of triamcinolone to decrease the inflammation of this wound while it heals.     Please decrease Losartan dose to 25 mg daily. She has had some hypotension and want to prevent a fall.     We started a new medication today for your memory. If you do not like it, you may stop taking it.       The following health maintenance items are reviewed in Epic and correct as of today:  Health Maintenance   Topic Date Due     PHQ-2 Q1 YR  08/21/1936     MEDICARE ANNUAL WELLNESS VISIT  08/21/1942     FOOT EXAM Q1 YEAR  11/02/2017     EYE EXAM Q1 YEAR  02/23/2018     LIPID MONITORING Q1 YEAR  03/07/2018     A1C Q6 MO  08/06/2018     INFLUENZA VACCINE (1) 09/01/2018     MICROALBUMIN Q1 YEAR  12/05/2018     CREATININE Q1 YEAR  02/26/2019     FALL RISK ASSESSMENT  07/17/2019     TSH W/ FREE T4 REFLEX Q2 YEAR  02/06/2020      ADVANCE DIRECTIVE PLANNING Q5 YRS  11/18/2021     TETANUS IMMUNIZATION (SYSTEM ASSIGNED)  12/16/2023     PNEUMOCOCCAL  Addressed       Pallavi Zayas MD  Family Medicine    For any issues, please call my office  [facility] at [phone].

## 2018-08-13 ENCOUNTER — OFFICE VISIT (OUTPATIENT)
Dept: FAMILY MEDICINE | Facility: CLINIC | Age: 83
End: 2018-08-13

## 2018-08-13 VITALS
OXYGEN SATURATION: 99 % | RESPIRATION RATE: 16 BRPM | WEIGHT: 147.6 LBS | SYSTOLIC BLOOD PRESSURE: 112 MMHG | DIASTOLIC BLOOD PRESSURE: 66 MMHG | HEART RATE: 74 BPM | BODY MASS INDEX: 31.39 KG/M2

## 2018-08-13 DIAGNOSIS — I10 ESSENTIAL HYPERTENSION: Primary | ICD-10-CM

## 2018-08-13 DIAGNOSIS — T14.8XXA SKIN EXCORIATION: ICD-10-CM

## 2018-08-13 DIAGNOSIS — R41.3 MEMORY DIFFICULTIES: ICD-10-CM

## 2018-08-13 DIAGNOSIS — E11.51 DIABETES MELLITUS TYPE 2 WITH PERIPHERAL ARTERY DISEASE (H): ICD-10-CM

## 2018-08-13 PROCEDURE — 99214 OFFICE O/P EST MOD 30 MIN: CPT | Performed by: FAMILY MEDICINE

## 2018-08-13 RX ORDER — TRIAMCINOLONE ACETONIDE 1 MG/G
CREAM TOPICAL
Qty: 30 G | Refills: 0 | Status: SHIPPED | OUTPATIENT
Start: 2018-08-13 | End: 2020-08-25

## 2018-08-13 RX ORDER — DONEPEZIL HYDROCHLORIDE 5 MG/1
5 TABLET, FILM COATED ORAL EVERY MORNING
Qty: 30 TABLET | Refills: 0 | Status: SHIPPED | OUTPATIENT
Start: 2018-08-13

## 2018-08-13 RX ORDER — LOSARTAN POTASSIUM 25 MG/1
25 TABLET ORAL DAILY
Qty: 30 TABLET | Refills: 1 | Status: SHIPPED | OUTPATIENT
Start: 2018-08-13 | End: 2018-09-10

## 2018-08-13 NOTE — MR AVS SNAPSHOT
After Visit Summary   8/13/2018    Serena Marie    MRN: [id number]           Patient Information     Date Of Birth          8/21/1924        Visit Information        Provider Department      8/13/2018 9:30 AM Pallavi Zayas MD [facility]        Today's Diagnoses     Essential hypertension    -  1    Memory difficulties        Skin excoriation        Diabetes mellitus type 2 with peripheral artery disease (H)          Care Instructions    Please see me in about a month.     Please stop using Neosporin on the wound in gluteal cleft - this is likely making the wound worse. OK to use a small amount of triamcinolone to decrease the inflammation of this wound while it heals.     Please decrease Losartan dose to 25 mg daily. She has had some hypotension and want to prevent a fall.     We started a new medication today for your memory. If you do not like it, you may stop taking it.           Follow-ups after your visit        Your next 10 appointments already scheduled     Sep 05, 2018  9:45 AM CDT   Phone Device Check with [facility] TECH1   [facility] ([facility])    [address] [place] [id number] [phone] OPT 2              Who to contact     If you have questions or need follow up information about today's clinic visit or your schedule please contact [facility] directly at [phone].  Normal or non-critical lab and imaging results will be communicated to you by MyChart, letter or phone within 4 business days after the clinic has received the results. If you do not hear from us within 7 days, please contact the clinic through MyChart or phone. If you have a critical or abnormal lab result, we will notify you by phone as soon as possible.  Submit refill requests through [employer] or call your pharmacy and they will forward the refill request to us. Please allow 3 business days for your refill to be  completed.          Additional Information About Your Visit        Care EveryWhere ID     This is your Care EveryWhere ID. This could be used by other organizations to access your [facility] medical records  [id number]        Your Vitals Were     Pulse Respirations Pulse Oximetry BMI (Body Mass Index)          74 16 99% 31.39 kg/m2         Blood Pressure from Last 3 Encounters:   08/13/18 112/66   07/25/18 108/70   06/22/18 (!) 72/45    Weight from Last 3 Encounters:   08/13/18 67 kg (147 lb 9.6 oz)   07/25/18 67.8 kg (149 lb 6.4 oz)   06/22/18 65.4 kg (144 lb 3.2 oz)              Today, you had the following     No orders found for display         Today's Medication Changes          These changes are accurate as of 8/13/18 10:39 AM.  If you have any questions, ask your nurse or doctor.               Start taking these medicines.        Dose/Directions    donepezil 5 MG tablet   Commonly known as:  ARICEPT   Used for:  Memory difficulties   Started by:  Pallavi Zayas MD        Dose:  5 mg   Take 1 tablet (5 mg) by mouth every morning   Quantity:  30 tablet   Refills:  0       triamcinolone 0.1 % cream   Commonly known as:  KENALOG   Used for:  Skin excoriation   Started by:  Pallavi Zayas MD        Apply sparingly to affected area two times daily as needed for gluteal cleft irritation for up to 2 weeks.   Quantity:  30 g   Refills:  0         These medicines have changed or have updated prescriptions.        Dose/Directions    losartan 25 MG tablet   Commonly known as:  COZAAR   This may have changed:    - medication strength  - how much to take   Used for:  Essential hypertension   Changed by:  Pallavi Zayas MD        Dose:  25 mg   Take 1 tablet (25 mg) by mouth daily   Quantity:  30 tablet   Refills:  1            Where to get your medicines      These medications were sent to [facility] - [place] - [address], [facility] [address]     Phone:   [phone]     donepezil 5 MG tablet    losartan 25 MG tablet    triamcinolone 0.1 % cream                Primary Care Provider Office Phone # Fax #    Pallavi Blanca Zayas [phone] [address]  [facility] [address]        Goals        General    I want to have good control of Diabetes (pt-stated)     Notes - Note edited  1/28/2016  1:17 PM by Leatha Campos, RN    I will check my blood sugars 4 x a day.  I will take Novolog with my meals.  I will take my Levemir at bedtime.              Equal Access to Services     [facility]: Hadii aad ku hadasho Soomaali, waaxda luqadaha, qaybta kaalmada adeegyada, waxay naiin hayaan aderemy kerns . So [facility] [phone].    ATENCIÓN: Si habla español, tiene a murcia disposición servicios gratuitos de asistencia lingüística. Llame al [phone].    We comply with applicable federal civil rights laws and [place] laws. We do not discriminate on the basis of race, color, national origin, age, disability, sex, sexual orientation, or gender identity.            Thank you!     Thank you for choosing [facility]  for your care. Our goal is always to provide you with excellent care. Hearing back from our patients is one way we can continue to improve our services. Please take a few minutes to complete the written survey that you may receive in the mail after your visit with us. Thank you!             Your Updated Medication List - Protect others around you: Learn how to safely use, store and throw away your medicines at www.disposemymeds.org.          This list is accurate as of 8/13/18 10:39 AM.  Always use your most recent med list.                   Brand Name Dispense Instructions for use Diagnosis    albuterol (2.5 MG/3ML) 0.083% neb solution      Take 1 vial by nebulization every 4 hours as needed for shortness of breath / dyspnea or wheezing        aspirin 81 MG tablet      Take 1 tablet by mouth every evening         atorvastatin 80 MG tablet    LIPITOR    90 tablet    Take 1 tablet (80 mg) by mouth daily    PAD (peripheral artery disease) (H)       bacitracin ophthalmic ointment           * blood glucose monitoring test strip    no brand specified    100 strip    Use to test blood sugar four times daily or as directed.    Encounter for medication refill       * blood glucose monitoring test strip    ONETOUCH ULTRA    300 each    TEST BLOOD GLUCOSE FOUR TIMES A DAY    Diabetes mellitus type 2 with peripheral artery disease (H)       cholecalciferol 1000 UNIT tablet    vitamin D3     Take 2,000 Units by mouth daily        DOCQLACE 100 MG capsule   Generic drug:  docusate sodium      Take 100 mg by mouth At Bedtime        donepezil 5 MG tablet    ARICEPT    30 tablet    Take 1 tablet (5 mg) by mouth every morning    Memory difficulties       fish oil-omega-3 fatty acids 1000 MG capsule      Take 1 g by mouth daily    PAD (peripheral artery disease) (H)       fluocinolone acetonide 0.01 % Oil     118 mL    Apply to scalp when wet massage well cover with cap leave on over night Wash in am.    DM type 2, goal A1c below 7       insulin aspart 100 UNIT/ML injection    NovoLOG FLEXPEN    15 mL    Take 15 units with breakfast, 12 units each at lunch, dinner and before bed. Hold bedtime dose if FBG < 100.    Diabetes mellitus type 2 with peripheral artery disease (H)       insulin detemir 100 UNIT/ML injection    LEVEMIR FLEXTOUCH    15 mL    INJECT 13 UNITS UNDER THE SKIN AT BEDTIME    Diabetes mellitus type 2 with peripheral artery disease (H)       insulin pen needle 31G X 6 MM    UNIFINE PENTIPS    100 each    USE ONCE DAILY    Encounter for medication refill       lamISIL AT 1 % cream   Generic drug:  terbinafine      Apply topically daily        LAMISIL EX      Externally apply topically daily For under breasts, in between toes        levothyroxine 75 MCG tablet    SYNTHROID/LEVOTHROID    90 tablet    TAKE ONE TABLET BY MOUTH ONE  TIME DAILY    Encounter for medication refill       losartan 25 MG tablet    COZAAR    30 tablet    Take 1 tablet (25 mg) by mouth daily    Essential hypertension       MEDI-PATCH-LIDOCAINE EX           TITO 128 5 % ophthalmic solution   Generic drug:  sodium chloride      Place 1 drop Into the left eye 2 times daily        omeprazole 20 MG CR capsule    priLOSEC    90 capsule    Take 1 capsule (20 mg) by mouth daily        PREPARATION H 0.25-14-74.9 % rectal ointment   Generic drug:  phenylephrine-shark liver oil-mineral oil-petrolatum      Place rectally daily        PRESERVISION AREDS 2 PO      Take by mouth 2 times daily        TH EYE DROP ADVANCED RELIEF 0.05-0.1-1-1 % Soln   Generic drug:  Tetrahydroz-Dextran-PEG-Povid      Apply 1 drop to eye 2 times daily To left eye    Blindness and low vision       timolol 0.5 % ophthalmic solution    TIMOPTIC    1 Bottle    Place 1 drop Into the left eye 2 times daily        triamcinolone 0.1 % cream    KENALOG    30 g    Apply sparingly to affected area two times daily as needed for gluteal cleft irritation for up to 2 weeks.    Skin excoriation       * TYLENOL ARTHRITIS PAIN 650 MG CR tablet   Generic drug:  acetaminophen      Take 1,300 mg by mouth daily States 2 tablets qam & usually 2 tablets qpm        * TYLENOL 8 HOUR ARTHRITIS PAIN PO      Take 650 mg by mouth every 8 hours as needed        UNABLE TO FIND      Take 1 Bar by mouth 2 times daily MEDICATION NAME: FiberONe Bar        VAGISIL 5-2 % Crea   Generic drug:  Benzocaine-Resorcinol      Place vaginally daily        VAGISIL ANTI-ITCH MEDICATED EX      Externally apply topically every morning        ZANTAC 150 MG tablet   Generic drug:  ranitidine      Take 150 mg by mouth as needed for heartburn        * Notice:  This list has 4 medication(s) that are the same as other medications prescribed for you. Read the directions carefully, and ask your doctor or other care provider to review them with you.

## 2018-08-13 NOTE — PATIENT INSTRUCTIONS
Please see me in about a month.     Please stop using Neosporin on the wound in gluteal cleft - this is likely making the wound worse. OK to use a small amount of triamcinolone to decrease the inflammation of this wound while it heals.     Please decrease Losartan dose to 25 mg daily. She has had some hypotension and want to prevent a fall.     We started a new medication today for your memory. If you do not like it, you may stop taking it.

## 2018-08-14 ENCOUNTER — MEDICAL CORRESPONDENCE (OUTPATIENT)
Dept: FAMILY MEDICINE | Facility: CLINIC | Age: 83
End: 2018-08-14

## 2018-08-30 ENCOUNTER — PATIENT OUTREACH (OUTPATIENT)
Dept: CARE COORDINATION | Facility: CLINIC | Age: 83
End: 2018-08-30

## 2018-08-30 NOTE — PROGRESS NOTES
Clinic Care Coordination Contact  [facility]/Vo[employer]       Clinical Data: Care Coordinator Outreach  Outreach attempted x 2.  Left message on [employer] with call back information and requested return call. Please see contact log for detailed outreach attempt dates.  Plan: Care Coordinator will e-mail an unable to contact letter and will do no further outreaches at this time.     Alma Rivera RUTHY  Clinic Care Coordinator  [phone]  [email or web address]

## 2018-09-05 ENCOUNTER — ALLIED HEALTH/NURSE VISIT (OUTPATIENT)
Dept: CARDIOLOGY | Facility: CLINIC | Age: 83
End: 2018-09-05
Payer: MEDICARE

## 2018-09-05 DIAGNOSIS — Z95.0 CARDIAC PACEMAKER IN SITU: Primary | ICD-10-CM

## 2018-09-05 PROCEDURE — 93293 PM PHONE R-STRIP DEVICE EVAL: CPT | Performed by: INTERNAL MEDICINE

## 2018-09-05 NOTE — MR AVS SNAPSHOT
After Visit Summary   9/5/2018    Serena Marie    MRN: [id number]           Patient Information     Date Of Birth          8/21/1924        Visit Information        Provider Department      9/5/2018 9:45 AM STEPHEN MCMANUS [facility]        Today's Diagnoses     Cardiac pacemaker in situ    -  1       Follow-ups after your visit        Your next 10 appointments already scheduled     Sep 10, 2018  9:45 AM CDT   Office Visit with Pallavi Zayas MD   [facility] ([facility])    [address] [place] [address] [phone]           Bring a current list of meds and any records pertaining to this visit. For Physicals, please bring immunization records and any forms needing to be filled out. Please arrive 10 minutes early to complete paperwork.            Dec 12, 2018  9:45 AM CST   Phone Device Check with MITCHELL TECHRob   [facility] ([facility])    [address] [place] [address] [phone] OPT 2              Who to contact     If you have questions or need follow up information about today's clinic visit or your schedule please contact [facility] directly at [phone].  Normal or non-critical lab and imaging results will be communicated to you by GELIhart, letter or phone within 4 business days after the clinic has received the results. If you do not hear from us within 7 days, please contact the clinic through GELIhart or phone. If you have a critical or abnormal lab result, we will notify you by phone as soon as possible.  Submit refill requests through Aluwave or call your pharmacy and they will forward the refill request to us. Please allow 3 business days for your refill to be completed.          Additional Information About Your Visit        GELIharMedisse Information     Aluwave lets you send messages to your  "doctor, view your test results, renew your prescriptions, schedule appointments and more. To sign up, go to [email or web address] . Click on \"Log in\" on the left side of the screen, which will take you to the Welcome page. Then click on \"Sign up Now\" on the right side of the page.     You will be asked to enter the access code listed below, as well as some personal information. Please follow the directions to create your username and password.     Your access code is: [email or web address]  Expires: 2018  9:52 AM     Your access code will  in 90 days. If you need help or a new code, please call your [place] clinic or [phone].        Care EveryWhere ID     This is your Care EveryWhere ID. This could be used by other organizations to access your [place] medical records  [id number]         Blood Pressure from Last 3 Encounters:   18 112/66   18 108/70   18 (!) 72/45    Weight from Last 3 Encounters:   18 67 kg (147 lb 9.6 oz)   18 67.8 kg (149 lb 6.4 oz)   18 65.4 kg (144 lb 3.2 oz)              We Performed the Following     PM PHONE R STRIP EVAL UP TO 90 DAYS ([id number])        Primary Care Provider Office Phone # Fax #    Pallavi Blanca Zayas [phone] [address] [facility] [address]        Goals        General    I want to have good control of Diabetes (pt-stated)     Notes - Note edited  2016  1:17 PM by Leatha Campos, RN    I will check my blood sugars 4 x a day.  I will take Novolog with my meals.  I will take my Levemir at bedtime.              Equal Access to Services     [facility] KINSEY : Luis F Mcmullen, [place], qaybta kaalmamatthew rodriguez, zoila church. [facility] [phone].    ATENCIÓN: Si davidla español, tiene a murcia disposición servicios gratuitos de asistencia lingüística. Llame al [phone].    We comply with applicable federal civil rights laws and [place] laws. We " do not discriminate on the basis of race, color, national origin, age, disability, sex, sexual orientation, or gender identity.            Thank you!     Thank you for choosing [facility]  for your care. Our goal is always to provide you with excellent care. Hearing back from our patients is one way we can continue to improve our services. Please take a few minutes to complete the written survey that you may receive in the mail after your visit with us. Thank you!             Your Updated Medication List - Protect others around you: Learn how to safely use, store and throw away your medicines at www.disposemymeds.org.          This list is accurate as of 9/5/18  9:52 AM.  Always use your most recent med list.                   Brand Name Dispense Instructions for use Diagnosis    albuterol (2.5 MG/3ML) 0.083% neb solution      Take 1 vial by nebulization every 4 hours as needed for shortness of breath / dyspnea or wheezing        aspirin 81 MG tablet      Take 1 tablet by mouth every evening        atorvastatin 80 MG tablet    LIPITOR    90 tablet    Take 1 tablet (80 mg) by mouth daily    PAD (peripheral artery disease) (H)       bacitracin ophthalmic ointment           * blood glucose monitoring test strip    no brand specified    100 strip    Use to test blood sugar four times daily or as directed.    Encounter for medication refill       * blood glucose monitoring test strip    ONETOUCH ULTRA    300 each    TEST BLOOD GLUCOSE FOUR TIMES A DAY    Diabetes mellitus type 2 with peripheral artery disease (H)       cholecalciferol 1000 UNIT tablet    vitamin D3     Take 2,000 Units by mouth daily        DOCQLACE 100 MG capsule   Generic drug:  docusate sodium      Take 100 mg by mouth At Bedtime        donepezil 5 MG tablet    ARICEPT    30 tablet    Take 1 tablet (5 mg) by mouth every morning    Memory difficulties       fish oil-omega-3 fatty acids 1000 MG capsule      Take 1  g by mouth daily    PAD (peripheral artery disease) (H)       fluocinolone acetonide 0.01 % Oil     118 mL    Apply to scalp when wet massage well cover with cap leave on over night Wash in am.    DM type 2, goal A1c below 7       insulin aspart 100 UNIT/ML injection    NovoLOG FLEXPEN    15 mL    Take 15 units with breakfast, 12 units each at lunch, dinner and before bed. Hold bedtime dose if FBG < 100.    Diabetes mellitus type 2 with peripheral artery disease (H)       insulin detemir 100 UNIT/ML injection    LEVEMIR FLEXTOUCH    15 mL    INJECT 13 UNITS UNDER THE SKIN AT BEDTIME    Diabetes mellitus type 2 with peripheral artery disease (H)       insulin pen needle 31G X 6 MM    UNIFINE PENTIPS    100 each    USE ONCE DAILY    Encounter for medication refill       lamISIL AT 1 % cream   Generic drug:  terbinafine      Apply topically daily        LAMISIL EX      Externally apply topically daily For under breasts, in between toes        levothyroxine 75 MCG tablet    SYNTHROID/LEVOTHROID    90 tablet    TAKE ONE TABLET BY MOUTH ONE TIME DAILY    Encounter for medication refill       losartan 25 MG tablet    COZAAR    30 tablet    Take 1 tablet (25 mg) by mouth daily    Essential hypertension       MEDI-PATCH-LIDOCAINE EX           TITO 128 5 % ophthalmic solution   Generic drug:  sodium chloride      Place 1 drop Into the left eye 2 times daily        omeprazole 20 MG CR capsule    priLOSEC    90 capsule    Take 1 capsule (20 mg) by mouth daily        PREPARATION H 0.25-14-74.9 % rectal ointment   Generic drug:  phenylephrine-shark liver oil-mineral oil-petrolatum      Place rectally daily        PRESERVISION AREDS 2 PO      Take by mouth 2 times daily        TH EYE DROP ADVANCED RELIEF 0.05-0.1-1-1 % Soln   Generic drug:  Tetrahydroz-Dextran-PEG-Povid      Apply 1 drop to eye 2 times daily To left eye    Blindness and low vision       timolol 0.5 % ophthalmic solution    TIMOPTIC    1 Bottle    Place 1 drop Into  the left eye 2 times daily        triamcinolone 0.1 % cream    KENALOG    30 g    Apply sparingly to affected area two times daily as needed for gluteal cleft irritation for up to 2 weeks.    Skin excoriation       * TYLENOL ARTHRITIS PAIN 650 MG CR tablet   Generic drug:  acetaminophen      Take 1,300 mg by mouth daily States 2 tablets qam & usually 2 tablets qpm        * TYLENOL 8 HOUR ARTHRITIS PAIN PO      Take 650 mg by mouth every 8 hours as needed        UNABLE TO FIND      Take 1 Bar by mouth 2 times daily MEDICATION NAME: FiberONe Bar        VAGISIL 5-2 % Crea   Generic drug:  Benzocaine-Resorcinol      Place vaginally daily        VAGISIL ANTI-ITCH MEDICATED EX      Externally apply topically every morning        ZANTAC 150 MG tablet   Generic drug:  ranitidine      Take 150 mg by mouth as needed for heartburn        * Notice:  This list has 4 medication(s) that are the same as other medications prescribed for you. Read the directions carefully, and ask your doctor or other care provider to review them with you.

## 2018-09-05 NOTE — PROGRESS NOTES
~90 day phone teletrace  Appropriate AS/ at time of check.  Magnet response WNL. F/U scheduled q 3 months. JUDITH Darden

## 2018-09-10 ENCOUNTER — MEDICAL CORRESPONDENCE (OUTPATIENT)
Dept: FAMILY MEDICINE | Facility: CLINIC | Age: 83
End: 2018-09-10

## 2018-09-10 ENCOUNTER — OFFICE VISIT (OUTPATIENT)
Dept: FAMILY MEDICINE | Facility: CLINIC | Age: 83
End: 2018-09-10

## 2018-09-10 VITALS
OXYGEN SATURATION: 99 % | SYSTOLIC BLOOD PRESSURE: 122 MMHG | RESPIRATION RATE: 16 BRPM | HEART RATE: 67 BPM | WEIGHT: 148 LBS | BODY MASS INDEX: 31.47 KG/M2 | DIASTOLIC BLOOD PRESSURE: 84 MMHG

## 2018-09-10 DIAGNOSIS — H61.23 EXCESSIVE CERUMEN IN BOTH EAR CANALS: ICD-10-CM

## 2018-09-10 DIAGNOSIS — I10 ESSENTIAL HYPERTENSION: ICD-10-CM

## 2018-09-10 DIAGNOSIS — E11.51 DIABETES MELLITUS TYPE 2 WITH PERIPHERAL ARTERY DISEASE (H): Primary | ICD-10-CM

## 2018-09-10 DIAGNOSIS — R35.0 URINARY FREQUENCY: ICD-10-CM

## 2018-09-10 DIAGNOSIS — I10 BENIGN ESSENTIAL HYPERTENSION: ICD-10-CM

## 2018-09-10 PROCEDURE — 99207 C FOOT EXAM  NO CHARGE: CPT | Performed by: FAMILY MEDICINE

## 2018-09-10 PROCEDURE — 36415 COLL VENOUS BLD VENIPUNCTURE: CPT | Performed by: FAMILY MEDICINE

## 2018-09-10 PROCEDURE — 99214 OFFICE O/P EST MOD 30 MIN: CPT | Performed by: FAMILY MEDICINE

## 2018-09-10 RX ORDER — LOSARTAN POTASSIUM 25 MG/1
25 TABLET ORAL DAILY
Qty: 90 TABLET | Refills: 3 | Status: SHIPPED | OUTPATIENT
Start: 2018-09-10

## 2018-09-10 RX ORDER — MIRABEGRON 25 MG/1
25 TABLET, FILM COATED, EXTENDED RELEASE ORAL EVERY EVENING
Qty: 7 TABLET | Refills: 0
Start: 2018-09-10 | End: 2018-09-19

## 2018-09-10 NOTE — PATIENT INSTRUCTIONS
I want you to be able to eat the foods you enjoy. Please increase your long-acting insulin Levemir to 15 units / day. We increased this today due to high blood sugar and urinary frequency. If you experience any low blood sugars, please let us know.     The urinary frequency can be due to high blood sugars or other causes. We started Myrbetriq 25 mg, one pill at night with the goal of decreasing urinary frequency. If you do not like this med, or it does not work, OK to stop taking this. Some people will experience constipation with this medication. If this becomes a problem, simply stop taking it.     Your blood pressures are excellent. I refilled the Losartan today for you - this med helps keep your blood pressure under good control and you have been taking it daily over the last month.     Keep up the good work!

## 2018-09-10 NOTE — MR AVS SNAPSHOT
After Visit Summary   9/10/2018    Serena Marie    MRN: [id number]           Patient Information     Date Of Birth          8/21/1924        Visit Information        Provider Department      9/10/2018 9:45 AM Pallavi Zayas MD [facility]        Today's Diagnoses     Diabetes mellitus type 2 with peripheral artery disease (H)    -  1    Benign essential hypertension        Urinary frequency        Essential hypertension        Excessive cerumen in both ear canals          Care Instructions    I want you to be able to eat the foods you enjoy. Please increase your long-acting insulin Levemir to 15 units / day. We increased this today due to high blood sugar and urinary frequency. If you experience any low blood sugars, please let us know.     The urinary frequency can be due to high blood sugars or other causes. We started Myrbetriq 25 mg, one pill at night with the goal of decreasing urinary frequency. If you do not like this med, or it does not work, OK to stop taking this. Some people will experience constipation with this medication. If this becomes a problem, simply stop taking it.     Your blood pressures are excellent. I refilled the Losartan today for you - this med helps keep your blood pressure under good control and you have been taking it daily over the last month.     Keep up the good work!              Follow-ups after your visit        Your next 10 appointments already scheduled     Dec 12, 2018  9:45 AM CST   Phone Device Check with MITCHELL TECH1   [facility] ([facility])    [address] [place] [address] [phone] OPT 2              Who to contact     If you have questions or need follow up information about today's clinic visit or your schedule please contact [facility] directly at [phone].  Normal or non-critical lab and imaging results will be communicated to you by  "MyChart, letter or phone within 4 business days after the clinic has received the results. If you do not hear from us within 7 days, please contact the clinic through OCZ Technologyhart or phone. If you have a critical or abnormal lab result, we will notify you by phone as soon as possible.  Submit refill requests through Same Day Serves or call your pharmacy and they will forward the refill request to us. Please allow 3 business days for your refill to be completed.          Additional Information About Your Visit        OCZ TechnologyharGetAFive Information     Same Day Serves lets you send messages to your doctor, view your test results, renew your prescriptions, schedule appointments and more. To sign up, go to [email or web address] . Click on \"Log in\" on the left side of the screen, which will take you to the Welcome page. Then click on \"Sign up Now\" on the right side of the page.     You will be asked to enter the access code listed below, as well as some personal information. Please follow the directions to create your username and password.     Your access code is: [email or web address]  Expires: 2018  9:52 AM     Your access code will  in 90 days. If you need help or a new code, please call your [place] clinic or [phone].        Care EveryWhere ID     This is your Care EveryWhere ID. This could be used by other organizations to access your [place] medical records  [id number]        Your Vitals Were     Pulse Respirations Pulse Oximetry BMI (Body Mass Index)          67 16 99% 31.47 kg/m2         Blood Pressure from Last 3 Encounters:   09/10/18 122/84   18 112/66   18 108/70    Weight from Last 3 Encounters:   09/10/18 67.1 kg (148 lb)   18 67 kg (147 lb 9.6 oz)   18 67.8 kg (149 lb 6.4 oz)              We Performed the Following     Basic Metabolic Panel (8) ([facility])     C FOOT EXAM  NO CHARGE     Hemoglobin A1C ([facility])          Today's Medication Changes          These changes are accurate as of 9/10/18 10:12 " AM.  If you have any questions, ask your nurse or doctor.               Start taking these medicines.        Dose/Directions    mirabegron 25 MG 24 hr tablet   Commonly known as:  MYRBETRIQ   Used for:  Urinary frequency   Started by:  Pallavi Zayas MD        Dose:  25 mg   Take 1 tablet (25 mg) by mouth every evening   Quantity:  7 tablet   Refills:  0         These medicines have changed or have updated prescriptions.        Dose/Directions    insulin detemir 100 UNIT/ML injection   Commonly known as:  LEVEMIR FLEXTOUCH   This may have changed:  additional instructions   Used for:  Diabetes mellitus type 2 with peripheral artery disease (H)   Changed by:  Pallavi Zayas MD        INJECT 15 UNITS UNDER THE SKIN AT BEDTIME   Quantity:  15 mL   Refills:  8       losartan 25 MG tablet   Commonly known as:  COZAAR   This may have changed:  additional instructions   Used for:  Essential hypertension   Changed by:  Pallavi Zayas MD        Dose:  25 mg   Take 1 tablet (25 mg) by mouth daily for HTN.   Quantity:  90 tablet   Refills:  3            Where to get your medicines      These medications were sent to [facility] - [place], [place] - [address]  [address], [facility] [address]     Phone:  [phone]     insulin detemir 100 UNIT/ML injection    losartan 25 MG tablet         Some of these will need a paper prescription and others can be bought over the counter.  Ask your nurse if you have questions.     You don't need a prescription for these medications     mirabegron 25 MG 24 hr tablet                Primary Care Provider Office Phone # Fax #    Pallavi Zayas [phone] [address] [facility] [address]        Goals        General    I want to have good control of Diabetes (pt-stated)     Notes - Note edited  1/28/2016  1:17 PM by Leatha Campos, RN    I will check my blood sugars 4 x a day.  I will take Novolog with my  meals.  I will take my Levemir at bedtime.              Equal Access to Services     ERMA EUCEDA : Hadii aad ku hadsarahlori Mcmullen, wamaryda aleksandra, qakenzieta claudianayanazoila garland. So [facility] [phone].    ATENCIÓN: Si habla español, tiene a murcia disposición servicios gratuitos de asistencia lingüística. [facility] [phone].    We comply with applicable federal civil rights laws and [place] laws. We do not discriminate on the basis of race, color, national origin, age, disability, sex, sexual orientation, or gender identity.            Thank you!     Thank you for choosing [facility]  for your care. Our goal is always to provide you with excellent care. Hearing back from our patients is one way we can continue to improve our services. Please take a few minutes to complete the written survey that you may receive in the mail after your visit with us. Thank you!             Your Updated Medication List - Protect others around you: Learn how to safely use, store and throw away your medicines at www.disposemymeds.org.          This list is accurate as of 9/10/18 10:12 AM.  Always use your most recent med list.                   Brand Name Dispense Instructions for use Diagnosis    albuterol (2.5 MG/3ML) 0.083% neb solution      Take 1 vial by nebulization every 4 hours as needed for shortness of breath / dyspnea or wheezing        aspirin 81 MG tablet      Take 1 tablet by mouth every evening        atorvastatin 80 MG tablet    LIPITOR    90 tablet    Take 1 tablet (80 mg) by mouth daily    PAD (peripheral artery disease) (H)       bacitracin ophthalmic ointment           * blood glucose monitoring test strip    no brand specified    100 strip    Use to test blood sugar four times daily or as directed.    Encounter for medication refill       * blood glucose monitoring test strip    ONETOUCH ULTRA    300 each    TEST BLOOD GLUCOSE FOUR TIMES A DAY    Diabetes mellitus  type 2 with peripheral artery disease (H)       cholecalciferol 1000 UNIT tablet    vitamin D3     Take 2,000 Units by mouth daily        DOCQLACE 100 MG capsule   Generic drug:  docusate sodium      Take 100 mg by mouth At Bedtime        donepezil 5 MG tablet    ARICEPT    30 tablet    Take 1 tablet (5 mg) by mouth every morning    Memory difficulties       fish oil-omega-3 fatty acids 1000 MG capsule      Take 1 g by mouth daily    PAD (peripheral artery disease) (H)       fluocinolone acetonide 0.01 % Oil     118 mL    Apply to scalp when wet massage well cover with cap leave on over night Wash in am.    DM type 2, goal A1c below 7       insulin aspart 100 UNIT/ML injection    NovoLOG FLEXPEN    15 mL    Take 15 units with breakfast, 12 units each at lunch, dinner and before bed. Hold bedtime dose if FBG < 100.    Diabetes mellitus type 2 with peripheral artery disease (H)       insulin detemir 100 UNIT/ML injection    LEVEMIR FLEXTOUCH    15 mL    INJECT 15 UNITS UNDER THE SKIN AT BEDTIME    Diabetes mellitus type 2 with peripheral artery disease (H)       insulin pen needle 31G X 6 MM    UNIFINE PENTIPS    100 each    USE ONCE DAILY    Encounter for medication refill       lamISIL AT 1 % cream   Generic drug:  terbinafine      Apply topically daily        LAMISIL EX      Externally apply topically daily For under breasts, in between toes        levothyroxine 75 MCG tablet    SYNTHROID/LEVOTHROID    90 tablet    TAKE ONE TABLET BY MOUTH ONE TIME DAILY    Encounter for medication refill       losartan 25 MG tablet    COZAAR    90 tablet    Take 1 tablet (25 mg) by mouth daily for HTN.    Essential hypertension       MEDI-PATCH-LIDOCAINE EX           mirabegron 25 MG 24 hr tablet    MYRBETRIQ    7 tablet    Take 1 tablet (25 mg) by mouth every evening    Urinary frequency       TITO 128 5 % ophthalmic solution   Generic drug:  sodium chloride      Place 1 drop Into the left eye 2 times daily        omeprazole 20  MG CR capsule    priLOSEC    90 capsule    Take 1 capsule (20 mg) by mouth daily        PREPARATION H 0.25-14-74.9 % rectal ointment   Generic drug:  phenylephrine-shark liver oil-mineral oil-petrolatum      Place rectally daily        PRESERVISION AREDS 2 PO      Take by mouth 2 times daily        TH EYE DROP ADVANCED RELIEF 0.05-0.1-1-1 % Soln   Generic drug:  Tetrahydroz-Dextran-PEG-Povid      Apply 1 drop to eye 2 times daily To left eye    Blindness and low vision       timolol 0.5 % ophthalmic solution    TIMOPTIC    1 Bottle    Place 1 drop Into the left eye 2 times daily        triamcinolone 0.1 % cream    KENALOG    30 g    Apply sparingly to affected area two times daily as needed for gluteal cleft irritation for up to 2 weeks.    Skin excoriation       * TYLENOL ARTHRITIS PAIN 650 MG CR tablet   Generic drug:  acetaminophen      Take 1,300 mg by mouth daily States 2 tablets qam & usually 2 tablets qpm        * TYLENOL 8 HOUR ARTHRITIS PAIN PO      Take 650 mg by mouth every 8 hours as needed        UNABLE TO FIND      Take 1 Bar by mouth 2 times daily MEDICATION NAME: FiberONe Bar        VAGISIL 5-2 % Crea   Generic drug:  Benzocaine-Resorcinol      Place vaginally daily        VAGISIL ANTI-ITCH MEDICATED EX      Externally apply topically every morning        ZANTAC 150 MG tablet   Generic drug:  ranitidine      Take 150 mg by mouth as needed for heartburn        * Notice:  This list has 4 medication(s) that are the same as other medications prescribed for you. Read the directions carefully, and ask your doctor or other care provider to review them with you.

## 2018-09-10 NOTE — PROGRESS NOTES
"Problem(s) Oriented visit      SUBJECTIVE:                                                    Serena Marie is a 89 year old female who presents to clinic today for:  Patient presents with:  RECHECK: One month BP check -- patient brought in BP from home. Copies made for patient chart    Her for BP check. We added Losartan at her last visit. She is tolerating OK. BP is in a reasonable range now. No known side effects.  Copy of BP log reviewed and sent to scanning.    Urinary frequency. Getting up several times each night to urinate. No incontinence but she does wear Depends. No known blood in urine. We checked UA in July - no infection and spec grav was wnl. No new low back pain. No pain w/ urination or vaginal itching.     Blood sugars are consistently high. Copy of QID blood sugars reviewed and sent to scanning.   Meds are all managed by her facility. It appears her insulin dose was increased as recommended at her last visit, but does not appear to have made any difference w/ her blood sugars. No lows.     Pt states has neuropathy in fingers but not in her toes. Just wants me to note it. \"Not a problem - yet.\"    Problem list, Medication list, Allergies, and Medical/Social/Surgical histories reviewed in eCert and updated as appropriate.     ROS:  10 point ROS completed and negative except noted above, including Gen, HEENT, CV, Resp, GI, , MS, Neurologic, Psych    Histories:   Patient Active Problem List   Diagnosis     Hyperlipidemia with target LDL less than 100     Diabetes mellitus type 2 with peripheral artery disease (H)     PAD (peripheral artery disease) (H)     Lumbar spinal stenosis     H/O enucleation of right eyeball     Health Care Home     Pacemaker     Syncope     HCAP (healthcare-associated pneumonia)     Cephalalgia     Bilateral low back pain without sciatica     Cervicalgia     Community acquired pneumonia of right lower lobe of lung (H)     ACP (advance care planning)     Coronary artery " disease involving native coronary artery of native heart without angina pectoris     Blindness and low vision     Influenza due to influenza virus, type B     Influenza and pneumonia     Benign essential hypertension     Physical deconditioning     Hyponatremia     Blind painful eye     Failure to thrive in adult     Past Medical History:   Diagnosis Date     CAD (coronary artery disease) 8/2012 8/2012 Cath - 75% RCA with BRIGITTE placed, 40% ostial and distal left main stenosis     Diabetes mellitus type 2 with peripheral artery disease (H)      DM type 2, goal A1c below 7      GERD (gastroesophageal reflux disease)      Gluten intolerance      HTN (hypertension)     been off meds since 2012     Hyperlipidemia LDL goal < 100      Pacemaker 8/2009 8/2009 Near syncopal episode with HR 40s and BP 60/sys - PPM implanted     PAD (peripheral artery disease) (H) 2008    70% proximal right SFA stenosis with successful angioplasy     S/P coronary angiogram 8/2012 8/2012 Cath - 75% RCA with BRIGITTE placed, 40% ostial and distal left main stenosis     Syncope 08/2009 8/2009 Near syncopal episode with HR 40s and BP 60/sys - PPM implanted     Unspecified hypothyroidism      Past Surgical History:   Procedure Laterality Date     ANGIOPLASTY  2008    Angioplasty of proximal right superficial femoral artery     APPENDECTOMY       BACK SURGERY       C REMOVAL OF EYE      right      CHOLECYSTECTOMY, LAPOROSCOPIC      Cholecystectomy, Laparoscopic     COLECTOMY       EYE SURGERY      corneal transplant      HYSTERECTOMY, EMANI      BSO     IMPLANT PACEMAKER  8/2009     STENT, CORONARY, FATOU  8-12 8/2012 Cath - 75% RCA with BRIGITTE placed, 40% ostial and distal left main stenosis     TONSILLECTOMY & ADENOIDECTOMY       Social History   Substance Use Topics     Smoking status: Former Smoker     Packs/day: 1.00     Years: 3.00     Types: Cigarettes     Quit date: 8/17/1947     Smokeless tobacco: Never Used     Alcohol use No     Family  History   Problem Relation Age of Onset     Cancer Mother      Cancer Father      Unknown/Adopted Maternal Grandmother      Unknown/Adopted Maternal Grandfather      Unknown/Adopted Paternal Grandmother      Unknown/Adopted Paternal Grandfather        OBJECTIVE:                                                    /84  Pulse 67  Resp 16  Wt 67.1 kg (148 lb)  SpO2 99%  BMI 31.47 kg/m2  Body mass index is 31.47 kg/(m^2).   Gen: Cooperative. No acute distress. Neatly dressed.   Eyes: Enucleated L eye. R eye reactive to light. Sclera white.    Ears/Nose/Mouth/Throat: Normal pinnae and small amt of cerumen in ear canals, TMs without erythema or effusion. Oropharynx mucous membranes moist, without lesions, erythema, or exudate.   Neck: Supple, without masses, lymphadenopathy or tenderness.   Heart: Regular rate and rhythm without murmurs, rubs, or gallops.   Lungs: Normal respiratory effort. Lungs are clear to auscultation. Good breath sounds bilaterally.   Musculoskeletal: No lower extremity edema.   Skin: Warm and well perfused. Skin is dry on legs - pt said she did not put on her usual lotion.   Neurologic: Alert. No deficits noted w/ monofilament diabetes foot exam.    Psych:  Mood and affect are appropriate to discussion, with a little irritability noted (baseline which I suspect is frustration over decr hearing and vision).      ASSESSMENT/PLAN:                                                      Serena was seen today for recheck.    Diagnoses and all orders for this visit:    Diabetes mellitus type 2 with peripheral artery disease (H)  -     C FOOT EXAM  NO CHARGE  -     Hemoglobin A1C ([facility]); Future  -     insulin detemir (LEVEMIR FLEXTOUCH) 100 UNIT/ML injection; INJECT 15 UNITS UNDER THE SKIN AT BEDTIME  -     Basic Metabolic Panel (8) ([facility])  -     Hemoglobin A1C ([facility])  -     VENOUS COLLECTION    Benign essential hypertension  -     Basic Metabolic Panel (8) ([facility])  -     VENOUS  COLLECTION        -     losartan (COZAAR) 25 MG tablet; Take 1 tablet (25 mg) by mouth daily for HTN.    Urinary frequency  -     mirabegron (MYRBETRIQ) 25 MG 24 hr tablet; Take 1 tablet (25 mg) by mouth every evening  -     Basic Metabolic Panel (8) ([facility])    Excessive cerumen in both ear canals   She opts to have lavaged at her next visit. Wants to get going today.      Other orders  -     Cancel: OPHTHALMOLOGY ADULT REFERRAL  -     Cancel: Lipid Panel ([facility]); Future    RTC 3 months.     Patient Instructions   I want you to be able to eat the foods you enjoy. Please increase your long-acting insulin Levemir to 15 units / day. We increased this today due to high blood sugar and urinary frequency. If you experience any low blood sugars, please let us know.     The urinary frequency can be due to high blood sugars or other causes. We started Myrbetriq 25 mg, one pill at night with the goal of decreasing urinary frequency. If you do not like this med, or it does not work, OK to stop taking this. Some people will experience constipation with this medication. If this becomes a problem, simply stop taking it.     Your blood pressures are excellent. I refilled the Losartan today for you - this med helps keep your blood pressure under good control and you have been taking it daily over the last month.     Keep up the good work!          The following health maintenance items are reviewed in Epic and correct as of today:  Health Maintenance   Topic Date Due     PHQ-2 Q1 YR  [birth year]/21/1936     MEDICARE ANNUAL WELLNESS VISIT  08/21/1942     EYE EXAM Q1 YEAR  02/23/2018     LIPID MONITORING Q1 YEAR  03/07/2018     INFLUENZA VACCINE (1) 09/01/2018     MICROALBUMIN Q1 YEAR  12/05/2018     A1C Q6 MO  03/10/2019     FALL RISK ASSESSMENT  07/17/2019     FOOT EXAM Q1 YEAR  09/10/2019     CREATININE Q1 YEAR  09/10/2019     TSH W/ FREE T4 REFLEX Q2 YEAR  02/06/2020     ADVANCE DIRECTIVE PLANNING Q5 YRS  11/18/2021     TETANUS  IMMUNIZATION (SYSTEM ASSIGNED)  12/16/2023     PNEUMOCOCCAL  Addressed       Pallavi Zayas MD  Family Medicine    For any issues, please call my office  [facility] at [phone].

## 2018-09-11 LAB
BUN SERPL-MCNC: 27 MG/DL (ref 10–36)
BUN/CREATININE RATIO: 35 (ref 12–28)
CALCIUM SERPL-MCNC: 9.4 MG/DL (ref 8.7–10.3)
CHLORIDE SERPLBLD-SCNC: 105 MMOL/L (ref 96–106)
CREAT SERPL-MCNC: 0.78 MG/DL (ref 0.57–1)
EGFR IF AFRICN AM: 75 ML/MIN/1.73
EGFR IF NONAFRICN AM: 65 ML/MIN/1.73
GLUCOSE SERPL-MCNC: 131 MG/DL (ref 65–99)
HBA1C MFR BLD: 6.1 % (ref 4.8–5.6)
POTASSIUM SERPL-SCNC: 4.6 MMOL/L (ref 3.5–5.2)
SODIUM SERPL-SCNC: 139 MMOL/L (ref 134–144)
TOTAL CO2: 24 MMOL/L (ref 20–29)

## 2018-09-17 ENCOUNTER — TELEPHONE (OUTPATIENT)
Dept: FAMILY MEDICINE | Facility: CLINIC | Age: 83
End: 2018-09-17

## 2018-09-17 ENCOUNTER — MEDICAL CORRESPONDENCE (OUTPATIENT)
Dept: FAMILY MEDICINE | Facility: CLINIC | Age: 83
End: 2018-09-17

## 2018-09-17 DIAGNOSIS — E11.51 DIABETES MELLITUS TYPE 2 WITH PERIPHERAL ARTERY DISEASE (H): ICD-10-CM

## 2018-09-17 NOTE — TELEPHONE ENCOUNTER
Called [facility] and spoke with Bernice who takes care of Serena.  Left her the orders to decrease patients Levemir by 2 units daily.  Patients A1C was 6.1.  Faxed over labs to [facility] at [phone].

## 2018-09-19 ENCOUNTER — TELEPHONE (OUTPATIENT)
Dept: FAMILY MEDICINE | Facility: CLINIC | Age: 83
End: 2018-09-19

## 2018-09-19 DIAGNOSIS — R35.0 URINARY FREQUENCY: ICD-10-CM

## 2018-09-19 RX ORDER — MIRABEGRON 25 MG/1
25 TABLET, FILM COATED, EXTENDED RELEASE ORAL EVERY EVENING
Qty: 30 TABLET | Refills: 3 | Status: SHIPPED | OUTPATIENT
Start: 2018-09-19 | End: 2019-04-15

## 2018-09-19 NOTE — TELEPHONE ENCOUNTER
Bernice - nurse at [facility], calls reporting patient likes the Myrbetriq 25mg QD that Dr. Samira Zayas gave her samples of at visit last week. They are  requesting rx for this medication be sent to [facility].   Plan: sent to Dr. Samira Zayas for review.  Loly Sandoval RN

## 2018-10-01 ENCOUNTER — MEDICAL CORRESPONDENCE (OUTPATIENT)
Dept: FAMILY MEDICINE | Facility: CLINIC | Age: 83
End: 2018-10-01

## 2018-10-14 NOTE — PROGRESS NOTES
Problem(s) Oriented visit      SUBJECTIVE:                                                    Serena Marie is a 89 year old female who presents to clinic today for:  Patient presents with:  RECHECK: Blood pressure    Here to recheck BP and Diabetes.   Brings in a copy of her BP readings taken by nursing where she lives. BP has been well controlled. Taking Losartan 25 mg daily. We decreased dose in August, tolerating OK. No falls. No dizziness.     Her last blood sugars and A1c were not congruent. Previously, her blood sugars were high and we increased her dose of Levemir. Then checked A1c in September and A1c had gone down to from 7.1% in Feb to 6.1%. She does not recall having lows. We decreased her Levemir to 13 units. Since then,she thinks her blood sugars are high again. She has made a few changes to her eating habits. Questions if we should increase her insulin again. She is aware of the risk for lows, but states feels better when her blood sugars are lower. Insulin is administered by the staff at her facility. She has some memory difficulties and vision problems so the staff also checks her blood sugars daily.     Otherwise, is feeling well overall.     Problem list, Medication list, Allergies, and Medical/Social/Surgical histories reviewed in EPIC and updated as appropriate.     ROS:  10 point ROS completed and negative except noted above, including Gen, HEENT, CV, Resp, GI, , MS, Neurologic, Psych    Histories:   Patient Active Problem List   Diagnosis     Hyperlipidemia with target LDL less than 100     Diabetes mellitus type 2 with peripheral artery disease (H)     PAD (peripheral artery disease) (H)     Lumbar spinal stenosis     H/O enucleation of right eyeball     Health Care Home     Pacemaker     Syncope     HCAP (healthcare-associated pneumonia)     Cephalalgia     Bilateral low back pain without sciatica     Cervicalgia     Community acquired pneumonia of right lower lobe of lung (H)     ACP  (advance care planning)     Coronary artery disease involving native coronary artery of native heart without angina pectoris     Blindness and low vision     Influenza due to influenza virus, type B     Influenza and pneumonia     Benign essential hypertension     Physical deconditioning     Hyponatremia     Blind painful eye     Failure to thrive in adult     Past Medical History:   Diagnosis Date     CAD (coronary artery disease) 8/2012 8/2012 Cath - 75% RCA with BRIGITTE placed, 40% ostial and distal left main stenosis     Diabetes mellitus type 2 with peripheral artery disease (H)      DM type 2, goal A1c below 7      GERD (gastroesophageal reflux disease)      Gluten intolerance      HTN (hypertension)     been off meds since 2012     Hyperlipidemia LDL goal < 100      Pacemaker 8/2009 8/2009 Near syncopal episode with HR 40s and BP 60/sys - PPM implanted     PAD (peripheral artery disease) (H) 2008    70% proximal right SFA stenosis with successful angioplasy     S/P coronary angiogram 8/2012 8/2012 Cath - 75% RCA with BRIGITTE placed, 40% ostial and distal left main stenosis     Syncope 08/2009 8/2009 Near syncopal episode with HR 40s and BP 60/sys - PPM implanted     Unspecified hypothyroidism      Past Surgical History:   Procedure Laterality Date     ANGIOPLASTY  2008    Angioplasty of proximal right superficial femoral artery     APPENDECTOMY       BACK SURGERY       C REMOVAL OF EYE      right      CHOLECYSTECTOMY, LAPOROSCOPIC      Cholecystectomy, Laparoscopic     COLECTOMY       EYE SURGERY      corneal transplant      HYSTERECTOMY, EMANI      BSO     IMPLANT PACEMAKER  8/2009     STENT, CORONARY, FATOU  8-12 8/2012 Cath - 75% RCA with BRIGITTE placed, 40% ostial and distal left main stenosis     TONSILLECTOMY & ADENOIDECTOMY       Social History   Substance Use Topics     Smoking status: Former Smoker     Packs/day: 1.00     Years: 3.00     Types: Cigarettes     Quit date: 8/17/1947     Smokeless  "tobacco: Never Used     Alcohol use No     Family History   Problem Relation Age of Onset     Cancer Mother      Cancer Father      Unknown/Adopted Maternal Grandmother      Unknown/Adopted Maternal Grandfather      Unknown/Adopted Paternal Grandmother      Unknown/Adopted Paternal Grandfather        OBJECTIVE:                                                    /82  Pulse 71  Resp 16  Ht 1.461 m (4' 9.5\")  Wt 67.3 kg (148 lb 6.4 oz)  SpO2 99%  BMI 31.56 kg/m2  Body mass index is 31.56 kg/(m^2).   Gen: Cooperative. No acute distress.   Eyes: Has 1 functional eye - not new.   Heart: Regular rate and rhythm without murmurs, rubs, or gallops.   Lungs: Normal respiratory effort. Lungs are clear to auscultation. Good breath sounds bilaterally.   Musculoskeletal: Trace B lower extremity edema.   Skin: Warm, no rashes.    Neurologic: Alert. Is seen alone today. MMSE not administered today.   Psych:  Mood and affect are appropriate.     ASSESSMENT/PLAN:                                                      Serena was seen today for recheck.    Diagnoses and all orders for this visit:    Diabetes mellitus type 2 with peripheral artery disease (H)  -     insulin detemir (LEVEMIR FLEXTOUCH) 100 UNIT/ML injection; INJECT 14 UNITS UNDER THE SKIN AT BEDTIME    Benign essential hypertension   BP great today. Continue Losartan at current dose.    Happy to see her at anytime, otherwise, RTC 6 months and she will bring blood sugars and BP log w/ her.    The following health maintenance items are reviewed in Epic and correct as of today:  Health Maintenance   Topic Date Due     PHQ-2 Q1 YR  08/21/1936     MEDICARE ANNUAL WELLNESS VISIT  [birth year]/21/1942     EYE EXAM Q1 YEAR  02/23/2018     LIPID MONITORING Q1 YEAR  03/07/2018     INFLUENZA VACCINE (1) 09/01/2018     MICROALBUMIN Q1 YEAR  12/05/2018     A1C Q6 MO  03/10/2019     FALL RISK ASSESSMENT  07/17/2019     FOOT EXAM Q1 YEAR  09/10/2019     CREATININE Q1 YEAR  09/10/2019 "     TSH W/ FREE T4 REFLEX Q2 YEAR  02/06/2020     ADVANCE DIRECTIVE PLANNING Q5 YRS  11/18/2021     TETANUS IMMUNIZATION (SYSTEM ASSIGNED)  12/16/2023     PNEUMOCOCCAL  Addressed       Pallavi Zayas MD  Family Medicine    For any issues, please call my office  [facility] at [phone].

## 2018-10-15 ENCOUNTER — OFFICE VISIT (OUTPATIENT)
Dept: FAMILY MEDICINE | Facility: CLINIC | Age: 83
End: 2018-10-15

## 2018-10-15 VITALS
HEIGHT: 58 IN | RESPIRATION RATE: 16 BRPM | HEART RATE: 71 BPM | WEIGHT: 148.4 LBS | BODY MASS INDEX: 31.15 KG/M2 | DIASTOLIC BLOOD PRESSURE: 82 MMHG | OXYGEN SATURATION: 99 % | SYSTOLIC BLOOD PRESSURE: 122 MMHG

## 2018-10-15 DIAGNOSIS — E11.51 DIABETES MELLITUS TYPE 2 WITH PERIPHERAL ARTERY DISEASE (H): Primary | ICD-10-CM

## 2018-10-15 DIAGNOSIS — I10 BENIGN ESSENTIAL HYPERTENSION: ICD-10-CM

## 2018-10-15 PROCEDURE — 99213 OFFICE O/P EST LOW 20 MIN: CPT | Performed by: FAMILY MEDICINE

## 2018-10-15 NOTE — MR AVS SNAPSHOT
"              After Visit Summary   10/15/2018    Serena Marie    MRN: [id number]           Patient Information     Date Of Birth          8/21/1924        Visit Information        Provider Department      10/15/2018 9:30 AM Pallavi Zayas MD [facility]        Today's Diagnoses     Diabetes mellitus type 2 with peripheral artery disease (H)    -  1    Benign essential hypertension           Follow-ups after your visit        Your next 10 appointments already scheduled     Dec 12, 2018  9:45 AM CST   Phone Device Check with MITCHELL TECH1   [facility] ([facility])    [address] [place], [place] [address]  [facility] [address] [phone] OPT 2              Who to contact     If you have questions or need follow up information about today's clinic visit or your schedule please contact [facility] directly at [phone].  Normal or non-critical lab and imaging results will be communicated to you by TEAM INTERVALhart, letter or phone within 4 business days after the clinic has received the results. If you do not hear from us within 7 days, please contact the clinic through TEAM INTERVALhart or phone. If you have a critical or abnormal lab result, we will notify you by phone as soon as possible.  Submit refill requests through Yi De or call your pharmacy and they will forward the refill request to us. Please allow 3 business days for your refill to be completed.          Additional Information About Your Visit        MyChart Information     Yi De lets you send messages to your doctor, view your test results, renew your prescriptions, schedule appointments and more. To sign up, go to [email or web address] . Click on \"Log in\" on the left side of the screen, which will take you to the Welcome page. Then click on \"Sign up Now\" on the right side of the page.     You will be asked to enter the access code listed below, as well as some personal information. " "Please follow the directions to create your username and password.     Your access code is: [email or web address]  Expires: 2018  9:52 AM     Your access code will  in 90 days. If you need help or a new code, please call your [facility] clinic or [phone].        Care EveryWhere ID     This is your Care EveryWhere ID. This could be used by other organizations to access your [facility] medical records  [id number]        Your Vitals Were     Pulse Respirations Height Pulse Oximetry BMI (Body Mass Index)       71 16 1.461 m (4' 9.5\") 99% 31.56 kg/m2        Blood Pressure from Last 3 Encounters:   10/15/18 122/82   09/10/18 122/84   18 112/66    Weight from Last 3 Encounters:   10/15/18 67.3 kg (148 lb 6.4 oz)   09/10/18 67.1 kg (148 lb)   18 67 kg (147 lb 9.6 oz)              Today, you had the following     No orders found for display         Today's Medication Changes          These changes are accurate as of 10/15/18 10:08 AM.  If you have any questions, ask your nurse or doctor.               These medicines have changed or have updated prescriptions.        Dose/Directions    insulin detemir 100 UNIT/ML injection   Commonly known as:  LEVEMIR FLEXTOUCH   This may have changed:  additional instructions   Used for:  Diabetes mellitus type 2 with peripheral artery disease (H)   Changed by:  Pallavi Zayas MD        INJECT 14 UNITS UNDER THE SKIN AT BEDTIME   Quantity:  15 mL   Refills:  8            Where to get your medicines      These medications were sent to [facility] - [place], [place] -  [address]   [address], [facility] [address]     Phone:  [phone]     insulin detemir 100 UNIT/ML injection                Primary Care Provider Office Phone # Fax #    Pallavi Zayas [phone] [address]  [facility] [address]        Goals        General    I want to have good control of Diabetes (pt-stated)     Notes - Note edited  " 1/28/2016  1:17 PM by Leatha Campos, RN    I will check my blood sugars 4 x a day.  I will take Novolog with my meals.  I will take my Levemir at bedtime.              Equal Access to Services     ERMA EUCEDA : Luis F resendiz tejinder Sopriyank, waaxda luqadaha, qaybta kaalmada adejennifer, zoila ackermanjazlyn wiggins [employer]. So [facility] [phone].    ATENCIÓN: Si habla español, tiene a murcia disposición servicios gratuitos de asistencia lingüística. Llame al [phone].    We comply with applicable federal civil rights laws and [place] laws. We do not discriminate on the basis of race, color, national origin, age, disability, sex, sexual orientation, or gender identity.            Thank you!     Thank you for choosing [facility]  for your care. Our goal is always to provide you with excellent care. Hearing back from our patients is one way we can continue to improve our services. Please take a few minutes to complete the written survey that you may receive in the mail after your visit with us. Thank you!             Your Updated Medication List - Protect others around you: Learn how to safely use, store and throw away your medicines at www.disposemymeds.org.          This list is accurate as of 10/15/18 10:08 AM.  Always use your most recent med list.                   Brand Name Dispense Instructions for use Diagnosis    aspirin 81 MG tablet      Take 1 tablet by mouth every evening        atorvastatin 80 MG tablet    LIPITOR    90 tablet    Take 1 tablet (80 mg) by mouth daily    PAD (peripheral artery disease) (H)       bacitracin ophthalmic ointment           * blood glucose monitoring test strip    no brand specified    100 strip    Use to test blood sugar four times daily or as directed.    Encounter for medication refill       * blood glucose monitoring test strip    ONETOUCH ULTRA    300 each    TEST BLOOD GLUCOSE FOUR TIMES A DAY    Diabetes mellitus type 2 with peripheral artery disease  (H)       cholecalciferol 1000 UNIT tablet    vitamin D3     Take 2,000 Units by mouth daily        DOCQLACE 100 MG capsule   Generic drug:  docusate sodium      Take 100 mg by mouth At Bedtime        donepezil 5 MG tablet    ARICEPT    30 tablet    Take 1 tablet (5 mg) by mouth every morning    Memory difficulties       fish oil-omega-3 fatty acids 1000 MG capsule      Take 1 g by mouth daily    PAD (peripheral artery disease) (H)       fluocinolone acetonide 0.01 % Oil     118 mL    Apply to scalp when wet massage well cover with cap leave on over night Wash in am.    DM type 2, goal A1c below 7       insulin aspart 100 UNIT/ML injection    NovoLOG FLEXPEN    15 mL    Take 15 units with breakfast, 12 units each at lunch, dinner and before bed. Hold bedtime dose if FBG < 100.    Diabetes mellitus type 2 with peripheral artery disease (H)       insulin detemir 100 UNIT/ML injection    LEVEMIR FLEXTOUCH    15 mL    INJECT 14 UNITS UNDER THE SKIN AT BEDTIME    Diabetes mellitus type 2 with peripheral artery disease (H)       insulin pen needle 31G X 6 MM    UNIFINE PENTIPS    100 each    USE ONCE DAILY    Encounter for medication refill       lamISIL AT 1 % cream   Generic drug:  terbinafine      Apply topically daily        LAMISIL EX      Externally apply topically daily For under breasts, in between toes        levothyroxine 75 MCG tablet    SYNTHROID/LEVOTHROID    90 tablet    TAKE ONE TABLET BY MOUTH ONE TIME DAILY    Encounter for medication refill       losartan 25 MG tablet    COZAAR    90 tablet    Take 1 tablet (25 mg) by mouth daily for HTN.    Essential hypertension       MEDI-PATCH-LIDOCAINE EX           mirabegron 25 MG 24 hr tablet    MYRBETRIQ    30 tablet    Take 1 tablet (25 mg) by mouth every evening    Urinary frequency       TITO 128 5 % ophthalmic solution   Generic drug:  sodium chloride      Place 1 drop Into the left eye 2 times daily        omeprazole 20 MG CR capsule    priLOSEC    90  capsule    Take 1 capsule (20 mg) by mouth daily        PREPARATION H 0.25-14-74.9 % rectal ointment   Generic drug:  phenylephrine-shark liver oil-mineral oil-petrolatum      Place rectally daily        PRESERVISION AREDS 2 PO      Take by mouth 2 times daily        TH EYE DROP ADVANCED RELIEF 0.05-0.1-1-1 % Soln   Generic drug:  Tetrahydroz-Dextran-PEG-Povid      Apply 1 drop to eye 2 times daily To left eye    Blindness and low vision       timolol 0.5 % ophthalmic solution    TIMOPTIC    1 Bottle    Place 1 drop Into the left eye 2 times daily        triamcinolone 0.1 % cream    KENALOG    30 g    Apply sparingly to affected area two times daily as needed for gluteal cleft irritation for up to 2 weeks.    Skin excoriation       * TYLENOL ARTHRITIS PAIN 650 MG CR tablet   Generic drug:  acetaminophen      Take 1,300 mg by mouth daily States 2 tablets qam & usually 2 tablets qpm        * TYLENOL 8 HOUR ARTHRITIS PAIN PO      Take 650 mg by mouth every 8 hours as needed        UNABLE TO FIND      Take 1 Bar by mouth 2 times daily MEDICATION NAME: FiberONe Bar        VAGISIL 5-2 % Crea   Generic drug:  Benzocaine-Resorcinol      Place vaginally daily        VAGISIL ANTI-ITCH MEDICATED EX      Externally apply topically every morning        ZANTAC 150 MG tablet   Generic drug:  ranitidine      Take 150 mg by mouth as needed for heartburn        * Notice:  This list has 4 medication(s) that are the same as other medications prescribed for you. Read the directions carefully, and ask your doctor or other care provider to review them with you.

## 2018-11-26 ENCOUNTER — OFFICE VISIT (OUTPATIENT)
Dept: FAMILY MEDICINE | Facility: CLINIC | Age: 83
End: 2018-11-26

## 2018-11-26 VITALS
WEIGHT: 150 LBS | SYSTOLIC BLOOD PRESSURE: 122 MMHG | BODY MASS INDEX: 31.9 KG/M2 | OXYGEN SATURATION: 97 % | RESPIRATION RATE: 16 BRPM | HEART RATE: 67 BPM | DIASTOLIC BLOOD PRESSURE: 58 MMHG

## 2018-11-26 DIAGNOSIS — E11.51 DIABETES MELLITUS TYPE 2 WITH PERIPHERAL ARTERY DISEASE (H): ICD-10-CM

## 2018-11-26 DIAGNOSIS — I10 BENIGN ESSENTIAL HYPERTENSION: ICD-10-CM

## 2018-11-26 DIAGNOSIS — M48.061 SPINAL STENOSIS OF LUMBAR REGION, UNSPECIFIED WHETHER NEUROGENIC CLAUDICATION PRESENT: ICD-10-CM

## 2018-11-26 DIAGNOSIS — L89.319 PRESSURE INJURY OF SKIN OF RIGHT BUTTOCK, UNSPECIFIED INJURY STAGE: Primary | ICD-10-CM

## 2018-11-26 PROBLEM — E87.1 HYPONATREMIA: Status: RESOLVED | Noted: 2018-02-23 | Resolved: 2018-11-26

## 2018-11-26 PROBLEM — J10.1 INFLUENZA DUE TO INFLUENZA VIRUS, TYPE B: Status: RESOLVED | Noted: 2018-02-17 | Resolved: 2018-11-26

## 2018-11-26 PROBLEM — J11.00 INFLUENZA AND PNEUMONIA: Status: RESOLVED | Noted: 2018-02-17 | Resolved: 2018-11-26

## 2018-11-26 PROCEDURE — 99214 OFFICE O/P EST MOD 30 MIN: CPT | Performed by: FAMILY MEDICINE

## 2018-11-26 RX ORDER — ASCORBIC ACID 500 MG
1000 TABLET ORAL 2 TIMES DAILY
Qty: 180 TABLET | Refills: 3 | Status: SHIPPED | OUTPATIENT
Start: 2018-11-26 | End: 2018-11-26

## 2018-11-26 RX ORDER — ASCORBIC ACID 500 MG
1000 TABLET ORAL 2 TIMES DAILY
Qty: 180 TABLET | Refills: 3 | Status: SHIPPED | OUTPATIENT
Start: 2018-11-26

## 2018-11-26 NOTE — PROGRESS NOTES
Problem(s) Oriented visit      SUBJECTIVE:                                                    Serena Marie is a 89 year old female who presents to clinic today for:  Patient presents with:  Diabetes: 6 week f/u    Here to follow-up on DM. Brings a copy of her blood sugar results from [facility].   No lows. A few in the 200s. She is not noticing polydipsia or polyuria. Staff manages all of her meds.     Does not have BP records w/ her today. BP in clinic today is under great control. Has not noticed HA, dizziness or light-headedness.     States her memory is getting worse. Is aware it is declining. Is on Aricept. She is seen alone today. Does not really want to increase Aricept dose.     Continues to have some low back pain. She states she does not want any intervention and no more meds. Has not been very active which helps to minimize the pain.     Has a new concern which she states is actually why she made the appt today. Now has an open wound on R buttock. She is not sure how long it has been present. Getting worse. Has been using some Bactroban on this - helpful, but wears off quickly. No fevers, chills. She cannot see the area. It is painful when she is seated. Adjusts how she sits to relieve pressure from her R buttock.     Her meals are all provided for her. Does not cook anything on her own. Has been having a Glucerna daily (10 gr of protein). Has not tried any other protein drink. States she probably has some meat every day but her appetite is not very robust. She does not eat fresh oranges b/c too acidic. She is not sure if she eating any foods high in Vit C.     Problem list, Medication list, Allergies, and Medical/Social/Surgical histories reviewed in [employer] and updated as appropriate.     ROS:  10 point ROS completed and negative except noted above, including Gen, HEENT, CV, Resp, GI, , MS, Neurologic, Psych    Histories:   Patient Active Problem List   Diagnosis     Hyperlipidemia with target  LDL less than 100     Diabetes mellitus type 2 with peripheral artery disease (H)     PAD (peripheral artery disease) (H)     Lumbar spinal stenosis     H/O enucleation of right eyeball     Health Care Home     Pacemaker     Syncope     Bilateral low back pain without sciatica     Cervicalgia     ACP (advance care planning)     Coronary artery disease involving native coronary artery of native heart without angina pectoris     Blindness and low vision     Benign essential hypertension     Physical deconditioning     Blind painful eye     Pressure injury of skin of right buttock, unspecified injury stage     Past Medical History:   Diagnosis Date     CAD (coronary artery disease) 8/2012 8/2012 Cath - 75% RCA with BRIGITTE placed, 40% ostial and distal left main stenosis     Diabetes mellitus type 2 with peripheral artery disease (H)      DM type 2, goal A1c below 7      GERD (gastroesophageal reflux disease)      Gluten intolerance      HTN (hypertension)     been off meds since 2012     Hyperlipidemia LDL goal < 100      Pacemaker 8/2009 8/2009 Near syncopal episode with HR 40s and BP 60/sys - PPM implanted     PAD (peripheral artery disease) (H) 2008    70% proximal right SFA stenosis with successful angioplasy     S/P coronary angiogram 8/2012 8/2012 Cath - 75% RCA with BRIGITTE placed, 40% ostial and distal left main stenosis     Syncope 08/2009 8/2009 Near syncopal episode with HR 40s and BP 60/sys - PPM implanted     Unspecified hypothyroidism      Past Surgical History:   Procedure Laterality Date     ANGIOPLASTY  2008    Angioplasty of proximal right superficial femoral artery     APPENDECTOMY       BACK SURGERY       C REMOVAL OF EYE      right      CHOLECYSTECTOMY, LAPOROSCOPIC      Cholecystectomy, Laparoscopic     COLECTOMY       EYE SURGERY      corneal transplant      HYSTERECTOMY, EMANI      BSO     IMPLANT PACEMAKER  8/2009     STENT, CORONARY, FATOU  8-12 8/2012 Cath - 75% RCA with BRIGITTE placed, 40%  ostial and distal left main stenosis     TONSILLECTOMY & ADENOIDECTOMY       Social History   Substance Use Topics     Smoking status: Former Smoker     Packs/day: 1.00     Years: 3.00     Types: Cigarettes     Quit date: 8/17/1947     Smokeless tobacco: Never Used     Alcohol use No     Family History   Problem Relation Age of Onset     Cancer Mother      Cancer Father      Unknown/Adopted Maternal Grandmother      Unknown/Adopted Maternal Grandfather      Unknown/Adopted Paternal Grandmother      Unknown/Adopted Paternal Grandfather        OBJECTIVE:                                                    /58  Pulse 67  Resp 16  Wt 68 kg (150 lb)  SpO2 97%  BMI 31.9 kg/m2  Body mass index is 31.9 kg/(m^2).   Gen: Cooperative. No acute distress. Obese body habitus noted. Clothing has a few spills on both her shirt and her pants. Hygiene is good.   Eyes: R eye has been enucleated. L eye reactive to light.    Neck: Supple, without masses, lymphadenopathy or tenderness. Wearing Lidocaine patch on R lateral neck, however, it does not appear to be well adhered to the skin.   Heart: Regular rate and rhythm. Pacemaker site in L chest is well healed, pacer is nearly flat to her chest with a well healed scar.   Lungs: Normal respiratory effort. Lungs are clear to auscultation. Good breath sounds bilaterally.   Musculoskeletal: No lower extremity edema.   Skin: 1 cm open wound on R buttock - stage 2 and 5 mm ulcer in superior gluteal cleft - also stage 2. Neither appear infected.   Neurologic: Alert. Repeats herself several times today, but overall, provides a reasonable history.   Psych:  Mood and affect are a little irritable. She states several times she is uncomfortable.      ASSESSMENT/PLAN:                                                      Serena was seen today for diabetes.    Diagnoses and all orders for this visit:    Pressure injury of skin of right buttock, unspecified injury stage  -     WOUND CARE  REFERRAL - will have assessed by wound team, then we can write for cares. Unclear how much assistance is available yet from staff at [facility]. Will address when we talk about how to best care for the wound.   -     ascorbic acid (VITAMIN C) 500 MG tablet; Take 2 tablets (1,000 mg) by mouth 2 times daily For wound healing.   Discussed making sure she is not sitting or lying in bed for long periods of time. Sitting on a protective soft surface. Keeping wound side clean.    Spent considerable time today discussing proper nutrition. At the end of the visit, she seemed to express understanding need to optimize nutrition w/ protein and Vit C PO intake, but it took numerous attempts and numerous methods of rephrasing the instructions.    Offered topical lidocaine to potentially address pain. She declines.     Diabetes mellitus type 2 with peripheral artery disease (H)   DM has been under good control. No med changes today.    Last A1c was very good. We have cut back on her meds a little since then to prevent lows.    Will want to keep blood sugar under reasonable control while pressure sore heals.     Spinal stenosis of lumbar region, unspecified whether neurogenic claudication present   She is managing w/ Lidoderm patches. Her insurance has apparently been covering the cost so far. Unclear if these will be covered in the new year w/ change to Part D.     Benign essential hypertension   I only have 1 data point today which is excellent. No med changes.     >25 min spent with patient, greater than 50% spent on discussion/education/planning, etc. About The primary encounter diagnosis was Pressure injury of skin of right buttock, unspecified injury stage. Diagnoses of Diabetes mellitus type 2 with peripheral artery disease (H), Spinal stenosis of lumbar region, unspecified whether neurogenic claudication present, and Benign essential hypertension were also pertinent to this visit.    Patient Instructions   Let's have a  wound specialist evaluate the wounds on your buttocks. Then I will see in you clinic 1 week after your wound care appointment.     We want to get your protein up to 60 grams of protein daily to help the wounds on your buttock heal.     Glucerna has just 10 grams of protein.      Let's add 1 bottle of Ensure Max daily or an equivalent high protein supplement. Each bottle of ensure Max has 30 grams of protein.     Let's also add a daily Vitamin C tablet. I wrote a paper prescription for you to show to your pharmacist. This is available over the counter.     Your blood sugar and blood pressure are good. No changes to those meds today.             The following health maintenance items are reviewed in Epic and correct as of today:  Health Maintenance   Topic Date Due     PHQ-2 Q1 YR  [birth year]/21/1936     MEDICARE ANNUAL WELLNESS VISIT  [birth year]/21/1942     EYE EXAM Q1 YEAR  02/23/2018     LIPID MONITORING Q1 YEAR  03/07/2018     MICROALBUMIN Q1 YEAR  12/05/2018     A1C Q6 MO  03/10/2019     FALL RISK ASSESSMENT  07/17/2019     FOOT EXAM Q1 YEAR  09/10/2019     CREATININE Q1 YEAR  09/10/2019     TSH W/ FREE T4 REFLEX Q2 YEAR  02/06/2020     ADVANCE DIRECTIVE PLANNING Q5 YRS  11/18/2021     TETANUS IMMUNIZATION (SYSTEM ASSIGNED)  12/16/2023     PNEUMOCOCCAL  Addressed     INFLUENZA VACCINE  Addressed       Pallavi Zayas MD  Family Medicine    For any issues, please call my office  [facility] at [phone].

## 2018-11-26 NOTE — PATIENT INSTRUCTIONS
Let's have a wound specialist evaluate the wounds on your buttocks. Then I will see in you clinic 1 week after your wound care appointment.     We want to get your protein up to 60 grams of protein daily to help the wounds on your buttock heal.     Glucerna has just 10 grams of protein.      Let's add 1 bottle of Ensure Max daily or an equivalent high protein supplement. Each bottle of ensure Max has 30 grams of protein.     Let's also add a daily Vitamin C tablet. I wrote a paper prescription for you to show to your pharmacist. This is available over the counter.     Your blood sugar and blood pressure are good. No changes to those meds today.

## 2018-12-11 ENCOUNTER — HOSPITAL ENCOUNTER (OUTPATIENT)
Dept: WOUND CARE | Facility: CLINIC | Age: 83
Discharge: HOME | End: 2018-12-11
Attending: PHYSICIAN ASSISTANT | Admitting: PHYSICIAN ASSISTANT
Payer: MEDICARE

## 2018-12-11 VITALS
HEIGHT: 58 IN | TEMPERATURE: 97.7 F | DIASTOLIC BLOOD PRESSURE: 60 MMHG | SYSTOLIC BLOOD PRESSURE: 128 MMHG | HEART RATE: 83 BPM | WEIGHT: 154 LBS | RESPIRATION RATE: 16 BRPM | BODY MASS INDEX: 32.32 KG/M2

## 2018-12-11 DIAGNOSIS — L89.319 PRESSURE INJURY OF SKIN OF RIGHT BUTTOCK, UNSPECIFIED INJURY STAGE: ICD-10-CM

## 2018-12-11 PROCEDURE — G0463 HOSPITAL OUTPT CLINIC VISIT: HCPCS

## 2018-12-11 PROCEDURE — 99203 OFFICE O/P NEW LOW 30 MIN: CPT | Performed by: PHYSICIAN ASSISTANT

## 2018-12-11 ASSESSMENT — MIFFLIN-ST. JEOR: SCORE: 980.35

## 2018-12-11 NOTE — DISCHARGE INSTRUCTIONS
[facility]  [address] [facility] [address], [place] [address]  Appointment Phone [phone] [facility]visors [phone]    Serena Marie      8/21/1924    Wound Dressing Change:Right Buttocks  Cleanse wound with: soap and water  Apply critic aid cream to skin surrounding the wound and the wound   Apply cream morning and night and after bowel/bladder movements  Repositioning:    Bed:  Reposition pt MINIMALLY every 1-2 hours in bed to relieve pressure and promote perfusion to tissue.     Chair:  When up to chair pt should not sit for longer than one hour total before either standing or returning to bed for at least 10 minutes, again to relieve pressure and promote perfusion to tissue.     o Pt should also sit on a chair cushion when up to the chair.      Britney Jones PA-C. December 11, 2018    Call us at [phone] if you have any questions about your wounds, have redness or swelling around your wound, have a fever of 101 or greater or if you have any other problems or concerns. We answer the phone Monday through Friday 8 am to 4 pm, please leave a message as we check the voicemail frequently throughout the day.     Follow up with Provider - 2 weeks

## 2018-12-11 NOTE — PROGRESS NOTES
[facility]    HISTORY OF PRESENT ILLNESS: Serena Marie is a 89 year old female who presents today for a pressure ulcer over her right buttocks. Reports that it has been there for a year. Adamant that she is quite active and walks around her facility multiple times a day, not in any one spot for over an hour. Has been apply triamcinolone ointment to the area daily.     NUTRITION: Takes protein shake and vitamin C tablet daily.     REVIEW OF SYSTEMS:  CONSTITUTIONAL: Denies fevers or acute illness  ENDOCRINE: Diabetes is well controlled, last HbA1C was 6.1%    PAST MEDICAL HISTORY:  has a past medical history of CAD (coronary artery disease) (8/2012), Diabetes mellitus type 2 with peripheral artery disease (H), DM type 2, goal A1c below 7, GERD (gastroesophageal reflux disease), Gluten intolerance, HTN (hypertension), Hyperlipidemia LDL goal < 100, Pacemaker (8/2009), PAD (peripheral artery disease) (H) (2008), S/P coronary angiogram (8/2012), Syncope (08/2009), and Unspecified hypothyroidism.    SOCIAL HISTORY: resides at [facility], prefers to do her own dressings    MEDICATIONS:   Current Outpatient Medications   Medication     Acetaminophen (TYLENOL 8 HOUR ARTHRITIS PAIN PO)     acetaminophen (TYLENOL ARTHRITIS PAIN) 650 MG CR tablet     ascorbic acid (VITAMIN C) 500 MG tablet     aspirin 81 MG tablet     atorvastatin (LIPITOR) 80 MG tablet     bacitracin ophthalmic ointment     Benzocaine-Resorcinol (VAGISIL) 5-2 % CREA     blood glucose monitoring (NO BRAND SPECIFIED) test strip     blood glucose monitoring (ONETOUCH ULTRA) test strip     cholecalciferol (VITAMIN D) 1000 UNIT tablet     docusate sodium (DOCQLACE) 100 MG capsule     donepezil (ARICEPT) 5 MG tablet     fish oil-omega-3 fatty acids 1000 MG capsule     fluocinolone acetonide 0.01 % OIL     insulin aspart (NOVOLOG FLEXPEN) 100 UNIT/ML injection     insulin detemir (LEVEMIR FLEXTOUCH) 100 UNIT/ML injection      "insulin pen needle (UNIFINE PENTIPS) 31G X 6 MM     levothyroxine (SYNTHROID/LEVOTHROID) 75 MCG tablet     Lido-Capsaicin-Men-Methyl Sal (MEDI-PATCH-LIDOCAINE EX)     losartan (COZAAR) 25 MG tablet     mirabegron (MYRBETRIQ) 25 MG 24 hr tablet     Multiple Vitamins-Minerals (PRESERVISION AREDS 2 PO)     omeprazole (PRILOSEC) 20 MG CR capsule     phenylephrine-shark liver oil-mineral oil-petrolatum (PREPARATION H) 0.25-14-74.9 % rectal ointment     Pramoxine HCl (VAGISIL ANTI-ITCH MEDICATED EX)     ranitidine (ZANTAC) 150 MG tablet     sodium chloride (TITO 128) 5 % ophthalmic solution     terbinafine (LAMISIL AT) 1 % cream     Terbinafine (LAMISIL EX)     Tetrahydroz-Dextran-PEG-Povid (TH EYE DROP ADVANCED RELIEF) 0.05-0.1-1-1 % SOLN     timolol (TIMOPTIC) 0.5 % ophthalmic solution     triamcinolone (KENALOG) 0.1 % cream     UNABLE TO FIND     No current facility-administered medications for this encounter.      VITALS: /60   Pulse 83   Temp 97.7  F (36.5  C) (Temporal)   Resp 16   Ht 1.461 m (4' 9.5\")   Wt 69.9 kg (154 lb)   BMI 32.75 kg/m      PHYSICAL EXAM:  GENERAL: Patient is alert and oriented and in no acute distress  INTEGUMENTARY:   WOUND ASSESSMENT #1:     Location: right buttock     Size: 1.7 cm x 1.1 cm with a depth of 0.1 cm    Drainage: small amount of serosanguinous drainage    Wound description: shallow, fibrinous slough overlying surface      ASSESSMENT: stage 3 pressure ulcer of right buttocks    PLAN:   1. Dress with CriticAid daily, discontinue TAC  2. Encouraged her to really analyze how much time she spends sitting and lying on this area, limit to no longer than one hour at a time    FOLLOW-UP: 2 weeks    YORDY FRANK PA-C (DYKSTRA)    "

## 2018-12-11 NOTE — PROGRESS NOTES
Patient arrived for wound care visit. Certified Wound Care Nurse time spent evaluating patient record, completed a full evaluation and documented wound(s) & katt-wound skin; provided recommendation based on treatment plan. Applied dressing, reviewed discharge instructions, patient education, and discussed plan of care with appropriate medical team staff members and patient and/or family members.

## 2018-12-12 ENCOUNTER — ANCILLARY PROCEDURE (OUTPATIENT)
Dept: CARDIOLOGY | Facility: CLINIC | Age: 83
End: 2018-12-12
Attending: INTERNAL MEDICINE
Payer: MEDICARE

## 2018-12-12 DIAGNOSIS — Z95.0 PACEMAKER: ICD-10-CM

## 2018-12-12 PROCEDURE — 93293 PM PHONE R-STRIP DEVICE EVAL: CPT | Performed by: INTERNAL MEDICINE

## 2018-12-21 DIAGNOSIS — Z76.0 ENCOUNTER FOR MEDICATION REFILL: ICD-10-CM

## 2018-12-21 LAB
ICDO DEVICE COMMENTS: NORMAL
MDC_IDC_LEAD_IMPLANT_DT: NORMAL
MDC_IDC_LEAD_IMPLANT_DT: NORMAL
MDC_IDC_LEAD_LOCATION: NORMAL
MDC_IDC_LEAD_LOCATION: NORMAL
MDC_IDC_LEAD_MFG: NORMAL
MDC_IDC_LEAD_MFG: NORMAL
MDC_IDC_LEAD_MODEL: NORMAL
MDC_IDC_LEAD_MODEL: NORMAL
MDC_IDC_LEAD_POLARITY_TYPE: NORMAL
MDC_IDC_LEAD_POLARITY_TYPE: NORMAL
MDC_IDC_LEAD_SERIAL: NORMAL
MDC_IDC_LEAD_SERIAL: NORMAL
MDC_IDC_PG_IMPLANT_DTM: NORMAL
MDC_IDC_PG_MFG: NORMAL
MDC_IDC_PG_MODEL: NORMAL
MDC_IDC_PG_SERIAL: NORMAL
MDC_IDC_PG_TYPE: NORMAL
MDC_IDC_SESS_CLINIC_NAME: NORMAL
MDC_IDC_SESS_DTM: NORMAL
MDC_IDC_SESS_TYPE: NORMAL

## 2019-01-08 ENCOUNTER — HOSPITAL ENCOUNTER (OUTPATIENT)
Dept: WOUND CARE | Facility: CLINIC | Age: 84
Discharge: HOME | End: 2019-01-08
Attending: PHYSICIAN ASSISTANT | Admitting: PHYSICIAN ASSISTANT
Payer: MEDICARE

## 2019-01-08 VITALS
SYSTOLIC BLOOD PRESSURE: 105 MMHG | HEART RATE: 80 BPM | RESPIRATION RATE: 16 BRPM | DIASTOLIC BLOOD PRESSURE: 60 MMHG | TEMPERATURE: 97 F

## 2019-01-08 DIAGNOSIS — L89.319 PRESSURE INJURY OF SKIN OF RIGHT BUTTOCK, UNSPECIFIED INJURY STAGE: ICD-10-CM

## 2019-01-08 PROCEDURE — 97597 DBRDMT OPN WND 1ST 20 CM/<: CPT

## 2019-01-08 PROCEDURE — 97597 DBRDMT OPN WND 1ST 20 CM/<: CPT | Performed by: PHYSICIAN ASSISTANT

## 2019-01-08 NOTE — PROGRESS NOTES
[facility]    HISTORY OF PRESENT ILLNESS: Serena Marie is a 89 year old female who presents today for a pressure ulcer over her right buttocks. Reports that it has been there for a year. Adamant that she is quite active and walks around her facility multiple times a day, not in any one spot for over an hour.     INTERVAL HISTORY:    Wound measuring smaller in diameter    Would expect more progress at this point    WOUND CARE: applying CriticAid ointment daily    NUTRITION: Takes protein shake and vitamin C tablet daily.     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    SOCIAL HISTORY: resides at [facility], prefers to do her own dressings    MEDICATIONS:   Current Outpatient Medications   Medication     Acetaminophen (TYLENOL 8 HOUR ARTHRITIS PAIN PO)     acetaminophen (TYLENOL ARTHRITIS PAIN) 650 MG CR tablet     ascorbic acid (VITAMIN C) 500 MG tablet     aspirin 81 MG tablet     atorvastatin (LIPITOR) 80 MG tablet     bacitracin ophthalmic ointment     Benzocaine-Resorcinol (VAGISIL) 5-2 % CREA     blood glucose (ONETOUCH ULTRA) test strip     blood glucose monitoring (ONETOUCH ULTRA) test strip     cholecalciferol (VITAMIN D) 1000 UNIT tablet     docusate sodium (DOCQLACE) 100 MG capsule     donepezil (ARICEPT) 5 MG tablet     fish oil-omega-3 fatty acids 1000 MG capsule     fluocinolone acetonide 0.01 % OIL     insulin aspart (NOVOLOG FLEXPEN) 100 UNIT/ML injection     insulin detemir (LEVEMIR FLEXTOUCH) 100 UNIT/ML injection     insulin pen needle (UNIFINE PENTIPS) 31G X 6 MM      levothyroxine (SYNTHROID/LEVOTHROID) 75 MCG tablet     Lido-Capsaicin-Men-Methyl Sal (MEDI-PATCH-LIDOCAINE EX)     losartan (COZAAR) 25 MG tablet     mirabegron (MYRBETRIQ) 25 MG 24 hr tablet     Multiple Vitamins-Minerals (PRESERVISION AREDS 2 PO)     omeprazole (PRILOSEC) 20 MG CR capsule     phenylephrine-shark liver oil-mineral oil-petrolatum (PREPARATION H) 0.25-14-74.9 % rectal ointment     Pramoxine HCl (VAGISIL ANTI-ITCH MEDICATED EX)     ranitidine (ZANTAC) 150 MG tablet     sodium chloride (TITO 128) 5 % ophthalmic solution     terbinafine (LAMISIL AT) 1 % cream     Terbinafine (LAMISIL EX)     Tetrahydroz-Dextran-PEG-Povid (TH EYE DROP ADVANCED RELIEF) 0.05-0.1-1-1 % SOLN     timolol (TIMOPTIC) 0.5 % ophthalmic solution     triamcinolone (KENALOG) 0.1 % cream     UNABLE TO FIND     No current facility-administered medications for this encounter.      VITALS: /60   Pulse 80   Temp 97  F (36.1  C) (Temporal)   Resp 16     PHYSICAL EXAM:  GENERAL: Patient is alert and oriented and in no acute distress  INTEGUMENTARY:   WOUND ASSESSMENT #1:     Location: right buttock     Size: 1.5 cm x 0.7 cm with a depth of 0.2 cm    Drainage: small amount of serosanguinous drainage    Wound description: slough over granulation tissue        ASSESSMENT: stage 3 pressure ulcer of right buttocks    PLAN:   1. Dress with DuoDerm  2. Encouraged her to really analyze how much time she spends sitting and lying on this area, limit to no longer than one hour at a time  3. Patient will need group 2 mattress due to stage 3 ulceration that has failed to improve on a normal mattress despite regular wound cares and repositioning. Patient has impaired sensation and is unable to reposition independently.  4. The patient needs a (bariatric if >350#) semi electric hospital bed for the following reasons:The patient requires positioning of the body in ways not feasible with an ordinary bed due to the pressure ulcers which are  expected to last at least 1 month. The patient requires a bed height different than a fixed height hospital bed to permit transfers to a wheelchair. The patient requires changes in body position every 2 hours to offload areas of pressure. The patient is repositioned by caregivers as they are unable to make their own body changes due to mobility limitations.     FOLLOW-UP: 2 weeks    YORDY FRANK PA-C (DYKSTRA)

## 2019-01-10 ENCOUNTER — TELEPHONE (OUTPATIENT)
Dept: WOUND CARE | Facility: CLINIC | Age: 84
End: 2019-01-10

## 2019-01-10 NOTE — TELEPHONE ENCOUNTER
Pt called, wants to cancel order for Group 2 mattress and hospital bed. States family does not wanther at the current facility and may reconsider at a later date. Advised pt that mattress will help wound heal with decreasing pressure. Pt expressed understanding but was adamant that she did not want the bed.  [facility] called to cancel bed order.

## 2019-01-14 DIAGNOSIS — E11.51 DIABETES MELLITUS TYPE 2 WITH PERIPHERAL ARTERY DISEASE (H): ICD-10-CM

## 2019-01-22 ENCOUNTER — HOSPITAL ENCOUNTER (OUTPATIENT)
Dept: WOUND CARE | Facility: CLINIC | Age: 84
Discharge: HOME | End: 2019-01-22
Attending: PHYSICIAN ASSISTANT | Admitting: PHYSICIAN ASSISTANT
Payer: MEDICARE

## 2019-01-22 VITALS
SYSTOLIC BLOOD PRESSURE: 99 MMHG | DIASTOLIC BLOOD PRESSURE: 66 MMHG | HEART RATE: 80 BPM | RESPIRATION RATE: 16 BRPM | TEMPERATURE: 97 F

## 2019-01-22 DIAGNOSIS — L89.319 PRESSURE INJURY OF SKIN OF RIGHT BUTTOCK, UNSPECIFIED INJURY STAGE: ICD-10-CM

## 2019-01-22 PROCEDURE — A6212 FOAM DRG <=16 SQ IN W/BORDER: HCPCS

## 2019-01-22 PROCEDURE — 97597 DBRDMT OPN WND 1ST 20 CM/<: CPT | Performed by: PHYSICIAN ASSISTANT

## 2019-01-22 PROCEDURE — A6248 HYDROGEL DRSG GEL FILLER: HCPCS

## 2019-01-22 PROCEDURE — 97597 DBRDMT OPN WND 1ST 20 CM/<: CPT

## 2019-01-22 NOTE — DISCHARGE INSTRUCTIONS
[facility]  [address] [facility] [address], [place] [address]  Appointment Phone [phone] Nurse Advisors [phone]    Serena Marie      8/21/1924    Wound Dressing Change: right buttock  Cleanse wound with:soap and water  Cover wound with Woun' Dres gel   Cover wound with Mepilex  Change dressing every other day.     Britney Jones PA-C. January 22, 2019    Call us at [phone] if you have any questions about your wounds, have redness or swelling around your wound, have a fever of 101 or greater or if you have any other problems or concerns. We answer the phone Monday through Friday 8 am to 4 pm, please leave a message as we check the voicemail frequently throughout the day.     Follow up with Nurse Visit - 2 weeks   Follow up with provider 4 weeks

## 2019-01-22 NOTE — PROGRESS NOTES
[facility]    HISTORY OF PRESENT ILLNESS: Serena Marie is a 89 year old female who returns today for a pressure ulcer over her right buttocks. Reports that it has been there for a year. Adamant that she is quite active and walks around her facility multiple times a day, not in any one spot for over an hour.     INTERVAL HISTORY:    Wound measuring about the same as last visit    Did not like the DuoDerm because she felt that it was too traumatizing with removal    Refused the group 2 mattress as we suggested, instead purchased a new Tempur-pedic mattress    WOUND CARE: applying CriticAid ointment daily    NUTRITION: Takes protein shake and vitamin C tablet daily.     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    SOCIAL HISTORY: resides at [facility], prefers to do her own dressings    MEDICATIONS:   Current Outpatient Medications   Medication     acetaminophen (TYLENOL ARTHRITIS PAIN) 650 MG CR tablet     ascorbic acid (VITAMIN C) 500 MG tablet     aspirin 81 MG tablet     atorvastatin (LIPITOR) 80 MG tablet     bacitracin ophthalmic ointment     Benzocaine-Resorcinol (VAGISIL) 5-2 % CREA     blood glucose (ONETOUCH ULTRA) test strip     blood glucose (ONETOUCH ULTRA) test strip     cholecalciferol (VITAMIN D) 1000 UNIT tablet     docusate sodium (DOCQLACE) 100 MG capsule     donepezil (ARICEPT) 5 MG tablet     fish oil-omega-3 fatty acids 1000 MG capsule     fluocinolone acetonide 0.01 % OIL     insulin aspart (NOVOLOG FLEXPEN) 100 UNIT/ML injection     insulin detemir (LEVEMIR  FLEXTOUCH) 100 UNIT/ML injection     insulin pen needle (UNIFINE PENTIPS) 31G X 6 MM     levothyroxine (SYNTHROID/LEVOTHROID) 75 MCG tablet     Lido-Capsaicin-Men-Methyl Sal (MEDI-PATCH-LIDOCAINE EX)     losartan (COZAAR) 25 MG tablet     mirabegron (MYRBETRIQ) 25 MG 24 hr tablet     Multiple Vitamins-Minerals (PRESERVISION AREDS 2 PO)     omeprazole (PRILOSEC) 20 MG CR capsule     order for DME     phenylephrine-shark liver oil-mineral oil-petrolatum (PREPARATION H) 0.25-14-74.9 % rectal ointment     Pramoxine HCl (VAGISIL ANTI-ITCH MEDICATED EX)     ranitidine (ZANTAC) 150 MG tablet     sodium chloride (TITO 128) 5 % ophthalmic solution     terbinafine (LAMISIL AT) 1 % cream     Terbinafine (LAMISIL EX)     Tetrahydroz-Dextran-PEG-Povid (TH EYE DROP ADVANCED RELIEF) 0.05-0.1-1-1 % SOLN     timolol (TIMOPTIC) 0.5 % ophthalmic solution     triamcinolone (KENALOG) 0.1 % cream     UNABLE TO FIND     No current facility-administered medications for this encounter.      VITALS: BP 99/66   Pulse 80   Temp 97  F (36.1  C) (Temporal)   Resp 16     PHYSICAL EXAM:  GENERAL: Patient is alert and oriented and in no acute distress  INTEGUMENTARY:   Wound (used by OP [facility] only) 01/22/19 0847 Right gluteal pressure injury (Active)   Thickness/Stage Stage 3 1/22/2019  9:39 AM   Dressing Appearance open to air 1/22/2019  8:00 AM   Base pink 1/22/2019  9:39 AM   Black (%), Wound Tissue Color 0 1/22/2019  9:39 AM   Red (%), Wound Tissue Color 100 1/22/2019  9:39 AM   Yellow (%), Wound Tissue Color 0 1/22/2019  9:39 AM   Periwound intact;warm;pink 1/22/2019  9:39 AM   Periwound Temperature warm 1/22/2019  9:39 AM   Periwound Skin Turgor soft 1/22/2019  9:39 AM   Edges open 1/22/2019  9:39 AM   Length (cm) 1.3 1/22/2019  9:39 AM   Width (cm) 0.9 1/22/2019  9:39 AM   Depth (cm) 0.1 1/22/2019  9:39 AM   Wound Volume (cm^3) 0.12 cm^3 1/22/2019  9:39 AM   Wound healing % 0 1/22/2019  9:39 AM   Drainage Characteristics/Odor  serosanguineous 1/22/2019  9:39 AM   Drainage Amount moderate 1/22/2019  9:39 AM   Care, Wound cleansed with;wound cleanser 1/22/2019  9:39 AM   Dressing Care dressing applied 1/22/2019  9:39 AM   Periwound Care absorptive dressing applied 1/22/2019  9:39 AM           ASSESSMENT: stage 3 pressure ulcer of right buttocks    PLAN:   1. Dress with WounDres and Mepilex  2. Continue working on offloading this area    FOLLOW-UP: 2 weeks with RN, 4 weeks with kasey FRANK PA-C (DYKSTRA)

## 2019-02-04 ENCOUNTER — HOSPITAL ENCOUNTER (OUTPATIENT)
Dept: WOUND CARE | Facility: CLINIC | Age: 84
Discharge: HOME | End: 2019-02-04
Attending: PHYSICIAN ASSISTANT | Admitting: PHYSICIAN ASSISTANT
Payer: MEDICARE

## 2019-02-04 VITALS — DIASTOLIC BLOOD PRESSURE: 81 MMHG | SYSTOLIC BLOOD PRESSURE: 160 MMHG | RESPIRATION RATE: 16 BRPM | HEART RATE: 70 BPM

## 2019-02-04 DIAGNOSIS — L89.319 PRESSURE INJURY OF SKIN OF RIGHT BUTTOCK, UNSPECIFIED INJURY STAGE: ICD-10-CM

## 2019-02-04 PROCEDURE — A6212 FOAM DRG <=16 SQ IN W/BORDER: HCPCS

## 2019-02-04 PROCEDURE — 97602 WOUND(S) CARE NON-SELECTIVE: CPT

## 2019-02-04 NOTE — PROGRESS NOTES
[facility] Nurse Note    Subject: Serena aMrie presents for nurse only visit for dressing change buttocks       Exam:  /81   Pulse 70   Resp 16   Wound (used by OP [facility] only) 01/22/19 0847 Right gluteal pressure injury (Active)   Length (cm) 0.8 2/4/2019 11:55 AM   Width (cm) 0.2 2/4/2019 11:55 AM   Depth (cm) 0.1 2/4/2019 11:55 AM   Wound Volume (cm^3) 0.02 cm^3 2/4/2019 11:55 AM   Wound healing % 83.33 2/4/2019 11:55 AM   Dressing Appearance moist drainage 2/4/2019 11:55 AM   Drainage Characteristics/Odor serosanguineous 2/4/2019 11:55 AM   Drainage Amount moderate 2/4/2019 11:55 AM   Thickness/Stage Stage 3 2/4/2019 11:55 AM   Base red/granulating 2/4/2019 11:55 AM   Red (%), Wound Tissue Color 100 2/4/2019 11:55 AM   Periwound intact 2/4/2019 11:55 AM   Periwound Temperature warm 2/4/2019 11:55 AM   Periwound Skin Turgor soft 2/4/2019 11:55 AM   Edges open 2/4/2019 11:55 AM     Procedure:  Nonselect debridement  to remove non-viable tissue less, no bleeding occurred. Patient tolerated procedure well.  Wound was redressed with wound gel and mepilex border.     Plan: Patient will return to the clinic if needed  AZIZA MejiaN, RN, CWOCN  February 4, 2019

## 2019-02-09 DIAGNOSIS — E11.51 DIABETES MELLITUS TYPE 2 WITH PERIPHERAL ARTERY DISEASE (H): ICD-10-CM

## 2019-02-12 ENCOUNTER — MEDICAL CORRESPONDENCE (OUTPATIENT)
Dept: FAMILY MEDICINE | Facility: CLINIC | Age: 84
End: 2019-02-12

## 2019-02-18 ENCOUNTER — OFFICE VISIT (OUTPATIENT)
Dept: FAMILY MEDICINE | Facility: CLINIC | Age: 84
End: 2019-02-18

## 2019-02-18 VITALS
OXYGEN SATURATION: 96 % | BODY MASS INDEX: 32.66 KG/M2 | HEART RATE: 84 BPM | WEIGHT: 153.6 LBS | DIASTOLIC BLOOD PRESSURE: 60 MMHG | RESPIRATION RATE: 16 BRPM | SYSTOLIC BLOOD PRESSURE: 122 MMHG

## 2019-02-18 DIAGNOSIS — I10 BENIGN ESSENTIAL HYPERTENSION: Primary | ICD-10-CM

## 2019-02-18 DIAGNOSIS — G31.9 BRAIN ATROPHY (H): ICD-10-CM

## 2019-02-18 DIAGNOSIS — E11.51 DIABETES MELLITUS TYPE 2 WITH PERIPHERAL ARTERY DISEASE (H): ICD-10-CM

## 2019-02-18 PROCEDURE — 99214 OFFICE O/P EST MOD 30 MIN: CPT | Performed by: FAMILY MEDICINE

## 2019-02-18 NOTE — LETTER
[facility]  [address] [place]  [address]  Phone: [phone]    February 22, 2019      Serena Marie  [facility] [address] [place] [id number]              Dear Serena,    I am writing to report that your included test results are within expected ranges. I do not suggest that we make any changes at this time.         Sincerely,     Biju Dumont M.D.    Results for orders placed or performed in visit on 02/18/19   Comp. Metabolic Panel (14) ([facility])   Result Value Ref Range    Glucose 210 (H) 65 - 99 mg/dL    Urea Nitrogen 21 10 - 36 mg/dL    Creatinine 0.68 0.57 - 1.00 mg/dL    eGFR If NonAfricn Am 75 >59 mL/min/1.73    eGFR If Africn Am 87 >59 mL/min/1.73    BUN/Creatinine Ratio 31 (H) 12 - 28    Sodium 139 134 - 144 mmol/L    Potassium 4.6 3.5 - 5.2 mmol/L    Chloride 102 96 - 106 mmol/L    Total CO2 26 20 - 29 mmol/L    Calcium 9.7 8.7 - 10.3 mg/dL    Protein Total 5.7 (L) 6.0 - 8.5 g/dL    Albumin 3.8 3.2 - 4.6 g/dL    Globulin, Total 1.9 1.5 - 4.5 g/dL    A/G Ratio 2.0 1.2 - 2.2    Bilirubin Total 0.3 0.0 - 1.2 mg/dL    Alkaline Phosphatase 93 39 - 117 IU/L    AST 32 0 - 40 IU/L    ALT 38 (H) 0 - 32 IU/L    Narrative    Performed at:  01 - [facility] [address], [place], [place]  [id number]  : Rodriguez Santoyo MD, Phone:  [phone]   Hemoglobin A1C ([facility])   Result Value Ref Range    Hemoglobin A1C 6.2 (H) 4.8 - 5.6 %    Narrative    Performed at:  01 - [facility] [address], [place], [place]  [id number]  : Rodriguez Santoyo MD, Phone:  [phone]

## 2019-02-18 NOTE — PROGRESS NOTES
Problem(s) Oriented visit        SUBJECTIVE:                                                    Serena Marie is a 89 year old female who presents to clinic today for the following health issues :  1. Diabetes mellitus type 2 with peripheral artery disease (H)  Diabetes  she  reports no new symptoms.  No significnat or regular episodes of hypo or hyperglycemia  Medication compliance: compliant most of the time  Diabetic diet compliance: compliant most of the time  Diabetic ROS: no polyuria or polydipsia, no chest pain, dyspnea or TIA's, no numbness, tingling or pain in extremities    Home blood sugar monitoring: are performed regularly, Review of patients self blood glucose monitoring shows fasting most always < 130 and post prandial average under 170.  As a direct cause of their history of diabetes they have the following Diabetic complications: none and peripheral vascular disease    Most  recent A1C: Smoking: BP:   Lab Results   Component Value Date    A1C 6.1 09/10/2018    A1C 7.1 02/06/2018    A1C 7.3 12/05/2017    History   Smoking Status     Former Smoker     Packs/day: 1.00     Years: 3.00     Types: Cigarettes     Quit date: 8/17/1947   Smokeless Tobacco     Never Used    BP Readings from Last 1 Encounters:   02/18/19 122/60        Kidney studies:  Creatinine   Date Value Ref Range Status   09/10/2018 0.78 0.57 - 1.00 mg/dL Final   02/26/2018 0.58 0.52 - 1.04 mg/dL Final   02/26/2018 0.58 0.52 - 1.04 mg/dL Final   ]    GFR Estimate   Date Value Ref Range Status   02/26/2018 >90 >60 mL/min/1.7m2 Final     Comment:     Non  GFR Calc                No components found for: MICORALBUMINLC      GFR Estimate If Black   Date Value Ref Range Status   02/26/2018 >90 >60 mL/min/1.7m2 Final     Comment:      GFR Calc     Medication (Note: This includes the hypertensive combination subclass to make sure to show all ACEI/ARB's.)     Angiotensin II Receptor Antagonists Sig    losartan  (COZAAR) 25 MG tablet Take 1 tablet (25 mg) by mouth daily for HTN.               Statin and ASA:  Current Outpatient Medications   Medication     acetaminophen (TYLENOL ARTHRITIS PAIN) 650 MG CR tablet     ascorbic acid (VITAMIN C) 500 MG tablet     aspirin 81 MG tablet     atorvastatin (LIPITOR) 80 MG tablet     bacitracin ophthalmic ointment     Benzocaine-Resorcinol (VAGISIL) 5-2 % CREA     blood glucose (ONETOUCH ULTRA) test strip     blood glucose (ONETOUCH ULTRA) test strip     cholecalciferol (VITAMIN D) 1000 UNIT tablet     docusate sodium (DOCQLACE) 100 MG capsule     donepezil (ARICEPT) 5 MG tablet     fish oil-omega-3 fatty acids 1000 MG capsule     fluocinolone acetonide 0.01 % OIL     insulin aspart (NOVOLOG FLEXPEN) 100 UNIT/ML injection     insulin detemir (LEVEMIR FLEXTOUCH) 100 UNIT/ML injection     insulin pen needle (UNIFINE PENTIPS) 31G X 6 MM     levothyroxine (SYNTHROID/LEVOTHROID) 75 MCG tablet     Lido-Capsaicin-Men-Methyl Sal (MEDI-PATCH-LIDOCAINE EX)     losartan (COZAAR) 25 MG tablet     mirabegron (MYRBETRIQ) 25 MG 24 hr tablet     Multiple Vitamins-Minerals (PRESERVISION AREDS 2 PO)     omeprazole (PRILOSEC) 20 MG CR capsule     order for DME     order for DME     phenylephrine-shark liver oil-mineral oil-petrolatum (PREPARATION H) 0.25-14-74.9 % rectal ointment     Pramoxine HCl (VAGISIL ANTI-ITCH MEDICATED EX)     ranitidine (ZANTAC) 150 MG tablet     sodium chloride (TITO 128) 5 % ophthalmic solution     terbinafine (LAMISIL AT) 1 % cream     Terbinafine (LAMISIL EX)     Tetrahydroz-Dextran-PEG-Povid (TH EYE DROP ADVANCED RELIEF) 0.05-0.1-1-1 % SOLN     timolol (TIMOPTIC) 0.5 % ophthalmic solution     triamcinolone (KENALOG) 0.1 % cream     UNABLE TO FIND     No current facility-administered medications for this visit.        - Comp. Metabolic Panel (14) ([facility])  - Hemoglobin A1C ([facility])    2. Benign essential hypertension  Blood presure remains well controlled when checked out  of clinic.    Reviewed last 6 BP readings in chart:  BP Readings from Last 6 Encounters:   02/18/19 122/60   02/04/19 160/81   01/22/19 99/66   01/08/19 105/60   12/11/18 128/60   11/26/18 122/58       she has not experienced any significant side effects from medications for hypertension.    NO active cardiac complaints or symptoms with exercise.      3. Brain atrophy  Long discussion, ok short term memory especially while shopping         Problem list, Medication list, Allergies, and Medical/Social/Surgical histories reviewed in EPIC and updated as appropriate.   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)     PAD (peripheral artery disease) (H)     Lumbar spinal stenosis     H/O enucleation of right eyeball     Health Care Home     Pacemaker     Syncope     Bilateral low back pain without sciatica     Cervicalgia     ACP (advance care planning)     Coronary artery disease involving native coronary artery of native heart without angina pectoris     Blindness and low vision     Benign essential hypertension     Physical deconditioning     Blind painful eye     Pressure injury of skin of right buttock, unspecified injury stage     Past Surgical History:   Procedure Laterality Date     ANGIOPLASTY  2008    Angioplasty of proximal right superficial femoral artery     APPENDECTOMY       BACK SURGERY       C REMOVAL OF EYE      right      CHOLECYSTECTOMY, LAPOROSCOPIC      Cholecystectomy, Laparoscopic     COLECTOMY       EYE SURGERY      corneal transplant      HYSTERECTOMY, EMANI      BSO     IMPLANT PACEMAKER  8/2009     STENT, CORONARY, FATOU  8-12 8/2012 Cath - 75% RCA with BRIGITTE placed, 40% ostial and distal left main stenosis     TONSILLECTOMY & ADENOIDECTOMY         Social History     Tobacco  Use     Smoking status: Former Smoker     Packs/day: 1.00     Years: 3.00     Pack years: 3.00     Types: Cigarettes     Last attempt to quit: 1947     Years since quittin.5     Smokeless tobacco: Never Used   Substance Use Topics     Alcohol use: No     Family History   Problem Relation Age of Onset     Cancer Mother      Cancer Father      Unknown/Adopted Maternal Grandmother      Unknown/Adopted Maternal Grandfather      Unknown/Adopted Paternal Grandmother      Unknown/Adopted Paternal Grandfather            OBJECTIVE:                                                    /60   Pulse 84   Resp 16   Wt 69.7 kg (153 lb 9.6 oz)   SpO2 96%   BMI 32.66 kg/m    Body mass index is 32.66 kg/m .   APPEARANCE: = Relaxed and in no distress  Conj/Eyelids = noninjected and lids and lashes are without inflammation  PERRLA/Irises = Pupils Round Reactive to Light and Irisis without inflammation  Neck = No anterior or posterior adenopathy appreciated.  Thyroid = Not enlarged and no masses felt  Resp effort = Calm regular breathing  Breath Sounds = Good air movement with no rales or rhonchi in any lung fields  Heart Rate, Rhythm, & sounds (no Murm)  = Regular rate and rhythm with no S3, S4, or murmur appreciated.  Carotid Art's = Pulses full and equal and no bruits appreciated  Abdomen = Soft, nontender, no masses, & bowel sounds in all quadrants  Liver/Spleen = Normal span and no splenomegaly noted  Digits and Nails = FROM in all finger joints, no nail dystrophy  Ext (edema) = No pretibial edema noted or elsewhere  Musculsktl =  Strength and ROM of major joints are within normal limits  SKIN = absent significant rashes or lesions   Recent/Remote Memory = Alert and Oriented x 3  Mood/Affect = Cooperative and interested     ASSESSMENT/PLAN:                                                        Serena was seen today for memory loss.    Diagnoses and all orders for this visit:    Benign essential  hypertension    Diabetes mellitus type 2 with peripheral artery disease (H)  -     Comp. Metabolic Panel (14) ([facility])  -     Hemoglobin A1C ([facility])    Brain atrophy        Regular exercise  See Patient Instructions    The following health maintenance items are reviewed in Epic and correct as of today:  Health Maintenance   Topic Date Due     PHQ-2 Q1 YR  08/21/1936     MEDICARE ANNUAL WELLNESS VISIT  08/21/1942     DTAP/TDAP/TD IMMUNIZATION (1 - Tdap) 08/21/1949     ZOSTER IMMUNIZATION (1 of 2) 08/21/1974     EYE EXAM Q1 YEAR  02/23/2018     LIPID MONITORING Q1 YEAR  03/07/2018     MICROALBUMIN Q1 YEAR  12/05/2018     A1C Q6 MO  03/10/2019     FALL RISK ASSESSMENT  07/17/2019     FOOT EXAM Q1 YEAR  09/10/2019     CREATININE Q1 YEAR  09/10/2019     TSH W/ FREE T4 REFLEX Q2 YEAR  02/06/2020     ADVANCE DIRECTIVE PLANNING Q5 YRS  11/18/2021     INFLUENZA VACCINE  Addressed     IPV IMMUNIZATION  Aged Out     MENINGITIS IMMUNIZATION  Aged Out       Biju Dumont MD  [facility]  Family Practice  [facility]  [phone]    For any issues my office # is [phone]

## 2019-02-19 LAB
ALBUMIN SERPL-MCNC: 3.8 G/DL (ref 3.2–4.6)
ALBUMIN/GLOB SERPL: 2 {RATIO} (ref 1.2–2.2)
ALP SERPL-CCNC: 93 IU/L (ref 39–117)
ALT SERPL-CCNC: 38 IU/L (ref 0–32)
AST SERPL-CCNC: 32 IU/L (ref 0–40)
BILIRUB SERPL-MCNC: 0.3 MG/DL (ref 0–1.2)
BUN SERPL-MCNC: 21 MG/DL (ref 10–36)
BUN/CREATININE RATIO: 31 (ref 12–28)
CALCIUM SERPL-MCNC: 9.7 MG/DL (ref 8.7–10.3)
CHLORIDE SERPLBLD-SCNC: 102 MMOL/L (ref 96–106)
CREAT SERPL-MCNC: 0.68 MG/DL (ref 0.57–1)
EGFR IF AFRICN AM: 87 ML/MIN/1.73
EGFR IF NONAFRICN AM: 75 ML/MIN/1.73
GLOBULIN, TOTAL: 1.9 G/DL (ref 1.5–4.5)
GLUCOSE SERPL-MCNC: 210 MG/DL (ref 65–99)
HBA1C MFR BLD: 6.2 % (ref 4.8–5.6)
POTASSIUM SERPL-SCNC: 4.6 MMOL/L (ref 3.5–5.2)
PROT SERPL-MCNC: 5.7 G/DL (ref 6–8.5)
SODIUM SERPL-SCNC: 139 MMOL/L (ref 134–144)
TOTAL CO2: 26 MMOL/L (ref 20–29)

## 2019-02-22 NOTE — RESULT ENCOUNTER NOTE
Dear Serena,   I am writing to report that your included test results are within expected ranges. I do not suggest that we make any changes at this time.  Biju Dumont M.D.

## 2019-03-04 ENCOUNTER — HOSPITAL ENCOUNTER (OUTPATIENT)
Dept: WOUND CARE | Facility: CLINIC | Age: 84
Discharge: HOME | End: 2019-03-04
Attending: PHYSICIAN ASSISTANT | Admitting: PHYSICIAN ASSISTANT
Payer: MEDICARE

## 2019-03-04 ENCOUNTER — MEDICAL CORRESPONDENCE (OUTPATIENT)
Dept: FAMILY MEDICINE | Facility: CLINIC | Age: 84
End: 2019-03-04

## 2019-03-04 VITALS
RESPIRATION RATE: 18 BRPM | TEMPERATURE: 97.6 F | SYSTOLIC BLOOD PRESSURE: 160 MMHG | DIASTOLIC BLOOD PRESSURE: 91 MMHG | HEART RATE: 78 BPM

## 2019-03-04 DIAGNOSIS — L89.319 PRESSURE INJURY OF SKIN OF RIGHT BUTTOCK, UNSPECIFIED INJURY STAGE: ICD-10-CM

## 2019-03-04 PROCEDURE — G0463 HOSPITAL OUTPT CLINIC VISIT: HCPCS

## 2019-03-04 NOTE — PROGRESS NOTES
[facility] [place] Nurse Note    Subject: Serena Marie presents for nurse only visit for assessment of wound      Exam:  BP (!) 160/91   Pulse 78   Temp 97.6  F (36.4  C) (Temporal)   Resp 18      Wound Healed.  Wound was redressed with critic aid to prevent skin breakdown from urine.     Plan: Patient will follow up if needed.  Gabriella Warren, AZIZAN, RN, CWOCN  March 4, 2019

## 2019-03-04 NOTE — DISCHARGE INSTRUCTIONS
[facility]  [address] [facility] [address], [place] [address]  Appointment Phone [phone] Nurse Advisors [phone]    Serena Marie      8/21/1924  Your wound is Healed!! Dressings are no longer required.     Apply barrier cream (example critic aid, shakir, a&d ointment) to buttocks morning and night and with soiling of undergarments.   Reposition frequently.      Britney Jones PA-C. March 4, 2019    Call us at [phone] if you have any questions about your wounds, have redness or swelling around your wound, have a fever of 101 or greater or if you have any other problems or concerns. We answer the phone Monday through Friday 8 am to 4 pm, please leave a message as we check the voicemail frequently throughout the day.     Follow up with Provider - none

## 2019-03-25 ENCOUNTER — ANCILLARY PROCEDURE (OUTPATIENT)
Dept: CARDIOLOGY | Facility: CLINIC | Age: 84
End: 2019-03-25
Attending: INTERNAL MEDICINE
Payer: MEDICARE

## 2019-03-25 DIAGNOSIS — Z95.0 CARDIAC PACEMAKER IN SITU: Primary | ICD-10-CM

## 2019-03-25 PROCEDURE — 93293 PM PHONE R-STRIP DEVICE EVAL: CPT | Performed by: INTERNAL MEDICINE

## 2019-04-15 ENCOUNTER — OFFICE VISIT (OUTPATIENT)
Dept: FAMILY MEDICINE | Facility: CLINIC | Age: 84
End: 2019-04-15

## 2019-04-15 ENCOUNTER — OFFICE VISIT (OUTPATIENT)
Dept: PHARMACY | Facility: PHYSICIAN GROUP | Age: 84
End: 2019-04-15
Payer: COMMERCIAL

## 2019-04-15 VITALS
OXYGEN SATURATION: 99 % | RESPIRATION RATE: 16 BRPM | WEIGHT: 152.8 LBS | HEART RATE: 75 BPM | DIASTOLIC BLOOD PRESSURE: 76 MMHG | BODY MASS INDEX: 32.49 KG/M2 | SYSTOLIC BLOOD PRESSURE: 122 MMHG

## 2019-04-15 DIAGNOSIS — E11.51 DIABETES MELLITUS TYPE 2 WITH PERIPHERAL ARTERY DISEASE (H): ICD-10-CM

## 2019-04-15 DIAGNOSIS — G31.9 BRAIN ATROPHY (H): Primary | ICD-10-CM

## 2019-04-15 PROCEDURE — 99214 OFFICE O/P EST MOD 30 MIN: CPT | Performed by: FAMILY MEDICINE

## 2019-04-15 PROCEDURE — 99207 ZZC NO CHARGE LOS: CPT | Mod: GY | Performed by: PHARMACIST

## 2019-04-15 NOTE — PROGRESS NOTES
Problem(s) Oriented visit        SUBJECTIVE:                                                    Serena Marie is a 89 year old female who presents to clinic today for the following health issues :  Brain atrophy  After a long discussion we reviewed that while she  hopes to live happily for many more years, she clearly understands and wants DNR/DNI in the event of her  demise.         Problem list, Medication list, Allergies, and Medical/Social/Surgical histories reviewed in EPIC and updated as appropriate.   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)     PAD (peripheral artery disease) (H)     Lumbar spinal stenosis     H/O enucleation of right eyeball     Health Care Home     Pacemaker     Syncope     Bilateral low back pain without sciatica     Cervicalgia     ACP (advance care planning)     Coronary artery disease involving native coronary artery of native heart without angina pectoris     Blindness and low vision     Benign essential hypertension     Physical deconditioning     Blind painful eye     Pressure injury of skin of right buttock, unspecified injury stage     Past Surgical History:   Procedure Laterality Date     ANGIOPLASTY  2008    Angioplasty of proximal right superficial femoral artery     APPENDECTOMY       BACK SURGERY       C REMOVAL OF EYE      right      CHOLECYSTECTOMY, LAPOROSCOPIC      Cholecystectomy, Laparoscopic     COLECTOMY       EYE SURGERY      corneal transplant      HYSTERECTOMY, EMANI      BSO     IMPLANT PACEMAKER  8/2009     STENT, CORONARY, FATOU  8-12 8/2012 Cath - 75% RCA with BRIGITTE placed, 40% ostial and distal left main stenosis     TONSILLECTOMY & ADENOIDECTOMY         Social History     Tobacco Use     Smoking status: Former Smoker     Packs/day:  1.00     Years: 3.00     Pack years: 3.00     Types: Cigarettes     Last attempt to quit: 1947     Years since quittin.7     Smokeless tobacco: Never Used   Substance Use Topics     Alcohol use: No     Family History   Problem Relation Age of Onset     Cancer Mother      Cancer Father      Unknown/Adopted Maternal Grandmother      Unknown/Adopted Maternal Grandfather      Unknown/Adopted Paternal Grandmother      Unknown/Adopted Paternal Grandfather            OBJECTIVE:                                                    /76   Pulse 75   Resp 16   Wt 69.3 kg (152 lb 12.8 oz)   SpO2 99%   BMI 32.49 kg/m    Body mass index is 32.49 kg/m .   APPEARANCE: = alert and mild distress  Conj/Eyelids = noninjected and lids and lashes are without inflammation  PERRLA/Irises = Pupils Round Reactive to Light and Irisis without inflammation  Neck = No anterior or posterior adenopathy appreciated.  Thyroid = Not enlarged and no masses felt  Resp effort = Calm regular breathing  Breath Sounds = Good air movement with no rales or rhonchi in any lung fields  Heart Rate, Rhythm, & sounds (no Murm)  = Regular rate and rhythm with no S3, S4, or murmur appreciated.  Carotid Art's = Pulses full and equal and no bruits appreciated  Abdomen = Soft, nontender, no masses, & bowel sounds in all quadrants  Liver/Spleen = Normal span and no splenomegaly noted  Digits and Nails = FROM in all finger joints, no nail dystrophy  Ext (edema) = No pretibial edema noted or elsewhere  Musculsktl =  Strength and ROM of major joints are within normal limits  SKIN = absent significant rashes or lesions   Recent/Remote Memory = Alert and Oriented x 3  Mood/Affect = Cooperative and interested     ASSESSMENT/PLAN:                                                        Serena was seen today for memory loss and anxiety.    Diagnoses and all orders for this visit:    Brain atrophy    Other orders  -     DNR/DNI    STOP the  eloisa  .      Here with daughter.  After a long discussion we reviewed that while she  hopes to live happily for many more years, she clearly understands and wants DNR/DNI in the event of her  demise.     >25 min spent with patient, greater than 50% spent on discussion/education/planning, etc. About The encounter diagnosis was Brain atrophy.      Regular exercise  See Patient Instructions    The following health maintenance items are reviewed in Epic and correct as of today:  Health Maintenance   Topic Date Due     PHQ-2 Q1 YR  08/21/1936     DTAP/TDAP/TD IMMUNIZATION (1 - Tdap) 08/21/1949     ZOSTER IMMUNIZATION (1 of 2) 08/21/1974     MEDICARE ANNUAL WELLNESS VISIT  08/21/1989     EYE EXAM Q1 YEAR  02/23/2018     LIPID MONITORING Q1 YEAR  03/07/2018     MICROALBUMIN Q1 YEAR  12/05/2018     FALL RISK ASSESSMENT  07/17/2019     A1C Q6 MO  08/18/2019     FOOT EXAM Q1 YEAR  09/10/2019     TSH W/ FREE T4 REFLEX Q2 YEAR  02/06/2020     CREATININE Q1 YEAR  02/18/2020     ADVANCE DIRECTIVE PLANNING Q5 YRS  11/18/2021     INFLUENZA VACCINE  Addressed     IPV IMMUNIZATION  Aged Out     MENINGITIS IMMUNIZATION  Aged Out       Biju Dumont MD  [facility]  Family Practice  [facility]  [phone]    For any issues my office # is [phone]

## 2019-04-15 NOTE — PROGRESS NOTES
Clinical Consult:                                                    Serena Marie is a 89 year old female coming in for a clinical pharmacist consult.  She was referred to me from patient's daughter.     Reason for Consult: Medication costs are up. Added on after PCP visit today, limited time to meet.     Discussion: Reviewed monthly statement and Novolog and Myrbetriq are the higher cost medications at this time.   PCP has stopped Myrbetriq today.   Lab Results   Component Value Date    A1C 6.2 02/18/2019    A1C 6.1 09/10/2018    A1C 7.1 02/06/2018    A1C 7.3 12/05/2017    A1C 7.0 09/05/2017       Patient lives in [place] and does have cognitive impairment. She does her own finger sticks and her own insulin administration.   Levemir 14 units once daily.   Novolog 12 units with Breakfast, lunch and dinner. Did not have sugar log with her but states that they are good and can get over 200, so doesn't want to change her insulin.     Daughter here with her today- they have a care conference with LTC today and will follow back up with MTM after this.     Discussed that given age her blood sugars may likely be over treated and would be safer to use less of the Novolog. Patient is not convinced of this.     Plan:  1. May consider dose reduction of Novolog pending blood sugar log from LT.   Daughter will call back with these.     Cass Zeng, Pharm.D, [facility]  Medication Therapy Management Pharmacist  [phone]

## 2019-04-19 ENCOUNTER — MEDICAL CORRESPONDENCE (OUTPATIENT)
Dept: FAMILY MEDICINE | Facility: CLINIC | Age: 84
End: 2019-04-19

## 2019-05-21 ENCOUNTER — MEDICAL CORRESPONDENCE (OUTPATIENT)
Dept: FAMILY MEDICINE | Facility: CLINIC | Age: 84
End: 2019-05-21

## 2019-05-23 ENCOUNTER — TRANSFERRED RECORDS (OUTPATIENT)
Dept: FAMILY MEDICINE | Facility: CLINIC | Age: 84
End: 2019-05-23

## 2019-05-28 NOTE — PROGRESS NOTES
Daughter called back after consult appointment to notify MTM that sugars have all been higher than she thought and they want to keep medication doses the same for now.     Cass Zeng, Pharm.D, [facility]  Medication Therapy Management Pharmacist  [phone]

## 2019-06-10 ENCOUNTER — ANCILLARY PROCEDURE (OUTPATIENT)
Dept: CARDIOLOGY | Facility: CLINIC | Age: 84
End: 2019-06-10
Attending: INTERNAL MEDICINE
Payer: MEDICARE

## 2019-06-10 DIAGNOSIS — Z95.0 CARDIAC PACEMAKER IN SITU: ICD-10-CM

## 2019-06-10 PROCEDURE — 93280 PM DEVICE PROGR EVAL DUAL: CPT | Performed by: INTERNAL MEDICINE

## 2019-06-14 ENCOUNTER — TELEPHONE (OUTPATIENT)
Dept: FAMILY MEDICINE | Facility: CLINIC | Age: 84
End: 2019-06-14

## 2019-06-14 LAB
MDC_IDC_LEAD_IMPLANT_DT: NORMAL
MDC_IDC_LEAD_IMPLANT_DT: NORMAL
MDC_IDC_LEAD_LOCATION: NORMAL
MDC_IDC_LEAD_LOCATION: NORMAL
MDC_IDC_LEAD_MFG: NORMAL
MDC_IDC_LEAD_MFG: NORMAL
MDC_IDC_LEAD_MODEL: NORMAL
MDC_IDC_LEAD_MODEL: NORMAL
MDC_IDC_LEAD_POLARITY_TYPE: NORMAL
MDC_IDC_LEAD_POLARITY_TYPE: NORMAL
MDC_IDC_LEAD_SERIAL: NORMAL
MDC_IDC_LEAD_SERIAL: NORMAL
MDC_IDC_MSMT_BATTERY_REMAINING_LONGEVITY: NORMAL
MDC_IDC_MSMT_BATTERY_STATUS: NORMAL
MDC_IDC_MSMT_LEADCHNL_RA_IMPEDANCE_VALUE: 520 OHM
MDC_IDC_MSMT_LEADCHNL_RA_PACING_THRESHOLD_AMPLITUDE: 0.7 V
MDC_IDC_MSMT_LEADCHNL_RA_PACING_THRESHOLD_PULSEWIDTH: 0.5 MS
MDC_IDC_MSMT_LEADCHNL_RA_SENSING_INTR_AMPL: 3.2 MV
MDC_IDC_MSMT_LEADCHNL_RV_IMPEDANCE_VALUE: 540 OHM
MDC_IDC_MSMT_LEADCHNL_RV_PACING_THRESHOLD_AMPLITUDE: 0.8 V
MDC_IDC_MSMT_LEADCHNL_RV_PACING_THRESHOLD_PULSEWIDTH: 0.4 MS
MDC_IDC_MSMT_LEADCHNL_RV_SENSING_INTR_AMPL: 12 MV
MDC_IDC_PG_IMPLANT_DTM: NORMAL
MDC_IDC_PG_MFG: NORMAL
MDC_IDC_PG_MODEL: NORMAL
MDC_IDC_PG_SERIAL: NORMAL
MDC_IDC_PG_TYPE: NORMAL
MDC_IDC_SESS_CLINIC_NAME: NORMAL
MDC_IDC_SESS_DTM: NORMAL
MDC_IDC_SESS_TYPE: NORMAL
MDC_IDC_SET_BRADY_AT_MODE_SWITCH_MODE: NORMAL
MDC_IDC_SET_BRADY_AT_MODE_SWITCH_RATE: 170 {BEATS}/MIN
MDC_IDC_SET_BRADY_HYSTRATE: NORMAL
MDC_IDC_SET_BRADY_LOWRATE: 60 {BEATS}/MIN
MDC_IDC_SET_BRADY_MAX_SENSOR_RATE: 130 {BEATS}/MIN
MDC_IDC_SET_BRADY_MAX_TRACKING_RATE: 130 {BEATS}/MIN
MDC_IDC_SET_BRADY_MODE: NORMAL
MDC_IDC_SET_BRADY_PAV_DELAY_HIGH: 180 MS
MDC_IDC_SET_BRADY_PAV_DELAY_LOW: 200 MS
MDC_IDC_SET_BRADY_SAV_DELAY_HIGH: 180 MS
MDC_IDC_SET_BRADY_SAV_DELAY_LOW: 200 MS
MDC_IDC_SET_LEADCHNL_RA_PACING_AMPLITUDE: 2 V
MDC_IDC_SET_LEADCHNL_RA_PACING_ANODE_ELECTRODE_1: NORMAL
MDC_IDC_SET_LEADCHNL_RA_PACING_ANODE_LOCATION_1: NORMAL
MDC_IDC_SET_LEADCHNL_RA_PACING_CAPTURE_MODE: NORMAL
MDC_IDC_SET_LEADCHNL_RA_PACING_CATHODE_ELECTRODE_1: NORMAL
MDC_IDC_SET_LEADCHNL_RA_PACING_CATHODE_LOCATION_1: NORMAL
MDC_IDC_SET_LEADCHNL_RA_PACING_POLARITY: NORMAL
MDC_IDC_SET_LEADCHNL_RA_PACING_PULSEWIDTH: 0.5 MS
MDC_IDC_SET_LEADCHNL_RA_SENSING_ADAPTATION_MODE: NORMAL
MDC_IDC_SET_LEADCHNL_RA_SENSING_ANODE_ELECTRODE_1: NORMAL
MDC_IDC_SET_LEADCHNL_RA_SENSING_ANODE_LOCATION_1: NORMAL
MDC_IDC_SET_LEADCHNL_RA_SENSING_CATHODE_ELECTRODE_1: NORMAL
MDC_IDC_SET_LEADCHNL_RA_SENSING_CATHODE_LOCATION_1: NORMAL
MDC_IDC_SET_LEADCHNL_RA_SENSING_POLARITY: NORMAL
MDC_IDC_SET_LEADCHNL_RA_SENSING_SENSITIVITY: 0.75 MV
MDC_IDC_SET_LEADCHNL_RV_PACING_AMPLITUDE: 1.3 V
MDC_IDC_SET_LEADCHNL_RV_PACING_ANODE_ELECTRODE_1: NORMAL
MDC_IDC_SET_LEADCHNL_RV_PACING_ANODE_LOCATION_1: NORMAL
MDC_IDC_SET_LEADCHNL_RV_PACING_CAPTURE_MODE: NORMAL
MDC_IDC_SET_LEADCHNL_RV_PACING_CATHODE_ELECTRODE_1: NORMAL
MDC_IDC_SET_LEADCHNL_RV_PACING_CATHODE_LOCATION_1: NORMAL
MDC_IDC_SET_LEADCHNL_RV_PACING_POLARITY: NORMAL
MDC_IDC_SET_LEADCHNL_RV_PACING_PULSEWIDTH: 0.4 MS
MDC_IDC_SET_LEADCHNL_RV_SENSING_ADAPTATION_MODE: NORMAL
MDC_IDC_SET_LEADCHNL_RV_SENSING_ANODE_ELECTRODE_1: NORMAL
MDC_IDC_SET_LEADCHNL_RV_SENSING_ANODE_LOCATION_1: NORMAL
MDC_IDC_SET_LEADCHNL_RV_SENSING_CATHODE_ELECTRODE_1: NORMAL
MDC_IDC_SET_LEADCHNL_RV_SENSING_CATHODE_LOCATION_1: NORMAL
MDC_IDC_SET_LEADCHNL_RV_SENSING_POLARITY: NORMAL
MDC_IDC_SET_LEADCHNL_RV_SENSING_SENSITIVITY: 2.5 MV
MDC_IDC_STAT_AT_DTM_END: NORMAL
MDC_IDC_STAT_AT_DTM_START: NORMAL
MDC_IDC_STAT_AT_MODE_SW_COUNT: 0
MDC_IDC_STAT_AT_MODE_SW_MAX_DURATION: 0 S
MDC_IDC_STAT_BRADY_DTM_END: NORMAL
MDC_IDC_STAT_BRADY_DTM_START: NORMAL
MDC_IDC_STAT_BRADY_RA_PERCENT_PACED: 7 %
MDC_IDC_STAT_BRADY_RV_PERCENT_PACED: 45 %
MDC_IDC_STAT_EPISODE_RECENT_COUNT: 0
MDC_IDC_STAT_EPISODE_RECENT_COUNT_DTM_END: NORMAL
MDC_IDC_STAT_EPISODE_RECENT_COUNT_DTM_START: NORMAL
MDC_IDC_STAT_EPISODE_TYPE: NORMAL

## 2019-06-14 NOTE — TELEPHONE ENCOUNTER
Triage received call this AM from Josefa with [facility]. Josefa called to report that patient was given incorrect type of insulin this AM. Patient was to get 15units Novolog and instead received 15units Levemir. Spoke with Cass Villatoro and Dr. Dumont regarding medication admin error. Plan as follows:  Today 6/14/19:  1. Hold 15units breakfast Novolog  2. Hold HS 15units Levemir  3. Decrease lunch and dinner dose         Novolog to 6units each  Starting tomorrow insulin as follows:  1. Levemir dose @ HS 14units  2. Breakfast lunch and dinner dose Novolog 12units each  3. Discontinue HS Novolog.    Instructions called to Josefa and faxed to [phone]. Christine Carroll

## 2019-06-17 ENCOUNTER — OFFICE VISIT (OUTPATIENT)
Dept: CARDIOLOGY | Facility: CLINIC | Age: 84
End: 2019-06-17
Payer: MEDICARE

## 2019-06-17 VITALS
BODY MASS INDEX: 33.66 KG/M2 | WEIGHT: 156 LBS | HEIGHT: 57 IN | HEART RATE: 78 BPM | DIASTOLIC BLOOD PRESSURE: 82 MMHG | SYSTOLIC BLOOD PRESSURE: 141 MMHG

## 2019-06-17 DIAGNOSIS — I25.10 CORONARY ARTERY DISEASE INVOLVING NATIVE HEART WITHOUT ANGINA PECTORIS, UNSPECIFIED VESSEL OR LESION TYPE: Primary | ICD-10-CM

## 2019-06-17 DIAGNOSIS — Z95.0 CARDIAC PACEMAKER IN SITU: ICD-10-CM

## 2019-06-17 PROCEDURE — 99214 OFFICE O/P EST MOD 30 MIN: CPT | Performed by: INTERNAL MEDICINE

## 2019-06-17 ASSESSMENT — MIFFLIN-ST. JEOR: SCORE: 981.49

## 2019-06-17 NOTE — PROGRESS NOTES
Service Date: 06/17/2019      REASON FOR VISIT:  Routine followup for CAD.      HISTORY OF PRESENT ILLNESS:  A very pleasant 89-year-old female who has a history of coronary artery disease, status post remote PCI to the right coronary artery.  A patient of Dr. Auguste for many years.   Could not get in to see her on time and as such she is following up with me in clinic.  Also has a history of permanent pacemaker for symptomatic bradycardia.  She is asymptomatic from this standpoint.  She is here for routine followup.  She says she continues to feel well.  Denies chest pain, shortness of breath, abdominal pain, back pain, syncope, presyncope or palpitations.  She says she has absolutely no complaints and she is here because she was told to come here.      PHYSICAL EXAMINATION:   VITAL SIGNS:  Blood pressure is 140/82, pulse of 78.   GENERAL:  Alert and oriented x3, no acute distress.   CARDIAC:  Sounds are regular.  Soft ejection systolic murmur, but the S2 is preserved.   EXTREMITIES:  Warm without edema.   ABDOMEN:  Without rebound or guarding.   LUNGS:  Clear to auscultation bilaterally.      PERTINENT DATA:  Her last echocardiogram was in 2016 that showed normal LV function.  Her last LDL was at goal back in 2017.  She remains on aspirin and statin.      ASSESSMENT AND PLAN:  A 89-year-old female with coronary disease and symptomatic bradycardia, status post pacemaker.  Overall doing well.  No symptoms.  She is here for routine followup.  The numbers are at goal and she is completely asymptomatic.  I will have her follow up in 1 year with Dr. Auguste with an echocardiogram.  Otherwise, she says she has no complaints.  I have counseled her to watch for symptoms of shortness of breath, passing out, palpitations, presyncope or syncope.  If that happens, then sooner attention with Cardiology would be required.  Otherwise, 1 year routine followup is fine.  Continue to follow up with PCP.  Please call us with questions       cc:   Biju Dumont MD   [facility] [address] [place]  [address]         ANTONIO CERVANTES MD             D: 2019   T: 2019   MT: LARS      Name:     CHRISTINA GANDHI   MRN:      [id number]        Account:      [id number]   :      1924           Service Date: 2019      Document: [id number]

## 2019-06-17 NOTE — PROGRESS NOTES
HPI and Plan:   See dictation [id number]    Orders Placed This Encounter   Procedures     Follow-Up with Cardiologist     Echocardiogram Complete     Orders Placed This Encounter   Medications     ERYTHROMYCIN OP     There are no discontinued medications.      Encounter Diagnoses   Name Primary?     Cardiac pacemaker in situ      Coronary artery disease involving native heart without angina pectoris, unspecified vessel or lesion type Yes       CURRENT MEDICATIONS:  Current Outpatient Medications   Medication Sig Dispense Refill     acetaminophen (TYLENOL ARTHRITIS PAIN) 650 MG CR tablet Take 1,300 mg by mouth daily States 2 tablets qam & usually 2 tablets qpm       ascorbic acid (VITAMIN C) 500 MG tablet Take 2 tablets (1,000 mg) by mouth 2 times daily For wound healing. 180 tablet 3     aspirin 81 MG tablet Take 1 tablet by mouth every evening        atorvastatin (LIPITOR) 80 MG tablet Take 1 tablet (80 mg) by mouth daily 90 tablet 3     bacitracin ophthalmic ointment   2     Benzocaine-Resorcinol (VAGISIL) 5-2 % CREA Place vaginally daily       cholecalciferol (VITAMIN D) 1000 UNIT tablet Take 2,000 Units by mouth daily       docusate sodium (DOCQLACE) 100 MG capsule Take 100 mg by mouth At Bedtime       donepezil (ARICEPT) 5 MG tablet Take 1 tablet (5 mg) by mouth every morning 30 tablet 0     ERYTHROMYCIN OP        fish oil-omega-3 fatty acids 1000 MG capsule Take 1 g by mouth daily        fluocinolone acetonide 0.01 % OIL Apply to scalp when wet massage well cover with cap leave on over night  Wash in am. 118 mL 12     insulin aspart (NOVOLOG FLEXPEN) 100 UNIT/ML injection Take 15 units with breakfast, 12 units each at lunch, dinner and before bed. Hold bedtime dose if FBG < 100. 15 mL 11     insulin detemir (LEVEMIR FLEXTOUCH) 100 UNIT/ML injection INJECT 14 UNITS UNDER THE SKIN AT BEDTIME 15 mL 8     levothyroxine (SYNTHROID/LEVOTHROID) 75 MCG tablet TAKE ONE TABLET BY MOUTH ONE TIME DAILY  90 tablet 1      losartan (COZAAR) 25 MG tablet Take 1 tablet (25 mg) by mouth daily for HTN. 90 tablet 3     Multiple Vitamins-Minerals (PRESERVISION AREDS 2 PO) Take by mouth 2 times daily       omeprazole (PRILOSEC) 20 MG CR capsule Take 1 capsule (20 mg) by mouth daily 90 capsule 1     phenylephrine-shark liver oil-mineral oil-petrolatum (PREPARATION H) 0.25-14-74.9 % rectal ointment Place rectally daily       Pramoxine HCl (VAGISIL ANTI-ITCH MEDICATED EX) Externally apply topically every morning       ranitidine (ZANTAC) 150 MG tablet Take 150 mg by mouth as needed for heartburn       sodium chloride (TITO 128) 5 % ophthalmic solution Place 1 drop Into the left eye 2 times daily       terbinafine (LAMISIL AT) 1 % cream Apply topically daily       Terbinafine (LAMISIL EX) Externally apply topically daily For under breasts, in between toes       Tetrahydroz-Dextran-PEG-Povid (TH EYE DROP ADVANCED RELIEF) 0.05-0.1-1-1 % SOLN Apply 1 drop to eye 2 times daily To left eye       timolol (TIMOPTIC) 0.5 % ophthalmic solution Place 1 drop Into the left eye 2 times daily  1 Bottle      triamcinolone (KENALOG) 0.1 % cream Apply sparingly to affected area two times daily as needed for gluteal cleft irritation for up to 2 weeks. 30 g 0     blood glucose (ONETOUCH ULTRA) test strip TEST BLOOD GLUCOSE FOUR TIMES A  each 11     blood glucose (ONETOUCH ULTRA) test strip USE TO TEST BLOOD SUGARS FOUR TIMES DAILY OR AS DIRECTED 100 strip 0     insulin pen needle (UNIFINE PENTIPS) 31G X 6 MM USE ONCE DAILY 100 each 3     Lido-Capsaicin-Men-Methyl Sal (MEDI-PATCH-LIDOCAINE EX)        order for DME [facility] Order Phone [phone] Fax [phone]    Secondary Dressing Mepilex foam border 4X4 dressing Qty 15    Length of Need: 1 month  Frequency of dressing change: every other day 30 days 0     order for DME [facility]   p: [phone] f: [phone]  EMAIL to  [email or web address]  [email or web address]    Request for group 2 air mattress & semi-electric hospital bed    Ht:1.461 m  Wt:69.9  Length of need: lifetime    Pt. has been in a comprehensive ulcer treatment program for >2 months. We will follow monthly until wound closure    To obtain medical records:  p: [phone] f: [phone] 1 Device 0     UNABLE TO FIND Take 1 Bar by mouth 2 times daily MEDICATION NAME: FiberONe Bar          ALLERGIES     Allergies   Allergen Reactions     Contrast Dye Hives and Swelling     Pt. States no history of allergy to topical betadine. Tolerated surgical betadine prep 2-6-2012.     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 (H)      DM type 2, goal A1c below 7      GERD (gastroesophageal reflux disease)      Gluten intolerance      HTN (hypertension)     been off meds since 2012     Hyperlipidemia LDL goal < 100      Pacemaker 8/2009 8/2009 Near syncopal episode with HR 40s and BP 60/sys - PPM implanted     PAD (peripheral artery disease) (H) 2008    70% proximal right SFA stenosis with successful angioplasy     S/P coronary angiogram 8/2012 8/2012 Cath - 75% RCA with BRIGITTE placed, 40% ostial and distal left main stenosis     Syncope 08/2009 8/2009 Near syncopal episode with HR 40s and BP 60/sys - PPM implanted     Unspecified hypothyroidism        PAST SURGICAL HISTORY:  Past Surgical History:   Procedure Laterality Date     ANGIOPLASTY  2008    Angioplasty of proximal right superficial femoral artery     APPENDECTOMY       BACK SURGERY       C REMOVAL OF EYE      right      CHOLECYSTECTOMY, LAPOROSCOPIC      Cholecystectomy, Laparoscopic     COLECTOMY       EYE SURGERY      corneal  transplant      HYSTERECTOMY, EMANI      BSO     IMPLANT PACEMAKER  2009     STENT, CORONARY, FATOU   Cath - 75% RCA with BRIGITTE placed, 40% ostial and distal left main stenosis     TONSILLECTOMY & ADENOIDECTOMY         FAMILY HISTORY:  Family History   Problem Relation Age of Onset     Cancer Mother      Cancer Father      Unknown/Adopted Maternal Grandmother      Unknown/Adopted Maternal Grandfather      Unknown/Adopted Paternal Grandmother      Unknown/Adopted Paternal Grandfather        SOCIAL HISTORY:  Social History     Socioeconomic History     Marital status:      Spouse name: None     Number of children: None     Years of education: None     Highest education level: None   Occupational History     None   Social Needs     Financial resource strain: None     Food insecurity:     Worry: None     Inability: None     Transportation needs:     Medical: None     Non-medical: None   Tobacco Use     Smoking status: Former Smoker     Packs/day: 1.00     Years: 3.00     Pack years: 3.00     Types: Cigarettes     Last attempt to quit: 1947     Years since quittin.8     Smokeless tobacco: Never Used   Substance and Sexual Activity     Alcohol use: No     Drug use: No     Sexual activity: None   Lifestyle     Physical activity:     Days per week: None     Minutes per session: None     Stress: None   Relationships     Social connections:     Talks on phone: None     Gets together: None     Attends [employer] service: None     Active member of club or organization: None     Attends meetings of clubs or organizations: None     Relationship status: None     Intimate partner violence:     Fear of current or ex partner: None     Emotionally abused: None     Physically abused: None     Forced sexual activity: None   Other Topics Concern     Parent/sibling w/ CABG, MI or angioplasty before 65F 55M? Not Asked      Service Not Asked     Blood Transfusions Not Asked     Caffeine Concern Yes      "Comment: 5 cups coffee in morning     Occupational Exposure Not Asked     Hobby Hazards Not Asked     Sleep Concern No     Stress Concern No     Weight Concern Not Asked     Special Diet No     Back Care Not Asked     Exercise Yes     Comment: walks hallways     Bike Helmet Not Asked     Seat Belt Yes     Self-Exams Not Asked   Social History Narrative     None       Review of Systems:  Skin:  Negative     Eyes:  Positive for glasses  ENT:  Negative    Respiratory:  Negative    Cardiovascular:    Positive for  Gastroenterology: Positive for heartburn, not exertional  Genitourinary:  Positive for urinary frequency  Musculoskeletal:  Positive for back pain;neck pain  Neurologic:  Positive for numbness or tingling of hands;headaches  Psychiatric:  Positive for anxiety  Heme/Lymph/Imm:  Negative    Endocrine:  Positive for diabetes;thyroid disorder    Physical Exam:  Vitals: /82   Pulse 78   Ht 1.448 m (4' 9\")   Wt 70.8 kg (156 lb)   BMI 33.76 kg/m       Recent Lab Results:  LIPID RESULTS:  Lab Results   Component Value Date    CHOL 123 03/07/2017    HDL 49 03/07/2017    LDL 40 03/07/2017    TRIG 169 (H) 03/07/2017    CHOLHDLRATIO 3.2 08/06/2009       LIVER ENZYME RESULTS:  Lab Results   Component Value Date    AST 32 02/18/2019    ALT 38 (H) 02/18/2019       CBC RESULTS:  Lab Results   Component Value Date    WBC 7.5 02/26/2018    RBC 4.13 02/26/2018    HGB 12.7 02/26/2018    HCT 37.1 02/26/2018    MCV 90 02/26/2018    MCH 30.8 02/26/2018    MCHC 34.2 02/26/2018    RDW 14.7 02/26/2018     02/26/2018       BMP RESULTS:  Lab Results   Component Value Date     02/18/2019    POTASSIUM 4.6 02/18/2019    CHLORIDE 102 02/18/2019    CO2 24 02/26/2018    ANIONGAP 7 02/26/2018     (H) 02/18/2019    BUN 21 02/18/2019    BUN 31 (H) 02/18/2019    CR 0.68 02/18/2019    GFRESTIMATED >90 02/26/2018    GFRESTBLACK >90 02/26/2018    JEN 9.7 02/18/2019        A1C RESULTS:  Lab Results   Component Value Date "    A1C 6.2 (H) 02/18/2019       INR RESULTS:  Lab Results   Component Value Date    INR 0.93 12/10/2013    INR 1.00 11/22/2012           CC  No referring provider defined for this encounter.

## 2019-06-17 NOTE — LETTER
6/17/2019    Biju Dumont MD  [address]  [facility] [place] [address]    RE: Serena aMrie       Dear Colleague,    I had the pleasure of seeing Serena Marie in the [facility].    HPI and Plan:   See dictation [id number]    Orders Placed This Encounter   Procedures     Follow-Up with Cardiologist     Echocardiogram Complete     Orders Placed This Encounter   Medications     ERYTHROMYCIN OP     There are no discontinued medications.      Encounter Diagnoses   Name Primary?     Cardiac pacemaker in situ      Coronary artery disease involving native heart without angina pectoris, unspecified vessel or lesion type Yes       CURRENT MEDICATIONS:  Current Outpatient Medications   Medication Sig Dispense Refill     acetaminophen (TYLENOL ARTHRITIS PAIN) 650 MG CR tablet Take 1,300 mg by mouth daily States 2 tablets qam & usually 2 tablets qpm       ascorbic acid (VITAMIN C) 500 MG tablet Take 2 tablets (1,000 mg) by mouth 2 times daily For wound healing. 180 tablet 3     aspirin 81 MG tablet Take 1 tablet by mouth every evening        atorvastatin (LIPITOR) 80 MG tablet Take 1 tablet (80 mg) by mouth daily 90 tablet 3     bacitracin ophthalmic ointment   2     Benzocaine-Resorcinol (VAGISIL) 5-2 % CREA Place vaginally daily       cholecalciferol (VITAMIN D) 1000 UNIT tablet Take 2,000 Units by mouth daily       docusate sodium (DOCQLACE) 100 MG capsule Take 100 mg by mouth At Bedtime       donepezil (ARICEPT) 5 MG tablet Take 1 tablet (5 mg) by mouth every morning 30 tablet 0     ERYTHROMYCIN OP        fish oil-omega-3 fatty acids 1000 MG capsule Take 1 g by mouth daily        fluocinolone acetonide 0.01 % OIL Apply to scalp when wet massage well cover with cap leave on over night  Wash in am. 118 mL 12     insulin aspart (NOVOLOG FLEXPEN) 100 UNIT/ML injection Take 15 units with breakfast, 12 units each at lunch, dinner and before bed. Hold bedtime dose if FBG < 100. 15 mL 11      insulin detemir (LEVEMIR FLEXTOUCH) 100 UNIT/ML injection INJECT 14 UNITS UNDER THE SKIN AT BEDTIME 15 mL 8     levothyroxine (SYNTHROID/LEVOTHROID) 75 MCG tablet TAKE ONE TABLET BY MOUTH ONE TIME DAILY  90 tablet 1     losartan (COZAAR) 25 MG tablet Take 1 tablet (25 mg) by mouth daily for HTN. 90 tablet 3     Multiple Vitamins-Minerals (PRESERVISION AREDS 2 PO) Take by mouth 2 times daily       omeprazole (PRILOSEC) 20 MG CR capsule Take 1 capsule (20 mg) by mouth daily 90 capsule 1     phenylephrine-shark liver oil-mineral oil-petrolatum (PREPARATION H) 0.25-14-74.9 % rectal ointment Place rectally daily       Pramoxine HCl (VAGISIL ANTI-ITCH MEDICATED EX) Externally apply topically every morning       ranitidine (ZANTAC) 150 MG tablet Take 150 mg by mouth as needed for heartburn       sodium chloride (TITO 128) 5 % ophthalmic solution Place 1 drop Into the left eye 2 times daily       terbinafine (LAMISIL AT) 1 % cream Apply topically daily       Terbinafine (LAMISIL EX) Externally apply topically daily For under breasts, in between toes       Tetrahydroz-Dextran-PEG-Povid (TH EYE DROP ADVANCED RELIEF) 0.05-0.1-1-1 % SOLN Apply 1 drop to eye 2 times daily To left eye       timolol (TIMOPTIC) 0.5 % ophthalmic solution Place 1 drop Into the left eye 2 times daily  1 Bottle      triamcinolone (KENALOG) 0.1 % cream Apply sparingly to affected area two times daily as needed for gluteal cleft irritation for up to 2 weeks. 30 g 0     blood glucose (ONETOUCH ULTRA) test strip TEST BLOOD GLUCOSE FOUR TIMES A  each 11     blood glucose (ONETOUCH ULTRA) test strip USE TO TEST BLOOD SUGARS FOUR TIMES DAILY OR AS DIRECTED 100 strip 0     insulin pen needle (UNIFINE PENTIPS) 31G X 6 MM USE ONCE DAILY 100 each 3     Lido-Capsaicin-Men-Methyl Sal (MEDI-PATCH-LIDOCAINE EX)        order for DME [facility] Order Phone [phone] Fax [phone]    Secondary Dressing Mepilex foam border 4X4 dressing Qty  15    Length of Need: 1 month  Frequency of dressing change: every other day 30 days 0     order for DME [facility]   p: [phone] f: [phone]  EMAIL to [email or web address]  [email or web address]    Request for group 2 air mattress & semi-electric hospital bed    Ht:1.461 m  Wt:69.9  Length of need: lifetime    Pt. has been in a comprehensive ulcer treatment program for >2 months. We will follow monthly until wound closure    To obtain medical records:  p: [phone] f: [phone] 1 Device 0     UNABLE TO FIND Take 1 Bar by mouth 2 times daily MEDICATION NAME: FiberONe Bar          ALLERGIES     Allergies   Allergen Reactions     Contrast Dye Hives and Swelling     Pt. States no history of allergy to topical betadine. Tolerated surgical betadine prep 2-6-2012.     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 (H)      DM type 2, goal A1c below 7      GERD (gastroesophageal reflux disease)      Gluten intolerance      HTN (hypertension)     been off meds since 2012     Hyperlipidemia LDL goal < 100      Pacemaker 8/2009 8/2009 Near syncopal episode with HR 40s and BP 60/sys - PPM implanted     PAD (peripheral artery disease) (H) 2008    70% proximal right SFA stenosis with successful angioplasy     S/P coronary angiogram 8/2012 8/2012 Cath - 75% RCA with BRIGITTE placed, 40% ostial and distal left main stenosis     Syncope 08/2009 8/2009 Near syncopal episode with HR 40s and BP 60/sys - PPM implanted     Unspecified hypothyroidism        PAST SURGICAL HISTORY:  Past Surgical History:   Procedure Laterality Date     ANGIOPLASTY  2008    Angioplasty of proximal right superficial femoral artery     APPENDECTOMY        BACK SURGERY       C REMOVAL OF EYE      right      CHOLECYSTECTOMY, LAPOROSCOPIC      Cholecystectomy, Laparoscopic     COLECTOMY       EYE SURGERY      corneal transplant      HYSTERECTOMY, EMANI      BSO     IMPLANT PACEMAKER  2009     STENT, CORONARY, FATOU   Cath - 75% RCA with BRIGITTE placed, 40% ostial and distal left main stenosis     TONSILLECTOMY & ADENOIDECTOMY         FAMILY HISTORY:  Family History   Problem Relation Age of Onset     Cancer Mother      Cancer Father      Unknown/Adopted Maternal Grandmother      Unknown/Adopted Maternal Grandfather      Unknown/Adopted Paternal Grandmother      Unknown/Adopted Paternal Grandfather        SOCIAL HISTORY:  Social History     Socioeconomic History     Marital status:      Spouse name: None     Number of children: None     Years of education: None     Highest education level: None   Occupational History     None   Social Needs     Financial resource strain: None     Food insecurity:     Worry: None     Inability: None     Transportation needs:     Medical: None     Non-medical: None   Tobacco Use     Smoking status: Former Smoker     Packs/day: 1.00     Years: 3.00     Pack years: 3.00     Types: Cigarettes     Last attempt to quit: 1947     Years since quittin.8     Smokeless tobacco: Never Used   Substance and Sexual Activity     Alcohol use: No     Drug use: No     Sexual activity: None   Lifestyle     Physical activity:     Days per week: None     Minutes per session: None     Stress: None   Relationships     Social connections:     Talks on phone: None     Gets together: None     Attends [employer] service: None     Active member of club or organization: None     Attends meetings of clubs or organizations: None     Relationship status: None     Intimate partner violence:     Fear of current or ex partner: None     Emotionally abused: None     Physically abused: None     Forced sexual activity: None   Other Topics Concern      "Parent/sibling w/ CABG, MI or angioplasty before 65F 55M? Not Asked      Service Not Asked     Blood Transfusions Not Asked     Caffeine Concern Yes     Comment: 5 cups coffee in morning     Occupational Exposure Not Asked     Hobby Hazards Not Asked     Sleep Concern No     Stress Concern No     Weight Concern Not Asked     Special Diet No     Back Care Not Asked     Exercise Yes     Comment: walks hallways     Bike Helmet Not Asked     Seat Belt Yes     Self-Exams Not Asked   Social History Narrative     None       Review of Systems:  Skin:  Negative     Eyes:  Positive for glasses  ENT:  Negative    Respiratory:  Negative    Cardiovascular:    Positive for  Gastroenterology: Positive for heartburn, not exertional  Genitourinary:  Positive for urinary frequency  Musculoskeletal:  Positive for back pain;neck pain  Neurologic:  Positive for numbness or tingling of hands;headaches  Psychiatric:  Positive for anxiety  Heme/Lymph/Imm:  Negative    Endocrine:  Positive for diabetes;thyroid disorder    Physical Exam:  Vitals: /82   Pulse 78   Ht 1.448 m (4' 9\")   Wt 70.8 kg (156 lb)   BMI 33.76 kg/m        Recent Lab Results:  LIPID RESULTS:  Lab Results   Component Value Date    CHOL 123 03/07/2017    HDL 49 03/07/2017    LDL 40 03/07/2017    TRIG 169 (H) 03/07/2017    CHOLHDLRATIO 3.2 08/06/2009       LIVER ENZYME RESULTS:  Lab Results   Component Value Date    AST 32 02/18/2019    ALT 38 (H) 02/18/2019       CBC RESULTS:  Lab Results   Component Value Date    WBC 7.5 02/26/2018    RBC 4.13 02/26/2018    HGB 12.7 02/26/2018    HCT 37.1 02/26/2018    MCV 90 02/26/2018    MCH 30.8 02/26/2018    MCHC 34.2 02/26/2018    RDW 14.7 02/26/2018     02/26/2018       BMP RESULTS:  Lab Results   Component Value Date     02/18/2019    POTASSIUM 4.6 02/18/2019    CHLORIDE 102 02/18/2019    CO2 24 02/26/2018    ANIONGAP 7 02/26/2018     (H) 02/18/2019    BUN 21 02/18/2019    BUN 31 (H) 02/18/2019 "    CR 0.68 02/18/2019    GFRESTIMATED >90 02/26/2018    GFRESTBLACK >90 02/26/2018    JEN 9.7 02/18/2019        A1C RESULTS:  Lab Results   Component Value Date    A1C 6.2 (H) 02/18/2019       INR RESULTS:  Lab Results   Component Value Date    INR 0.93 12/10/2013    INR 1.00 11/22/2012           CC  No referring provider defined for this encounter.                    Thank you for allowing me to participate in the care of your patient.      Sincerely,     Clay Charlton MD     [facility]    cc:   No referring provider defined for this encounter.

## 2019-06-17 NOTE — LETTER
6/17/2019      Biju Dumont MD  [address]  [facility] [address]      RE: Serena Marie       Dear Colleague,    I had the pleasure of seeing Serena Marie in the [facility].    Service Date: 06/17/2019      REASON FOR VISIT:  Routine followup for CAD.      HISTORY OF PRESENT ILLNESS:  A very pleasant 89-year-old female who has a history of coronary artery disease, status post remote PCI to the right coronary artery.  A patient of Dr. Auguste for many years.   Could not get in to see her on time and as such she is following up with me in clinic.  Also has a history of permanent pacemaker for symptomatic bradycardia.  She is asymptomatic from this standpoint.  She is here for routine followup.  She says she continues to feel well.  Denies chest pain, shortness of breath, abdominal pain, back pain, syncope, presyncope or palpitations.  She says she has absolutely no complaints and she is here because she was told to come here.      PHYSICAL EXAMINATION:   VITAL SIGNS:  Blood pressure is 140/82, pulse of 78.   GENERAL:  Alert and oriented x3, no acute distress.   CARDIAC:  Sounds are regular.  Soft ejection systolic murmur, but the S2 is preserved.   EXTREMITIES:  Warm without edema.   ABDOMEN:  Without rebound or guarding.   LUNGS:  Clear to auscultation bilaterally.      PERTINENT DATA:  Her last echocardiogram was in 2016 that showed normal LV function.  Her last LDL was at goal back in 2017.  She remains on aspirin and statin.      ASSESSMENT AND PLAN:  A 89-year-old female with coronary disease and symptomatic bradycardia, status post pacemaker.  Overall doing well.  No symptoms.  She is here for routine followup.  The numbers are at goal and she is completely asymptomatic.  I will have her follow up in 1 year with Dr. Auguste with an echocardiogram.  Otherwise, she says she has no complaints.  I have counseled her to watch for symptoms of shortness of breath,  passing out, palpitations, presyncope or syncope.  If that happens, then sooner attention with Cardiology would be required.  Otherwise, 1 year routine followup is fine.  Continue to follow up with PCP.  Please call us with questions      cc:   Biju Dumont MD   [facility]    [address] [place]  [address]         ANTONIO CERVANTES MD             D: 2019   T: 2019   MT: LARS      Name:     CHRISTINA GANDHI   MRN:      [id number]        Account:      [id number]   :      1924           Service Date: 2019      Document: [id number]         Outpatient Encounter Medications as of 2019   Medication Sig Dispense Refill     acetaminophen (TYLENOL ARTHRITIS PAIN) 650 MG CR tablet Take 1,300 mg by mouth daily States 2 tablets qam & usually 2 tablets qpm       ascorbic acid (VITAMIN C) 500 MG tablet Take 2 tablets (1,000 mg) by mouth 2 times daily For wound healing. 180 tablet 3     aspirin 81 MG tablet Take 1 tablet by mouth every evening        atorvastatin (LIPITOR) 80 MG tablet Take 1 tablet (80 mg) by mouth daily 90 tablet 3     bacitracin ophthalmic ointment   2     Benzocaine-Resorcinol (VAGISIL) 5-2 % CREA Place vaginally daily       cholecalciferol (VITAMIN D) 1000 UNIT tablet Take 2,000 Units by mouth daily       docusate sodium (DOCQLACE) 100 MG capsule Take 100 mg by mouth At Bedtime       donepezil (ARICEPT) 5 MG tablet Take 1 tablet (5 mg) by mouth every morning 30 tablet 0     ERYTHROMYCIN OP        fish oil-omega-3 fatty acids 1000 MG capsule Take 1 g by mouth daily        fluocinolone acetonide 0.01 % OIL Apply to scalp when wet massage well cover with cap leave on over night  Wash in am. 118 mL 12     insulin aspart (NOVOLOG FLEXPEN) 100 UNIT/ML injection Take 15 units with breakfast, 12 units each at lunch, dinner and before bed. Hold bedtime dose if FBG < 100. 15 mL 11     insulin detemir (LEVEMIR FLEXTOUCH) 100 UNIT/ML injection INJECT 14  UNITS UNDER THE SKIN AT BEDTIME 15 mL 8     levothyroxine (SYNTHROID/LEVOTHROID) 75 MCG tablet TAKE ONE TABLET BY MOUTH ONE TIME DAILY  90 tablet 1     losartan (COZAAR) 25 MG tablet Take 1 tablet (25 mg) by mouth daily for HTN. 90 tablet 3     Multiple Vitamins-Minerals (PRESERVISION AREDS 2 PO) Take by mouth 2 times daily       omeprazole (PRILOSEC) 20 MG CR capsule Take 1 capsule (20 mg) by mouth daily 90 capsule 1     phenylephrine-shark liver oil-mineral oil-petrolatum (PREPARATION H) 0.25-14-74.9 % rectal ointment Place rectally daily       Pramoxine HCl (VAGISIL ANTI-ITCH MEDICATED EX) Externally apply topically every morning       ranitidine (ZANTAC) 150 MG tablet Take 150 mg by mouth as needed for heartburn       sodium chloride (TITO 128) 5 % ophthalmic solution Place 1 drop Into the left eye 2 times daily       terbinafine (LAMISIL AT) 1 % cream Apply topically daily       Terbinafine (LAMISIL EX) Externally apply topically daily For under breasts, in between toes       Tetrahydroz-Dextran-PEG-Povid (TH EYE DROP ADVANCED RELIEF) 0.05-0.1-1-1 % SOLN Apply 1 drop to eye 2 times daily To left eye       timolol (TIMOPTIC) 0.5 % ophthalmic solution Place 1 drop Into the left eye 2 times daily  1 Bottle      triamcinolone (KENALOG) 0.1 % cream Apply sparingly to affected area two times daily as needed for gluteal cleft irritation for up to 2 weeks. 30 g 0     blood glucose (ONETOUCH ULTRA) test strip TEST BLOOD GLUCOSE FOUR TIMES A  each 11     blood glucose (ONETOUCH ULTRA) test strip USE TO TEST BLOOD SUGARS FOUR TIMES DAILY OR AS DIRECTED 100 strip 0     insulin pen needle (UNIFINE PENTIPS) 31G X 6 MM USE ONCE DAILY 100 each 3     Lido-Capsaicin-Men-Methyl Sal (MEDI-PATCH-LIDOCAINE EX)        order for DME [facility] Order Phone [phone] Fax [phone]    Secondary Dressing Mepilex foam border 4X4 dressing Qty 15    Length of Need: 1 month  Frequency of dressing change: every other day 30  days 0     order for DME [facility]   p: [phone] f: [phone]  EMAIL to [email or web address]    Request for group 2 air mattress & semi-electric hospital bed    Ht:1.461 m  Wt:69.9  Length of need: lifetime    Pt. has been in a comprehensive ulcer treatment program for >2 months. We will follow monthly until wound closure    To obtain medical records:  p: [phone] f: [phone] 1 Device 0     UNABLE TO FIND Take 1 Bar by mouth 2 times daily MEDICATION NAME: FiberONe Bar        No facility-administered encounter medications on file as of 6/17/2019.        Again, thank you for allowing me to participate in the care of your patient.      Sincerely,    Clay Charlton MD     [facility]

## 2019-08-15 ENCOUNTER — MEDICAL CORRESPONDENCE (OUTPATIENT)
Dept: FAMILY MEDICINE | Facility: CLINIC | Age: 84
End: 2019-08-15

## 2019-08-19 ENCOUNTER — OFFICE VISIT (OUTPATIENT)
Dept: FAMILY MEDICINE | Facility: CLINIC | Age: 84
End: 2019-08-19

## 2019-08-19 VITALS
RESPIRATION RATE: 16 BRPM | BODY MASS INDEX: 31.65 KG/M2 | OXYGEN SATURATION: 99 % | WEIGHT: 157 LBS | SYSTOLIC BLOOD PRESSURE: 132 MMHG | DIASTOLIC BLOOD PRESSURE: 64 MMHG | HEIGHT: 59 IN | HEART RATE: 72 BPM

## 2019-08-19 DIAGNOSIS — E11.51 DIABETES MELLITUS TYPE 2 WITH PERIPHERAL ARTERY DISEASE (H): ICD-10-CM

## 2019-08-19 DIAGNOSIS — R41.3 MEMORY LOSS: ICD-10-CM

## 2019-08-19 DIAGNOSIS — I10 BENIGN ESSENTIAL HYPERTENSION: ICD-10-CM

## 2019-08-19 DIAGNOSIS — I50.32 CHRONIC DIASTOLIC CONGESTIVE HEART FAILURE (H): ICD-10-CM

## 2019-08-19 DIAGNOSIS — I73.9 PAD (PERIPHERAL ARTERY DISEASE) (H): Primary | ICD-10-CM

## 2019-08-19 PROCEDURE — 82044 UR ALBUMIN SEMIQUANTITATIVE: CPT | Performed by: FAMILY MEDICINE

## 2019-08-19 PROCEDURE — 99214 OFFICE O/P EST MOD 30 MIN: CPT | Performed by: FAMILY MEDICINE

## 2019-08-19 PROCEDURE — 82570 ASSAY OF URINE CREATININE: CPT | Performed by: FAMILY MEDICINE

## 2019-08-19 PROCEDURE — 36415 COLL VENOUS BLD VENIPUNCTURE: CPT | Performed by: FAMILY MEDICINE

## 2019-08-19 ASSESSMENT — MIFFLIN-ST. JEOR: SCORE: 1009.84

## 2019-08-19 NOTE — LETTER
[facility]  [address] [place]  [address]  Phone: [phone]    August 26, 2019      Serena Marie  [facility] [place] [address] [place] [id number]              Dear Serena,       I am writing to report that your included test results are within expected ranges. I do not suggest that we make any changes at this time.      Sincerely,     Biju Dumont M.D.    Results for orders placed or performed in visit on 08/19/19   Microalbumin ([facility])   Result Value Ref Range    Albumin mg/L 150 (A) mg/L    Urine Creatinine Mg/Dl 200 mg/dL    A/C Ratio mg/g  (A) mg/g    Interpretation abnormal    Hemoglobin A1C ([facility])   Result Value Ref Range    Hemoglobin A1C 7.2 (H) 4.8 - 5.6 %    Narrative    Performed at:  01 - [facility] [address], [place], [place]  [id number]  : Rodriguez Santoyo MD, Phone:  [phone]

## 2019-08-19 NOTE — PROGRESS NOTES
Problem(s) Oriented visit        SUBJECTIVE:                                                    Serena Marie is a 89 year old female who presents to clinic today for the following health issues :    1. 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  No significnat or regular episodes of hypo or hyperglycemia  Medication compliance: compliant most of the time  Diabetic diet compliance: compliant most of the time  Diabetic ROS: no polyuria or polydipsia, no chest pain, dyspnea or TIA's, no numbness, tingling or pain in extremities    Home blood sugar monitoring: are performed regularly, Review of patients self blood glucose monitoring shows fasting most always < 130 and post prandial average under 170.  As a direct cause of their history of diabetes they have the following Diabetic complications: peripheral vascular disease    Most  recent A1C: Smoking: BP:   The last 5 available A1C values from [facility] reference lab, [facility]:  No components found for: [id number]     Lab Results   Component Value Date    A1C 6.2 02/18/2019    A1C 6.1 09/10/2018    A1C 7.1 02/06/2018    History   Smoking Status     Former Smoker     Packs/day: 1.00     Years: 3.00     Types: Cigarettes     Quit date: 8/17/1947   Smokeless Tobacco     Never Used    BP Readings from Last 1 Encounters:   08/19/19 132/64        Kidney studies:  Creatinine   Date Value Ref Range Status   02/18/2019 0.68 0.57 - 1.00 mg/dL Final   09/10/2018 0.78 0.57 - 1.00 mg/dL Final   02/26/2018 0.58 0.52 - 1.04 mg/dL Final   ]    GFR Estimate   Date Value Ref Range Status   02/26/2018 >90 >60 mL/min/1.7m2 Final     Comment:     Non  GFR Calc                No components found for: MICORALBUMINLC      GFR Estimate If Black   Date Value Ref Range Status   02/26/2018 >90 >60 mL/min/1.7m2 Final     Comment:      GFR Calc     Medication (Note: This includes the hypertensive  combination subclass to make sure to show all ACEI/ARB's.)     Angiotensin II Receptor Antagonists       losartan (COZAAR) 25 MG tablet    Take 1 tablet (25 mg) by mouth daily for HTN.               Statin and ASA:  Current Outpatient Medications   Medication     acetaminophen (TYLENOL ARTHRITIS PAIN) 650 MG CR tablet     ascorbic acid (VITAMIN C) 500 MG tablet     aspirin 81 MG tablet     atorvastatin (LIPITOR) 80 MG tablet     bacitracin ophthalmic ointment     Benzocaine-Resorcinol (VAGISIL) 5-2 % CREA     blood glucose (ONETOUCH ULTRA) test strip     blood glucose (ONETOUCH ULTRA) test strip     cholecalciferol (VITAMIN D) 1000 UNIT tablet     docusate sodium (DOCQLACE) 100 MG capsule     donepezil (ARICEPT) 5 MG tablet     ERYTHROMYCIN OP     fish oil-omega-3 fatty acids 1000 MG capsule     fluocinolone acetonide 0.01 % OIL     insulin aspart (NOVOLOG FLEXPEN) 100 UNIT/ML pen     insulin detemir (LEVEMIR FLEXTOUCH) 100 UNIT/ML injection     insulin pen needle (UNIFINE PENTIPS) 31G X 6 MM     levothyroxine (SYNTHROID/LEVOTHROID) 75 MCG tablet     Lido-Capsaicin-Men-Methyl Sal (MEDI-PATCH-LIDOCAINE EX)     losartan (COZAAR) 25 MG tablet     Multiple Vitamins-Minerals (PRESERVISION AREDS 2 PO)     omeprazole (PRILOSEC) 20 MG CR capsule     order for DME     order for DME     phenylephrine-shark liver oil-mineral oil-petrolatum (PREPARATION H) 0.25-14-74.9 % rectal ointment     Pramoxine HCl (VAGISIL ANTI-ITCH MEDICATED EX)     ranitidine (ZANTAC) 150 MG tablet     sodium chloride (TITO 128) 5 % ophthalmic solution     terbinafine (LAMISIL AT) 1 % cream     Terbinafine (LAMISIL EX)     Tetrahydroz-Dextran-PEG-Povid (TH EYE DROP ADVANCED RELIEF) 0.05-0.1-1-1 % SOLN     timolol (TIMOPTIC) 0.5 % ophthalmic solution     triamcinolone (KENALOG) 0.1 % cream     UNABLE TO FIND     No current facility-administered medications for this visit.        - Microalbumin ([facility])  - Hemoglobin A1C ([facility])  - insulin aspart (NOVOLOG  FLEXPEN) 100 UNIT/ML pen; Take 12 units with breakfast, at lunch, dinner. Hold dose if FBG < 100.  Dispense: 15 mL; Refill: 11    2. Chronic diastolic congestive heart failure (H)  Has known history of CHF.  No new symptoms to report.  Denies new or worsened shortness of breath, dyspnea on exertion, orthopnea, and excessive lower extremity edema.   Taking medications as ordered.     Reviewed weights in chart:  Wt Readings from Last 10 Encounters:   08/19/19 71.2 kg (157 lb)   06/17/19 70.8 kg (156 lb)   04/15/19 69.3 kg (152 lb 12.8 oz)   02/18/19 69.7 kg (153 lb 9.6 oz)   12/11/18 69.9 kg (154 lb)   11/26/18 68 kg (150 lb)   10/15/18 67.3 kg (148 lb 6.4 oz)   09/10/18 67.1 kg (148 lb)   08/13/18 67 kg (147 lb 9.6 oz)   07/25/18 67.8 kg (149 lb 6.4 oz)         3. PAD (peripheral artery disease) (H)  No current problems    4. Benign essential hypertension  Blood presure remains well controlled when checked out of clinic.    Reviewed last 6 BP readings in chart:  BP Readings from Last 6 Encounters:   08/19/19 132/64   06/17/19 141/82   04/15/19 122/76   03/04/19 (!) 160/91   02/18/19 122/60   02/04/19 160/81       she has not experienced any significant side effects from medications for hypertension.    NO active cardiac complaints or symptoms with exercise.           Problem list, Medication list, Allergies, and Medical/Social/Surgical histories reviewed in [facility] and updated as appropriate.   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)     PAD (peripheral artery disease) (H)     Lumbar spinal stenosis     H/O enucleation of right eyeball     Health Care Home     Pacemaker     Syncope     Bilateral low back pain without sciatica     Cervicalgia     ACP (advance care planning)     Coronary artery  "disease involving native coronary artery of native heart without angina pectoris     Blindness and low vision     Benign essential hypertension     Physical deconditioning     Blind painful eye     Pressure injury of skin of right buttock, unspecified injury stage     Chronic diastolic congestive heart failure (H)     Past Surgical History:   Procedure Laterality Date     ANGIOPLASTY      Angioplasty of proximal right superficial femoral artery     APPENDECTOMY       BACK SURGERY       C REMOVAL OF EYE      right      CHOLECYSTECTOMY, LAPOROSCOPIC      Cholecystectomy, Laparoscopic     COLECTOMY       EYE SURGERY      corneal transplant      HYSTERECTOMY, EMANI      BSO     IMPLANT PACEMAKER  2009     STENT, CORONARY, FATOU   Cath - 75% RCA with BRIGITTE placed, 40% ostial and distal left main stenosis     TONSILLECTOMY & ADENOIDECTOMY         Social History     Tobacco Use     Smoking status: Former Smoker     Packs/day: 1.00     Years: 3.00     Pack years: 3.00     Types: Cigarettes     Last attempt to quit: 1947     Years since quittin.0     Smokeless tobacco: Never Used   Substance Use Topics     Alcohol use: No     Family History   Problem Relation Age of Onset     Cancer Mother      Cancer Father      Unknown/Adopted Maternal Grandmother      Unknown/Adopted Maternal Grandfather      Unknown/Adopted Paternal Grandmother      Unknown/Adopted Paternal Grandfather            OBJECTIVE:                                                    /64   Pulse 72   Resp 16   Ht 1.486 m (4' 10.5\")   Wt 71.2 kg (157 lb)   SpO2 99%   BMI 32.25 kg/m    Body mass index is 32.25 kg/m .   APPEARANCE: = Relaxed and in no distress  Conj/Eyelids = noninjected and lids and lashes are without inflammation  PERRLA/Irises = Pupils Round Reactive to Light and Irisis without inflammation  Neck = No anterior or posterior adenopathy appreciated.  Thyroid = Not enlarged and no masses felt  Resp effort = Calm " regular breathing  Breath Sounds = Good air movement with no rales or rhonchi in any lung fields  Heart Rate, Rhythm, & sounds (no Murm)  = Regular rate and rhythm with no S3, S4, or murmur appreciated.  Carotid Art's = Pulses full and equal and no bruits appreciated  Abdomen = Soft, nontender, no masses, & bowel sounds in all quadrants  Liver/Spleen = Normal span and no splenomegaly noted  Digits and Nails = FROM in all finger joints, no nail dystrophy  Ext (edema) = No pretibial edema noted or elsewhere  Musculsktl =  Strength and ROM of major joints are within normal limits  SKIN = absent significant rashes or lesions   Recent/Remote Memory = Alert and Oriented x 3  NEURO: Normal strength and tone, mentation is tougher, here with daughter and speech normal  Mood/Affect = Cooperative and interested     ASSESSMENT/PLAN:                                                        Serena was seen today for diabetes, consult, back pain and urinary problem.    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      Benign essential hypertension  Discussed current hypertension treatment guidelines, including indications for treatment and treatment options.  Discussed the importance for aggressive management of HTN to prevent vascular complications later.  Recommended lower fat, lower carbohydrate, and lower sodium (<2000 mg)diet.  Discussed required intervals for follow up on HTN, lab studies.  Recommened pt. follow their blood pressures outside the clinic to ensure that BPs are remaining within guidelines, and to contact me if the readings are not within guidelines on a regular basis so we can adjust treatment as needed.    Memory loss.  Long discussion.  In  gustavo aguilar, here with daughter        Regular exercise  See Patient Instructions    The following health maintenance items are reviewed in Epic and correct as of today:  Health Maintenance   Topic Date Due     DTAP/TDAP/TD IMMUNIZATION (1 - Tdap) 08/21/1949     ZOSTER IMMUNIZATION (1 of 2) 08/21/1974     MEDICARE ANNUAL WELLNESS VISIT  08/21/1989     EYE EXAM  02/23/2018     LIPID  03/07/2018     MICROALBUMIN  12/05/2018     PHQ-2  01/01/2019     FALL RISK ASSESSMENT  07/17/2019     A1C  08/18/2019     DIABETIC FOOT EXAM  09/10/2019     INFLUENZA VACCINE (1) 09/01/2019     TSH W/FREE T4 REFLEX  02/06/2020     BMP  02/18/2020     ADVANCE CARE PLANNING  11/18/2021     IPV IMMUNIZATION  Aged Out     MENINGITIS IMMUNIZATION  Aged Out       Biju Dumont MD  [facility]  Family Practice  [facility]  [phone]    For any issues my office # is [phone]

## 2019-08-20 LAB — HBA1C MFR BLD: 7.2 % (ref 4.8–5.6)

## 2019-08-21 ENCOUNTER — MEDICAL CORRESPONDENCE (OUTPATIENT)
Dept: FAMILY MEDICINE | Facility: CLINIC | Age: 84
End: 2019-08-21

## 2019-08-21 LAB
A/C RATIO MG/G: ABNORMAL MG/G
ALBUMIN (URINE) MG/L: 150 MG/L
INTERPRETATION: ABNORMAL
URINE CREATININE MG/DL - QUEST: 200 MG/DL

## 2019-09-09 DIAGNOSIS — E11.51 DIABETES MELLITUS TYPE 2 WITH PERIPHERAL ARTERY DISEASE (H): ICD-10-CM

## 2019-09-12 ENCOUNTER — ANCILLARY PROCEDURE (OUTPATIENT)
Dept: CARDIOLOGY | Facility: CLINIC | Age: 84
End: 2019-09-12
Attending: INTERNAL MEDICINE
Payer: MEDICARE

## 2019-09-12 DIAGNOSIS — Z95.0 CARDIAC PACEMAKER IN SITU: ICD-10-CM

## 2019-09-12 PROCEDURE — 93293 PM PHONE R-STRIP DEVICE EVAL: CPT | Performed by: INTERNAL MEDICINE

## 2019-09-17 NOTE — TELEPHONE ENCOUNTER
medicare paperwork for diabetic test strips.  form filled out and faxed to [facility] @ [phone] on 9/16/19    Eliseo Trujillo,   [facility]  [phone]

## 2019-09-17 NOTE — TELEPHONE ENCOUNTER
this was sent to [facility].  Per insurance, needs to be filled at a local pharmacy.  Per [facility] @ [phone], they have a prescription on file.  This was not called into them by myself.      Eliseo Trujillo,   [facility]  [phone]

## 2019-09-25 ENCOUNTER — MEDICAL CORRESPONDENCE (OUTPATIENT)
Dept: FAMILY MEDICINE | Facility: CLINIC | Age: 84
End: 2019-09-25

## 2019-10-01 LAB
MDC_IDC_LEAD_IMPLANT_DT: NORMAL
MDC_IDC_LEAD_IMPLANT_DT: NORMAL
MDC_IDC_LEAD_LOCATION: NORMAL
MDC_IDC_LEAD_LOCATION: NORMAL
MDC_IDC_LEAD_MFG: NORMAL
MDC_IDC_LEAD_MFG: NORMAL
MDC_IDC_LEAD_MODEL: NORMAL
MDC_IDC_LEAD_MODEL: NORMAL
MDC_IDC_LEAD_POLARITY_TYPE: NORMAL
MDC_IDC_LEAD_POLARITY_TYPE: NORMAL
MDC_IDC_LEAD_SERIAL: NORMAL
MDC_IDC_LEAD_SERIAL: NORMAL
MDC_IDC_PG_IMPLANT_DTM: NORMAL
MDC_IDC_PG_MFG: NORMAL
MDC_IDC_PG_MODEL: NORMAL
MDC_IDC_PG_SERIAL: NORMAL
MDC_IDC_PG_TYPE: NORMAL
MDC_IDC_SESS_CLINIC_NAME: NORMAL
MDC_IDC_SESS_DTM: NORMAL
MDC_IDC_SESS_TYPE: NORMAL

## 2019-12-12 ENCOUNTER — TRANSFERRED RECORDS (OUTPATIENT)
Dept: FAMILY MEDICINE | Facility: CLINIC | Age: 84
End: 2019-12-12

## 2019-12-12 LAB — RETINOPATHY: NORMAL

## 2019-12-19 ENCOUNTER — ANCILLARY PROCEDURE (OUTPATIENT)
Dept: CARDIOLOGY | Facility: CLINIC | Age: 84
End: 2019-12-19
Attending: INTERNAL MEDICINE
Payer: MEDICARE

## 2019-12-19 DIAGNOSIS — Z95.0 CARDIAC PACEMAKER IN SITU: ICD-10-CM

## 2019-12-19 PROCEDURE — 93293 PM PHONE R-STRIP DEVICE EVAL: CPT | Performed by: INTERNAL MEDICINE

## 2020-01-29 DIAGNOSIS — E11.51 DIABETES MELLITUS TYPE 2 WITH PERIPHERAL ARTERY DISEASE (H): ICD-10-CM

## 2020-02-09 ENCOUNTER — TELEPHONE (OUTPATIENT)
Dept: FAMILY MEDICINE | Facility: CLINIC | Age: 85
End: 2020-02-09

## 2020-02-10 NOTE — TELEPHONE ENCOUNTER
Received call from Jessica STEWART that Serena has been having vomiting and BGs have dropped to high 70s. Improving with oral hydration and Boost. Wondering about HS dose of Levemir 14 units. Reviewed with Dr. Dumont- may give Levemir 7 units tonight, continue to monitor BG. Left message for .  Brenda Swift [facility]

## 2020-02-25 ENCOUNTER — OFFICE VISIT (OUTPATIENT)
Dept: FAMILY MEDICINE | Facility: CLINIC | Age: 85
End: 2020-02-25

## 2020-02-25 VITALS
WEIGHT: 153.38 LBS | DIASTOLIC BLOOD PRESSURE: 74 MMHG | SYSTOLIC BLOOD PRESSURE: 128 MMHG | BODY MASS INDEX: 30.92 KG/M2 | HEART RATE: 84 BPM | RESPIRATION RATE: 16 BRPM | OXYGEN SATURATION: 96 % | HEIGHT: 59 IN

## 2020-02-25 DIAGNOSIS — I50.32 CHRONIC DIASTOLIC CONGESTIVE HEART FAILURE (H): ICD-10-CM

## 2020-02-25 DIAGNOSIS — F41.9 ANXIETY: Primary | ICD-10-CM

## 2020-02-25 DIAGNOSIS — L89.313 PRESSURE ULCER OF RIGHT BUTTOCK, STAGE 3 (H): ICD-10-CM

## 2020-02-25 DIAGNOSIS — E11.51 DIABETES MELLITUS TYPE 2 WITH PERIPHERAL ARTERY DISEASE (H): ICD-10-CM

## 2020-02-25 DIAGNOSIS — I73.9 PAD (PERIPHERAL ARTERY DISEASE) (H): ICD-10-CM

## 2020-02-25 PROCEDURE — 99214 OFFICE O/P EST MOD 30 MIN: CPT | Performed by: FAMILY MEDICINE

## 2020-02-25 RX ORDER — SERTRALINE HYDROCHLORIDE 25 MG/1
25 TABLET, FILM COATED ORAL DAILY
Qty: 30 TABLET | Refills: 11 | Status: SHIPPED | OUTPATIENT
Start: 2020-02-25 | End: 2020-03-03

## 2020-02-25 RX ORDER — LORAZEPAM 0.5 MG/1
0.5 TABLET ORAL EVERY 8 HOURS PRN
Qty: 60 TABLET | Refills: 1 | Status: SHIPPED | OUTPATIENT
Start: 2020-02-25 | End: 2020-03-03

## 2020-02-25 ASSESSMENT — MIFFLIN-ST. JEOR: SCORE: 988.39

## 2020-02-25 NOTE — PROGRESS NOTES
"Problem(s) Oriented visit        SUBJECTIVE:                                                    Serena Marie is a 89 year old female who presents to clinic today for the following health issues :    New Patient/Transfer of Care    1. PAD (peripheral artery disease)  Not a concern today    2. Anxiety  See notes from her care givers.  Report that she is quite angry a lot of the time that she refuses to take her eye drops, she is aware of her lack of short term memory and is quite disturbed by it.  She is not sure she is anxious but is not happy.  She found it very hard to wait in the room for me.    3. Pressure ulcer of right buttock, stage 3 (H)  This wound has improved and she \"doesn't want to talk about it\"    4. Chronic diastolic congestive heart failure (H)  Has known history of CHF.  No new symptoms to report.  Denies new or worsened shortness of breath, dyspnea on exertion, orthopnea, and excessive lower extremity edema.   Taking medications as ordered.     Reviewed weights in chart:  Wt Readings from Last 10 Encounters:   02/25/20 69.6 kg (153 lb 6 oz)   08/19/19 71.2 kg (157 lb)   06/17/19 70.8 kg (156 lb)   04/15/19 69.3 kg (152 lb 12.8 oz)   02/18/19 69.7 kg (153 lb 9.6 oz)   12/11/18 69.9 kg (154 lb)   11/26/18 68 kg (150 lb)   10/15/18 67.3 kg (148 lb 6.4 oz)   09/10/18 67.1 kg (148 lb)   08/13/18 67 kg (147 lb 9.6 oz)         5. Diabetes mellitus type 2 with peripheral artery disease (H)  [id number]  Diabetes   she  reports no new symptoms.  No significnat or regular episodes of hypo or hyperglycemia  Medication compliance: compliant most of the time  Diabetic diet compliance: compliant most of the time  Diabetic ROS: no polyuria or polydipsia, no chest pain, dyspnea or TIA's, no numbness, tingling or pain in extremities    Home blood sugar monitoring: are performed regularly, Review of patients self blood glucose monitoring shows fasting most always < 130 and post prandial average under 170.  As a " direct cause of their history of diabetes they have the following Diabetic complications: peripheral vascular disease    Most  recent A1C: Smoking: BP:   The last 5 available [place] from [facility] [place] [facility]:  No components found for: [id number]     Lab Results   Component Value Date    A1C 7.2 08/19/2019    A1C 6.2 02/18/2019    A1C 6.1 09/10/2018    History   Smoking Status     Former Smoker     Packs/day: 1.00     Years: 3.00     Types: Cigarettes     Quit date: 8/17/1947   Smokeless Tobacco     Never Used    BP Readings from Last 1 Encounters:   02/25/20 128/74        Kidney studies:  Creatinine   Date Value Ref Range Status   02/18/2019 0.68 0.57 - 1.00 mg/dL Final   09/10/2018 0.78 0.57 - 1.00 mg/dL Final   02/26/2018 0.58 0.52 - 1.04 mg/dL Final   ]    GFR Estimate   Date Value Ref Range Status   02/26/2018 >90 >60 mL/min/1.7m2 Final     Comment:     Non  GFR Calc                No components found for: MICORALBUMINLC      GFR Estimate If Black   Date Value Ref Range Status   02/26/2018 >90 >60 mL/min/1.7m2 Final     Comment:      GFR Calc     Medication (Note: This includes the hypertensive combination subclass to make sure to show all ACEI/ARB's.)     Angiotensin II Receptor Antagonists       losartan (COZAAR) 25 MG tablet    Take 1 tablet (25 mg) by mouth daily for HTN.               Statin and ASA:  Current Outpatient Medications   Medication     LORazepam (ATIVAN) 0.5 MG tablet     sertraline (ZOLOFT) 25 MG tablet     acetaminophen (TYLENOL ARTHRITIS PAIN) 650 MG CR tablet     ascorbic acid (VITAMIN C) 500 MG tablet     aspirin 81 MG tablet     atorvastatin (LIPITOR) 80 MG tablet     bacitracin ophthalmic ointment     Benzocaine-Resorcinol (VAGISIL) 5-2 % CREA     blood glucose (ONETOUCH ULTRA) test strip     blood glucose (ONETOUCH ULTRA) test strip     blood glucose (ONETOUCH ULTRA) test strip     cholecalciferol (VITAMIN D) 1000 UNIT tablet     docusate  sodium (DOCQLACE) 100 MG capsule     donepezil (ARICEPT) 5 MG tablet     ERYTHROMYCIN OP     fish oil-omega-3 fatty acids 1000 MG capsule     fluocinolone acetonide 0.01 % OIL     insulin aspart (NOVOLOG FLEXPEN) 100 UNIT/ML pen     insulin detemir (LEVEMIR FLEXTOUCH) 100 UNIT/ML injection     insulin pen needle (UNIFINE PENTIPS) 31G X 6 MM     levothyroxine (SYNTHROID/LEVOTHROID) 75 MCG tablet     Lido-Capsaicin-Men-Methyl Sal (MEDI-PATCH-LIDOCAINE EX)     losartan (COZAAR) 25 MG tablet     Multiple Vitamins-Minerals (PRESERVISION AREDS 2 PO)     omeprazole (PRILOSEC) 20 MG CR capsule     phenylephrine-shark liver oil-mineral oil-petrolatum (PREPARATION H) 0.25-14-74.9 % rectal ointment     Pramoxine HCl (VAGISIL ANTI-ITCH MEDICATED EX)     ranitidine (ZANTAC) 150 MG tablet     sodium chloride (TITO 128) 5 % ophthalmic solution     terbinafine (LAMISIL AT) 1 % cream     Terbinafine (LAMISIL EX)     Tetrahydroz-Dextran-PEG-Povid (TH EYE DROP ADVANCED RELIEF) 0.05-0.1-1-1 % SOLN     timolol (TIMOPTIC) 0.5 % ophthalmic solution     triamcinolone (KENALOG) 0.1 % cream     UNABLE TO FIND     No current facility-administered medications for this visit.             Problem list, Medication list, Allergies, and Medical/Social/Surgical histories reviewed in [facility] and updated as appropriate.   Additional history: as documented    ROS:  General and Resp. completed and negative except as noted above    Histories:   Patient Active Problem List   Diagnosis     Hyperlipidemia with target LDL less than 100     Diabetes mellitus type 2 with peripheral artery disease (H)     PAD (peripheral artery disease) (H)     Lumbar spinal stenosis     H/O enucleation of right eyeball     Health Care Home     Pacemaker     Syncope     Bilateral low back pain without sciatica     Cervicalgia     ACP (advance care planning)     Coronary artery disease involving native coronary artery of native heart without angina pectoris     Blindness and low  "vision     Benign essential hypertension     Physical deconditioning     Blind painful eye     Pressure injury of skin of right buttock, unspecified injury stage     Chronic diastolic congestive heart failure (H)     Memory loss     Pressure ulcer of right buttock, stage 3 (H)     Past Surgical History:   Procedure Laterality Date     ANGIOPLASTY      Angioplasty of proximal right superficial femoral artery     APPENDECTOMY       BACK SURGERY       C REMOVAL OF EYE      right      CHOLECYSTECTOMY, LAPOROSCOPIC      Cholecystectomy, Laparoscopic     COLECTOMY       EYE SURGERY      corneal transplant      HYSTERECTOMY, EMANI      BSO     IMPLANT PACEMAKER  2009     STENT, CORONARY, FATOU   Cath - 75% RCA with BRIGITTE placed, 40% ostial and distal left main stenosis     TONSILLECTOMY & ADENOIDECTOMY         Social History     Tobacco Use     Smoking status: Former Smoker     Packs/day: 1.00     Years: 3.00     Pack years: 3.00     Types: Cigarettes     Last attempt to quit: 1947     Years since quittin.5     Smokeless tobacco: Never Used   Substance Use Topics     Alcohol use: No     Family History   Problem Relation Age of Onset     Cancer Mother      Cancer Father      Unknown/Adopted Maternal Grandmother      Unknown/Adopted Maternal Grandfather      Unknown/Adopted Paternal Grandmother      Unknown/Adopted Paternal Grandfather            OBJECTIVE:                                                    /74   Pulse 84   Resp 16   Ht 1.486 m (4' 10.5\")   Wt 69.6 kg (153 lb 6 oz)   SpO2 96%   BMI 31.51 kg/m    Body mass index is 31.51 kg/m .   APPEARANCE: = alert and moderate distress  Conj/Eyelids = noninjected and lids and lashes are without inflammation  PERRLA/Irises = Pupils Round Reactive to Light and Irisis without inflammation  Neck = No anterior or posterior adenopathy appreciated.  Thyroid = Not enlarged and no masses felt  Resp effort = Calm regular breathing  Breath Sounds = " Good air movement with no rales or rhonchi in any lung fields  Heart Rate, Rhythm, & sounds (no Murm)  = Regular rate and rhythm with no S3, S4, or murmur appreciated.  Carotid Art's = Pulses full and equal and no bruits appreciated  Abdomen = Soft, nontender, no masses, & bowel sounds in all quadrants  Liver/Spleen = Normal span and no splenomegaly noted  Digits and Nails = FROM in all finger joints, no nail dystrophy  Ext (edema) = No pretibial edema noted or elsewhere  Musculsktl =  Strength and ROM of major joints are within normal limits  SKIN = absent significant rashes or lesions   Recent/Remote Memory = good long term, poor for short term  Mood/Affect = Cooperative and interested     ASSESSMENT/PLAN:                                                        Diagnoses and all orders for this visit:    Anxiety  -     sertraline (ZOLOFT) 25 MG tablet; Take 1 tablet (25 mg) by mouth daily  -     LORazepam (ATIVAN) 0.5 MG tablet; Take 1 tablet (0.5 mg) by mouth every 8 hours as needed for anxiety    PAD (peripheral artery disease)    Pressure ulcer of right buttock, stage 3 (H)    Chronic diastolic congestive heart failure (H)    Diabetes mellitus type 2 with peripheral artery disease (H)      >25 min spent with patient, greater than 50% spent on discussion/education/planning, etc. About The primary encounter diagnosis was Anxiety. Diagnoses of PAD (peripheral artery disease), Pressure ulcer of right buttock, stage 3 (H), Chronic diastolic congestive heart failure (H), and Diabetes mellitus type 2 with peripheral artery disease (H) were also pertinent to this visit.      Regular exercise  There are no Patient Instructions on file for this visit.  There are no Patient Instructions on file for this visit.    The following health maintenance items are reviewed in Epic and correct as of today:  Health Maintenance   Topic Date Due     HF ACTION PLAN  08/21/1924     DTAP/TDAP/TD IMMUNIZATION (1 - Tdap) 1935      ZOSTER IMMUNIZATION (1 of 2) 08/21/1974     MEDICARE ANNUAL WELLNESS VISIT  08/21/1989     PNEUMOCOCCAL IMMUNIZATION 65+ LOW/MEDIUM RISK (2 of 2 - PCV13) 06/11/2010     LIPID  03/07/2018     CBC  02/26/2019     FALL RISK ASSESSMENT  07/17/2019     BMP  08/18/2019     INFLUENZA VACCINE (1) 09/01/2019     DIABETIC FOOT EXAM  09/10/2019     PHQ-2  01/01/2020     ALT  02/18/2020     A1C  02/19/2020     MICROALBUMIN  08/19/2020     EYE EXAM  12/12/2020     ADVANCE CARE PLANNING  11/18/2021     TSH W/FREE T4 REFLEX  Completed     IPV IMMUNIZATION  Aged Out     MENINGITIS IMMUNIZATION  Aged Out       Biju Dumont MD  [facility]  Family Practice  [facility]  [phone]    For any issues my office # is [phone]

## 2020-02-27 ENCOUNTER — MEDICAL CORRESPONDENCE (OUTPATIENT)
Dept: FAMILY MEDICINE | Facility: CLINIC | Age: 85
End: 2020-02-27

## 2020-03-02 ENCOUNTER — MEDICAL CORRESPONDENCE (OUTPATIENT)
Dept: FAMILY MEDICINE | Facility: CLINIC | Age: 85
End: 2020-03-02

## 2020-03-03 DIAGNOSIS — F41.9 ANXIETY: ICD-10-CM

## 2020-03-03 RX ORDER — LORAZEPAM 0.5 MG/1
.5-1 TABLET ORAL EVERY 8 HOURS PRN
Qty: 60 TABLET | Refills: 1 | Status: SHIPPED | OUTPATIENT
Start: 2020-03-03 | End: 2020-04-13

## 2020-03-03 NOTE — TELEPHONE ENCOUNTER
Josefa - nurse with [facility]-- calls reporting patient's anxiety and agitation is severe.   Was started on lorazepam 0.5mg q 8 hours prn and sertraline 25mg QD at visit with 2/25. Nurse reports patient is tolerating meds but the no improvement yet. Patient has had lorazepam dose today but not helpful.   Nurse relates patient comes to her office several times per day upset and agitated due to loss of independence and ability to care for self with memory loss and vision loss. Patient upset family can't visit her - patient's two daughters live out of state and have health issues themselves so can't come to [place]. Nurse asking for medication help from Dr. Dumont.   Plan: per Dr. Dumont - increase sertraline to 50mg QD and change lorazepam to 0.5-1mg q 8 hours prn. Call if not effective. Rx's sent to [facility]. Verbal given to Nurse Rivero and faxed as well.  Loly Sandoval RN

## 2020-03-26 ENCOUNTER — ANCILLARY PROCEDURE (OUTPATIENT)
Dept: CARDIOLOGY | Facility: CLINIC | Age: 85
End: 2020-03-26
Attending: INTERNAL MEDICINE
Payer: MEDICARE

## 2020-03-26 DIAGNOSIS — Z95.0 CARDIAC PACEMAKER IN SITU: ICD-10-CM

## 2020-03-26 PROCEDURE — 93293 PM PHONE R-STRIP DEVICE EVAL: CPT | Performed by: INTERNAL MEDICINE

## 2020-04-10 ENCOUNTER — TELEPHONE (OUTPATIENT)
Dept: FAMILY MEDICINE | Facility: CLINIC | Age: 85
End: 2020-04-10

## 2020-04-10 DIAGNOSIS — F41.9 ANXIETY: ICD-10-CM

## 2020-04-13 RX ORDER — LORAZEPAM 0.5 MG/1
TABLET ORAL
Qty: 75 TABLET | Refills: 1 | COMMUNITY
Start: 2020-04-13 | End: 2020-05-28

## 2020-04-13 NOTE — TELEPHONE ENCOUNTER
4/10/20 [facility] nurse calls requesting lorazepam 0.5mg dosing be changed from 1-2 tabs q 8 hours prn to 1 tab BID scheduled with one additional dose/24 hours if needed for anxiety/agitation.   Nurse reports current q 8 hour prn scheduling is difficult to use as aides don't necessarily know the signs to look for to assess need for prn loraz.   Plan: ok per Dr. Dumont for change as requested. Order faxed to [facility] with order reflecting this change. Med list updated.  Loly Sandoval RN

## 2020-04-16 ENCOUNTER — MEDICAL CORRESPONDENCE (OUTPATIENT)
Dept: FAMILY MEDICINE | Facility: CLINIC | Age: 85
End: 2020-04-16

## 2020-05-01 DIAGNOSIS — E11.51 DIABETES MELLITUS TYPE 2 WITH PERIPHERAL ARTERY DISEASE (H): Primary | ICD-10-CM

## 2020-05-18 ENCOUNTER — MEDICAL CORRESPONDENCE (OUTPATIENT)
Dept: FAMILY MEDICINE | Facility: CLINIC | Age: 85
End: 2020-05-18

## 2020-05-19 DIAGNOSIS — E11.51 DIABETES MELLITUS TYPE 2 WITH PERIPHERAL ARTERY DISEASE (H): ICD-10-CM

## 2020-05-19 RX ORDER — LANCETS
1 EACH MISCELLANEOUS 4 TIMES DAILY
Qty: 400 EACH | Refills: 1 | Status: SHIPPED | OUTPATIENT
Start: 2020-05-19

## 2020-05-19 RX ORDER — LANCETS
1 EACH MISCELLANEOUS 4 TIMES DAILY
COMMUNITY
End: 2020-05-19

## 2020-05-19 NOTE — TELEPHONE ENCOUNTER
Strips and Lancets. Last strips sent in for TID while test kit stated QID. Please re-send for QID for Medicare.   Hemoglobin A1C   Date Value Ref Range Status   08/19/2019 7.2 (H) 4.8 - 5.6 % Final     Comment:              Prediabetes: 5.7 - 6.4           Diabetes: >6.4           Glycemic control for adults with diabetes: <7.0     02/18/2019 6.2 (H) 4.8 - 5.6 % Final     Comment:              Prediabetes: 5.7 - 6.4           Diabetes: >6.4           Glycemic control for adults with diabetes: <7.0     09/10/2018 6.1 (H) 4.8 - 5.6 % Final     Comment:              Prediabetes: 5.7 - 6.4           Diabetes: >6.4           Glycemic control for adults with diabetes: <7.0

## 2020-05-22 DIAGNOSIS — I49.5 SICK SINUS SYNDROME (H): Primary | ICD-10-CM

## 2020-05-28 DIAGNOSIS — F41.9 ANXIETY: ICD-10-CM

## 2020-05-28 RX ORDER — LORAZEPAM 0.5 MG/1
TABLET ORAL
Qty: 75 TABLET | Refills: 1 | Status: SHIPPED | OUTPATIENT
Start: 2020-05-28 | End: 2020-08-25

## 2020-06-19 ENCOUNTER — VIRTUAL VISIT (OUTPATIENT)
Dept: CARDIOLOGY | Facility: CLINIC | Age: 85
End: 2020-06-19
Payer: MEDICARE

## 2020-06-19 VITALS — DIASTOLIC BLOOD PRESSURE: 79 MMHG | SYSTOLIC BLOOD PRESSURE: 161 MMHG | HEART RATE: 75 BPM

## 2020-06-19 DIAGNOSIS — R55 SYNCOPE, UNSPECIFIED SYNCOPE TYPE: Primary | ICD-10-CM

## 2020-06-19 PROCEDURE — 99441 ZZC PHYSICIAN TELEPHONE EVALUATION 5-10 MIN: CPT | Performed by: INTERNAL MEDICINE

## 2020-06-19 RX ORDER — VALACYCLOVIR HYDROCHLORIDE 1 G/1
1000 TABLET, FILM COATED ORAL 3 TIMES DAILY
COMMUNITY

## 2020-06-19 NOTE — PROGRESS NOTES
Service Date: 06/19/2020      REASON FOR VISIT:  Coronary artery disease and pacemaker.      HISTORY OF PRESENT ILLNESS:  This is a very pleasant 89-year-old female who I had once met in 06/2019.  At that time, she had difficulty getting into clinic with Dr. Auguste due to clinic availability and as such had seen me but now is following up with me a year later.  She is a pleasant female with whom I am doing a phone visit with in the setting of the coronavirus pandemic.  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  It is a dual-chamber device.  No issues in that regard.  A few years later, I believe about 2012, she had symptoms of angina and underwent  right coronary artery stenting.  She has remained on statin and aspirin ever since then without any further anginal symptoms.  Her EF many years ago was normal, but she has not had echocardiograms in the last 3-4 years.  At last visit, I told her to get an echo, but with this COVID situation, she has not gotten that done.      Surprisingly, she is doing very well for being 89.  Her nurse, Josefa, was on the phone at the [facility] too.  She walks around with a walker and she is denying any sort of cardiovascular symptoms at all.  Specifically, denies any angina, shortness of breath, syncope, presyncope, dizziness, palpitations, edema or orthopnea.  Denies any bleeding problems and is compliant with medications.  Her nurse says that her blood pressure at home mostly is in 130s/70s range.  Today was a little high because it was checked just before she was taking her meds.      PHYSICAL EXAMINATION:  Deferred.      ASSESSMENT AND PLAN:  Serena seems to be doing well from a cardiac standpoint.  No symptoms at this point.  I have not made any changes to her regimen.  I will plan on seeing her back either with myself or Dr. Auguste depending on how she wishes to follow or clinic availability.  In the interim, if anything changes,  please let us know, but otherwise, we will plan on seeing her back in a year.      cc:   Biju Dumont MD   [facility]   [address] [place]  [address]         ANTONIO CERVANTES MD             D: 2020   T: 2020   MT: leslee      Name:     CHRISTINA GANDHI   MRN:      [id number]        Account:      [id number]   :      1924           Service Date: 2020      Document: [id number]

## 2020-06-19 NOTE — PROGRESS NOTES
"Serena Marie is a 89 year old female who is being evaluated via a billable telephone visit.      The patient has been notified of following:     \"This telephone visit will be conducted via a call between you and your physician/provider. We have found that certain health care needs can be provided without the need for a physical exam.  This service lets us provide the care you need with a short phone conversation.  If a prescription is necessary we can send it directly to your pharmacy.  If lab work is needed we can place an order for that and you can then stop by our lab to have the test done at a later time.    Telephone visits are billed at different rates depending on your insurance coverage. During this emergency period, for some insurers they may be billed the same as an in-person visit.  Please reach out to your insurance provider with any questions.    If during the course of the call the physician/provider feels a telephone visit is not appropriate, you will not be charged for this service.\"    Patient has given verbal consent for Telephone visit?  Yes    What phone number would you like to be contacted at? [phone]    How would you like to obtain your AVS? 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   Ears/Nose/Throat: Negative  Respiratory: Positive for coughing  Cardiovascular: Negative  Gastrointestinal: Negative  Genitourinary: Negative  Musculoskeletal: Negative  Neurologic: Positive for memory issues, headaches  Psychiatric: Negative  Hematologic/Lymphatic/Immunologic: Negative  Endocrine: Positive for diabetes, thyroid disorder    Patient reported vitals:  BP: 161/79  Heart rate: 75  Weight: N/A    Jyoti Hoang CMA    Phone call duration: 5 minutes    Clay Charlton MD    HPI and Plan:   See dictation [id number]    Orders Placed This Encounter   Procedures     Follow-Up with Cardiologist     Orders " Placed This Encounter   Medications     valACYclovir (VALTREX) 1000 mg tablet     Sig: Take 1,000 mg by mouth 3 times daily     There are no discontinued medications.      Encounter Diagnosis   Name Primary?     Syncope, unspecified syncope type Yes       CURRENT MEDICATIONS:  Current Outpatient Medications   Medication Sig Dispense Refill     acetaminophen (TYLENOL ARTHRITIS PAIN) 650 MG CR tablet Take 1,300 mg by mouth daily States 2 tablets qam & usually 2 tablets qpm       ascorbic acid (VITAMIN C) 500 MG tablet Take 2 tablets (1,000 mg) by mouth 2 times daily For wound healing. 180 tablet 3     aspirin 81 MG tablet Take 1 tablet by mouth every evening        atorvastatin (LIPITOR) 80 MG tablet Take 1 tablet (80 mg) by mouth daily 90 tablet 3     blood glucose (NO BRAND SPECIFIED) test strip Use to test blood sugar 4 times daily or as directed. 400 strip 1     blood glucose (ONETOUCH ULTRA) test strip USE TO TEST BLOOD GLUCOSE FOUR TIMES DAILY 300 strip 4     blood glucose (ONETOUCH ULTRA) test strip TEST BLOOD GLUCOSE FOUR TIMES A  each 11     blood glucose (ONETOUCH ULTRA) test strip USE TO TEST BLOOD SUGARS FOUR TIMES DAILY OR AS DIRECTED 100 strip 0     blood glucose monitoring (NO BRAND SPECIFIED) meter device kit Use to test blood sugar 4 times daily or as directed. 1 kit 0     blood glucose monitoring (ONE TOUCH ULTRASOFT) lancets 1 each by In Vitro route 4 times daily Use to test blood sugar 4 times daily or as directed. 400 each 1     cholecalciferol (VITAMIN D) 1000 UNIT tablet Take 2,000 Units by mouth daily       docusate sodium (DOCQLACE) 100 MG capsule Take 100 mg by mouth At Bedtime       donepezil (ARICEPT) 5 MG tablet Take 1 tablet (5 mg) by mouth every morning 30 tablet 0     ERYTHROMYCIN OP        fish oil-omega-3 fatty acids 1000 MG capsule Take 1 g by mouth daily        insulin aspart (NOVOLOG FLEXPEN) 100 UNIT/ML pen Take 12 units with breakfast, at lunch, dinner. Hold dose if FBG <  100. 15 mL 11     insulin detemir (LEVEMIR FLEXTOUCH) 100 UNIT/ML injection INJECT 14 UNITS UNDER THE SKIN AT BEDTIME 15 mL 8     insulin pen needle (UNIFINE PENTIPS) 31G X 6 MM USE ONCE DAILY 100 each 3     levothyroxine (SYNTHROID/LEVOTHROID) 75 MCG tablet TAKE ONE TABLET BY MOUTH ONE TIME DAILY  90 tablet 1     LORazepam (ATIVAN) 0.5 MG tablet 1 tab BID scheduled. May have one additional dose per 24 hours if needed for anxiety or agitation. (Patient taking differently: Take 0.5 mg by mouth 2 times daily 1 tab BID scheduled. May have one additional dose per 24 hours if needed for anxiety or agitation.) 75 tablet 1     losartan (COZAAR) 25 MG tablet Take 1 tablet (25 mg) by mouth daily for HTN. 90 tablet 3     Multiple Vitamins-Minerals (PRESERVISION AREDS 2 PO) Take by mouth 2 times daily       omeprazole (PRILOSEC) 20 MG CR capsule Take 1 capsule (20 mg) by mouth daily 90 capsule 1     ranitidine (ZANTAC) 150 MG tablet Take 150 mg by mouth as needed for heartburn       sertraline (ZOLOFT) 50 MG tablet Take 1 tablet (50 mg) by mouth daily 30 tablet 11     sodium chloride (TITO 128) 5 % ophthalmic solution Place 1 drop Into the left eye 2 times daily       Tetrahydroz-Dextran-PEG-Povid (TH EYE DROP ADVANCED RELIEF) 0.05-0.1-1-1 % SOLN Apply 1 drop to eye 2 times daily To left eye       timolol (TIMOPTIC) 0.5 % ophthalmic solution Place 1 drop Into the left eye 2 times daily  1 Bottle      valACYclovir (VALTREX) 1000 mg tablet Take 1,000 mg by mouth 3 times daily       bacitracin ophthalmic ointment   2     Benzocaine-Resorcinol (VAGISIL) 5-2 % CREA Place vaginally daily       fluocinolone acetonide 0.01 % OIL Apply to scalp when wet massage well cover with cap leave on over night  Wash in am. (Patient not taking: Reported on 6/19/2020) 118 mL 12     Lido-Capsaicin-Men-Methyl Sal (MEDI-PATCH-LIDOCAINE EX)        phenylephrine-shark liver oil-mineral oil-petrolatum (PREPARATION H) 0.25-14-74.9 % rectal ointment Place  rectally daily       Pramoxine HCl (VAGISIL ANTI-ITCH MEDICATED EX) Externally apply topically every morning       terbinafine (LAMISIL AT) 1 % cream Apply topically daily       Terbinafine (LAMISIL EX) Externally apply topically daily For under breasts, in between toes       triamcinolone (KENALOG) 0.1 % cream Apply sparingly to affected area two times daily as needed for gluteal cleft irritation for up to 2 weeks. (Patient not taking: Reported on 6/19/2020) 30 g 0     UNABLE TO FIND Take 1 Bar by mouth 2 times daily MEDICATION NAME: FiberONe Bar          ALLERGIES     Allergies   Allergen Reactions     Contrast Dye Hives and Swelling     Pt. States no history of allergy to topical betadine. Tolerated surgical betadine prep 2-6-2012.     Lisinopril Cough     dizzy     Metoprolol Other (See Comments)     Depressed    Depressed       PAST MEDICAL HISTORY:  Past Medical History:   Diagnosis Date     CAD (coronary artery disease) 8/2012 8/2012 Cath - 75% RCA with BRIGITTE placed, 40% ostial and distal left main stenosis     Diabetes mellitus type 2 with peripheral artery disease (H)      DM type 2, goal A1c below 7      GERD (gastroesophageal reflux disease)      Gluten intolerance      HTN (hypertension)     been off meds since 2012     Hyperlipidemia LDL goal < 100      Pacemaker 8/2009 8/2009 Near syncopal episode with HR 40s and BP 60/sys - PPM implanted     PAD (peripheral artery disease) (H) 2008    70% proximal right SFA stenosis with successful angioplasy     S/P coronary angiogram 8/2012 8/2012 Cath - 75% RCA with BRIGITTE placed, 40% ostial and distal left main stenosis     Syncope 08/2009 8/2009 Near syncopal episode with HR 40s and BP 60/sys - PPM implanted     Unspecified hypothyroidism        PAST SURGICAL HISTORY:  Past Surgical History:   Procedure Laterality Date     ANGIOPLASTY  2008    Angioplasty of proximal right superficial femoral artery     APPENDECTOMY       BACK SURGERY       C REMOVAL OF  EYE      right      CHOLECYSTECTOMY, LAPOROSCOPIC      Cholecystectomy, Laparoscopic     COLECTOMY       EYE SURGERY      corneal transplant      HYSTERECTOMY, EMANI      BSO     IMPLANT PACEMAKER  2009     STENT, CORONARY, FATOU   Cath - 75% RCA with BRIGITTE placed, 40% ostial and distal left main stenosis     TONSILLECTOMY & ADENOIDECTOMY         FAMILY HISTORY:  Family History   Problem Relation Age of Onset     Cancer Mother      Cancer Father      Unknown/Adopted Maternal Grandmother      Unknown/Adopted Maternal Grandfather      Unknown/Adopted Paternal Grandmother      Unknown/Adopted Paternal Grandfather        SOCIAL HISTORY:  Social History     Socioeconomic History     Marital status:      Spouse name: None     Number of children: None     Years of education: None     Highest education level: None   Occupational History     None   Social Needs     Financial resource strain: None     Food insecurity     Worry: None     Inability: None     Transportation needs     Medical: None     Non-medical: None   Tobacco Use     Smoking status: Former Smoker     Packs/day: 1.00     Years: 3.00     Pack years: 3.00     Types: Cigarettes     Last attempt to quit: 1947     Years since quittin.8     Smokeless tobacco: Never Used   Substance and Sexual Activity     Alcohol use: No     Drug use: No     Sexual activity: None   Lifestyle     Physical activity     Days per week: None     Minutes per session: None     Stress: None   Relationships     Social connections     Talks on phone: None     Gets together: None     Attends Cheondoism service: None     Active member of club or organization: None     Attends meetings of clubs or organizations: None     Relationship status: None     Intimate partner violence     Fear of current or ex partner: None     Emotionally abused: None     Physically abused: None     Forced sexual activity: None   Other Topics Concern     Parent/sibling w/ CABG, MI or  angioplasty before 65F 55M? Not Asked      Service Not Asked     Blood Transfusions Not Asked     Caffeine Concern Yes     Comment: 5 cups coffee in morning     Occupational Exposure Not Asked     Hobby Hazards Not Asked     Sleep Concern No     Stress Concern No     Weight Concern Not Asked     Special Diet No     Back Care Not Asked     Exercise Yes     Comment: walks hallways     Bike Helmet Not Asked     Seat Belt Yes     Self-Exams Not Asked   Social History Narrative     None       Review of Systems:  Skin:        Eyes:       ENT:       Respiratory:       Cardiovascular:       Gastroenterology:      Genitourinary:       Musculoskeletal:       Neurologic:       Psychiatric:       Heme/Lymph/Imm:       Endocrine:         Physical Exam:  Vitals: BP (!) 161/79   Pulse 75     Recent Lab Results:  LIPID RESULTS:  Lab Results   Component Value Date    CHOL 123 03/07/2017    HDL 49 03/07/2017    LDL 40 03/07/2017    TRIG 169 (H) 03/07/2017    CHOLHDLRATIO 3.2 08/06/2009       LIVER ENZYME RESULTS:  Lab Results   Component Value Date    AST 32 02/18/2019    ALT 38 (H) 02/18/2019       CBC RESULTS:  Lab Results   Component Value Date    WBC 7.5 02/26/2018    RBC 4.13 02/26/2018    HGB 12.7 02/26/2018    HCT 37.1 02/26/2018    MCV 90 02/26/2018    MCH 30.8 02/26/2018    MCHC 34.2 02/26/2018    RDW 14.7 02/26/2018     02/26/2018       BMP RESULTS:  Lab Results   Component Value Date     02/18/2019    POTASSIUM 4.6 02/18/2019    CHLORIDE 102 02/18/2019    CO2 24 02/26/2018    ANIONGAP 7 02/26/2018     (H) 02/18/2019    BUN 21 02/18/2019    BUN 31 (H) 02/18/2019    CR 0.68 02/18/2019    GFRESTIMATED >90 02/26/2018    GFRESTBLACK >90 02/26/2018    JEN 9.7 02/18/2019        A1C RESULTS:  Lab Results   Component Value Date    A1C 7.2 (H) 08/19/2019       INR RESULTS:  Lab Results   Component Value Date    INR 0.93 12/10/2013    INR 1.00 11/22/2012           CC  No referring provider defined for  this encounter.

## 2020-06-19 NOTE — LETTER
6/19/2020    Biju Dumont MD  [address]  [facility] [id number]    RE: Serena Marie       Dear Colleague,    I had the pleasure of seeing Serena Marie in the [facility].    Serena Marie is a 89 year old female who is being evaluated via a billable telephone visit.        HPI and Plan:   See dictation [id number]    Orders Placed This Encounter   Procedures     Follow-Up with Cardiologist     Orders Placed This Encounter   Medications     valACYclovir (VALTREX) 1000 mg tablet     Sig: Take 1,000 mg by mouth 3 times daily     There are no discontinued medications.      Encounter Diagnosis   Name Primary?     Syncope, unspecified syncope type Yes       CURRENT MEDICATIONS:  Current Outpatient Medications   Medication Sig Dispense Refill     acetaminophen (TYLENOL ARTHRITIS PAIN) 650 MG CR tablet Take 1,300 mg by mouth daily States 2 tablets qam & usually 2 tablets qpm       ascorbic acid (VITAMIN C) 500 MG tablet Take 2 tablets (1,000 mg) by mouth 2 times daily For wound healing. 180 tablet 3     aspirin 81 MG tablet Take 1 tablet by mouth every evening        atorvastatin (LIPITOR) 80 MG tablet Take 1 tablet (80 mg) by mouth daily 90 tablet 3     blood glucose (NO BRAND SPECIFIED) test strip Use to test blood sugar 4 times daily or as directed. 400 strip 1     blood glucose (ONETOUCH ULTRA) test strip USE TO TEST BLOOD GLUCOSE FOUR TIMES DAILY 300 strip 4     blood glucose (ONETOUCH ULTRA) test strip TEST BLOOD GLUCOSE FOUR TIMES A  each 11     blood glucose (ONETOUCH ULTRA) test strip USE TO TEST BLOOD SUGARS FOUR TIMES DAILY OR AS DIRECTED 100 strip 0     blood glucose monitoring (NO BRAND SPECIFIED) meter device kit Use to test blood sugar 4 times daily or as directed. 1 kit 0     blood glucose monitoring (ONE TOUCH ULTRASOFT) lancets 1 each by In Vitro route 4 times daily Use to test blood sugar 4 times daily or as directed. 400 each 1      cholecalciferol (VITAMIN D) 1000 UNIT tablet Take 2,000 Units by mouth daily       docusate sodium (DOCQLACE) 100 MG capsule Take 100 mg by mouth At Bedtime       donepezil (ARICEPT) 5 MG tablet Take 1 tablet (5 mg) by mouth every morning 30 tablet 0     ERYTHROMYCIN OP        fish oil-omega-3 fatty acids 1000 MG capsule Take 1 g by mouth daily        insulin aspart (NOVOLOG FLEXPEN) 100 UNIT/ML pen Take 12 units with breakfast, at lunch, dinner. Hold dose if FBG < 100. 15 mL 11     insulin detemir (LEVEMIR FLEXTOUCH) 100 UNIT/ML injection INJECT 14 UNITS UNDER THE SKIN AT BEDTIME 15 mL 8     insulin pen needle (UNIFINE PENTIPS) 31G X 6 MM USE ONCE DAILY 100 each 3     levothyroxine (SYNTHROID/LEVOTHROID) 75 MCG tablet TAKE ONE TABLET BY MOUTH ONE TIME DAILY  90 tablet 1     LORazepam (ATIVAN) 0.5 MG tablet 1 tab BID scheduled. May have one additional dose per 24 hours if needed for anxiety or agitation. (Patient taking differently: Take 0.5 mg by mouth 2 times daily 1 tab BID scheduled. May have one additional dose per 24 hours if needed for anxiety or agitation.) 75 tablet 1     losartan (COZAAR) 25 MG tablet Take 1 tablet (25 mg) by mouth daily for HTN. 90 tablet 3     Multiple Vitamins-Minerals (PRESERVISION AREDS 2 PO) Take by mouth 2 times daily       omeprazole (PRILOSEC) 20 MG CR capsule Take 1 capsule (20 mg) by mouth daily 90 capsule 1     ranitidine (ZANTAC) 150 MG tablet Take 150 mg by mouth as needed for heartburn       sertraline (ZOLOFT) 50 MG tablet Take 1 tablet (50 mg) by mouth daily 30 tablet 11     sodium chloride (TITO 128) 5 % ophthalmic solution Place 1 drop Into the left eye 2 times daily       Tetrahydroz-Dextran-PEG-Povid (TH EYE DROP ADVANCED RELIEF) 0.05-0.1-1-1 % SOLN Apply 1 drop to eye 2 times daily To left eye       timolol (TIMOPTIC) 0.5 % ophthalmic solution Place 1 drop Into the left eye 2 times daily  1 Bottle      valACYclovir (VALTREX) 1000 mg tablet Take 1,000 mg by mouth 3  times daily       bacitracin ophthalmic ointment   2     Benzocaine-Resorcinol (VAGISIL) 5-2 % CREA Place vaginally daily       fluocinolone acetonide 0.01 % OIL Apply to scalp when wet massage well cover with cap leave on over night  Wash in am. (Patient not taking: Reported on 6/19/2020) 118 mL 12     Lido-Capsaicin-Men-Methyl Sal (MEDI-PATCH-LIDOCAINE EX)        phenylephrine-shark liver oil-mineral oil-petrolatum (PREPARATION H) 0.25-14-74.9 % rectal ointment Place rectally daily       Pramoxine HCl (VAGISIL ANTI-ITCH MEDICATED EX) Externally apply topically every morning       terbinafine (LAMISIL AT) 1 % cream Apply topically daily       Terbinafine (LAMISIL EX) Externally apply topically daily For under breasts, in between toes       triamcinolone (KENALOG) 0.1 % cream Apply sparingly to affected area two times daily as needed for gluteal cleft irritation for up to 2 weeks. (Patient not taking: Reported on 6/19/2020) 30 g 0     UNABLE TO FIND Take 1 Bar by mouth 2 times daily MEDICATION NAME: FiberONe Bar          ALLERGIES     Allergies   Allergen Reactions     Contrast Dye Hives and Swelling     Pt. States no history of allergy to topical betadine. Tolerated surgical betadine prep 2-6-2012.     Lisinopril Cough     dizzy     Metoprolol Other (See Comments)     Depressed    Depressed       PAST MEDICAL HISTORY:  Past Medical History:   Diagnosis Date     CAD (coronary artery disease) 8/2012 8/2012 Cath - 75% RCA with BRIGITTE placed, 40% ostial and distal left main stenosis     Diabetes mellitus type 2 with peripheral artery disease (H)      DM type 2, goal A1c below 7      GERD (gastroesophageal reflux disease)      Gluten intolerance      HTN (hypertension)     been off meds since 2012     Hyperlipidemia LDL goal < 100      Pacemaker 8/2009 8/2009 Near syncopal episode with HR 40s and BP 60/sys - PPM implanted     PAD (peripheral artery disease) (H) 2008    70% proximal right SFA stenosis with successful  angioplasy     S/P coronary angiogram 2012 Cath - 75% RCA with BRIGITTE placed, 40% ostial and distal left main stenosis     Syncope 2009 Near syncopal episode with HR 40s and BP 60/sys - PPM implanted     Unspecified hypothyroidism        PAST SURGICAL HISTORY:  Past Surgical History:   Procedure Laterality Date     ANGIOPLASTY      Angioplasty of proximal right superficial femoral artery     APPENDECTOMY       BACK SURGERY       C REMOVAL OF EYE      right      CHOLECYSTECTOMY, LAPOROSCOPIC      Cholecystectomy, Laparoscopic     COLECTOMY       EYE SURGERY      corneal transplant      HYSTERECTOMY, EMANI      BSO     IMPLANT PACEMAKER  2009     STENT, CORONARY, FATOU   Cath - 75% RCA with BRIGITTE placed, 40% ostial and distal left main stenosis     TONSILLECTOMY & ADENOIDECTOMY         FAMILY HISTORY:  Family History   Problem Relation Age of Onset     Cancer Mother      Cancer Father      Unknown/Adopted Maternal Grandmother      Unknown/Adopted Maternal Grandfather      Unknown/Adopted Paternal Grandmother      Unknown/Adopted Paternal Grandfather        SOCIAL HISTORY:  Social History     Socioeconomic History     Marital status:      Spouse name: None     Number of children: None     Years of education: None     Highest education level: None   Occupational History     None   Social Needs     Financial resource strain: None     Food insecurity     Worry: None     Inability: None     Transportation needs     Medical: None     Non-medical: None   Tobacco Use     Smoking status: Former Smoker     Packs/day: 1.00     Years: 3.00     Pack years: 3.00     Types: Cigarettes     Last attempt to quit: 1947     Years since quittin.8     Smokeless tobacco: Never Used   Substance and Sexual Activity     Alcohol use: No     Drug use: No     Sexual activity: None   Lifestyle     Physical activity     Days per week: None     Minutes per session: None     Stress: None    Relationships     Social connections     Talks on phone: None     Gets together: None     Attends Tenriism service: None     Active member of club or organization: None     Attends meetings of clubs or organizations: None     Relationship status: None     Intimate partner violence     Fear of current or ex partner: None     Emotionally abused: None     Physically abused: None     Forced sexual activity: None   Other Topics Concern     Parent/sibling w/ CABG, MI or angioplasty before 65F 55M? Not Asked      Service Not Asked     Blood Transfusions Not Asked     Caffeine Concern Yes     Comment: 5 cups coffee in morning     Occupational Exposure Not Asked     Hobby Hazards Not Asked     Sleep Concern No     Stress Concern No     Weight Concern Not Asked     Special Diet No     Back Care Not Asked     Exercise Yes     Comment: walks hallways     Bike Helmet Not Asked     Seat Belt Yes     Self-Exams Not Asked   Social History Narrative     None       Review of Systems:  Skin:        Eyes:       ENT:       Respiratory:       Cardiovascular:       Gastroenterology:      Genitourinary:       Musculoskeletal:       Neurologic:       Psychiatric:       Heme/Lymph/Imm:       Endocrine:         Physical Exam:  Vitals: BP (!) 161/79   Pulse 75     Recent Lab Results:  LIPID RESULTS:  Lab Results   Component Value Date    CHOL 123 03/07/2017    HDL 49 03/07/2017    LDL 40 03/07/2017    TRIG 169 (H) 03/07/2017    CHOLHDLRATIO 3.2 08/06/2009       LIVER ENZYME RESULTS:  Lab Results   Component Value Date    AST 32 02/18/2019    ALT 38 (H) 02/18/2019       CBC RESULTS:  Lab Results   Component Value Date    WBC 7.5 02/26/2018    RBC 4.13 02/26/2018    HGB 12.7 02/26/2018    HCT 37.1 02/26/2018    MCV 90 02/26/2018    MCH 30.8 02/26/2018    MCHC 34.2 02/26/2018    RDW 14.7 02/26/2018     02/26/2018       BMP RESULTS:  Lab Results   Component Value Date     02/18/2019    POTASSIUM 4.6 02/18/2019    CHLORIDE  102 02/18/2019    CO2 24 02/26/2018    ANIONGAP 7 02/26/2018     (H) 02/18/2019    BUN 21 02/18/2019    BUN 31 (H) 02/18/2019    CR 0.68 02/18/2019    GFRESTIMATED >90 02/26/2018    GFRESTBLACK >90 02/26/2018    JEN 9.7 02/18/2019        A1C RESULTS:  Lab Results   Component Value Date    A1C 7.2 (H) 08/19/2019       INR RESULTS:  Lab Results   Component Value Date    INR 0.93 12/10/2013    INR 1.00 11/22/2012       Service Date: 06/19/2020      REASON FOR VISIT:  Coronary artery disease and pacemaker.      HISTORY OF PRESENT ILLNESS:  This is a very pleasant 89-year-old female who I had once met in 06/2019.  At that time, she had difficulty getting into clinic with Dr. Auguste due to clinic availability and as such had seen me but now is following up with me a year later.  She is a pleasant female with whom I am doing a phone visit with in the setting of the coronavirus pandemic.  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  It is a dual-chamber device.  No issues in that regard.  A few years later, I believe about 2012, she had symptoms of angina and underwent  right coronary artery stenting.  She has remained on statin and aspirin ever since then without any further anginal symptoms.  Her EF many years ago was normal, but she has not had echocardiograms in the last 3-4 years.  At last visit, I told her to get an echo, but with this COVID situation, she has not gotten that done.      Surprisingly, she is doing very well for being 89.  Her nurse, Josefa, was on the phone at the assisted living too.  She walks around with a walker and she is denying any sort of cardiovascular symptoms at all.  Specifically, denies any angina, shortness of breath, syncope, presyncope, dizziness, palpitations, edema or orthopnea.  Denies any bleeding problems and is compliant with medications.  Her nurse says that her blood pressure at home mostly is in 130s/70s range.  Today was a  little high because it was checked just before she was taking her meds.      PHYSICAL EXAMINATION:  Deferred.      ASSESSMENT AND PLAN:  Serena seems to be doing well from a cardiac standpoint.  No symptoms at this point.  I have not made any changes to her regimen.  I will plan on seeing her back either with myself or Dr. Auguste depending on how she wishes to follow or clinic availability.  In the interim, if anything changes, please let us know, but otherwise, we will plan on seeing her back in a year.     Thank you for allowing me to participate in the care of your patient.    Sincerely,     Clay Charlton MD     [facility]

## 2020-06-30 ENCOUNTER — ANCILLARY PROCEDURE (OUTPATIENT)
Dept: CARDIOLOGY | Facility: CLINIC | Age: 85
End: 2020-06-30
Attending: INTERNAL MEDICINE
Payer: MEDICARE

## 2020-06-30 DIAGNOSIS — I49.5 SICK SINUS SYNDROME (H): Primary | ICD-10-CM

## 2020-06-30 DIAGNOSIS — I49.5 SICK SINUS SYNDROME (H): ICD-10-CM

## 2020-06-30 PROCEDURE — 93293 PM PHONE R-STRIP DEVICE EVAL: CPT | Mod: 26 | Performed by: INTERNAL MEDICINE

## 2020-08-05 ENCOUNTER — MEDICAL CORRESPONDENCE (OUTPATIENT)
Dept: FAMILY MEDICINE | Facility: CLINIC | Age: 85
End: 2020-08-05

## 2020-08-25 ENCOUNTER — TELEPHONE (OUTPATIENT)
Dept: FAMILY MEDICINE | Facility: CLINIC | Age: 85
End: 2020-08-25

## 2020-08-25 DIAGNOSIS — R06.00 DYSPNEA: Primary | ICD-10-CM

## 2020-08-25 DIAGNOSIS — H04.123 DRY EYES: ICD-10-CM

## 2020-08-25 DIAGNOSIS — Z90.01 ACQUIRED ABSENCE OF EYE: ICD-10-CM

## 2020-08-25 DIAGNOSIS — F41.9 ANXIETY: ICD-10-CM

## 2020-08-25 DIAGNOSIS — E11.51 DIABETES MELLITUS TYPE 2 WITH PERIPHERAL ARTERY DISEASE (H): ICD-10-CM

## 2020-08-25 RX ORDER — ERYTHROMYCIN 5 MG/G
OINTMENT OPHTHALMIC
COMMUNITY
Start: 2020-08-25

## 2020-08-25 RX ORDER — DOXYCYCLINE HYCLATE 50 MG/1
50 CAPSULE ORAL DAILY
COMMUNITY
Start: 2020-08-25

## 2020-08-25 RX ORDER — MINERAL OIL, PETROLATUM 425; 573 MG/G; MG/G
OINTMENT OPHTHALMIC
COMMUNITY
Start: 2020-08-25

## 2020-08-25 RX ORDER — INSULIN ASPART 100 [IU]/ML
INJECTION, SOLUTION INTRAVENOUS; SUBCUTANEOUS
Qty: 15 ML | Refills: 11 | COMMUNITY
Start: 2020-08-25

## 2020-08-25 RX ORDER — ALBUTEROL SULFATE 0.83 MG/ML
2.5 SOLUTION RESPIRATORY (INHALATION) EVERY 4 HOURS PRN
COMMUNITY
Start: 2020-08-25

## 2020-08-25 RX ORDER — PREDNISOLONE ACETATE 10 MG/ML
1 SUSPENSION/ DROPS OPHTHALMIC 3 TIMES DAILY
COMMUNITY
Start: 2020-08-25

## 2020-08-25 RX ORDER — LORAZEPAM 0.5 MG/1
TABLET ORAL
Qty: 75 TABLET | Refills: 1 | COMMUNITY
Start: 2020-08-25

## 2020-08-25 NOTE — TELEPHONE ENCOUNTER
Received notice patient mvoing from [facility] in [place]. They need physician admit order form completed with current meds and most recent H&P.   Josefa STEWART with [facility] faxed copy of MAR.   Med list updated. Signed med list, admit form and last visit notes faxed to [facility].   Loly Sandoval RN

## 2020-08-28 DIAGNOSIS — F41.9 ANXIETY: Primary | ICD-10-CM

## 2020-08-28 DIAGNOSIS — R52 PAIN: ICD-10-CM

## 2020-08-28 DIAGNOSIS — R45.1 AGITATION: ICD-10-CM

## 2020-08-28 RX ORDER — SENNOSIDES 8.6 MG
CAPSULE ORAL
Qty: 90 TABLET | Refills: 4
Start: 2020-08-28

## 2020-08-28 RX ORDER — SENNOSIDES 8.6 MG
1300 CAPSULE ORAL DAILY
Status: CANCELLED | OUTPATIENT
Start: 2020-08-28

## 2020-08-28 RX ORDER — LORAZEPAM 0.5 MG/1
TABLET ORAL
Qty: 90 TABLET | Refills: 1 | COMMUNITY
Start: 2020-08-28 | End: 2020-10-22

## 2020-08-28 RX ORDER — ANTIOX #8/OM3/DHA/EPA/LUT/ZEAX 250-2.5 MG
1 CAPSULE ORAL 2 TIMES DAILY
COMMUNITY
Start: 2020-08-28

## 2020-08-28 NOTE — TELEPHONE ENCOUNTER
Patient admitted yesterday to [facility] in [place].   They need rx sent to [facility].   Routed to Dr. Dumont for approval.  Loly Sandoval RN

## 2020-09-08 ENCOUNTER — RECORDS - HEALTHEAST (OUTPATIENT)
Dept: LAB | Facility: CLINIC | Age: 85
End: 2020-09-08

## 2020-09-09 LAB
ALBUMIN SERPL-MCNC: 3.7 G/DL (ref 3.5–5)
ALP SERPL-CCNC: 93 U/L (ref 45–120)
ALT SERPL W P-5'-P-CCNC: 23 U/L (ref 0–45)
ANION GAP SERPL CALCULATED.3IONS-SCNC: 12 MMOL/L (ref 5–18)
AST SERPL W P-5'-P-CCNC: 27 U/L (ref 0–40)
BILIRUB SERPL-MCNC: 0.3 MG/DL (ref 0–1)
BUN SERPL-MCNC: 15 MG/DL (ref 8–28)
CALCIUM SERPL-MCNC: 9.4 MG/DL (ref 8.5–10.5)
CHLORIDE BLD-SCNC: 104 MMOL/L (ref 98–107)
CHOLEST SERPL-MCNC: 104 MG/DL
CO2 SERPL-SCNC: 24 MMOL/L (ref 22–31)
CREAT SERPL-MCNC: 0.68 MG/DL (ref 0.6–1.1)
ERYTHROCYTE [DISTWIDTH] IN BLOOD BY AUTOMATED COUNT: 14.7 % (ref 11–14.5)
FASTING STATUS PATIENT QL REPORTED: ABNORMAL
GFR SERPL CREATININE-BSD FRML MDRD: >60 ML/MIN/1.73M2
GLUCOSE BLD-MCNC: 183 MG/DL (ref 70–125)
HBA1C MFR BLD: 7.7 %
HCT VFR BLD AUTO: 38.5 % (ref 35–47)
HDLC SERPL-MCNC: 43 MG/DL
HGB BLD-MCNC: 12.6 G/DL (ref 12–16)
LDLC SERPL CALC-MCNC: 43 MG/DL
MCH RBC QN AUTO: 36.3 PG (ref 27–34)
MCHC RBC AUTO-ENTMCNC: 32.7 G/DL (ref 32–36)
MCV RBC AUTO: 111 FL (ref 80–100)
PLATELET # BLD AUTO: 163 THOU/UL (ref 140–440)
PMV BLD AUTO: 11.7 FL (ref 8.5–12.5)
POTASSIUM BLD-SCNC: 4 MMOL/L (ref 3.5–5)
PROT SERPL-MCNC: 6.6 G/DL (ref 6–8)
RBC # BLD AUTO: 3.47 MILL/UL (ref 3.8–5.4)
SODIUM SERPL-SCNC: 140 MMOL/L (ref 136–145)
TRIGL SERPL-MCNC: 92 MG/DL
TSH SERPL DL<=0.005 MIU/L-ACNC: 4.56 UIU/ML (ref 0.3–5)
WBC: 6.7 THOU/UL (ref 4–11)

## 2020-09-10 LAB — 25(OH)D3 SERPL-MCNC: 48.6 NG/ML (ref 30–80)

## 2020-10-14 ENCOUNTER — ANCILLARY PROCEDURE (OUTPATIENT)
Dept: CARDIOLOGY | Facility: CLINIC | Age: 85
End: 2020-10-14
Attending: INTERNAL MEDICINE
Payer: MEDICARE

## 2020-10-14 DIAGNOSIS — I49.5 SICK SINUS SYNDROME (H): Primary | ICD-10-CM

## 2020-10-14 DIAGNOSIS — I49.5 SICK SINUS SYNDROME (H): ICD-10-CM

## 2020-10-14 PROCEDURE — 93293 PM PHONE R-STRIP DEVICE EVAL: CPT | Performed by: INTERNAL MEDICINE

## 2020-10-22 DIAGNOSIS — F41.9 ANXIETY: ICD-10-CM

## 2020-10-22 DIAGNOSIS — R45.1 AGITATION: ICD-10-CM

## 2020-10-22 RX ORDER — LORAZEPAM 0.5 MG/1
TABLET ORAL
Qty: 90 TABLET | Refills: 1 | Status: SHIPPED | OUTPATIENT
Start: 2020-10-22

## 2020-10-23 NOTE — TELEPHONE ENCOUNTER
We should not have filled this prescription, patient transferred out.      Called [facility] and cancelled with them    Eliseo

## 2021-01-01 ENCOUNTER — LAB REQUISITION (OUTPATIENT)
Dept: LAB | Facility: CLINIC | Age: 86
End: 2021-01-01
Payer: MEDICARE

## 2021-01-01 ENCOUNTER — HOSPITAL ENCOUNTER (OUTPATIENT)
Dept: CARDIOLOGY | Facility: CLINIC | Age: 86
Discharge: HOME | End: 2021-08-20
Attending: INTERNAL MEDICINE | Admitting: INTERNAL MEDICINE
Payer: MEDICARE

## 2021-01-01 ENCOUNTER — TELEPHONE (OUTPATIENT)
Dept: CARDIOLOGY | Facility: CLINIC | Age: 86
End: 2021-01-01

## 2021-01-01 ENCOUNTER — ANCILLARY PROCEDURE (OUTPATIENT)
Dept: CARDIOLOGY | Facility: CLINIC | Age: 86
End: 2021-01-01
Attending: INTERNAL MEDICINE
Payer: MEDICARE

## 2021-01-01 ENCOUNTER — OFFICE VISIT (OUTPATIENT)
Dept: CARDIOLOGY | Facility: CLINIC | Age: 86
End: 2021-01-01
Payer: MEDICARE

## 2021-01-01 ENCOUNTER — RECORDS - HEALTHEAST (OUTPATIENT)
Dept: LAB | Facility: CLINIC | Age: 86
End: 2021-01-01

## 2021-01-01 VITALS
HEIGHT: 57 IN | OXYGEN SATURATION: 99 % | BODY MASS INDEX: 32.36 KG/M2 | HEART RATE: 80 BPM | SYSTOLIC BLOOD PRESSURE: 113 MMHG | WEIGHT: 150 LBS | DIASTOLIC BLOOD PRESSURE: 69 MMHG

## 2021-01-01 DIAGNOSIS — Z95.0 CARDIAC PACEMAKER IN SITU: ICD-10-CM

## 2021-01-01 DIAGNOSIS — E78.2 MIXED HYPERLIPIDEMIA: ICD-10-CM

## 2021-01-01 DIAGNOSIS — I49.5 SICK SINUS SYNDROME (H): ICD-10-CM

## 2021-01-01 DIAGNOSIS — Z20.822 CONTACT WITH AND (SUSPECTED) EXPOSURE TO COVID-19: ICD-10-CM

## 2021-01-01 DIAGNOSIS — I25.10 CORONARY ARTERY DISEASE INVOLVING NATIVE CORONARY ARTERY OF NATIVE HEART WITHOUT ANGINA PECTORIS: Primary | ICD-10-CM

## 2021-01-01 DIAGNOSIS — R35.0 FREQUENCY OF MICTURITION: ICD-10-CM

## 2021-01-01 DIAGNOSIS — E11.21 TYPE 2 DIABETES MELLITUS WITH DIABETIC NEPHROPATHY (H): ICD-10-CM

## 2021-01-01 DIAGNOSIS — R05.9 COUGH, UNSPECIFIED: ICD-10-CM

## 2021-01-01 DIAGNOSIS — I25.10 CORONARY ARTERY DISEASE INVOLVING NATIVE HEART WITHOUT ANGINA PECTORIS, UNSPECIFIED VESSEL OR LESION TYPE: ICD-10-CM

## 2021-01-01 DIAGNOSIS — E03.4 ATROPHY OF THYROID (ACQUIRED): ICD-10-CM

## 2021-01-01 DIAGNOSIS — I49.5 SICK SINUS SYNDROME (H): Primary | ICD-10-CM

## 2021-01-01 DIAGNOSIS — R41.82 ALTERED MENTAL STATUS, UNSPECIFIED: ICD-10-CM

## 2021-01-01 LAB
ALBUMIN SERPL-MCNC: 3.5 G/DL (ref 3.5–5)
ALBUMIN SERPL-MCNC: 3.8 G/DL (ref 3.5–5)
ALBUMIN UR-MCNC: 10 MG/DL
ALBUMIN UR-MCNC: 70 MG/DL
ALP SERPL-CCNC: 107 U/L (ref 45–120)
ALP SERPL-CCNC: 112 U/L (ref 45–120)
ALT SERPL W P-5'-P-CCNC: 11 U/L (ref 0–45)
ALT SERPL W P-5'-P-CCNC: 20 U/L (ref 0–45)
ANION GAP SERPL CALCULATED.3IONS-SCNC: 12 MMOL/L (ref 5–18)
ANION GAP SERPL CALCULATED.3IONS-SCNC: 9 MMOL/L (ref 5–18)
APPEARANCE UR: CLEAR
APPEARANCE UR: CLEAR
AST SERPL W P-5'-P-CCNC: 19 U/L (ref 0–40)
AST SERPL W P-5'-P-CCNC: 22 U/L (ref 0–40)
BACTERIA #/AREA URNS HPF: ABNORMAL /HPF
BACTERIA #/AREA URNS HPF: ABNORMAL /HPF
BACTERIA UR CULT: ABNORMAL
BACTERIA UR CULT: NORMAL
BASOPHILS # BLD AUTO: 0.1 10E3/UL (ref 0–0.2)
BASOPHILS NFR BLD AUTO: 1 %
BILIRUB SERPL-MCNC: 0.2 MG/DL (ref 0–1)
BILIRUB SERPL-MCNC: 0.3 MG/DL (ref 0–1)
BILIRUB UR QL STRIP: NEGATIVE
BILIRUB UR QL STRIP: NEGATIVE
BUN SERPL-MCNC: 22 MG/DL (ref 8–28)
BUN SERPL-MCNC: 23 MG/DL (ref 8–28)
CALCIUM SERPL-MCNC: 9.3 MG/DL (ref 8.5–10.5)
CALCIUM SERPL-MCNC: 9.8 MG/DL (ref 8.5–10.5)
CHLORIDE BLD-SCNC: 107 MMOL/L (ref 98–107)
CHLORIDE BLD-SCNC: 108 MMOL/L (ref 98–107)
CHOLEST SERPL-MCNC: 117 MG/DL
CHOLEST SERPL-MCNC: 141 MG/DL
CO2 SERPL-SCNC: 23 MMOL/L (ref 22–31)
CO2 SERPL-SCNC: 24 MMOL/L (ref 22–31)
COLOR UR AUTO: ABNORMAL
COLOR UR AUTO: YELLOW
CREAT SERPL-MCNC: 0.71 MG/DL (ref 0.6–1.1)
CREAT SERPL-MCNC: 0.81 MG/DL (ref 0.6–1.1)
DEPRECATED CALCIDIOL+CALCIFEROL SERPL-MC: 43 UG/L (ref 30–80)
EOSINOPHIL # BLD AUTO: 0.1 10E3/UL (ref 0–0.7)
EOSINOPHIL NFR BLD AUTO: 1 %
ERYTHROCYTE [DISTWIDTH] IN BLOOD BY AUTOMATED COUNT: 15.5 % (ref 11–14.5)
ERYTHROCYTE [DISTWIDTH] IN BLOOD BY AUTOMATED COUNT: 16 % (ref 10–15)
FASTING STATUS PATIENT QL REPORTED: ABNORMAL
FASTING STATUS PATIENT QL REPORTED: ABNORMAL
GFR SERPL CREATININE-BSD FRML MDRD: 66 ML/MIN/1.73M2
GFR SERPL CREATININE-BSD FRML MDRD: >60 ML/MIN/1.73M2
GLUCOSE BLD-MCNC: 171 MG/DL (ref 70–125)
GLUCOSE BLD-MCNC: 82 MG/DL (ref 70–125)
GLUCOSE UR STRIP-MCNC: NEGATIVE MG/DL
GLUCOSE UR STRIP-MCNC: NEGATIVE MG/DL
HBA1C MFR BLD: 6.4 %
HBA1C MFR BLD: 7.1 %
HCT VFR BLD AUTO: 38.7 % (ref 35–47)
HCT VFR BLD AUTO: 39.4 % (ref 35–47)
HDLC SERPL-MCNC: 42 MG/DL
HDLC SERPL-MCNC: 46 MG/DL
HGB BLD-MCNC: 11.8 G/DL (ref 11.7–15.7)
HGB BLD-MCNC: 11.8 G/DL (ref 12–16)
HGB UR QL STRIP: NEGATIVE
HGB UR QL STRIP: NEGATIVE
HYALINE CASTS: 1 /LPF
HYALINE CASTS: 4 /LPF
IMM GRANULOCYTES # BLD: 0 10E3/UL
IMM GRANULOCYTES NFR BLD: 0 %
KETONES UR STRIP-MCNC: NEGATIVE MG/DL
KETONES UR STRIP-MCNC: NEGATIVE MG/DL
LDLC SERPL CALC-MCNC: 45 MG/DL
LDLC SERPL CALC-MCNC: 58 MG/DL
LEUKOCYTE ESTERASE UR QL STRIP: NEGATIVE
LEUKOCYTE ESTERASE UR QL STRIP: NEGATIVE
LVEF ECHO: NORMAL
LYMPHOCYTES # BLD AUTO: 2.2 10E3/UL (ref 0.8–5.3)
LYMPHOCYTES NFR BLD AUTO: 31 %
MCH RBC QN AUTO: 31.9 PG (ref 26.5–33)
MCH RBC QN AUTO: 31.9 PG (ref 27–34)
MCHC RBC AUTO-ENTMCNC: 29.9 G/DL (ref 32–36)
MCHC RBC AUTO-ENTMCNC: 30.5 G/DL (ref 31.5–36.5)
MCV RBC AUTO: 105 FL (ref 78–100)
MCV RBC AUTO: 107 FL (ref 80–100)
MDC_IDC_LEAD_IMPLANT_DT: NORMAL
MDC_IDC_LEAD_IMPLANT_DT: NORMAL
MDC_IDC_LEAD_LOCATION: NORMAL
MDC_IDC_LEAD_LOCATION: NORMAL
MDC_IDC_LEAD_MFG: NORMAL
MDC_IDC_LEAD_MFG: NORMAL
MDC_IDC_LEAD_MODEL: NORMAL
MDC_IDC_LEAD_MODEL: NORMAL
MDC_IDC_LEAD_POLARITY_TYPE: NORMAL
MDC_IDC_LEAD_POLARITY_TYPE: NORMAL
MDC_IDC_LEAD_SERIAL: NORMAL
MDC_IDC_LEAD_SERIAL: NORMAL
MDC_IDC_MSMT_BATTERY_STATUS: NORMAL
MDC_IDC_MSMT_LEADCHNL_RA_IMPEDANCE_VALUE: 500 OHM
MDC_IDC_MSMT_LEADCHNL_RA_PACING_THRESHOLD_AMPLITUDE: 0.7 V
MDC_IDC_MSMT_LEADCHNL_RA_PACING_THRESHOLD_PULSEWIDTH: 0.5 MS
MDC_IDC_MSMT_LEADCHNL_RA_SENSING_INTR_AMPL: 3.2 MV
MDC_IDC_MSMT_LEADCHNL_RV_IMPEDANCE_VALUE: 540 OHM
MDC_IDC_MSMT_LEADCHNL_RV_PACING_THRESHOLD_AMPLITUDE: 0.9 V
MDC_IDC_MSMT_LEADCHNL_RV_PACING_THRESHOLD_PULSEWIDTH: 0.4 MS
MDC_IDC_MSMT_LEADCHNL_RV_SENSING_INTR_AMPL: 12 MV
MDC_IDC_PG_IMPLANT_DTM: NORMAL
MDC_IDC_PG_MFG: NORMAL
MDC_IDC_PG_MODEL: NORMAL
MDC_IDC_PG_SERIAL: NORMAL
MDC_IDC_PG_TYPE: NORMAL
MDC_IDC_SESS_CLINIC_NAME: NORMAL
MDC_IDC_SESS_DTM: NORMAL
MDC_IDC_SESS_TYPE: NORMAL
MDC_IDC_SET_BRADY_AT_MODE_SWITCH_MODE: NORMAL
MDC_IDC_SET_BRADY_AT_MODE_SWITCH_RATE: 170 {BEATS}/MIN
MDC_IDC_SET_BRADY_HYSTRATE: NORMAL
MDC_IDC_SET_BRADY_LOWRATE: 60 {BEATS}/MIN
MDC_IDC_SET_BRADY_MAX_SENSOR_RATE: 130 {BEATS}/MIN
MDC_IDC_SET_BRADY_MAX_TRACKING_RATE: 130 {BEATS}/MIN
MDC_IDC_SET_BRADY_MODE: NORMAL
MDC_IDC_SET_BRADY_PAV_DELAY_HIGH: 180 MS
MDC_IDC_SET_BRADY_PAV_DELAY_LOW: 200 MS
MDC_IDC_SET_BRADY_SAV_DELAY_HIGH: 180 MS
MDC_IDC_SET_BRADY_SAV_DELAY_LOW: 200 MS
MDC_IDC_SET_LEADCHNL_RA_PACING_AMPLITUDE: 2 V
MDC_IDC_SET_LEADCHNL_RA_PACING_CAPTURE_MODE: NORMAL
MDC_IDC_SET_LEADCHNL_RA_PACING_POLARITY: NORMAL
MDC_IDC_SET_LEADCHNL_RA_PACING_PULSEWIDTH: 0.5 MS
MDC_IDC_SET_LEADCHNL_RA_SENSING_ADAPTATION_MODE: NORMAL
MDC_IDC_SET_LEADCHNL_RA_SENSING_POLARITY: NORMAL
MDC_IDC_SET_LEADCHNL_RA_SENSING_SENSITIVITY: 0.75 MV
MDC_IDC_SET_LEADCHNL_RV_PACING_AMPLITUDE: 1.6 V
MDC_IDC_SET_LEADCHNL_RV_PACING_CAPTURE_MODE: NORMAL
MDC_IDC_SET_LEADCHNL_RV_PACING_POLARITY: NORMAL
MDC_IDC_SET_LEADCHNL_RV_PACING_PULSEWIDTH: 0.4 MS
MDC_IDC_SET_LEADCHNL_RV_SENSING_ADAPTATION_MODE: NORMAL
MDC_IDC_SET_LEADCHNL_RV_SENSING_POLARITY: NORMAL
MDC_IDC_SET_LEADCHNL_RV_SENSING_SENSITIVITY: 2.5 MV
MDC_IDC_STAT_AT_DTM_END: NORMAL
MDC_IDC_STAT_AT_DTM_START: NORMAL
MDC_IDC_STAT_AT_MODE_SW_COUNT: 1
MDC_IDC_STAT_AT_MODE_SW_MAX_DURATION: 0 S
MDC_IDC_STAT_BRADY_DTM_END: NORMAL
MDC_IDC_STAT_BRADY_DTM_START: NORMAL
MDC_IDC_STAT_BRADY_RA_PERCENT_PACED: 7 %
MDC_IDC_STAT_BRADY_RV_PERCENT_PACED: 47 %
MDC_IDC_STAT_EPISODE_RECENT_COUNT: 1
MDC_IDC_STAT_EPISODE_RECENT_COUNT_DTM_END: NORMAL
MDC_IDC_STAT_EPISODE_RECENT_COUNT_DTM_START: NORMAL
MDC_IDC_STAT_EPISODE_TYPE: NORMAL
MONOCYTES # BLD AUTO: 0.8 10E3/UL (ref 0–1.3)
MONOCYTES NFR BLD AUTO: 12 %
MUCOUS THREADS #/AREA URNS LPF: PRESENT /LPF
MUCOUS THREADS #/AREA URNS LPF: PRESENT /LPF
NEUTROPHILS # BLD AUTO: 4 10E3/UL (ref 1.6–8.3)
NEUTROPHILS NFR BLD AUTO: 55 %
NITRATE UR QL: NEGATIVE
NITRATE UR QL: NEGATIVE
NRBC # BLD AUTO: 0 10E3/UL
NRBC BLD AUTO-RTO: 0 /100
PH UR STRIP: 5.5 [PH] (ref 5–7)
PH UR STRIP: 6 [PH] (ref 5–7)
PLATELET # BLD AUTO: 164 THOU/UL (ref 140–440)
PLATELET # BLD AUTO: 181 10E3/UL (ref 150–450)
PMV BLD AUTO: 10.9 FL (ref 8.5–12.5)
POTASSIUM BLD-SCNC: 4 MMOL/L (ref 3.5–5)
POTASSIUM BLD-SCNC: 4.4 MMOL/L (ref 3.5–5)
PROT SERPL-MCNC: 6.6 G/DL (ref 6–8)
PROT SERPL-MCNC: 6.7 G/DL (ref 6–8)
RBC # BLD AUTO: 3.7 10E6/UL (ref 3.8–5.2)
RBC # BLD AUTO: 3.7 MILL/UL (ref 3.8–5.4)
RBC URINE: 2 /HPF
RBC URINE: 5 /HPF
SARS-COV-2 RNA RESP QL NAA+PROBE: NEGATIVE
SODIUM SERPL-SCNC: 141 MMOL/L (ref 136–145)
SODIUM SERPL-SCNC: 142 MMOL/L (ref 136–145)
SP GR UR STRIP: 1.02 (ref 1–1.03)
SP GR UR STRIP: 1.04 (ref 1–1.03)
SQUAMOUS EPITHELIAL: 2 /HPF
SQUAMOUS EPITHELIAL: 6 /HPF
TRIGL SERPL-MCNC: 148 MG/DL
TRIGL SERPL-MCNC: 184 MG/DL
TSH SERPL DL<=0.005 MIU/L-ACNC: 11.87 UIU/ML (ref 0.3–5)
TSH SERPL DL<=0.005 MIU/L-ACNC: 8.89 UIU/ML (ref 0.3–5)
TSH SERPL DL<=0.005 MIU/L-ACNC: 9.66 UIU/ML (ref 0.3–5)
UROBILINOGEN UR STRIP-MCNC: <2 MG/DL
UROBILINOGEN UR STRIP-MCNC: <2 MG/DL
WBC # BLD AUTO: 7.2 10E3/UL (ref 4–11)
WBC URINE: 1 /HPF
WBC URINE: 2 /HPF
WBC: 7.7 THOU/UL (ref 4–11)

## 2021-01-01 PROCEDURE — 93306 TTE W/DOPPLER COMPLETE: CPT | Mod: 26 | Performed by: INTERNAL MEDICINE

## 2021-01-01 PROCEDURE — 87086 URINE CULTURE/COLONY COUNT: CPT | Mod: ORL | Performed by: NURSE PRACTITIONER

## 2021-01-01 PROCEDURE — 36415 COLL VENOUS BLD VENIPUNCTURE: CPT | Mod: ORL | Performed by: NURSE PRACTITIONER

## 2021-01-01 PROCEDURE — 84443 ASSAY THYROID STIM HORMONE: CPT | Mod: ORL | Performed by: NURSE PRACTITIONER

## 2021-01-01 PROCEDURE — 80061 LIPID PANEL: CPT | Mod: ORL | Performed by: NURSE PRACTITIONER

## 2021-01-01 PROCEDURE — 82306 VITAMIN D 25 HYDROXY: CPT | Mod: ORL | Performed by: NURSE PRACTITIONER

## 2021-01-01 PROCEDURE — 999N000208 ECHOCARDIOGRAM COMPLETE

## 2021-01-01 PROCEDURE — 80053 COMPREHEN METABOLIC PANEL: CPT | Mod: ORL | Performed by: NURSE PRACTITIONER

## 2021-01-01 PROCEDURE — U0003 INFECTIOUS AGENT DETECTION BY NUCLEIC ACID (DNA OR RNA); SEVERE ACUTE RESPIRATORY SYNDROME CORONAVIRUS 2 (SARS-COV-2) (CORONAVIRUS DISEASE [COVID-19]), AMPLIFIED PROBE TECHNIQUE, MAKING USE OF HIGH THROUGHPUT TECHNOLOGIES AS DESCRIBED BY CMS-2020-01-R: HCPCS | Mod: ORL | Performed by: NURSE PRACTITIONER

## 2021-01-01 PROCEDURE — 255N000002 HC RX 255 OP 636: Performed by: INTERNAL MEDICINE

## 2021-01-01 PROCEDURE — 85025 COMPLETE CBC W/AUTO DIFF WBC: CPT | Mod: ORL | Performed by: NURSE PRACTITIONER

## 2021-01-01 PROCEDURE — 99214 OFFICE O/P EST MOD 30 MIN: CPT | Performed by: NURSE PRACTITIONER

## 2021-01-01 PROCEDURE — C8929 TTE W OR WO FOL WCON,DOPPLER: HCPCS

## 2021-01-01 PROCEDURE — P9604 ONE-WAY ALLOW PRORATED TRIP: HCPCS | Mod: ORL | Performed by: NURSE PRACTITIONER

## 2021-01-01 PROCEDURE — 87086 URINE CULTURE/COLONY COUNT: CPT | Mod: ORL | Performed by: FAMILY MEDICINE

## 2021-01-01 PROCEDURE — 83036 HEMOGLOBIN GLYCOSYLATED A1C: CPT | Mod: ORL | Performed by: NURSE PRACTITIONER

## 2021-01-01 PROCEDURE — 81001 URINALYSIS AUTO W/SCOPE: CPT | Mod: ORL | Performed by: FAMILY MEDICINE

## 2021-01-01 PROCEDURE — 81001 URINALYSIS AUTO W/SCOPE: CPT | Mod: ORL | Performed by: NURSE PRACTITIONER

## 2021-01-01 PROCEDURE — 93280 PM DEVICE PROGR EVAL DUAL: CPT | Performed by: INTERNAL MEDICINE

## 2021-01-01 RX ADMIN — HUMAN ALBUMIN MICROSPHERES AND PERFLUTREN 9 ML: 10; .22 INJECTION, SOLUTION INTRAVENOUS at 10:41

## 2021-01-01 ASSESSMENT — MIFFLIN-ST. JEOR: SCORE: 944.4

## 2021-01-20 ENCOUNTER — ANCILLARY PROCEDURE (OUTPATIENT)
Dept: CARDIOLOGY | Facility: CLINIC | Age: 86
End: 2021-01-20
Attending: INTERNAL MEDICINE
Payer: MEDICARE

## 2021-01-20 DIAGNOSIS — I49.5 SICK SINUS SYNDROME (H): Primary | ICD-10-CM

## 2021-01-20 DIAGNOSIS — I49.5 SICK SINUS SYNDROME (H): ICD-10-CM

## 2021-01-20 PROCEDURE — 93293 PM PHONE R-STRIP DEVICE EVAL: CPT | Performed by: INTERNAL MEDICINE

## 2021-04-21 ENCOUNTER — ANCILLARY PROCEDURE (OUTPATIENT)
Dept: CARDIOLOGY | Facility: CLINIC | Age: 86
End: 2021-04-21
Attending: INTERNAL MEDICINE
Payer: MEDICARE

## 2021-04-21 DIAGNOSIS — I49.5 SICK SINUS SYNDROME (H): ICD-10-CM

## 2021-04-21 PROCEDURE — 93293 PM PHONE R-STRIP DEVICE EVAL: CPT | Performed by: INTERNAL MEDICINE

## 2021-07-15 NOTE — LETTER
[facility]  [address] [place]  [address]  Phone: [phone]    February 12, 2018      Sernea Marie  [address]  [place] [facility] [address]              Dear Serena,    The results from your recent visit showed Labs are all stable, please continue all current medications.      Sincerely,     Lin Cruz M.D.    Results for orders placed or performed in visit on 02/06/18   Hemoglobin A1C ([facility])   Result Value Ref Range    Hemoglobin A1C 7.1 (H) 4.8 - 5.6 %    Narrative    Performed at:  01 - [facility] [address], [place], [place]  [id number]  : Kurtis Felix MD, Phone:  [phone]   TSH ([facility])   Result Value Ref Range    TSH 0.731 0.450 - 4.500 uIU/mL    Narrative    Performed at:  01 - [facility] [address], [place], [place]  [id number]  : Kurtis Felix MD, Phone:  [phone]   Comp. Metabolic Panel (14) ([facility])   Result Value Ref Range    Glucose 203 (H) 65 - 99 mg/dL    Urea Nitrogen 12 10 - 36 mg/dL    Creatinine 0.45 (L) 0.57 - 1.00 mg/dL    eGFR If NonAfricn Am 87 >59 mL/min/1.73    eGFR If Africn Am 100 >59 mL/min/1.73    BUN/Creatinine Ratio 27 12 - 28    Sodium 137 134 - 144 mmol/L    Potassium 4.6 3.5 - 5.2 mmol/L    Chloride 98 96 - 106 mmol/L    Total CO2 26 18 - 28 mmol/L    Calcium 9.4 8.7 - 10.3 mg/dL    Protein Total 6.1 6.0 - 8.5 g/dL    Albumin 4.2 3.2 - 4.6 g/dL    Globulin, Total 1.9 1.5 - 4.5 g/dL    A/G Ratio 2.2 1.2 - 2.2    Bilirubin Total 0.4 0.0 - 1.2 mg/dL    Alkaline Phosphatase 74 39 - 117 IU/L    AST 21 0 - 40 IU/L    ALT 18 0 - 32 IU/L    Narrative    Performed at:  01 - [facility] [address], [place], [place]  [id number]  : Kurtis Felix MD, Phone:  [phone]       Arava Pregnancy And Lactation Text: This medication is Pregnancy Category X and is absolutely contraindicated during pregnancy. It is unknown if it is excreted in breast milk.

## 2021-08-20 NOTE — PROGRESS NOTES
Cardiology Clinic Progress Note    Service Date: August 20, 2021    Primary Cardiologist: Dr. Auguste/Dr. Charlton      Reason for Visit: Annual follow up    HPI:   I had the pleasure of meeting Ms. Serena Marie in the clinic today. 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 She has been a patient of Dr. Auguste for many years. More recently, she has met with Dr. Charlton for her last few check in due to availability.     The patient lives at a memory care facility through [facility] in [place]. She presents to the clinic today with her daughter, Priti, for a routine annual follow-up visit.  The patient had a pacemaker interrogation completed prior to the visit today which showed that her device is functioning properly with 47% ventricular pacing and estimated battery life is around 2 years. One transient atrial arrhythmia episode was noted lasting 6 seconds with no EGM available. One episode of V.Tach was logged on 3/14/2020, also with no EGM available.     Today, Serena tells me that she has been feeling generally well over the past year. She has not had symptoms of chest pain, palpitations, dizziness, lightheadedness significant shortness of breath, orthopnea, PND, or worsening lower extremity edema. Blood pressure looks excellent in clinic today 113/69. An echocardiogram was also completed prior to the visit today with the report is unfortunately not available yet at the time of the visit.  The patient gets around well independently with a wheeled walker. She goes for walks regularly at her care facility and has been tolerating this well without significant exertional symptoms. She will be celebrating her 89th birthday tomorrow and is planning to go for [employer] with her daughter and some friends.    ASSESSMENT AND  PLAN:  1. Coronary artery disease  - Status post PCI to the RCA remotely.  - Stable without anginal symptoms. Continue current medical regimen with aspirin 81 mg daily and atorvastatin 80 mg daily.  - An echocardiogram was completed prior to the visit today with results pending as noted above. I will update the patient and her daughter by phone on the echocardiogram findings once the report is available.    2. Sick sinus syndrome with cardiac pacemaker in situ  - Device check today shows PPM continues to function properly with around 2 years of battery life remaining. Continue with routine device checks with the device clinic.    Thank you for the opportunity to participate in this pleasant patient's care.  I will plan to have her see Dr. Charlton or Dr. Auguste in routine follow-up in about 6 months. I encouraged the patient or her daughter to call the clinic with any questions or concerns in the meantime, and we would be happy to see her sooner if needed.     25 total minutes was spent today including chart review, precharting, history and exam, post visit documentation, and reviewing studies as outlined above.     ANNEMARIE Orellana, CNP   Nurse Practitioner  [facility]  Pager: [phone]  Text Page  (8am - 5pm, M-F)    Orders this Visit:  No orders of the defined types were placed in this encounter.    No orders of the defined types were placed in this encounter.    There are no discontinued medications.  No diagnosis found.    CURRENT MEDICATIONS:  Current Outpatient Medications   Medication Sig Dispense Refill     acetaminophen (TYLENOL ARTHRITIS PAIN) 650 MG CR tablet Take 2 tabs (1300mg) BID scheduled. May have one additional 650mg dose per 24 hours prn pain. 90 tablet 4     albuterol (PROVENTIL) (2.5 MG/3ML) 0.083% neb solution Take 1 vial (2.5 mg) by nebulization every 4 hours as needed for shortness of breath / dyspnea or wheezing       ascorbic acid (VITAMIN C) 500 MG tablet Take 2  tablets (1,000 mg) by mouth 2 times daily For wound healing. 180 tablet 3     aspirin 81 MG tablet Take 1 tablet by mouth every evening        atorvastatin (LIPITOR) 80 MG tablet Take 1 tablet (80 mg) by mouth daily 90 tablet 3     blood glucose (NO BRAND SPECIFIED) test strip Use to test blood sugar 4 times daily or as directed. 400 strip 1     blood glucose monitoring (NO BRAND SPECIFIED) meter device kit Use to test blood sugar 4 times daily or as directed. 1 kit 0     blood glucose monitoring (ONE TOUCH ULTRASOFT) lancets 1 each by In Vitro route 4 times daily Use to test blood sugar 4 times daily or as directed. 400 each 1     cholecalciferol (VITAMIN D) 1000 UNIT tablet Take 2,000 Units by mouth daily       docusate sodium (DOCQLACE) 100 MG capsule Take 100 mg by mouth At Bedtime       donepezil (ARICEPT) 5 MG tablet Take 1 tablet (5 mg) by mouth every morning 30 tablet 0     doxycycline hyclate (VIBRAMYCIN) 50 MG capsule Take 1 capsule (50 mg) by mouth daily       erythromycin (ROMYCIN) 5 MG/GM ophthalmic ointment 1 application to eye socket TID for comfort       fish oil-omega-3 fatty acids 1000 MG capsule Take 1 g by mouth daily        hypromellose (ARTIFICIAL TEARS) 0.5 % SOLN ophthalmic solution Place 1 drop Into the left eye 4 times daily as needed for dry eyes       insulin aspart (NOVOLOG FLEXPEN) 100 UNIT/ML pen Take 14 units with breakfast, at lunch, dinner. Hold dose if FBG < 100. 15 mL 11     insulin detemir (LEVEMIR FLEXTOUCH) 100 UNIT/ML pen INJECT 16 units subcutaneously QD. 15 mL 8     insulin pen needle (UNIFINE PENTIPS) 31G X 6 MM USE ONCE DAILY 100 each 3     levothyroxine (SYNTHROID/LEVOTHROID) 75 MCG tablet TAKE ONE TABLET BY MOUTH ONE TIME DAILY  90 tablet 1     LORazepam (ATIVAN) 0.5 MG tablet TAKE 1 TAB BY MOUTH TWICE DAILY FOR ANXIETY/AGITATION;TAKE 1 TAB BYMOUTH EVERY 24 HOURS AS NEEDED FOR ANXIETY/AGITATION 90 tablet 1     LORazepam (ATIVAN) 0.5 MG tablet 1 tab BID scheduled. May  have one additional dose per 24 hours if needed for anxiety or agitation. 75 tablet 1     losartan (COZAAR) 25 MG tablet Take 1 tablet (25 mg) by mouth daily for HTN. 90 tablet 3     Multiple Vitamins-Minerals (PRESERVISION AREDS 2) CAPS Take 1 capsule by mouth 2 times daily       omeprazole (PRILOSEC) 20 MG CR capsule Take 1 capsule (20 mg) by mouth daily 90 capsule 1     prednisoLONE acetate (PRED FORTE) 1 % ophthalmic suspension Place 1 drop Into the left eye 3 times daily       ranitidine (ZANTAC) 150 MG tablet Take 150 mg by mouth as needed for heartburn       sertraline (ZOLOFT) 50 MG tablet Take 1 tablet (50 mg) by mouth daily 30 tablet 11     timolol (TIMOPTIC) 0.5 % ophthalmic solution Place 1 drop Into the left eye 2 times daily  1 Bottle      valACYclovir (VALTREX) 1000 mg tablet Take 1,000 mg by mouth 3 times daily       White Petrolatum-Mineral Oil (REFRESH P.M.) OINT Instill 1 application in left eye BID for healing         ALLERGIES  Allergies   Allergen Reactions     Contrast Dye Hives and Swelling     Pt. States no history of allergy to topical betadine. Tolerated surgical betadine prep 2012.     Lisinopril Cough     dizzy     Metoprolol Other (See Comments)     Depressed    Depressed       PAST MEDICAL, SURGICAL, FAMILY HISTORY:  History was reviewed and updated as needed, see medical record.    SOCIAL HISTORY:  Social History     Socioeconomic History     Marital status:      Spouse name: Not on file     Number of children: Not on file     Years of education: Not on file     Highest education level: Not on file   Occupational History     Not on file   Tobacco Use     Smoking status: Former Smoker     Packs/day: 1.00     Years: 3.00     Pack years: 3.00     Types: Cigarettes     Quit date: 1947     Years since quittin.0     Smokeless tobacco: Never Used   Substance and Sexual Activity     Alcohol use: No     Drug use: No     Sexual activity: Not on file   Other Topics Concern      Parent/sibling w/ CABG, MI or angioplasty before 65F 55M? Not Asked      Service Not Asked     Blood Transfusions Not Asked     Caffeine Concern Yes     Comment: 5 cups coffee in morning     Occupational Exposure Not Asked     Hobby Hazards Not Asked     Sleep Concern No     Stress Concern No     Weight Concern Not Asked     Special Diet No     Back Care Not Asked     Exercise Yes     Comment: walks hallways     Bike Helmet Not Asked     Seat Belt Yes     Self-Exams Not Asked   Social History Narrative     Not on file     Social Determinants of Health     Financial Resource Strain:      Difficulty of Paying Living Expenses:    Food Insecurity:      Worried About Running Out of Food in the Last Year:      Ran Out of Food in the Last Year:    Transportation Needs:      Lack of Transportation (Medical):      Lack of Transportation (Non-Medical):    Physical Activity:      Days of Exercise per Week:      Minutes of Exercise per Session:    Stress:      Feeling of Stress :    Social Connections:      Frequency of Communication with Friends and Family:      Frequency of Social Gatherings with Friends and Family:      Attends [employer] Services:      Active Member of Clubs or Organizations:      Attends Club or Organization Meetings:      Marital Status:    Intimate Partner Violence:      Fear of Current or Ex-Partner:      Emotionally Abused:      Physically Abused:      Sexually Abused:      Review of Systems:  Skin:  Negative     Eyes:  Positive for glasses  ENT:  Negative    Respiratory:  Negative cough;mucoid expectorant  Cardiovascular:  Negative    Gastroenterology: Positive for heartburn  Genitourinary:  Positive for urinary frequency  Musculoskeletal:  Positive for back pain;neck pain  Neurologic:  Positive for numbness or tingling of hands;headaches  Psychiatric:  Positive for anxiety  Heme/Lymph/Imm:  Negative allergies  Endocrine:  Positive for diabetes;thyroid disorder     Physical Exam:  Vitals:  "/69 (BP Location: Right arm, Patient Position: Sitting, Cuff Size: Adult Regular)   Pulse 80   Ht 1.448 m (4' 9.01\")   Wt 68 kg (150 lb)   SpO2 99%   BMI 32.45 kg/m     Wt Readings from Last 4 Encounters:   08/20/21 68 kg (150 lb)   02/25/20 69.6 kg (153 lb 6 oz)   08/19/19 71.2 kg (157 lb)   06/17/19 70.8 kg (156 lb)     CONSTITUTIONAL: Appears her stated age, well nourished, and in no acute distress.  HEENT: Pupils equal, round. Sclerae nonicteric.    NECK: Supple, no masses or JVD appreciated.   C/V: Regular rate and rhythm, normal S1 and S2, no S3 or S4, no murmur, rub or gallop.  RESP: Respirations are unlabored. Lungs are clear to auscultation bilaterally without wheezing, rales, or rhonchi.  GI: Abdomen soft, non-tender, non-distended.  EXTREM: 1+ pitting lower extremity edema of the right ankle, no edema on the left side.  No clubbing or cyanosis.   NEURO: Alert, cooperative. Gait steady. No gross focal deficits.   PSYCH: Affect appropriate. Responds to questions appropriately.  SKIN: Warm and dry.     Recent Lab Results reviewed today:  LIPID RESULTS:  Lab Results   Component Value Date    CHOL 123 03/07/2017    HDL 49 03/07/2017    LDL 40 03/07/2017    TRIG 169 (H) 03/07/2017    CHOLHDLRATIO 3.2 08/06/2009     LIVER ENZYME RESULTS:  Lab Results   Component Value Date    AST 32 02/18/2019    ALT 38 (H) 02/18/2019     CBC RESULTS:  Lab Results   Component Value Date    WBC 7.5 02/26/2018    RBC 4.13 02/26/2018    HGB 12.7 02/26/2018    HCT 37.1 02/26/2018    MCV 90 02/26/2018    MCH 30.8 02/26/2018    MCHC 34.2 02/26/2018    RDW 14.7 02/26/2018     02/26/2018     BMP RESULTS:  Lab Results   Component Value Date     02/18/2019    POTASSIUM 4.6 02/18/2019    CHLORIDE 102 02/18/2019    CO2 24 02/26/2018    ANIONGAP 7 02/26/2018     (H) 02/18/2019    BUN 21 02/18/2019    BUN 31 (H) 02/18/2019    CR 0.68 02/18/2019    GFRESTIMATED >90 02/26/2018    GFRESTBLACK >90 02/26/2018    JEN " 9.7 02/18/2019      A1C RESULTS:  Lab Results   Component Value Date    A1C 7.2 (H) 08/19/2019     INR RESULTS:  Lab Results   Component Value Date    INR 0.93 12/10/2013    INR 1.00 11/22/2012     CC  Clay Charlton MD  [address] [place] [address]    This note was completed in part using Dragon voice recognition software. Although reviewed after completion, some word and grammatical errors may occur.

## 2021-08-20 NOTE — PATIENT INSTRUCTIONS
Thank you for your visit with the [facility] today.    Today's plan:   1. Continue with your current medications.  2. Follow up with Dr. Charlton in about 6 months.   3. We will update you on the echocardiogram results by phone once this is done.    Results: Your pacemaker check today looks good.    If you have questions or concerns, please do not hesitate to call my nurse, Harleen, at [phone].     Scheduling phone number: [phone]    It was a pleasure seeing you today!     ANNEMARIE Orellana, CNP  Nurse Practitioner  [facility]  August 20, 2021  ________________________________________________________

## 2021-08-20 NOTE — LETTER
8/20/2021    Physician No Ref-Primary  No address on file    RE: Serena Marie       Dear Colleague,    I had the pleasure of seeing Serena Marie in the [facility].      Cardiology Clinic Progress Note    Service Date: August 20, 2021    Primary Cardiologist: Dr. Auguste/Dr. Charlton      Reason for Visit: Annual follow up    HPI:   I had the pleasure of meeting Ms. Serena Marie in the clinic today. 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 She has been a patient of Dr. Auguste for many years. More recently, she has met with Dr. Charlton for her last few check in due to availability.     The patient lives at a memory care facility through [facility]. She presents to the clinic today with her daughter, Priti, for a routine annual follow-up visit.  The patient had a pacemaker interrogation completed prior to the visit today which showed that her device is functioning properly with 47% ventricular pacing and estimated battery life is around 2 years. One transient atrial arrhythmia episode was noted lasting 6 seconds with no EGM available. One episode of V.Tach was logged on 3/14/2020, also with no EGM available.     Today, Serena tells me that she has been feeling generally well over the past year. She has not had symptoms of chest pain, palpitations, dizziness, lightheadedness significant shortness of breath, orthopnea, PND, or worsening lower extremity edema. Blood pressure looks excellent in clinic today 113/69. An echocardiogram was also completed prior to the visit today with the report is unfortunately not available yet at the time of the visit.  The patient gets around well independently with a wheeled walker. She goes for walks regularly at  her care facility and has been tolerating this well without significant exertional symptoms. She will be celebrating her 89th birthday tomorrow and is planning to go for [employer] with her daughter and some friends.    ASSESSMENT AND PLAN:  1. Coronary artery disease  - Status post PCI to the RCA remotely.  - Stable without anginal symptoms. Continue current medical regimen with aspirin 81 mg daily and atorvastatin 80 mg daily.  - An echocardiogram was completed prior to the visit today with results pending as noted above. I will update the patient and her daughter by phone on the echocardiogram findings once the report is available.    2. Sick sinus syndrome with cardiac pacemaker in situ  - Device check today shows PPM continues to function properly with around 2 years of battery life remaining. Continue with routine device checks with the device clinic.    Thank you for the opportunity to participate in this pleasant patient's care.  I will plan to have her see Dr. Charlton or Dr. Auguste in routine follow-up in about 6 months. I encouraged the patient or her daughter to call the clinic with any questions or concerns in the meantime, and we would be happy to see her sooner if needed.     25 total minutes was spent today including chart review, precharting, history and exam, post visit documentation, and reviewing studies as outlined above.     ANNEMARIE Orellana, CNP   Nurse Practitioner  [facility]  Pager: [phone]  Text Page  (8am - 5pm, M-F)    Orders this Visit:  No orders of the defined types were placed in this encounter.    No orders of the defined types were placed in this encounter.    There are no discontinued medications.  No diagnosis found.    CURRENT MEDICATIONS:  Current Outpatient Medications   Medication Sig Dispense Refill     acetaminophen (TYLENOL ARTHRITIS PAIN) 650 MG CR tablet Take 2 tabs (1300mg) BID scheduled. May have one additional 650mg dose per 24 hours prn pain. 90  tablet 4     albuterol (PROVENTIL) (2.5 MG/3ML) 0.083% neb solution Take 1 vial (2.5 mg) by nebulization every 4 hours as needed for shortness of breath / dyspnea or wheezing       ascorbic acid (VITAMIN C) 500 MG tablet Take 2 tablets (1,000 mg) by mouth 2 times daily For wound healing. 180 tablet 3     aspirin 81 MG tablet Take 1 tablet by mouth every evening        atorvastatin (LIPITOR) 80 MG tablet Take 1 tablet (80 mg) by mouth daily 90 tablet 3     blood glucose (NO BRAND SPECIFIED) test strip Use to test blood sugar 4 times daily or as directed. 400 strip 1     blood glucose monitoring (NO BRAND SPECIFIED) meter device kit Use to test blood sugar 4 times daily or as directed. 1 kit 0     blood glucose monitoring (ONE TOUCH ULTRASOFT) lancets 1 each by In Vitro route 4 times daily Use to test blood sugar 4 times daily or as directed. 400 each 1     cholecalciferol (VITAMIN D) 1000 UNIT tablet Take 2,000 Units by mouth daily       docusate sodium (DOCQLACE) 100 MG capsule Take 100 mg by mouth At Bedtime       donepezil (ARICEPT) 5 MG tablet Take 1 tablet (5 mg) by mouth every morning 30 tablet 0     doxycycline hyclate (VIBRAMYCIN) 50 MG capsule Take 1 capsule (50 mg) by mouth daily       erythromycin (ROMYCIN) 5 MG/GM ophthalmic ointment 1 application to eye socket TID for comfort       fish oil-omega-3 fatty acids 1000 MG capsule Take 1 g by mouth daily        hypromellose (ARTIFICIAL TEARS) 0.5 % SOLN ophthalmic solution Place 1 drop Into the left eye 4 times daily as needed for dry eyes       insulin aspart (NOVOLOG FLEXPEN) 100 UNIT/ML pen Take 14 units with breakfast, at lunch, dinner. Hold dose if FBG < 100. 15 mL 11     insulin detemir (LEVEMIR FLEXTOUCH) 100 UNIT/ML pen INJECT 16 units subcutaneously QD. 15 mL 8     insulin pen needle (UNIFINE PENTIPS) 31G X 6 MM USE ONCE DAILY 100 each 3     levothyroxine (SYNTHROID/LEVOTHROID) 75 MCG tablet TAKE ONE TABLET BY MOUTH ONE TIME DAILY  90 tablet 1      LORazepam (ATIVAN) 0.5 MG tablet TAKE 1 TAB BY MOUTH TWICE DAILY FOR ANXIETY/AGITATION;TAKE 1 TAB BYMOUTH EVERY 24 HOURS AS NEEDED FOR ANXIETY/AGITATION 90 tablet 1     LORazepam (ATIVAN) 0.5 MG tablet 1 tab BID scheduled. May have one additional dose per 24 hours if needed for anxiety or agitation. 75 tablet 1     losartan (COZAAR) 25 MG tablet Take 1 tablet (25 mg) by mouth daily for HTN. 90 tablet 3     Multiple Vitamins-Minerals (PRESERVISION AREDS 2) CAPS Take 1 capsule by mouth 2 times daily       omeprazole (PRILOSEC) 20 MG CR capsule Take 1 capsule (20 mg) by mouth daily 90 capsule 1     prednisoLONE acetate (PRED FORTE) 1 % ophthalmic suspension Place 1 drop Into the left eye 3 times daily       ranitidine (ZANTAC) 150 MG tablet Take 150 mg by mouth as needed for heartburn       sertraline (ZOLOFT) 50 MG tablet Take 1 tablet (50 mg) by mouth daily 30 tablet 11     timolol (TIMOPTIC) 0.5 % ophthalmic solution Place 1 drop Into the left eye 2 times daily  1 Bottle      valACYclovir (VALTREX) 1000 mg tablet Take 1,000 mg by mouth 3 times daily       White Petrolatum-Mineral Oil (REFRESH P.M.) OINT Instill 1 application in left eye BID for healing         ALLERGIES  Allergies   Allergen Reactions     Contrast Dye Hives and Swelling     Pt. States no history of allergy to topical betadine. Tolerated surgical betadine prep 2-6-2012.     Lisinopril Cough     dizzy     Metoprolol Other (See Comments)     Depressed    Depressed       PAST MEDICAL, SURGICAL, FAMILY HISTORY:  History was reviewed and updated as needed, see medical record.    SOCIAL HISTORY:  Social History     Socioeconomic History     Marital status:      Spouse name: Not on file     Number of children: Not on file     Years of education: Not on file     Highest education level: Not on file   Occupational History     Not on file   Tobacco Use     Smoking status: Former Smoker     Packs/day: 1.00     Years: 3.00     Pack years: 3.00     Types:  Cigarettes     Quit date: 1947     Years since quittin.0     Smokeless tobacco: Never Used   Substance and Sexual Activity     Alcohol use: No     Drug use: No     Sexual activity: Not on file   Other Topics Concern     Parent/sibling w/ CABG, MI or angioplasty before 65F 55M? Not Asked      Service Not Asked     Blood Transfusions Not Asked     Caffeine Concern Yes     Comment: 5 cups coffee in morning     Occupational Exposure Not Asked     Hobby Hazards Not Asked     Sleep Concern No     Stress Concern No     Weight Concern Not Asked     Special Diet No     Back Care Not Asked     Exercise Yes     Comment: walks hallways     Bike Helmet Not Asked     Seat Belt Yes     Self-Exams Not Asked   Social History Narrative     Not on file     Social Determinants of Health     Financial Resource Strain:      Difficulty of Paying Living Expenses:    Food Insecurity:      Worried About Running Out of Food in the Last Year:      Ran Out of Food in the Last Year:    Transportation Needs:      Lack of Transportation (Medical):      Lack of Transportation (Non-Medical):    Physical Activity:      Days of Exercise per Week:      Minutes of Exercise per Session:    Stress:      Feeling of Stress :    Social Connections:      Frequency of Communication with Friends and Family:      Frequency of Social Gatherings with Friends and Family:      Attends Spiritism Services:      Active Member of Clubs or Organizations:      Attends Club or Organization Meetings:      Marital Status:    Intimate Partner Violence:      Fear of Current or Ex-Partner:      Emotionally Abused:      Physically Abused:      Sexually Abused:      Review of Systems:  Skin:  Negative     Eyes:  Positive for glasses  ENT:  Negative    Respiratory:  Negative cough;mucoid expectorant  Cardiovascular:  Negative    Gastroenterology: Positive for heartburn  Genitourinary:  Positive for urinary frequency  Musculoskeletal:  Positive for back pain;neck  "pain  Neurologic:  Positive for numbness or tingling of hands;headaches  Psychiatric:  Positive for anxiety  Heme/Lymph/Imm:  Negative allergies  Endocrine:  Positive for diabetes;thyroid disorder     Physical Exam:  Vitals: /69 (BP Location: Right arm, Patient Position: Sitting, Cuff Size: Adult Regular)   Pulse 80   Ht 1.448 m (4' 9.01\")   Wt 68 kg (150 lb)   SpO2 99%   BMI 32.45 kg/m     Wt Readings from Last 4 Encounters:   08/20/21 68 kg (150 lb)   02/25/20 69.6 kg (153 lb 6 oz)   08/19/19 71.2 kg (157 lb)   06/17/19 70.8 kg (156 lb)     CONSTITUTIONAL: Appears her stated age, well nourished, and in no acute distress.  HEENT: Pupils equal, round. Sclerae nonicteric.    NECK: Supple, no masses or JVD appreciated.   C/V: Regular rate and rhythm, normal S1 and S2, no S3 or S4, no murmur, rub or gallop.  RESP: Respirations are unlabored. Lungs are clear to auscultation bilaterally without wheezing, rales, or rhonchi.  GI: Abdomen soft, non-tender, non-distended.  EXTREM: 1+ pitting lower extremity edema of the right ankle, no edema on the left side.  No clubbing or cyanosis.   NEURO: Alert, cooperative. Gait steady. No gross focal deficits.   PSYCH: Affect appropriate. Responds to questions appropriately.  SKIN: Warm and dry.     Recent Lab Results reviewed today:  LIPID RESULTS:  Lab Results   Component Value Date    CHOL 123 03/07/2017    HDL 49 03/07/2017    LDL 40 03/07/2017    TRIG 169 (H) 03/07/2017    CHOLHDLRATIO 3.2 08/06/2009     LIVER ENZYME RESULTS:  Lab Results   Component Value Date    AST 32 02/18/2019    ALT 38 (H) 02/18/2019     CBC RESULTS:  Lab Results   Component Value Date    WBC 7.5 02/26/2018    RBC 4.13 02/26/2018    HGB 12.7 02/26/2018    HCT 37.1 02/26/2018    MCV 90 02/26/2018    MCH 30.8 02/26/2018    MCHC 34.2 02/26/2018    RDW 14.7 02/26/2018     02/26/2018     BMP RESULTS:  Lab Results   Component Value Date     02/18/2019    POTASSIUM 4.6 02/18/2019    " CHLORIDE 102 02/18/2019    CO2 24 02/26/2018    ANIONGAP 7 02/26/2018     (H) 02/18/2019    BUN 21 02/18/2019    BUN 31 (H) 02/18/2019    CR 0.68 02/18/2019    GFRESTIMATED >90 02/26/2018    GFRESTBLACK >90 02/26/2018    JEN 9.7 02/18/2019      A1C RESULTS:  Lab Results   Component Value Date    A1C 7.2 (H) 08/19/2019     INR RESULTS:  Lab Results   Component Value Date    INR 0.93 12/10/2013    INR 1.00 11/22/2012     CC  Clay Charlton MD  [id number] [place] [id number]    This note was completed in part using Dragon voice recognition software. Although reviewed after completion, some word and grammatical errors may occur.      Thank you for allowing me to participate in the care of your patient.      Sincerely,     Dru Cote NP     [facility]  cc:   No referring provider defined for this encounter.

## 2021-08-24 NOTE — TELEPHONE ENCOUNTER
"TTE completed 8/20/2021:  Interpretation Summary     The left ventricle is normal in size.  There is moderate concentric left ventricular hypertrophy.  Proximal septal thickening is noted.  LVOT obstruction appears to be present but could not be measured well. Peak  gradient measured at 76 mmHg (mean 25 mmHg).  Left ventricular systolic function is normal.  The visual ejection fraction is 60-65%.  There is mild (1+) mitral regurgitation.  There is mild mitral stenosis.     Compared to the previous study, LVOT obstruction was not previously reported.  Mitral stenosis was reported to be NOT present with a mean MV gradient of 9.6  mmHg. Now, the gradient is less (5 mmHg), and mild mitral stenosis is  reported.  Technically difficult, suboptimal study.      ----- Message from Dru Cote NP sent at 8/23/2021 11:45 AM CDT -----  Please call patient's daughter Priti (pt has history of dementia) with an update that Serena's echocardiogram results look good. Some thickening of the heart wall was noted which was not noted on previous studies. Would recommend stopping losartan and switching to carvedilol 6.25 mg twice daily in place of this to help with this issue. The leakiness of the mitral valve looks stable, if not improved some in comparison to the last check. She can follow up as planned with Dr. Auguste or Latesha in about 6 months. Thank you! Osmani Cote, APRN, CNP       Call placed to pt's daughter Priti to review results. No answer - LVM / ACB 08/25/21 10:55 AM     Call back from daughter Priti. Daughter requests I speak with pt's granddaughter Lesa Murphy (former last name chhaya). CTC is on file. Will await call back from Lesa to review results and recommendations.   COLIN Lee RN, BSN. 08/25/21 11:02 AM     Call back received from granddaughter Lesa received. Results and recommendations reviewed. Per granddaughter pt was previously on coreg and did not tolerate it well stating \"pt had intolerable " "fatigue / Depressive sxs associated with the medication\" (per notes med was discontinued 2015 due to this reason by PMD). Per granddaughter pt is doing very well and has not had any complaints related to her current health / wellbeing. The family wishes to not make any changes unless necessary at this time. Will route to provider for review.   COLIN Lee RN, BSN.     "

## 2021-08-26 NOTE — TELEPHONE ENCOUNTER
Thank you for the update. Glad that she has been doing well. Reasonable to continue as we are without changes. The family or patient can call if she has any changes in her symptoms or concerns. Otherwise, can follow up as planned in about 6 months with Dr. Charlton or Molina.     ANNEMARIE Orellana, CNP

## 2021-08-26 NOTE — TELEPHONE ENCOUNTER
Call placed to pts granddaughter to review recommendations. understanding verbalized. No change in medications at this time. Family advised to call with any changes in status.   COLIN Lee RN, BSN. 08/26/21 3:48 PM

## 2022-01-01 ENCOUNTER — ANCILLARY PROCEDURE (OUTPATIENT)
Dept: CARDIOLOGY | Facility: CLINIC | Age: 87
End: 2022-01-01
Attending: INTERNAL MEDICINE
Payer: OTHER MISCELLANEOUS

## 2022-01-01 ENCOUNTER — ANCILLARY PROCEDURE (OUTPATIENT)
Dept: CARDIOLOGY | Facility: CLINIC | Age: 87
End: 2022-01-01
Attending: INTERNAL MEDICINE
Payer: MEDICARE

## 2022-01-01 ENCOUNTER — TELEPHONE (OUTPATIENT)
Dept: CARDIOLOGY | Facility: CLINIC | Age: 87
End: 2022-01-01
Payer: MEDICARE

## 2022-01-01 ENCOUNTER — LAB REQUISITION (OUTPATIENT)
Dept: LAB | Facility: CLINIC | Age: 87
End: 2022-01-01
Payer: MEDICARE

## 2022-01-01 DIAGNOSIS — Z95.0 CARDIAC PACEMAKER IN SITU: ICD-10-CM

## 2022-01-01 DIAGNOSIS — E03.4 ATROPHY OF THYROID (ACQUIRED): ICD-10-CM

## 2022-01-01 DIAGNOSIS — I49.5 SICK SINUS SYNDROME (H): ICD-10-CM

## 2022-01-01 LAB
MDC_IDC_LEAD_IMPLANT_DT: NORMAL
MDC_IDC_LEAD_LOCATION: NORMAL
MDC_IDC_LEAD_MFG: NORMAL
MDC_IDC_LEAD_MODEL: NORMAL
MDC_IDC_LEAD_POLARITY_TYPE: NORMAL
MDC_IDC_LEAD_SERIAL: NORMAL
MDC_IDC_PG_IMPLANT_DTM: NORMAL
MDC_IDC_PG_IMPLANT_DTM: NORMAL
MDC_IDC_PG_MFG: NORMAL
MDC_IDC_PG_MFG: NORMAL
MDC_IDC_PG_MODEL: NORMAL
MDC_IDC_PG_MODEL: NORMAL
MDC_IDC_PG_SERIAL: NORMAL
MDC_IDC_PG_SERIAL: NORMAL
MDC_IDC_PG_TYPE: NORMAL
MDC_IDC_PG_TYPE: NORMAL
MDC_IDC_SESS_CLINIC_NAME: NORMAL
MDC_IDC_SESS_CLINIC_NAME: NORMAL
MDC_IDC_SESS_DTM: NORMAL
MDC_IDC_SESS_DTM: NORMAL
MDC_IDC_SESS_TYPE: NORMAL
MDC_IDC_SESS_TYPE: NORMAL
TSH SERPL DL<=0.005 MIU/L-ACNC: 3.05 UIU/ML (ref 0.3–5)

## 2022-01-01 PROCEDURE — 36415 COLL VENOUS BLD VENIPUNCTURE: CPT | Mod: ORL | Performed by: NURSE PRACTITIONER

## 2022-01-01 PROCEDURE — P9604 ONE-WAY ALLOW PRORATED TRIP: HCPCS | Mod: ORL | Performed by: NURSE PRACTITIONER

## 2022-01-01 PROCEDURE — 93293 PM PHONE R-STRIP DEVICE EVAL: CPT | Performed by: INTERNAL MEDICINE

## 2022-01-01 PROCEDURE — 84443 ASSAY THYROID STIM HORMONE: CPT | Mod: ORL | Performed by: NURSE PRACTITIONER

## 2022-02-08 NOTE — DISCHARGE INSTRUCTIONS
[facility]  [address] [facility] [address], [place] [address]  Appointment Phone [phone] Nurse Advisors [phone]    eSrena Marie      8/21/1924    Wound Dressing Change:right buttocks  Cleanse wound and surrounding skin with: soap and water  Cover wound with duoderm  Change dressing every other day    Try not to sit more than an hour at a time .  Try to get a group 2 mattress for non-healing wound on buttock.         Britney Jones PA-C. January 8, 2019    Call us at [phone] if you have any questions about your wounds, have redness or swelling around your wound, have a fever of 101 or greater or if you have any other problems or concerns. We answer the phone Monday through Friday 8 am to 4 pm, please leave a message as we check the voicemail frequently throughout the day.     Follow up with Provider - 2 weeks         
09-Feb-2022 00:58

## 2022-06-16 NOTE — TELEPHONE ENCOUNTER
Received message from call center stating that patient's daughter, Priti Crooks, had questions regarding if anything was needed to be done with patient's pacemaker as patient is close to passing away.    Called and left message for Priti reassuring her that the pacemaker will not prolong patient's life if it is her time to go.  Also notified her that the home monitor can either be brought to an electronics recycling center or thrown away.    Requested they call the Device Techs when patient does pass to notify them of the date and also provided the Device Nurse number for any further questions.     PAT Yoon

## 2022-06-16 NOTE — TELEPHONE ENCOUNTER
[facility] Call Center    Phone Message    May a detailed message be left on voicemail: yes     Reason for Call: Other: Per daughter Daksha, pt has just a few days to live.  They are concerned about what they need to do about the pt's pacemaker.  Please call Daksha to advise the steps needed for this concern.     Action Taken: Message routed to:  [facility] cardio    Travel Screening: Not Applicable